# Patient Record
Sex: FEMALE | Race: WHITE | Employment: OTHER | URBAN - METROPOLITAN AREA
[De-identification: names, ages, dates, MRNs, and addresses within clinical notes are randomized per-mention and may not be internally consistent; named-entity substitution may affect disease eponyms.]

---

## 2017-11-14 DIAGNOSIS — R07.9 CHEST PAIN, UNSPECIFIED TYPE: ICD-10-CM

## 2017-11-14 RX ORDER — SIMVASTATIN 20 MG
20 TABLET ORAL AT BEDTIME
Qty: 30 TABLET | Refills: 0 | Status: SHIPPED | OUTPATIENT
Start: 2017-11-14 | End: 2020-01-23

## 2017-11-14 NOTE — TELEPHONE ENCOUNTER
Patient to f/u PRN. Given x1 month refill as pt is out of medication. Pharmacy notified to defer future refills to PMD per LOV 10/2016 w/Dr. Nicolas.

## 2018-02-15 ENCOUNTER — APPOINTMENT (OUTPATIENT)
Dept: GENERAL RADIOLOGY | Facility: CLINIC | Age: 78
End: 2018-02-15
Attending: EMERGENCY MEDICINE
Payer: COMMERCIAL

## 2018-02-15 ENCOUNTER — HOSPITAL ENCOUNTER (OUTPATIENT)
Facility: CLINIC | Age: 78
Setting detail: OBSERVATION
Discharge: HOME | End: 2018-02-16
Attending: EMERGENCY MEDICINE | Admitting: INTERNAL MEDICINE
Payer: COMMERCIAL

## 2018-02-15 DIAGNOSIS — J44.1 CHRONIC OBSTRUCTIVE PULMONARY DISEASE WITH ACUTE EXACERBATION (H): ICD-10-CM

## 2018-02-15 DIAGNOSIS — R06.02 SOB (SHORTNESS OF BREATH): ICD-10-CM

## 2018-02-15 DIAGNOSIS — R07.9 CHEST PAIN: ICD-10-CM

## 2018-02-15 LAB
ALBUMIN SERPL-MCNC: 3.4 G/DL (ref 3.4–5)
ALP SERPL-CCNC: 49 U/L (ref 40–150)
ALT SERPL W P-5'-P-CCNC: 21 U/L (ref 0–50)
ANION GAP SERPL CALCULATED.3IONS-SCNC: 4 MMOL/L (ref 3–14)
AST SERPL W P-5'-P-CCNC: 29 U/L (ref 0–45)
BASOPHILS # BLD AUTO: 0 10E9/L (ref 0–0.2)
BASOPHILS NFR BLD AUTO: 0.3 %
BILIRUB SERPL-MCNC: 0.3 MG/DL (ref 0.2–1.3)
BUN SERPL-MCNC: 15 MG/DL (ref 7–30)
CALCIUM SERPL-MCNC: 8.8 MG/DL (ref 8.5–10.1)
CHLORIDE SERPL-SCNC: 103 MMOL/L (ref 94–109)
CO2 SERPL-SCNC: 31 MMOL/L (ref 20–32)
CREAT SERPL-MCNC: 0.61 MG/DL (ref 0.52–1.04)
DIFFERENTIAL METHOD BLD: NORMAL
EOSINOPHIL # BLD AUTO: 0.2 10E9/L (ref 0–0.7)
EOSINOPHIL NFR BLD AUTO: 3.7 %
ERYTHROCYTE [DISTWIDTH] IN BLOOD BY AUTOMATED COUNT: 12.5 % (ref 10–15)
GFR SERPL CREATININE-BSD FRML MDRD: >90 ML/MIN/1.7M2
GLUCOSE SERPL-MCNC: 90 MG/DL (ref 70–99)
HCT VFR BLD AUTO: 37.2 % (ref 35–47)
HGB BLD-MCNC: 12.7 G/DL (ref 11.7–15.7)
IMM GRANULOCYTES # BLD: 0 10E9/L (ref 0–0.4)
IMM GRANULOCYTES NFR BLD: 0.2 %
LACTATE BLD-SCNC: 1.1 MMOL/L (ref 0.7–2)
LIPASE SERPL-CCNC: 296 U/L (ref 73–393)
LYMPHOCYTES # BLD AUTO: 3.4 10E9/L (ref 0.8–5.3)
LYMPHOCYTES NFR BLD AUTO: 54.3 %
MCH RBC QN AUTO: 31.8 PG (ref 26.5–33)
MCHC RBC AUTO-ENTMCNC: 34.1 G/DL (ref 31.5–36.5)
MCV RBC AUTO: 93 FL (ref 78–100)
MONOCYTES # BLD AUTO: 0.4 10E9/L (ref 0–1.3)
MONOCYTES NFR BLD AUTO: 6.8 %
NEUTROPHILS # BLD AUTO: 2.2 10E9/L (ref 1.6–8.3)
NEUTROPHILS NFR BLD AUTO: 34.7 %
NRBC # BLD AUTO: 0 10*3/UL
NRBC BLD AUTO-RTO: 0 /100
PLATELET # BLD AUTO: 201 10E9/L (ref 150–450)
POTASSIUM SERPL-SCNC: 3.5 MMOL/L (ref 3.4–5.3)
PROT SERPL-MCNC: 7.2 G/DL (ref 6.8–8.8)
RBC # BLD AUTO: 4 10E12/L (ref 3.8–5.2)
SODIUM SERPL-SCNC: 138 MMOL/L (ref 133–144)
TROPONIN I SERPL-MCNC: <0.015 UG/L (ref 0–0.04)
WBC # BLD AUTO: 6.2 10E9/L (ref 4–11)

## 2018-02-15 PROCEDURE — 80053 COMPREHEN METABOLIC PANEL: CPT | Performed by: EMERGENCY MEDICINE

## 2018-02-15 PROCEDURE — 93005 ELECTROCARDIOGRAM TRACING: CPT

## 2018-02-15 PROCEDURE — 99285 EMERGENCY DEPT VISIT HI MDM: CPT | Mod: 25

## 2018-02-15 PROCEDURE — 84484 ASSAY OF TROPONIN QUANT: CPT | Performed by: EMERGENCY MEDICINE

## 2018-02-15 PROCEDURE — 83690 ASSAY OF LIPASE: CPT | Performed by: EMERGENCY MEDICINE

## 2018-02-15 PROCEDURE — 71046 X-RAY EXAM CHEST 2 VIEWS: CPT

## 2018-02-15 PROCEDURE — 83605 ASSAY OF LACTIC ACID: CPT | Performed by: EMERGENCY MEDICINE

## 2018-02-15 PROCEDURE — 85025 COMPLETE CBC W/AUTO DIFF WBC: CPT | Performed by: EMERGENCY MEDICINE

## 2018-02-15 PROCEDURE — 94640 AIRWAY INHALATION TREATMENT: CPT

## 2018-02-15 RX ORDER — IPRATROPIUM BROMIDE AND ALBUTEROL SULFATE 2.5; .5 MG/3ML; MG/3ML
3 SOLUTION RESPIRATORY (INHALATION)
Status: DISCONTINUED | OUTPATIENT
Start: 2018-02-15 | End: 2018-02-16

## 2018-02-15 ASSESSMENT — ENCOUNTER SYMPTOMS
SHORTNESS OF BREATH: 1
DIARRHEA: 1
CHEST TIGHTNESS: 1
DIAPHORESIS: 0
VOMITING: 0
DIZZINESS: 0
NAUSEA: 0
ABDOMINAL PAIN: 1
LIGHT-HEADEDNESS: 0
COUGH: 1

## 2018-02-15 NOTE — IP AVS SNAPSHOT
MRN:[id number]                      After Visit Summary   2/15/2018    Mel Castellanos    MRN: [id number]           Thank you!     Thank you for choosing [facility] for your care. Our goal is always to provide you with excellent care. Hearing back from our patients is one way we can continue to improve our services. Please take a few minutes to complete the written survey that you may receive in the mail after you visit with us. Thank you!        Patient Information     Date Of Birth          1940        About your hospital stay     You were admitted on:  February 16, 2018 [facility]    You were discharged on:  February 16, 2018        Reason for your hospital stay       Chest pain, suspect MSK                  Who to Call     For medical emergencies, please call 911.  For non-urgent questions about your medical care, please call your primary care provider or clinic, [phone]          Attending Provider     Provider Specialty    Yvonne Reagan MD Emergency Medicine    [place], Satinder Dumont MD Internal Medicine       Primary Care Provider Office Phone # Fax #    [facility] [phone]       When to contact your care team       Call your primary doctor if you have any of the following:  increased shortness of breath or increased pain.                  After Care Instructions     Activity       Your activity upon discharge: activity as tolerated            Diet       Follow this diet upon discharge: Orders Placed This Encounter      Moderate Consistent CHO Diet                  Follow-up Appointments     Follow-up and recommended labs and tests        Follow up with primary care provider, [facility], within 7-10 days for hospital follow- up.  No follow up labs or test are needed.                  Pending Results     No orders found for last 3 day(s).            Statement of Approval      "Ordered          18 1253  I have reviewed and agree with all the recommendations and orders detailed in this document.  EFFECTIVE NOW     Approved and electronically signed by:  Oleg Peterson MD             Admission Information     Date & Time Provider Department Dept. Phone    2/15/2018 Satinder Balderas MD [facility] Observation Unit [phone]      Your Vitals Were     Blood Pressure Temperature Respirations Height Weight Pulse Oximetry    115/45 (BP Location: Right arm) 97.5  F (36.4  C) (Oral) 14 1.676 m (5' 6\") 67.9 kg (149 lb 11.2 oz) 95%    BMI (Body Mass Index)                   24.16 kg/m2           MyChart Information     One[employer]See lets you send messages to your doctor, view your test results, renew your prescriptions, schedule appointments and more. To sign up, go to [email or web address] . Click on \"Log in\" on the left side of the screen, which will take you to the Welcome page. Then click on \"Sign up Now\" on the right side of the page.     You will be asked to enter the access code listed below, as well as some personal information. Please follow the directions to create your username and password.     Your access code is: [email or web address]  Expires: 2018  1:39 PM     Your access code will  in 90 days. If you need help or a new code, please call your [place] clinic or [phone].        Care EveryWhere ID     This is your Care EveryWhere ID. This could be used by other organizations to access your [facility] medical records  [id number]        Equal Access to Services     [facility]: Hadii berlin marcelo Sohumberto, waaxda luqadaha, qaybta kaalmalashon cadet. So [facility] [phone].    ATENCIÓN: Si habla español, tiene a ware disposición servicios gratuitos de asistencia lingüística. Llame al [phone].    We comply with applicable federal civil rights laws and [place] laws. We do not discriminate on the basis of race, color, " national origin, age, disability, sex, sexual orientation, or gender identity.               Review of your medicines      CONTINUE these medicines which have NOT CHANGED        Dose / Directions    AMLODIPINE BESYLATE PO        Dose:  5 mg   Take 5 mg by mouth daily   Refills:  0       ASPIRIN EC PO        Dose:  81 mg   Take 81 mg by mouth 2 times daily   Refills:  0       CENTRUM SILVER per tablet        Dose:  1 tablet   Take 1 tablet by mouth daily   Refills:  0       IMDUR PO        Dose:  60 mg   Take 60 mg by mouth daily   Refills:  0       metFORMIN 500 MG 24 hr tablet   Commonly known as:  GLUCOPHAGE-XR        Dose:  1000 mg   Take 1,000 mg by mouth daily (with dinner)   Refills:  0       metoprolol succinate 50 MG 24 hr tablet   Commonly known as:  TOPROL-XL   Used for:  HTN (hypertension)        Dose:  50 mg   Take 1 tablet (50 mg) by mouth 2 times daily   Quantity:  180 tablet   Refills:  3       nitroGLYcerin 0.4 MG sublingual tablet   Commonly known as:  NITROSTAT   Used for:  Chest pain, unspecified type        Dose:  0.4 mg   Place 1 tablet (0.4 mg) under the tongue every 5 minutes as needed X 3 doses total if needed for chest pain.   Quantity:  25 tablet   Refills:  3       OMEPRAZOLE PO        Dose:  20 mg   Take 20 mg by mouth every morning   Refills:  0       simvastatin 20 MG tablet   Commonly known as:  ZOCOR   Used for:  Chest pain, unspecified type        Dose:  20 mg   Take 1 tablet (20 mg) by mouth At Bedtime   Quantity:  30 tablet   Refills:  0       tiotropium 18 MCG capsule   Commonly known as:  SPIRIVA        Dose:  18 mcg   Inhale 18 mcg into the lungs daily   Refills:  0         STOP taking     HYDROCHLOROTHIAZIDE PO                    Protect others around you: Learn how to safely use, store and throw away your medicines at www.disposemymeds.org.             Medication List: This is a list of all your medications and when to take them. Check marks below indicate your daily home  schedule. Keep this list as a reference.      Medications           Morning Afternoon Evening Bedtime As Needed    AMLODIPINE BESYLATE PO   Take 5 mg by mouth daily   Last time this was given:  5 mg on 2/16/2018  9:18 AM                                ASPIRIN EC PO   Take 81 mg by mouth 2 times daily   Last time this was given:  81 mg on 2/16/2018  9:18 AM                                CENTRUM SILVER per tablet   Take 1 tablet by mouth daily   Last time this was given:  1 tablet on 2/16/2018  9:18 AM                                IMDUR PO   Take 60 mg by mouth daily   Last time this was given:  30 mg on 2/16/2018  9:18 AM                                metFORMIN 500 MG 24 hr tablet   Commonly known as:  GLUCOPHAGE-XR   Take 1,000 mg by mouth daily (with dinner)                                metoprolol succinate 50 MG 24 hr tablet   Commonly known as:  TOPROL-XL   Take 1 tablet (50 mg) by mouth 2 times daily                                nitroGLYcerin 0.4 MG sublingual tablet   Commonly known as:  NITROSTAT   Place 1 tablet (0.4 mg) under the tongue every 5 minutes as needed X 3 doses total if needed for chest pain.   Last time this was given:  0.4 mg on 2/16/2018 12:21 AM                                OMEPRAZOLE PO   Take 20 mg by mouth every morning   Last time this was given:  20 mg on 2/16/2018  9:18 AM                                simvastatin 20 MG tablet   Commonly known as:  ZOCOR   Take 1 tablet (20 mg) by mouth At Bedtime                                tiotropium 18 MCG capsule   Commonly known as:  SPIRIVA   Inhale 18 mcg into the lungs daily

## 2018-02-15 NOTE — IP AVS SNAPSHOT
[facility] Observation [facility]    [address] [place] [id number]    Phone:  [phone]                                       After Visit Summary   2/15/2018    Mel Castellanos    MRN: [id number]           After Visit Summary Signature Page     I have received my discharge instructions, and my questions have been answered. I have discussed any challenges I see with this plan with the nurse or doctor.    ..........................................................................................................................................  Patient/Patient Representative Signature      ..........................................................................................................................................  Patient Representative Print Name and Relationship to Patient    ..................................................               ................................................  Date                                            Time    ..........................................................................................................................................  Reviewed by Signature/Title    ...................................................              ..............................................  Date                                                            Time

## 2018-02-16 VITALS
TEMPERATURE: 97.5 F | RESPIRATION RATE: 14 BRPM | OXYGEN SATURATION: 95 % | BODY MASS INDEX: 24.06 KG/M2 | DIASTOLIC BLOOD PRESSURE: 45 MMHG | SYSTOLIC BLOOD PRESSURE: 115 MMHG | HEIGHT: 66 IN | WEIGHT: 149.7 LBS

## 2018-02-16 LAB
ALBUMIN UR-MCNC: 10 MG/DL
APPEARANCE UR: CLEAR
BILIRUB UR QL STRIP: NEGATIVE
COLOR UR AUTO: YELLOW
GLUCOSE BLDC GLUCOMTR-MCNC: 106 MG/DL (ref 70–99)
GLUCOSE BLDC GLUCOMTR-MCNC: 114 MG/DL (ref 70–99)
GLUCOSE BLDC GLUCOMTR-MCNC: 134 MG/DL (ref 70–99)
GLUCOSE UR STRIP-MCNC: NEGATIVE MG/DL
HBA1C MFR BLD: 6.5 % (ref 4.3–6)
HGB UR QL STRIP: NEGATIVE
INTERPRETATION ECG - MUSE: NORMAL
KETONES UR STRIP-MCNC: NEGATIVE MG/DL
LEUKOCYTE ESTERASE UR QL STRIP: NEGATIVE
MUCOUS THREADS #/AREA URNS LPF: PRESENT /LPF
NITRATE UR QL: NEGATIVE
PH UR STRIP: 6 PH (ref 5–7)
RBC #/AREA URNS AUTO: 1 /HPF (ref 0–2)
SOURCE: ABNORMAL
SP GR UR STRIP: 1.02 (ref 1–1.03)
SQUAMOUS #/AREA URNS AUTO: <1 /HPF (ref 0–1)
TROPONIN I SERPL-MCNC: <0.015 UG/L (ref 0–0.04)
TROPONIN I SERPL-MCNC: <0.015 UG/L (ref 0–0.04)
UROBILINOGEN UR STRIP-MCNC: 2 MG/DL (ref 0–2)
WBC #/AREA URNS AUTO: 1 /HPF (ref 0–2)

## 2018-02-16 PROCEDURE — 25000132 ZZH RX MED GY IP 250 OP 250 PS 637: Performed by: INTERNAL MEDICINE

## 2018-02-16 PROCEDURE — 81001 URINALYSIS AUTO W/SCOPE: CPT | Performed by: INTERNAL MEDICINE

## 2018-02-16 PROCEDURE — 84484 ASSAY OF TROPONIN QUANT: CPT | Mod: 91 | Performed by: INTERNAL MEDICINE

## 2018-02-16 PROCEDURE — 25000125 ZZHC RX 250: Performed by: EMERGENCY MEDICINE

## 2018-02-16 PROCEDURE — 99207 ZZC APP CREDIT; MD BILLING SHARED VISIT: CPT | Performed by: HOSPITALIST

## 2018-02-16 PROCEDURE — 25000132 ZZH RX MED GY IP 250 OP 250 PS 637: Performed by: EMERGENCY MEDICINE

## 2018-02-16 PROCEDURE — 99236 HOSP IP/OBS SAME DATE HI 85: CPT | Performed by: INTERNAL MEDICINE

## 2018-02-16 PROCEDURE — 36415 COLL VENOUS BLD VENIPUNCTURE: CPT | Performed by: INTERNAL MEDICINE

## 2018-02-16 PROCEDURE — G0378 HOSPITAL OBSERVATION PER HR: HCPCS

## 2018-02-16 PROCEDURE — 83036 HEMOGLOBIN GLYCOSYLATED A1C: CPT | Performed by: INTERNAL MEDICINE

## 2018-02-16 PROCEDURE — 00000146 ZZHCL STATISTIC GLUCOSE BY METER IP

## 2018-02-16 PROCEDURE — 94640 AIRWAY INHALATION TREATMENT: CPT

## 2018-02-16 RX ORDER — LORATADINE 10 MG/1
10 TABLET ORAL DAILY
Status: DISCONTINUED | OUTPATIENT
Start: 2018-02-16 | End: 2018-02-16 | Stop reason: HOSPADM

## 2018-02-16 RX ORDER — ASPIRIN 81 MG/1
81 TABLET ORAL DAILY
Status: DISCONTINUED | OUTPATIENT
Start: 2018-02-17 | End: 2018-02-16

## 2018-02-16 RX ORDER — AMLODIPINE BESYLATE 5 MG/1
5 TABLET ORAL DAILY
Status: DISCONTINUED | OUTPATIENT
Start: 2018-02-16 | End: 2018-02-16 | Stop reason: HOSPADM

## 2018-02-16 RX ORDER — ISOSORBIDE MONONITRATE 30 MG/1
30 TABLET, EXTENDED RELEASE ORAL DAILY
Status: DISCONTINUED | OUTPATIENT
Start: 2018-02-16 | End: 2018-02-16 | Stop reason: HOSPADM

## 2018-02-16 RX ORDER — NALOXONE HYDROCHLORIDE 0.4 MG/ML
.1-.4 INJECTION, SOLUTION INTRAMUSCULAR; INTRAVENOUS; SUBCUTANEOUS
Status: DISCONTINUED | OUTPATIENT
Start: 2018-02-16 | End: 2018-02-16 | Stop reason: HOSPADM

## 2018-02-16 RX ORDER — MORPHINE SULFATE 2 MG/ML
2 INJECTION, SOLUTION INTRAMUSCULAR; INTRAVENOUS
Status: DISCONTINUED | OUTPATIENT
Start: 2018-02-16 | End: 2018-02-16 | Stop reason: HOSPADM

## 2018-02-16 RX ORDER — MULTIPLE VITAMINS W/ MINERALS TAB 9MG-400MCG
1 TAB ORAL DAILY
Status: DISCONTINUED | OUTPATIENT
Start: 2018-02-16 | End: 2018-02-16 | Stop reason: HOSPADM

## 2018-02-16 RX ORDER — METFORMIN HCL 500 MG
1000 TABLET, EXTENDED RELEASE 24 HR ORAL
COMMUNITY
End: 2020-01-23

## 2018-02-16 RX ORDER — NICOTINE POLACRILEX 4 MG
15-30 LOZENGE BUCCAL
Status: DISCONTINUED | OUTPATIENT
Start: 2018-02-16 | End: 2018-02-16 | Stop reason: HOSPADM

## 2018-02-16 RX ORDER — LIDOCAINE 40 MG/G
CREAM TOPICAL
Status: DISCONTINUED | OUTPATIENT
Start: 2018-02-16 | End: 2018-02-16 | Stop reason: HOSPADM

## 2018-02-16 RX ORDER — ROPINIROLE 1 MG/1
1 TABLET, FILM COATED ORAL AT BEDTIME
Status: DISCONTINUED | OUTPATIENT
Start: 2018-02-17 | End: 2018-02-16 | Stop reason: HOSPADM

## 2018-02-16 RX ORDER — ACETAMINOPHEN 325 MG/1
650 TABLET ORAL EVERY 4 HOURS PRN
Status: DISCONTINUED | OUTPATIENT
Start: 2018-02-16 | End: 2018-02-16 | Stop reason: HOSPADM

## 2018-02-16 RX ORDER — ASPIRIN 81 MG/1
162 TABLET, CHEWABLE ORAL ONCE
Status: DISCONTINUED | OUTPATIENT
Start: 2018-02-16 | End: 2018-02-16 | Stop reason: HOSPADM

## 2018-02-16 RX ORDER — ASPIRIN 81 MG/1
81 TABLET ORAL 2 TIMES DAILY
Status: DISCONTINUED | OUTPATIENT
Start: 2018-02-16 | End: 2018-02-16 | Stop reason: HOSPADM

## 2018-02-16 RX ORDER — SIMVASTATIN 10 MG
20 TABLET ORAL AT BEDTIME
Status: DISCONTINUED | OUTPATIENT
Start: 2018-02-17 | End: 2018-02-16 | Stop reason: HOSPADM

## 2018-02-16 RX ORDER — ALBUTEROL SULFATE 0.83 MG/ML
2.5 SOLUTION RESPIRATORY (INHALATION) EVERY 4 HOURS PRN
Status: DISCONTINUED | OUTPATIENT
Start: 2018-02-16 | End: 2018-02-16 | Stop reason: HOSPADM

## 2018-02-16 RX ORDER — ACETAMINOPHEN 650 MG/1
650 SUPPOSITORY RECTAL EVERY 4 HOURS PRN
Status: DISCONTINUED | OUTPATIENT
Start: 2018-02-16 | End: 2018-02-16 | Stop reason: HOSPADM

## 2018-02-16 RX ORDER — ALUMINA, MAGNESIA, AND SIMETHICONE 2400; 2400; 240 MG/30ML; MG/30ML; MG/30ML
30 SUSPENSION ORAL EVERY 4 HOURS PRN
Status: DISCONTINUED | OUTPATIENT
Start: 2018-02-16 | End: 2018-02-16 | Stop reason: HOSPADM

## 2018-02-16 RX ORDER — DEXTROSE MONOHYDRATE 25 G/50ML
25-50 INJECTION, SOLUTION INTRAVENOUS
Status: DISCONTINUED | OUTPATIENT
Start: 2018-02-16 | End: 2018-02-16 | Stop reason: HOSPADM

## 2018-02-16 RX ORDER — NITROGLYCERIN 0.4 MG/1
0.4 TABLET SUBLINGUAL EVERY 5 MIN PRN
Status: DISCONTINUED | OUTPATIENT
Start: 2018-02-16 | End: 2018-02-16

## 2018-02-16 RX ORDER — NITROGLYCERIN 0.4 MG/1
0.4 TABLET SUBLINGUAL EVERY 5 MIN PRN
Status: DISCONTINUED | OUTPATIENT
Start: 2018-02-16 | End: 2018-02-16 | Stop reason: HOSPADM

## 2018-02-16 RX ORDER — IPRATROPIUM BROMIDE AND ALBUTEROL SULFATE 2.5; .5 MG/3ML; MG/3ML
3 SOLUTION RESPIRATORY (INHALATION) EVERY 4 HOURS PRN
Status: DISCONTINUED | OUTPATIENT
Start: 2018-02-16 | End: 2018-02-16

## 2018-02-16 RX ADMIN — IPRATROPIUM BROMIDE AND ALBUTEROL SULFATE 3 ML: .5; 3 SOLUTION RESPIRATORY (INHALATION) at 00:36

## 2018-02-16 RX ADMIN — AMLODIPINE BESYLATE 5 MG: 5 TABLET ORAL at 09:18

## 2018-02-16 RX ADMIN — MULTIPLE VITAMINS W/ MINERALS TAB 1 TABLET: TAB at 09:18

## 2018-02-16 RX ADMIN — UMECLIDINIUM 1 PUFF: 62.5 AEROSOL, POWDER ORAL at 09:18

## 2018-02-16 RX ADMIN — CHOLECALCIFEROL CAP 125 MCG (5000 UNIT) 5000 UNITS: 125 CAP at 09:18

## 2018-02-16 RX ADMIN — LORATADINE 10 MG: 10 TABLET ORAL at 09:18

## 2018-02-16 RX ADMIN — ASPIRIN 81 MG: 81 TABLET, COATED ORAL at 09:18

## 2018-02-16 RX ADMIN — ISOSORBIDE MONONITRATE 30 MG: 30 TABLET, EXTENDED RELEASE ORAL at 09:18

## 2018-02-16 RX ADMIN — NITROGLYCERIN 0.4 MG: 0.4 TABLET SUBLINGUAL at 00:21

## 2018-02-16 RX ADMIN — OMEPRAZOLE 20 MG: 20 CAPSULE, DELAYED RELEASE ORAL at 09:18

## 2018-02-16 RX ADMIN — IPRATROPIUM BROMIDE AND ALBUTEROL SULFATE 3 ML: .5; 3 SOLUTION RESPIRATORY (INHALATION) at 00:06

## 2018-02-16 NOTE — PROGRESS NOTES
"RN spoke with pt and advised per MD recommendation that stress test be completed due to history of abnormal stress test in November 2017. Pt continues to refuse stress test. Pt states she feels better. \"I just don't want to go through that again.\" Pt remains pleasant but does not want to have any more testing done.  "

## 2018-02-16 NOTE — H&P
[facility]    History and Physical  Hospitalist       Date of Admission:  2/15/2018  Date of Service (when I saw the patient): 02/16/18    Assessment & Plan   Mel Castellanos is a 77 year old female who presents with chest pain    Chest pain  Angina, suspected  Ms. Castellanos is a pleasant 77-year-old female with medical history remarkable for coronary disease with PCI in 2011 angiogram 2014, diabetes mellitus, hypertension, hyperlipidemia, COPD, history of TIA and restless leg syndrome who comes in today after having an episode of chest pain.  It is similar to previous episodes of chest pain that she had when she had her heart attacks.  Her initial troponin is negative.  EKG does not show any ischemic changes.  -We will admit to observation to rule out MI  -Serial troponins  -Telemetry  -Oxygen to keep oxygen sats were 92%  -Plan on Lexiscan nuclear stress test in the morning  -She has received aspirin already and will continue  -If her troponins become positive or she develops recurrent and worsening chest pain may need to transfer her to inpatient status heparinize.  At this point I do not feel this is necessary.    Diabetes mellitus type 2  She takes metformin for this.  -Hold metformin  -Blood sugar checks  -Moderate CHO diet  -A1c    Hypertension  We will continue her outpatient regimen including amlodipine 5 mg daily and Imdur 30 mg daily.  -Hold metoprolol for now as Lexiscan is planned for today  -Blood pressures under excellent control    Hyperlipidemia  Continue her simvastatin at 20 mg at at bedtime    COPD  Lungs appear clear with good air movement no wheezing appreciated  -continue her prior to admission Spiriva  -As needed duo nebs every 4 hours  -Continue Claritin    Restless leg syndrome  Continue Requip    Question urinary tract infection  Patient reports having history of urinary tract infection.  She feels as if she may have a right now.  Will send UA and follow-up.    DVT  Prophylaxis: Ambulate every shift  Code Status: Full Code    Disposition: Expected discharge in 1-2 days pending the results of the above evaluation..    Satinder Balderas MD  [phone] (P)  Text Page     Primary Care Physician   [facility]    Chief Complaint   Chest pain    History is obtained from the patient and medical records    History of Present Illness   Mel Castellanos is a 77 year old female who presents with chest pain.  Patient was in her normal state of health when she is at the kitchen washing dishes today.  She had sudden onset of pain underneath her left breast.  She states she has short of breath with this although it is a little unclear whether this is new shortness of breath are related to severe COPD.  She also does not appear to have any other related symptoms including nausea/vomiting or diaphoresis.  She took 2 nitro with the second nitro helping her pain.  She called 911 was transported here.  The time of my visit her pain is largely resolved.  She denies any shortness of breath this time.  Otherwise her review of systems was negative/normal.    Past Medical History    I have reviewed this patient's medical history and updated it with pertinent information if needed.   Past Medical History:   Diagnosis Date     Arthritis      Emphysema of lung (H)      High cholesterol      HTN (hypertension)      Insomnia      MI (myocardial infarction)      Restless leg syndrome      TIA (transient ischaemic attack)      Type 2 diabetes mellitus without complications (H)      UTI (urinary tract infection)        Past Surgical History   I have reviewed this patient's surgical history and updated it with pertinent information if needed.  Past Surgical History:   Procedure Laterality Date     APPENDECTOMY       CHOLECYSTECTOMY         Prior to Admission Medications   Prior to Admission Medications   Prescriptions Last Dose Informant Patient Reported? Taking?   ASPIRIN EC PO  Daughter Yes  No   Sig: Take 81 mg by mouth 2 times daily   Cholecalciferol (VITAMIN D3 PO)   Yes No   Sig: Take 5,000 Units by mouth daily   METFORMIN HCL PO  Daughter Yes No   Sig: Take 1,000 mg by mouth 2 times daily (with meals)    Multiple Vitamins-Minerals (CENTRUM SILVER) per tablet  Daughter Yes No   Sig: Take 1 tablet by mouth daily   OMEPRAZOLE PO  Daughter Yes No   Sig: Take 20 mg by mouth every morning    ROPINIROLE HCL PO  Daughter Yes No   Sig: Take 1 mg by mouth At Bedtime Restless legs   amLODIPine (NORVASC) 2.5 MG tablet   No No   Sig: Take 2 tablets (5 mg) by mouth daily   ipratropium - albuterol 0.5 mg/2.5 mg/3 mL (DUONEB) 0.5-2.5 (3) MG/3ML nebulization  Daughter Yes No   Sig: Take 1 vial by nebulization 3 times daily as needed for shortness of breath / dyspnea or wheezing   isosorbide mononitrate (IMDUR) 30 MG 24 hr tablet   No No   Sig: Take 1 tablet (30 mg) by mouth daily   loratadine (CLARITIN) 10 MG tablet  Daughter Yes No   Sig: Take 10 mg by mouth daily   metoprolol (TOPROL-XL) 50 MG 24 hr tablet   No No   Sig: Take 1 tablet (50 mg) by mouth 2 times daily   nitroglycerin (NITROSTAT) 0.4 MG SL tablet   No No   Sig: Place 1 tablet (0.4 mg) under the tongue every 5 minutes as needed X 3 doses total if needed for chest pain.   simvastatin (ZOCOR) 20 MG tablet   No No   Sig: Take 1 tablet (20 mg) by mouth At Bedtime   tiotropium (SPIRIVA) 18 MCG inhalation capsule  Daughter Yes No   Sig: Inhale 18 mcg into the lungs daily      Facility-Administered Medications: None     Allergies   Allergies   Allergen Reactions     Sulfa Drugs Rash       Social History   I have reviewed this patient's social history and updated it with pertinent information if needed. Mel TAMIE Castellanos  reports that she has been smoking.  She has been smoking about 0.25 packs per day. She does not have any smokeless tobacco history on file. She reports that she does not drink alcohol or use illicit drugs.    Family History   I have reviewed  this patient's family history and updated it with pertinent information if needed.   Family History   Problem Relation Age of Onset     Coronary Artery Disease Father        Review of Systems   The 10 point Review of Systems is negative other than noted in the HPI or here.     Physical Exam   Temp: 98.1  F (36.7  C) Temp src: Oral BP: 110/57   Heart Rate: 68 Resp: 16 SpO2: 94 % O2 Device: None (Room air)    Vital Signs with Ranges  0 lbs 0 oz    Constitutional: alert, oriented and in no acute distress  Eyes: EOMI, PERRL  HEENT: OP clear  Respiratory: CTA B without wheezing appreciated.  Cardiovascular: RRR without m/r/g  GI: soft, nontender, nondistended, no HSM  Lymph/Hematologic: no cervical LAD  Genitourinary: deferred  Skin: no rashes or lesions grossly  Musculoskeletal: no deformities or arthritis  Neurologic: CN II-XII, HOOD, sensation grossly intact  Psychiatric: mood and affect wnl    Data   Data reviewed today:  I personally reviewed the EKG tracing showing No acute ischemic changes and the chest x-ray image(s) showing No acute infiltrates or changes..    Recent Labs  Lab 02/15/18  2325   WBC 6.2   HGB 12.7   MCV 93         POTASSIUM 3.5   CHLORIDE 103   CO2 31   BUN 15   CR 0.61   ANIONGAP 4   LAUREN 8.8   GLC 90   ALBUMIN 3.4   PROTTOTAL 7.2   BILITOTAL 0.3   ALKPHOS 49   ALT 21   AST 29   LIPASE 296   TROPI <0.015       Recent Results (from the past 24 hour(s))   XR Chest 2 Views    Narrative    CHEST 2 VIEWS  2/15/2018 11:46 PM     HISTORY: Chest pain.    COMPARISON: 8/4/2016.    FINDINGS: A few possible tiny calcified granulomas in the right lung  apex. The lungs are otherwise clear. Normal-sized cardiac silhouette.  Atherosclerotic calcification in the thoracic aorta.      Impression    IMPRESSION: No evidence of active cardiopulmonary disease.

## 2018-02-16 NOTE — PROGRESS NOTES
A&Ox4, forgetful at times. VSS on RA. Up independently. Denies pain. Discharge instructions reviewed with pt; understanding verbalized.

## 2018-02-16 NOTE — PHARMACY-ADMISSION MEDICATION HISTORY
Admission medication history interview status for the 2/15/2018  admission is complete. See EPIC admission navigator for prior to admission medications     Medication history source reliability:Good    Actions taken by pharmacist (provider contacted, etc):interviewed patient and called [facility] pharmacy for doses/meds as pt couldn't remember them all.     Additional medication history information not noted on PTA med list :None    Medication reconciliation/reorder completed by provider prior to medication history? No    Time spent in this activity: 25 minutes    Prior to Admission medications    Medication Sig Last Dose Taking? Auth Provider   AMLODIPINE BESYLATE PO Take 5 mg by mouth daily 2/15/2018 at Unknown time Yes Unknown, Entered By History   metFORMIN (GLUCOPHAGE-XR) 500 MG 24 hr tablet Take 1,000 mg by mouth daily (with dinner) 2/15/2018 at Unknown time Yes Unknown, Entered By History   HYDROCHLOROTHIAZIDE PO Take 25 mg by mouth daily 2/15/2018 at Unknown time Yes Unknown, Entered By History   Isosorbide Mononitrate (IMDUR PO) Take 60 mg by mouth daily 2/15/2018 at Unknown time Yes Unknown, Entered By History   simvastatin (ZOCOR) 20 MG tablet Take 1 tablet (20 mg) by mouth At Bedtime 2/15/2018 at Unknown time Yes Briseida Nicolas,    metoprolol (TOPROL-XL) 50 MG 24 hr tablet Take 1 tablet (50 mg) by mouth 2 times daily 2/15/2018 at Unknown time Yes Briseida Nicolas, DO   ASPIRIN EC PO Take 81 mg by mouth 2 times daily 2/15/2018 at Unknown time Yes Unknown, Entered By History   Multiple Vitamins-Minerals (CENTRUM SILVER) per tablet Take 1 tablet by mouth daily 2/15/2018 at Unknown time Yes Reported, Patient   tiotropium (SPIRIVA) 18 MCG inhalation capsule Inhale 18 mcg into the lungs daily 2/15/2018 at Unknown time Yes Reported, Patient   OMEPRAZOLE PO Take 20 mg by mouth every morning  2/15/2018 at Unknown time Yes Reported, Patient   nitroglycerin (NITROSTAT) 0.4 MG SL tablet Place 1  tablet (0.4 mg) under the tongue every 5 minutes as needed X 3 doses total if needed for chest pain.   Briseida Nicolas,           1

## 2018-02-16 NOTE — PLAN OF CARE
Problem: Patient Care Overview  Goal: Plan of Care/Patient Progress Review  Outcome: No Change  AVSS; pt denied pain during the night; on 1L/NC; UA sent; troponins negative so far; tele SR; plan for stress test today.

## 2018-02-16 NOTE — ED NOTES
Bed: ED21  Expected date: 2/15/18  Expected time: 11:00 PM  Means of arrival: Ambulance  Comments:  HEMS [phone] abd. pain

## 2018-02-16 NOTE — ED NOTES
[facility]  ED Nurse Handoff Report    ED Chief complaint: Chest Pain (c/o L lower CP with onset 40mins pta. EMS reports LLQ tenderness. +SOB with hx of COPD. Took 2 nitro at home. )      ED Diagnosis:   Final diagnoses:   Chest pain       Code Status: Full Code    Allergies:   Allergies   Allergen Reactions     Sulfa Drugs Rash       Activity level - Baseline/Home:  Independent    Activity Level - Current:   Stand with Assist     Needed?: No    Isolation: No  Infection: Not Applicable    Bariatric?: No    Vital Signs:   Vitals:    02/16/18 0002 02/16/18 0003 02/16/18 0004 02/16/18 0020   BP: 131/68   130/70   Resp:  25 12 9   Temp:       TempSrc:       SpO2: 94%  97% 97%       Cardiac Rhythm: ,        Pain level: 0-10 Pain Scale: 5    Is this patient confused?: No    Patient Report: Initial Complaint: started having chest pain 40min PTA,  Reports has hx of COPD and that she was feeling short of breath. Took 2 nitro at home with no relief, called EMS.  Pt c/o 4/10 here . Pt A/o.   Focused Assessment: chest pain  Tests Performed: labs, cxr  Abnormal Results: none  Treatments provided: nitro x1 pain 4/10 to 3/10.  duoneb given x2  Pt placed on O2 at 2liters per NC SAO2 90% room air    Family Comments: lives alone    OBS brochure/video discussed/provided to patient: Yes    ED Medications:   Medications   ipratropium - albuterol 0.5 mg/2.5 mg/3 mL (DUONEB) neb solution 3 mL (3 mLs Nebulization Given 2/16/18 0036)   nitroGLYcerin (NITROSTAT) sublingual tablet 0.4 mg (0.4 mg Sublingual Given 2/16/18 0021)       Drips infusing?:  No      ED NURSE PHONE NUMBER: [phone]

## 2018-02-16 NOTE — PROGRESS NOTES
RECEIVING UNIT ED HANDOFF REVIEW    ED Nurse Handoff Report was reviewed by: Judy Laurent on February 16, 2018 at 1:05 AM

## 2018-02-16 NOTE — ED PROVIDER NOTES
History     Chief Complaint:  Chest Pain    HPI   Mel Castellanos is a 77 year old female with a history of COPD not on baseline oxygen, MI, and TIA who presents with chest pain. The patient reports developing left lower chest pain 40 minutes prior to arrival tonight. She states that she was standing in the kitchen when she developed chest tightness with shortness of breath but no diaphoresis, nausea, or vomiting. She took 2 Nitroglycerin at home and called EMS for further transport; she presents to the ED and states that her discomfort has decreased to a 5/10 in severity following the nitro. She has a history of COPD and reports feeling chronic shortness of breath but not new or worsening now. She notes diarrhea without black or bloody stools. She denies lightheadedness or dizziness. EMS additionally reports her having left lower quadrant abdominal pain but the patient notes this to be chronic and diffuse across her abdomen. Of note, the patient additionally notes worsening cough with increased phlegm production recently.    Allergies:  Sulfa Drugs      Medications:    Zocor  Metoprolol  Nitroglycerin  Imdur  Norvasc  Claritin  Aspirin   Duoneb  Spiriva  Metformin  Omeprazole  Ropinirole      Past Medical History:    Arthritis  Emphysema of lung  High cholesterol   Hypertension   MI  TIA  Diabetes type II  UTI    Past Surgical History:    Appendectomy  Cholecystectomy    Family History:    CAD - father    Social History:  Smoking status: yes  Alcohol use: no   PCP: Monroe Cheng   Patient presents via EMS.   Marital Status:       Review of Systems   Constitutional: Negative for diaphoresis.   HENT: Positive for congestion.    Respiratory: Positive for cough, chest tightness and shortness of breath.    Cardiovascular: Positive for chest pain.   Gastrointestinal: Positive for abdominal pain and diarrhea. Negative for nausea and vomiting.   Neurological: Negative for dizziness and  light-headedness.   All other systems reviewed and are negative.    Physical Exam     Patient Vitals for the past 24 hrs:   BP Temp Temp src Heart Rate Resp SpO2   02/16/18 0043 - - - 64 19 98 %   02/16/18 0042 - - - 68 10 99 %   02/16/18 0041 - - - 64 (!) 7 -   02/16/18 0040 107/49 - - 68 9 -   02/16/18 0020 130/70 - - 66 9 97 %   02/16/18 0004 - - - 68 12 97 %   02/16/18 0003 - - - - 25 -   02/16/18 0002 131/68 - - - - 94 %   02/15/18 2309 139/71 98.1  F (36.7  C) Oral 65 18 95 %      Physical Exam  Physical Exam   General: Resting on the bed.  Ears, Nose, Throat:  External ears normal.  Nose normal.  .    Eyes:  Conjunctivae clear.  Pupils are equal, round, and reactive.   Neck: Normal range of motion.  Neck supple.   CV: Regular rate and rhythm.  No murmurs.      Respiratory: Effort normal and breath sounds diminished and scattered wheezing  Gastrointestinal: Soft.  No distension. There is diffuse tenderness.  There is no rigidity, no rebound and no guarding.   Neuro: Alert. Moving all extremities appropriately.  Normal speech.    Skin: Skin is warm and dry.  No rash noted.     Emergency Department Course   ECG (23:20):  Rate 63 bpm. PA interval 164. QT/QTc 418/427.   Normal sinus rhythm. Nonspecific ST abnormality. Abnormal ECG.  Interpreted at 2325 by Yvonne Reagan MD.      Imaging:  Radiographic findings were communicated with the patient who voiced understanding of the findings.    X-ray Chest, 2 views:  No evidence of active cardiopulmonary disease.   As read by Radiology.     Laboratory:  CBC: WNL (WBC 6.2, HGB 12.7, )   CMP: AWNL (Creatinine 0.61)  Lipase: 296  Troponin: <0.015  Lactic: 1.1    Interventions:  0021: Nitroglycerin 0.4 mg SL  0036: Duoneb 3 mL Nebulization    Emergency Department Course:  Past medical records, nursing notes, and vitals reviewed.  2309: I performed an exam of the patient and obtained history, as documented above. GCS 15.  IV inserted and blood drawn.  The  patient was taken for xray, see imaging results above.    I rechecked and updated the patient  Findings and plan explained to the Patient who consents to admission.     0030: Discussed the patient with Dr. Balderas, who will admit the patient to an observation bed for further monitoring, evaluation, and treatment.      Impression & Plan      Medical Decision Making:  Mel Castellanos is a 77 year old female with a history of CAD, prior MI, diabetes, COPD, presenting with chest discomfort. Vital signs unremarkable. Broad differential diagnosis includes but not limited to COPD exacerbation, CHF, ACS, PE, pneumonia, pneumothorax, dissection, etc. Overall, patient is well appearing and non toxic. She describes some vague abdominal pain, therefore, abdominal labs were added on as well. Lactate normal, not concerning for ischemic process. Lipase and LFT's unremarkable, not concerned for obstructive biliary etiology, pancreatitis, or hepatitis. BMP without any acute electrolyte, renal, or metabolic abnormality. CBC without leukocytosis or anemia. Chest xray shows no evidence of acute cardiopulmonary disease. Patient denies any tearing pain, back pain, and has symmetric peripheral pulses, not concerning for dissection. Considered PE but patient has no pleuritic chest pain, no significant shortness of breath, no hypoxia or tachycardia, not concerning for PE. I suspect there is some element of COPD as the patient is quite wheezy and sounds rattley on examination. She was given some nebulizers.  EKG obtained and reviewed showing sinus rhythm, no acute ST or T wave changes, overall, relatively unchanged from prior EKG. Troponin is negative. Patient's pain started only 40 minutes prior to presentation. This is concerning for possible ACS as she described it as similar chest pain to when she had a heart attack. She is also a heart score of 6 based on risk factors, age, etc. Therefore the decision was made to bring her into  observation for ACS rule out.  I discussed the patient with Dr. Balderas, who graciously accepted. Patient was admitted in stable condition.    Diagnosis:    ICD-10-CM    1. Chest pain R07.9 Hemoglobin A1c     UA with Microscopic reflex to Culture     Troponin I - Now then in 4 hours x 2      Troponin I - Now then in 4 hours x 2      Glucose by meter     Glucose by meter     CANCELED: UA reflex to Microscopic and Culture   2. SOB (shortness of breath) R06.02    3. Chronic obstructive pulmonary disease with acute exacerbation (H) J44.1        Disposition:  Admitted to observation    Rosmery Avalos  2/15/2018    EMERGENCY DEPARTMENT  I, Rosmery Avalos, am serving as a scribe at 11:09 PM on 2/15/2018 to document services personally performed by Yvonne Reagan MD based on my observations and the provider's statements to me.        Yvonne Reagan MD  02/16/18 0729

## 2018-02-16 NOTE — PROGRESS NOTES
Observation Goals:    Serial troponins and stress test complete. - not met; needs 0600 troponin check and stress test today  - Seen and cleared by consultant if applicable - not met  - Adequate pain control on oral analgesia - met  - Vital signs normal or at patient baseline - met  - Safe disposition plan has been identified - partially met

## 2018-02-16 NOTE — PROGRESS NOTES
Observation Goals:      Serial troponins and stress test complete. - not met; trops neg x3, pt refusing stress test  - Seen and cleared by consultant if applicable - n/a  - Adequate pain control on oral analgesia - denies pain  - Vital signs normal or at patient baseline - met  - Safe disposition plan has been identified - met, will discharge home

## 2018-02-16 NOTE — PROGRESS NOTES
Observation Goals:     Serial troponins and stress test complete. - not met; trops neg x3, pt refusing stress test  - Seen and cleared by consultant if applicable - not met  - Adequate pain control on oral analgesia - denies pain  - Vital signs normal or at patient baseline - met  - Safe disposition plan has been identified - partially met

## 2018-02-18 NOTE — DISCHARGE SUMMARY
"[facility]    Discharge Summary  Hospitalist    Date of Admission:  2/15/2018  Date of Discharge:  2/16/2018  1:56 PM  Discharging Provider: Oleg Peterson MD  Date of Service (when I saw the patient): 2/16/18    Discharge Diagnoses   Atypical chest pain, resolved  HTN, Essential  DM2  Hyperlipidemia  Restless leg syndrome  COPD      History of Present Illness   From H&P \"Mel Castellanos is a 77 year old female who presents with chest pain.  Patient was in her normal state of health when she is at the kitchen washing dishes today.  She had sudden onset of pain underneath her left breast.  She states she has short of breath with this although it is a little unclear whether this is new shortness of breath are related to severe COPD.  She also does not appear to have any other related symptoms including nausea/vomiting or diaphoresis.  She took 2 nitro with the second nitro helping her pain.  She called 911 was transported here.  The time of my visit her pain is largely resolved.  She denies any shortness of breath this time.  Otherwise her review of systems was negative/normal.\"    Hospital Course   Mel Castellanos was admitted on 2/15/2018.  The following problems were addressed during her hospitalization:     Chest pain  Angina, suspected  Ms. Castellanos is a pleasant 77-year-old female with medical history remarkable for coronary disease with PCI in 2011 angiogram 2014, diabetes mellitus, hypertension, hyperlipidemia, COPD, history of TIA and restless leg syndrome who came to ER after having an episode of chest pain. Patient reported on admission that her pain was similar to previous episodes of chest pain that she had when she had her heart attacks, though subsequently reported it started when she was cleaning the dog kennel, and some soiled linen. Her serial troponin remained negative.  EKG did not show any ischemic changes. She remained chest pain free during hospital stay. Did not have any " recurrence of pain, and reported that her pain was more during hand movement after cleaning the dog kennel. She had stress test 5 months ago, and had followed by cardiologist at outpatient. Patient did not want further testing, which I also felt was not needed given atypical pain. She was discharged     Question urinary tract infection  Patient reports having history of urinary tract infection.  She feels as if she may have a right now. UA was negative.    PTA medications were continued for her other stable medical conditions at discharge which included:   Diabetes mellitus type 2   Hypertension   Hyperlipidemia  COPD  Restless leg syndrome.    Oleg Peterson MD  Hospitalist    Significant Results and Procedures   Labs- reports attached.  CXR report attached    Pending Results   These results will be followed up by none    Unresulted Labs Ordered in the Past 30 Days of this Admission     No orders found from 12/17/2017 to 2/16/2018.          Code Status   Full Code       Primary Care Physician   [facility]    Constitutional: Alert, awake. Not in distress.  HEENT: PERRLA EOMI   Respiratory: Bilateral equal air entry, clear to auscultation. No respiratory distress.  Cardiovascular: Regular s1s2, no murmur, rub or gallop. No tachycardia.  GI: Soft, non distended, non tender, bowel tones active.  Skin/Integumen: No rash, no blister.    Discharge Disposition   Discharged to home  Condition at discharge: Stable    Consultations This Hospital Stay   None    Time Spent on this Encounter   I, Oleg Peterson, personally saw the patient today and spent less than or equal to 30 minutes discharging this patient.    Discharge Orders     Reason for your hospital stay   Chest pain, suspect MSK     Follow-up and recommended labs and tests    Follow up with primary care provider, [facility], within 7-10 days for hospital follow- up.  No follow up labs or test are needed.     Activity   Your  activity upon discharge: activity as tolerated     When to contact your care team   Call your primary doctor if you have any of the following:  increased shortness of breath or increased pain.     Full Code     Diet   Follow this diet upon discharge: Orders Placed This Encounter     Moderate Consistent CHO Diet       Discharge Medications   Discharge Medication List as of 2/16/2018  1:40 PM      CONTINUE these medications which have NOT CHANGED    Details   AMLODIPINE BESYLATE PO Take 5 mg by mouth daily, Historical      metFORMIN (GLUCOPHAGE-XR) 500 MG 24 hr tablet Take 1,000 mg by mouth daily (with dinner), Historical      Isosorbide Mononitrate (IMDUR PO) Take 60 mg by mouth daily, Historical      simvastatin (ZOCOR) 20 MG tablet Take 1 tablet (20 mg) by mouth At Bedtime, Disp-30 tablet, R-0, E-Prescribe      metoprolol (TOPROL-XL) 50 MG 24 hr tablet Take 1 tablet (50 mg) by mouth 2 times daily, Disp-180 tablet, R-3, E-Prescribe      ASPIRIN EC PO Take 81 mg by mouth 2 times daily, Historical      Multiple Vitamins-Minerals (CENTRUM SILVER) per tablet Take 1 tablet by mouth daily, Historical      tiotropium (SPIRIVA) 18 MCG inhalation capsule Inhale 18 mcg into the lungs daily, Historical      OMEPRAZOLE PO Take 20 mg by mouth every morning , Historical      nitroglycerin (NITROSTAT) 0.4 MG SL tablet Place 1 tablet (0.4 mg) under the tongue every 5 minutes as needed X 3 doses total if needed for chest pain., Disp-25 tablet, R-3, E-Prescribe         STOP taking these medications       HYDROCHLOROTHIAZIDE PO Comments:   Reason for Stopping:         loratadine (CLARITIN) 10 MG tablet Comments:   Reason for Stopping:         Cholecalciferol (VITAMIN D3 PO) Comments:   Reason for Stopping:         ROPINIROLE HCL PO Comments:   Reason for Stopping:             Allergies   Allergies   Allergen Reactions     Sulfa Drugs Rash     Data   Most Recent 3 CBC's:  Recent Labs   Lab Test  02/15/18   2325  08/04/16   1327   04/27/14   0536   WBC  6.2  6.2  7.0   HGB  12.7  12.2  12.8   MCV  93  91  91   PLT  201  233  202      Most Recent 3 BMP's:  Recent Labs   Lab Test  02/15/18   2325  09/19/16   1128  08/04/16   1327   NA  138  135*  135   POTASSIUM  3.5  4.0  3.3*   CHLORIDE  103  97*  96   CO2  31  31*  29   BUN  15  12  8   CR  0.61  0.68*  0.53   ANIONGAP  4  11  10   LAUREN  8.8  9.4  8.3*   GLC  90  106*  157*     Most Recent 2 LFT's:  Recent Labs   Lab Test  02/15/18   2325  04/24/14   1030   AST  29  26   ALT  21  23   ALKPHOS  49  43   BILITOTAL  0.3  0.5     Most Recent INR's and Anticoagulation Dosing History:  Anticoagulation Dose History     Recent Dosing and Labs Latest Ref Rng & Units 10/22/2009 10/23/2009 10/24/2009 10/25/2009 4/28/2011 9/13/2011 4/24/2014    INR 0.86 - 1.14 1.09 1.11 1.77(H) 1.92(H) 1.05 1.03 0.98        Most Recent 3 Troponin's:  Recent Labs   Lab Test  02/16/18   0635  02/16/18   0315  02/15/18   2325   TROPI  <0.015  <0.015  <0.015     Most Recent Cholesterol Panel:  Recent Labs   Lab Test  10/05/16   1036   CHOL  129   LDL  49   HDL  51   TRIG  144     Most Recent 6 Bacteria Isolates From Any Culture (See EPIC Reports for Culture Details):  Recent Labs   Lab Test  04/30/11 2010 04/27/11   1820   CULT  No growth after 6 days  No growth after 6 days  No growth     Most Recent TSH, T4 and A1c Labs:  Recent Labs   Lab Test  02/16/18   0635   04/27/11   1710   TSH   --    --   0.78   A1C  6.5*   < >   --     < > = values in this interval not displayed.     Results for orders placed or performed during the hospital encounter of 02/15/18   XR Chest 2 Views    Narrative    CHEST 2 VIEWS  2/15/2018 11:46 PM     HISTORY: Chest pain.    COMPARISON: 8/4/2016.    FINDINGS: A few possible tiny calcified granulomas in the right lung  apex. The lungs are otherwise clear. Normal-sized cardiac silhouette.  Atherosclerotic calcification in the thoracic aorta.      Impression    IMPRESSION: No evidence of active  cardiopulmonary disease.    MAGDALENA SIN MD

## 2018-03-29 ENCOUNTER — HOSPITAL ENCOUNTER (EMERGENCY)
Facility: CLINIC | Age: 78
Discharge: HOME | End: 2018-03-29
Attending: EMERGENCY MEDICINE | Admitting: EMERGENCY MEDICINE
Payer: COMMERCIAL

## 2018-03-29 ENCOUNTER — APPOINTMENT (OUTPATIENT)
Dept: GENERAL RADIOLOGY | Facility: CLINIC | Age: 78
End: 2018-03-29
Attending: EMERGENCY MEDICINE
Payer: COMMERCIAL

## 2018-03-29 VITALS
OXYGEN SATURATION: 96 % | HEART RATE: 72 BPM | SYSTOLIC BLOOD PRESSURE: 126 MMHG | RESPIRATION RATE: 20 BRPM | HEIGHT: 66 IN | TEMPERATURE: 98 F | WEIGHT: 141 LBS | BODY MASS INDEX: 22.66 KG/M2 | DIASTOLIC BLOOD PRESSURE: 54 MMHG

## 2018-03-29 DIAGNOSIS — Z86.79 HISTORY OF CORONARY ARTERY DISEASE: ICD-10-CM

## 2018-03-29 DIAGNOSIS — R07.9 ACUTE CHEST PAIN: ICD-10-CM

## 2018-03-29 LAB
ANION GAP SERPL CALCULATED.3IONS-SCNC: 8 MMOL/L (ref 3–14)
BASOPHILS # BLD AUTO: 0 10E9/L (ref 0–0.2)
BASOPHILS NFR BLD AUTO: 0.2 %
BUN SERPL-MCNC: 13 MG/DL (ref 7–30)
CALCIUM SERPL-MCNC: 9 MG/DL (ref 8.5–10.1)
CHLORIDE SERPL-SCNC: 105 MMOL/L (ref 94–109)
CO2 SERPL-SCNC: 26 MMOL/L (ref 20–32)
CREAT SERPL-MCNC: 0.63 MG/DL (ref 0.52–1.04)
D DIMER PPP FEU-MCNC: 0.6 UG/ML FEU (ref 0–0.5)
DIFFERENTIAL METHOD BLD: NORMAL
EOSINOPHIL # BLD AUTO: 0.1 10E9/L (ref 0–0.7)
EOSINOPHIL NFR BLD AUTO: 2.7 %
ERYTHROCYTE [DISTWIDTH] IN BLOOD BY AUTOMATED COUNT: 12.5 % (ref 10–15)
GFR SERPL CREATININE-BSD FRML MDRD: >90 ML/MIN/1.7M2
GLUCOSE SERPL-MCNC: 141 MG/DL (ref 70–99)
HCT VFR BLD AUTO: 36 % (ref 35–47)
HGB BLD-MCNC: 12 G/DL (ref 11.7–15.7)
IMM GRANULOCYTES # BLD: 0 10E9/L (ref 0–0.4)
IMM GRANULOCYTES NFR BLD: 0.2 %
INTERPRETATION ECG - MUSE: NORMAL
LIPASE SERPL-CCNC: 51 U/L (ref 73–393)
LYMPHOCYTES # BLD AUTO: 1.3 10E9/L (ref 0.8–5.3)
LYMPHOCYTES NFR BLD AUTO: 28.6 %
MCH RBC QN AUTO: 30.8 PG (ref 26.5–33)
MCHC RBC AUTO-ENTMCNC: 33.3 G/DL (ref 31.5–36.5)
MCV RBC AUTO: 92 FL (ref 78–100)
MONOCYTES # BLD AUTO: 0.3 10E9/L (ref 0–1.3)
MONOCYTES NFR BLD AUTO: 6.5 %
NEUTROPHILS # BLD AUTO: 2.8 10E9/L (ref 1.6–8.3)
NEUTROPHILS NFR BLD AUTO: 61.8 %
NRBC # BLD AUTO: 0 10*3/UL
NRBC BLD AUTO-RTO: 0 /100
PLATELET # BLD AUTO: 173 10E9/L (ref 150–450)
POTASSIUM SERPL-SCNC: 3.6 MMOL/L (ref 3.4–5.3)
RBC # BLD AUTO: 3.9 10E12/L (ref 3.8–5.2)
SODIUM SERPL-SCNC: 139 MMOL/L (ref 133–144)
TROPONIN I SERPL-MCNC: <0.015 UG/L (ref 0–0.04)
TROPONIN I SERPL-MCNC: <0.015 UG/L (ref 0–0.04)
WBC # BLD AUTO: 4.5 10E9/L (ref 4–11)

## 2018-03-29 PROCEDURE — 83690 ASSAY OF LIPASE: CPT | Performed by: EMERGENCY MEDICINE

## 2018-03-29 PROCEDURE — 85025 COMPLETE CBC W/AUTO DIFF WBC: CPT | Performed by: EMERGENCY MEDICINE

## 2018-03-29 PROCEDURE — 99285 EMERGENCY DEPT VISIT HI MDM: CPT | Mod: 25

## 2018-03-29 PROCEDURE — 71046 X-RAY EXAM CHEST 2 VIEWS: CPT

## 2018-03-29 PROCEDURE — 80048 BASIC METABOLIC PNL TOTAL CA: CPT | Performed by: EMERGENCY MEDICINE

## 2018-03-29 PROCEDURE — 93005 ELECTROCARDIOGRAM TRACING: CPT | Mod: 76

## 2018-03-29 PROCEDURE — 84484 ASSAY OF TROPONIN QUANT: CPT | Mod: 91 | Performed by: EMERGENCY MEDICINE

## 2018-03-29 PROCEDURE — 85379 FIBRIN DEGRADATION QUANT: CPT | Performed by: EMERGENCY MEDICINE

## 2018-03-29 PROCEDURE — 93005 ELECTROCARDIOGRAM TRACING: CPT

## 2018-03-29 ASSESSMENT — ENCOUNTER SYMPTOMS
SHORTNESS OF BREATH: 1
NAUSEA: 0
VOMITING: 0

## 2018-03-29 NOTE — ED AVS SNAPSHOT
Emergency Department    [address] [facility] [address]    Phone:  [phone]    Fax:  [phone]                                       Mel Castellanos   MRN: [id number]    Department:   Emergency Department   Date of Visit:  3/29/2018           Patient Information     Date Of Birth          1940        Your diagnoses for this visit were:     Acute chest pain     History of coronary artery disease        You were seen by Cristhian Washington MD.      Follow-up Information     Follow up with [facility]. Call today.    Contact information:    [address], [address]  [facility] [address] [phone]          Follow up with  Emergency Department.    Specialty:  EMERGENCY MEDICINE    Why:  As needed, If symptoms worsen    Contact information:    [address] [facility] [id number] [phone]        Discharge Instructions          *CHEST PAIN, UNCERTAIN CAUSE    Based on your exam today, the exact cause of your chest pain is not certain. Your condition does not seem serious at this time, and your pain does not appear to be coming from your heart. However, sometimes the signs of a serious problem take more time to appear. Therefore, watch for the warning signs listed below.  HOME CARE:  1. Rest today and avoid strenuous activity.  2. Take any prescribed medicine as directed.  FOLLOW UP with your doctor in 1-3 days.   GET PROMPT MEDICAL ATTENTION if any of the following occur:    A change in the type of pain: if it feels different, becomes more severe, lasts longer, or begins to spread into your shoulder, arm, neck, jaw or back    Shortness of breath or increased pain with breathing    Weakness, dizziness, or fainting    Cough with blood or dark colored sputum (phlegm)    Fever over 101  F (38.3  C)    Swelling, pain or redness in one leg    8867-0738 [employer]. [address] [place] [address], [place] [id number]. All rights  reserved. This information is not intended as a substitute for professional medical care. Always follow your healthcare professional's instructions.  This information has been modified by your health care provider with permission from the publisher.      24 Hour Appointment Hotline       To make an appointment at any [facility], call [phone] ([phone]). If you don't have a family doctor or clinic, we will help you find one. [facility] are conveniently located to serve the needs of you and your family.             Review of your medicines      Our records show that you are taking the medicines listed below. If these are incorrect, please call your family doctor or clinic.        Dose / Directions Last dose taken    AMLODIPINE BESYLATE PO   Dose:  5 mg        Take 5 mg by mouth daily   Refills:  0        ASPIRIN EC PO   Dose:  81 mg        Take 81 mg by mouth 2 times daily   Refills:  0        CENTRUM SILVER per tablet   Dose:  1 tablet        Take 1 tablet by mouth daily   Refills:  0        IMDUR PO   Dose:  60 mg        Take 60 mg by mouth daily   Refills:  0        metFORMIN 500 MG 24 hr tablet   Commonly known as:  GLUCOPHAGE-XR   Dose:  1000 mg        Take 1,000 mg by mouth daily (with dinner)   Refills:  0        metoprolol succinate 50 MG 24 hr tablet   Commonly known as:  TOPROL-XL   Dose:  50 mg   Quantity:  180 tablet        Take 1 tablet (50 mg) by mouth 2 times daily   Refills:  3        nitroGLYcerin 0.4 MG sublingual tablet   Commonly known as:  NITROSTAT   Dose:  0.4 mg   Quantity:  25 tablet        Place 1 tablet (0.4 mg) under the tongue every 5 minutes as needed X 3 doses total if needed for chest pain.   Refills:  3        OMEPRAZOLE PO   Dose:  20 mg        Take 20 mg by mouth every morning   Refills:  0        simvastatin 20 MG tablet   Commonly known as:  ZOCOR   Dose:  20 mg   Quantity:  30 tablet        Take 1 tablet (20 mg) by mouth At Bedtime   Refills:  0         tiotropium 18 MCG capsule   Commonly known as:  SPIRIVA   Dose:  18 mcg        Inhale 18 mcg into the lungs daily   Refills:  0                Procedures and tests performed during your visit     Procedure/Test Number of Times Performed    Basic metabolic panel 1    CBC with platelets differential 1    Cardiac Continuous Monitoring 2    D dimer quantitative 1    EKG 12-lead, tracing only 2    Lipase 1    Peripheral IV catheter 1    Pulse oximetry nursing 2    Troponin I 2    XR Chest 2 Views 1      Orders Needing Specimen Collection     None      Pending Results     Date and Time Order Name Status Description    3/29/2018 1027 EKG 12-lead, tracing only Preliminary             Pending Culture Results     No orders found from 3/27/2018 to 3/30/2018.            Pending Results Instructions     If you had any lab results that were not finalized at the time of your Discharge, you can call the ED Lab Result RN at [phone]. You will be contacted by this team for any positive Lab results or changes in treatment. The nurses are available 7 days a week from 10A to 6:30P.  You can leave a message 24 hours per day and they will return your call.        Test Results From Your Hospital Stay        3/29/2018 10:40 AM      Component Results     Component Value Ref Range & Units Status    WBC 4.5 4.0 - 11.0 10e9/L Final    RBC Count 3.90 3.8 - 5.2 10e12/L Final    Hemoglobin 12.0 11.7 - 15.7 g/dL Final    Hematocrit 36.0 35.0 - 47.0 % Final    MCV 92 78 - 100 fl Final    MCH 30.8 26.5 - 33.0 pg Final    MCHC 33.3 31.5 - 36.5 g/dL Final    RDW 12.5 10.0 - 15.0 % Final    Platelet Count 173 150 - 450 10e9/L Final    Diff Method Automated Method  Final    % Neutrophils 61.8 % Final    % Lymphocytes 28.6 % Final    % Monocytes 6.5 % Final    % Eosinophils 2.7 % Final    % Basophils 0.2 % Final    % Immature Granulocytes 0.2 % Final    Nucleated RBCs 0 0 /100 Final    Absolute Neutrophil 2.8 1.6 - 8.3 10e9/L Final    Absolute  Lymphocytes 1.3 0.8 - 5.3 10e9/L Final    Absolute Monocytes 0.3 0.0 - 1.3 10e9/L Final    Absolute Eosinophils 0.1 0.0 - 0.7 10e9/L Final    Absolute Basophils 0.0 0.0 - 0.2 10e9/L Final    Abs Immature Granulocytes 0.0 0 - 0.4 10e9/L Final    Absolute Nucleated RBC 0.0  Final         3/29/2018 11:05 AM      Component Results     Component Value Ref Range & Units Status    Sodium 139 133 - 144 mmol/L Final    Potassium 3.6 3.4 - 5.3 mmol/L Final    Chloride 105 94 - 109 mmol/L Final    Carbon Dioxide 26 20 - 32 mmol/L Final    Anion Gap 8 3 - 14 mmol/L Final    Glucose 141 (H) 70 - 99 mg/dL Final    Urea Nitrogen 13 7 - 30 mg/dL Final    Creatinine 0.63 0.52 - 1.04 mg/dL Final    GFR Estimate >90 >60 mL/min/1.7m2 Final    Non  GFR Calc    GFR Estimate If Black >90 >60 mL/min/1.7m2 Final    African American GFR Calc    Calcium 9.0 8.5 - 10.1 mg/dL Final         3/29/2018 11:05 AM      Component Results     Component Value Ref Range & Units Status    Troponin I ES <0.015 0.000 - 0.045 ug/L Final    The 99th percentile for upper reference range is 0.045 ug/L.  Troponin values   in the range of 0.045 - 0.120 ug/L may be associated with risks of adverse   clinical events.           3/29/2018 11:26 AM      Component Results     Component Value Ref Range & Units Status    D Dimer 0.6 (H) 0.0 - 0.50 ug/ml FEU Final    This D-dimer assay is intended for use in conjunction with a clinical pretest   probability assessment model to exclude pulmonary embolism (PE) and deep   venous thrombosis (DVT) in outpatients suspected of PE or DVT. The cut-off   value is 0.5 ug/mL FEU.           3/29/2018 11:11 AM      Narrative     XR CHEST 2 VW 3/29/2018 11:00 AM    HISTORY: Pain.    COMPARISON: February 15, 2018.        Impression     IMPRESSION: The lungs are clear. No focal pulmonary opacities. Heart  and mediastinum are unremarkable. No acute cardiopulmonary  abnormalities.    LOVE REESE MD         3/29/2018 11:05  AM      Component Results     Component Value Ref Range & Units Status    Lipase 51 (L) 73 - 393 U/L Final         3/29/2018  2:08 PM      Component Results     Component Value Ref Range & Units Status    Troponin I ES <0.015 0.000 - 0.045 ug/L Final    The 99th percentile for upper reference range is 0.045 ug/L.  Troponin values   in the range of 0.045 - 0.120 ug/L may be associated with risks of adverse   clinical events.                  Clinical Quality Measure: Blood Pressure Screening     Your blood pressure was checked while you were in the emergency department today. The last reading we obtained was  BP: 129/56 . Please read the guidelines below about what these numbers mean and what you should do about them.  If your systolic blood pressure (the top number) is less than 120 and your diastolic blood pressure (the bottom number) is less than 80, then your blood pressure is normal. There is nothing more that you need to do about it.  If your systolic blood pressure (the top number) is 120-139 or your diastolic blood pressure (the bottom number) is 80-89, your blood pressure may be higher than it should be. You should have your blood pressure rechecked within a year by a primary care provider.  If your systolic blood pressure (the top number) is 140 or greater or your diastolic blood pressure (the bottom number) is 90 or greater, you may have high blood pressure. High blood pressure is treatable, but if left untreated over time it can put you at risk for heart attack, stroke, or kidney failure. You should have your blood pressure rechecked by a primary care provider within the next 4 weeks.  If your provider in the emergency department today gave you specific instructions to follow-up with your doctor or provider even sooner than that, you should follow that instruction and not wait for up to 4 weeks for your follow-up visit.        Thank you for choosing [facility]       Thank you for choosing [facility] for your  "care. Our goal is always to provide you with excellent care. Hearing back from our patients is one way we can continue to improve our services. Please take a few minutes to complete the written survey that you may receive in the mail after you visit with us. Thank you!        [employer] Information     [employer] lets you send messages to your doctor, view your test results, renew your prescriptions, schedule appointments and more. To sign up, go to [email or web address] . Click on \"Log in\" on the left side of the screen, which will take you to the Welcome page. Then click on \"Sign up Now\" on the right side of the page.     You will be asked to enter the access code listed below, as well as some personal information. Please follow the directions to create your username and password.     Your access code is: [email or web address]  Expires: 2018  2:39 PM     Your access code will  in 90 days. If you need help or a new code, please call your [facility] clinic or [phone].        Care EveryWhere ID     This is your Care EveryWhere ID. This could be used by other organizations to access your [facility] medical records  [id number]        Equal Access to Services     MICHAEL LOOMIS AH: Pito Patel, mark cowan, sage benites, lashon willard. So [facility] [phone].    ATENCIÓN: Si habla español, tiene a ware disposición servicios gratuitos de asistencia lingüística. Dana al [phone].    We comply with applicable federal civil rights laws and [place] laws. We do not discriminate on the basis of race, color, national origin, age, disability, sex, sexual orientation, or gender identity.            After Visit Summary       This is your record. Keep this with you and show to your community pharmacist(s) and doctor(s) at your next visit.                  "

## 2018-03-29 NOTE — ED NOTES
Bed: ED19  Expected date:   Expected time:   Means of arrival:   Comments:  [facility] [place] - 445 - 77F chest pain eta 1005

## 2018-03-29 NOTE — ED AVS SNAPSHOT
Emergency Department    [address] [place] [address]    Phone:  [phone]    Fax:  [phone]                                       Mel Castellanos   MRN: [id number]    Department:   Emergency Department   Date of Visit:  3/29/2018           After Visit Summary Signature Page     I have received my discharge instructions, and my questions have been answered. I have discussed any challenges I see with this plan with the nurse or doctor.    ..........................................................................................................................................  Patient/Patient Representative Signature      ..........................................................................................................................................  Patient Representative Print Name and Relationship to Patient    ..................................................               ................................................  Date                                            Time    ..........................................................................................................................................  Reviewed by Signature/Title    ...................................................              ..............................................  Date                                                            Time

## 2018-03-29 NOTE — DISCHARGE INSTRUCTIONS
*CHEST PAIN, UNCERTAIN CAUSE    Based on your exam today, the exact cause of your chest pain is not certain. Your condition does not seem serious at this time, and your pain does not appear to be coming from your heart. However, sometimes the signs of a serious problem take more time to appear. Therefore, watch for the warning signs listed below.  HOME CARE:  1. Rest today and avoid strenuous activity.  2. Take any prescribed medicine as directed.  FOLLOW UP with your doctor in 1-3 days.   GET PROMPT MEDICAL ATTENTION if any of the following occur:    A change in the type of pain: if it feels different, becomes more severe, lasts longer, or begins to spread into your shoulder, arm, neck, jaw or back    Shortness of breath or increased pain with breathing    Weakness, dizziness, or fainting    Cough with blood or dark colored sputum (phlegm)    Fever over 101  F (38.3  C)    Swelling, pain or redness in one leg    2746-3198 [employer]. [address] [place] [address]. All rights reserved. This information is not intended as a substitute for professional medical care. Always follow your healthcare professional's instructions.  This information has been modified by your health care provider with permission from the publisher.

## 2018-03-29 NOTE — ED PROVIDER NOTES
History     Chief Complaint:  Chest Pain     HPI   HPI limited due to patient being poor historian. Supplemental HPI provided by nurse's report and information from EMS upon arrival.    Mel Castellanos is a 77 year old female, with a history of MI, TIA, hypertension, hyperlipidemia, type 2 diabetes, emphysema, daily smoker, and s/p coronary stent to RCA in 2011, who presents to the ED for evaluation of left upper chest pain. Per nurse s report, the patient lives at home with her daughters.  She took 2 Nitro at home which alleviated her pain. She rates the pain as a 5/10. She was provided 324mg Aspirin and 500mLs bolus by EMS. Upon evaluation, the patient reports she was outside this morning smoking for a few minutes then developed chest pain while returning inside the house. The patient notes her chest pain feels improved. She has associated shortness of breath though is unable to elaborate on the chronicity of this. The patient denies any nausea, vomiting, or leg swelling.      She was hospitalized here on February 15 for chest pain, thought to be atypical and she ruled out for an acute coronary syndrome.  At that time she did not wish to have advanced testing such as stress test or catheterization so she was referred back to her outpatient teams.    Allergies:  Sulfa drugs       Medications:    Amlodipine besylate  Metformin  Imdur  Simvastatin  Metoprolol  Nitrostat  Aspirin  Centrum silver  Spiriva  Omeprazole    Past Medical History:    Unstable angina  Arthritis  Emphysema   HLD  HTN  Insomnia  RLS  MI  TIA  Type 2 DM  UTI    Past Surgical History:    Appendectomy  Cholecystectomy  Coronary stent     Family History:    CAD    Social History:  Smoking status: Current every day smoker   Alcohol use: No  Presents to ED via EMS  Marital Status:   [5]     Review of Systems   Respiratory: Positive for shortness of breath.    Cardiovascular: Positive for chest pain. Negative for leg swelling.  "  Gastrointestinal: Negative for nausea and vomiting.   Limited due to patient being poor historian.     Physical Exam     Patient Vitals for the past 24 hrs:   BP Temp Temp src Pulse Heart Rate Resp SpO2 Height Weight   03/29/18 1430 126/54 - - - 60 20 - - -   03/29/18 1400 129/56 - - - 65 20 96 % - -   03/29/18 1330 134/59 - - - 67 20 96 % - -   03/29/18 1315 142/64 - - - 68 - - - -   03/29/18 1300 132/67 - - - 65 15 - - -   03/29/18 1245 (!) 124/108 - - - 68 - - - -   03/29/18 1230 132/63 - - - 65 - 96 % - -   03/29/18 1215 130/63 - - - - - 95 % - -   03/29/18 1200 132/63 - - - 58 12 96 % - -   03/29/18 1145 128/58 - - - 63 19 94 % - -   03/29/18 1143 - - - - 64 19 95 % - -   03/29/18 1131 - - - - 64 15 94 % - -   03/29/18 1130 133/62 - - - 66 14 - - -   03/29/18 1115 130/64 - - - 67 19 96 % - -   03/29/18 1114 - - - - 65 21 96 % - -   03/29/18 1106 - - - - 68 - 98 % - -   03/29/18 1049 121/61 - - - 63 15 97 % - -   03/29/18 1020 (!) 85/60 - - 72 - - 95 % - -   03/29/18 1018 (!) 74/64 98  F (36.7  C) Oral 70 - 18 97 % 1.676 m (5' 6\") 64 kg (141 lb)     Physical Exam  General: nontoxic appearing woman sitting upright in room 19  HENT: mucous membranes moist  CV: regular rate, regular rhythm, no lower extremity edema, no JVD, palpable symmetric radial pulses, no murmur audible  Resp: clear throughout, normal effort, no crackles or wheezing  GI: abdomen soft and nontender, no guarding, negative Lafleur's sign  MSK: no bony tenderness to chest, FROM bilateral shoulders  Skin: appropriately warm and dry, no erythema or vesicles to chest wall  Neuro: alert, clear speech, poor historian, moves all extremities with symmetric tone, no meningismus  Psych: calm, somewhat wandering topics of conversation but can be verbally redirected, suboptimal insight into her medical situation, wishes to discuss that taco salad is no longer offered in the cafeteria and is upset that her (clinic) doctor told her she can no longer " drive.    Emergency Department Course     ECG #1 (10:19:09):  Rate 62 bpm. NJ interval 172. QRS duration 76. QT/QTc 422/428. P-R-T axes 83 59 44. Normal sinus rhythm. Nonspecific ST and T wave abnormality. Abnormal ECG. Interpreted at 1023 by Cristhian Washington MD.    ECG #2 (13:48:56):  Rate 63 bpm. NJ interval 176. QRS duration 74. QT/QTc 434/444. P-R-T axes 78 54 45. Normal sinus rhythm. Nonspecific ST abnormality. Abnormal ECG. No symptoms. Interpreted at 1407 by Cristhian Washington MD.    Imaging:  Radiographic findings were communicated with the patient who voiced understanding of the findings.    XR Chest 2 Views  IMPRESSION: The lungs are clear. No focal pulmonary opacities. Heart  and mediastinum are unremarkable. No acute cardiopulmonary  abnormalities. As read by Radiology.     Laboratory:  Troponin I #1 (1105): <0.015  Troponin I #2 (1327): <0.015  Lipase: 51(L)  D dimer: 0.6(H)  CBC: o/w WNL (WBC 4.5, HGB 12.0, )  BMP: Glucose 141(H) o/w WNL (Creatinine 0.63)     Emergency Department Course:  Patient arrived by EMS.     Past medical records, nursing notes, and vitals reviewed.  1028: I performed an exam of the patient and obtained history, as documented above.    IV inserted and blood drawn.    The patient was sent for a chest x-ray while in the emergency department, findings above.    The patient was placed on continuous cardiac and pulse ox monitoring.    1123: I rechecked the patient. Explained findings to patient.    1210: I spoke to patient's daughter over the phone.  She prefers discharge home if tests in ED don't show anything major.    1225: I rechecked the patient.     1448: I rechecked the patient. Explained findings to patient.    Findings and plan explained to the Patient. Patient discharged home with instructions regarding supportive care, medications, and reasons to return. The importance of close follow-up was reviewed.     Impression & Plan      Medical Decision  Making:  She presents with chest pain of unconfirmed etiology despite extensive testing as above. She is a challenging historian, as has been documented previously, and I spent a significant amount of time during multiple visits to her room trying to elicit her symptoms. Acknowledging her history of known coronary artery disease, ACS was clearly considered; though now with serial troponins that are negative and non-specific EKG's, I do not think there is indication for immediate cath lab activation. She was admitted to this hospital for chest pain rule-out just over a month ago, and did not wish to pursue that again nor advanced testing such as stress testing or coronary angiogram. This remains the case today. I spoke with her daughter who defers to the patient's wishes, which are for consistently and unmistakably for discharge. D dimer is normal when adjusted for age, making PE unlikely as well. Chest x-ray shows no pneumothorax or pneumonia. I do not suspect aortic pathology. She has been asymptomatic throughout her time here. Interestingly, she does have lower blood pressures in her left arm compared to her right arm, though this has been present for many years per daughter.  She has normal symmetric palpable pulses in each radial artery, such long standing findings as this would not explain her acute and now resolved chest pain, so I do not think she needs emergent advanced imaging such as CT of the chest, though this could be reasonably reconsidered through clinic. She was discharged home in asymptomatic state.     Diagnosis:    ICD-10-CM   1. Acute chest pain R07.9   2. History of coronary artery disease Z86.79     Disposition:Patient discharged to home     Fay David  3/29/2018    EMERGENCY DEPARTMENT    I, Fay David, am serving as a scribe at 10:28 AM on 3/29/2018 to document services personally performed by Cristhian Washington MD based on my observations and the provider's statements to me.         Cristhian Washington MD  03/29/18 7442

## 2019-12-20 ENCOUNTER — HOSPITAL ENCOUNTER (EMERGENCY)
Facility: CLINIC | Age: 79
Discharge: HOME | End: 2019-12-20
Attending: EMERGENCY MEDICINE | Admitting: EMERGENCY MEDICINE
Payer: COMMERCIAL

## 2019-12-20 VITALS
OXYGEN SATURATION: 93 % | TEMPERATURE: 97.8 F | SYSTOLIC BLOOD PRESSURE: 116 MMHG | RESPIRATION RATE: 20 BRPM | DIASTOLIC BLOOD PRESSURE: 60 MMHG

## 2019-12-20 DIAGNOSIS — L81.9 DISCOLORATION OF SKIN OF LOWER LEG: ICD-10-CM

## 2019-12-20 DIAGNOSIS — R82.90 ABNORMAL URINALYSIS: ICD-10-CM

## 2019-12-20 LAB
ALBUMIN SERPL-MCNC: 3 G/DL (ref 3.4–5)
ALBUMIN SERPL-MCNC: NORMAL G/DL (ref 3.4–5)
ALBUMIN UR-MCNC: NEGATIVE MG/DL
ALP SERPL-CCNC: 76 U/L (ref 40–150)
ALP SERPL-CCNC: NORMAL U/L (ref 40–150)
ALT SERPL W P-5'-P-CCNC: 22 U/L (ref 0–50)
ALT SERPL W P-5'-P-CCNC: NORMAL U/L (ref 0–50)
ANION GAP SERPL CALCULATED.3IONS-SCNC: 3 MMOL/L (ref 3–14)
ANION GAP SERPL CALCULATED.3IONS-SCNC: NORMAL MMOL/L (ref 6–17)
APPEARANCE UR: CLEAR
AST SERPL W P-5'-P-CCNC: 39 U/L (ref 0–45)
AST SERPL W P-5'-P-CCNC: NORMAL U/L (ref 0–45)
BASOPHILS # BLD AUTO: 0 10E9/L (ref 0–0.2)
BASOPHILS NFR BLD AUTO: 0.2 %
BILIRUB SERPL-MCNC: 0.5 MG/DL (ref 0.2–1.3)
BILIRUB SERPL-MCNC: NORMAL MG/DL (ref 0.2–1.3)
BILIRUB UR QL STRIP: NEGATIVE
BUN SERPL-MCNC: 9 MG/DL (ref 7–30)
BUN SERPL-MCNC: NORMAL MG/DL (ref 7–30)
CALCIUM SERPL-MCNC: 9.4 MG/DL (ref 8.5–10.1)
CALCIUM SERPL-MCNC: NORMAL MG/DL (ref 8.5–10.1)
CHLORIDE SERPL-SCNC: 103 MMOL/L (ref 94–109)
CHLORIDE SERPL-SCNC: NORMAL MMOL/L (ref 94–109)
CO2 SERPL-SCNC: 31 MMOL/L (ref 20–32)
CO2 SERPL-SCNC: NORMAL MMOL/L (ref 20–32)
COLOR UR AUTO: ABNORMAL
CREAT SERPL-MCNC: 0.54 MG/DL (ref 0.52–1.04)
CREAT SERPL-MCNC: NORMAL MG/DL (ref 0.52–1.04)
DIFFERENTIAL METHOD BLD: NORMAL
EOSINOPHIL # BLD AUTO: 0.1 10E9/L (ref 0–0.7)
EOSINOPHIL NFR BLD AUTO: 2 %
ERYTHROCYTE [DISTWIDTH] IN BLOOD BY AUTOMATED COUNT: 12.6 % (ref 10–15)
GFR SERPL CREATININE-BSD FRML MDRD: 89 ML/MIN/{1.73_M2}
GFR SERPL CREATININE-BSD FRML MDRD: NORMAL ML/MIN/{1.73_M2}
GLUCOSE SERPL-MCNC: 109 MG/DL (ref 70–99)
GLUCOSE SERPL-MCNC: NORMAL MG/DL (ref 70–99)
GLUCOSE UR STRIP-MCNC: NEGATIVE MG/DL
HCT VFR BLD AUTO: 39 % (ref 35–47)
HGB BLD-MCNC: 13.1 G/DL (ref 11.7–15.7)
HGB UR QL STRIP: NEGATIVE
HYALINE CASTS #/AREA URNS LPF: 1 /LPF (ref 0–2)
IMM GRANULOCYTES # BLD: 0 10E9/L (ref 0–0.4)
IMM GRANULOCYTES NFR BLD: 0.3 %
KETONES UR STRIP-MCNC: NEGATIVE MG/DL
LEUKOCYTE ESTERASE UR QL STRIP: ABNORMAL
LYMPHOCYTES # BLD AUTO: 1.5 10E9/L (ref 0.8–5.3)
LYMPHOCYTES NFR BLD AUTO: 24.3 %
MCH RBC QN AUTO: 31.3 PG (ref 26.5–33)
MCHC RBC AUTO-ENTMCNC: 33.6 G/DL (ref 31.5–36.5)
MCV RBC AUTO: 93 FL (ref 78–100)
MONOCYTES # BLD AUTO: 0.3 10E9/L (ref 0–1.3)
MONOCYTES NFR BLD AUTO: 3.9 %
MUCOUS THREADS #/AREA URNS LPF: PRESENT /LPF
NEUTROPHILS # BLD AUTO: 4.4 10E9/L (ref 1.6–8.3)
NEUTROPHILS NFR BLD AUTO: 69.3 %
NITRATE UR QL: NEGATIVE
NRBC # BLD AUTO: 0 10*3/UL
NRBC BLD AUTO-RTO: 0 /100
PH UR STRIP: 8 PH (ref 5–7)
PLATELET # BLD AUTO: 227 10E9/L (ref 150–450)
POTASSIUM SERPL-SCNC: 3.7 MMOL/L (ref 3.4–5.3)
POTASSIUM SERPL-SCNC: NORMAL MMOL/L (ref 3.4–5.3)
PROT SERPL-MCNC: 6.5 G/DL (ref 6.8–8.8)
PROT SERPL-MCNC: NORMAL G/DL (ref 6.8–8.8)
RBC # BLD AUTO: 4.19 10E12/L (ref 3.8–5.2)
RBC #/AREA URNS AUTO: 0 /HPF (ref 0–2)
SODIUM SERPL-SCNC: 137 MMOL/L (ref 133–144)
SODIUM SERPL-SCNC: NORMAL MMOL/L (ref 133–144)
SOURCE: ABNORMAL
SP GR UR STRIP: 1.02 (ref 1–1.03)
SQUAMOUS #/AREA URNS AUTO: 1 /HPF (ref 0–1)
UROBILINOGEN UR STRIP-MCNC: NORMAL MG/DL (ref 0–2)
WBC # BLD AUTO: 6.4 10E9/L (ref 4–11)
WBC #/AREA URNS AUTO: 11 /HPF (ref 0–5)

## 2019-12-20 PROCEDURE — 87186 SC STD MICRODIL/AGAR DIL: CPT | Performed by: EMERGENCY MEDICINE

## 2019-12-20 PROCEDURE — 99283 EMERGENCY DEPT VISIT LOW MDM: CPT

## 2019-12-20 PROCEDURE — 80053 COMPREHEN METABOLIC PANEL: CPT | Performed by: EMERGENCY MEDICINE

## 2019-12-20 PROCEDURE — 87088 URINE BACTERIA CULTURE: CPT | Performed by: EMERGENCY MEDICINE

## 2019-12-20 PROCEDURE — 85025 COMPLETE CBC W/AUTO DIFF WBC: CPT | Performed by: EMERGENCY MEDICINE

## 2019-12-20 PROCEDURE — 87086 URINE CULTURE/COLONY COUNT: CPT | Performed by: EMERGENCY MEDICINE

## 2019-12-20 PROCEDURE — 81001 URINALYSIS AUTO W/SCOPE: CPT | Performed by: EMERGENCY MEDICINE

## 2019-12-20 RX ORDER — CEPHALEXIN 500 MG/1
500 CAPSULE ORAL 2 TIMES DAILY
Qty: 14 CAPSULE | Refills: 0 | Status: SHIPPED | OUTPATIENT
Start: 2019-12-20 | End: 2020-01-23

## 2019-12-20 NOTE — ED AVS SNAPSHOT
Emergency Department  [address] [place] [address]  Phone:  [phone]  Fax:  [phone]                                    Mel Castellanos   MRN: [id number]    Department:   Emergency Department   Date of Visit:  12/20/2019           After Visit Summary Signature Page    I have received my discharge instructions, and my questions have been answered. I have discussed any challenges I see with this plan with the nurse or doctor.    ..........................................................................................................................................  Patient/Patient Representative Signature      ..........................................................................................................................................  Patient Representative Print Name and Relationship to Patient    ..................................................               ................................................  Date                                   Time    ..........................................................................................................................................  Reviewed by Signature/Title    ...................................................              ..............................................  Date                                               Time          22EPIC Rev 08/18

## 2019-12-21 NOTE — ED TRIAGE NOTES
Pt states she has a bladder infection. She was seen at the Urgent care today who was worried about sepsis.

## 2019-12-21 NOTE — DISCHARGE INSTRUCTIONS
Your exam and test results are somewhat abnormal though not fully convincing for skin infection (cellulitis) or urinary infection.  However, I think it would be reasonable to start you on an antibiotic to treat for these possibilities.  Be sure to contact your primary care clinic for recheck early this coming week.

## 2019-12-21 NOTE — ED PROVIDER NOTES
"  History     Chief Complaint:  \"I have a UTI\"    HPI supplemented with electronic chart review.  History limited by: Sadia historian.      Mel Castellanos is a 79 year old female who presents with multiple concerns from Urgent Care via EMS. She reports that she presented to Urgent Care today with concerns about redness on her legs and a feeling like she \"has a bladder infection\". Per EMS, she was sent from Urgent Care with concerns for \"sepsis\", though EMS specifically states that they were not given any information to further support concern for sepsis or serious infection.  The patient herself states that she was sent here by EMS because urgent care was closing on this Friday evening.  Per her note at Urgent Care, she had an elevated respiratory rate of 22 breaths per minute but vitals are otherwise unremarkable. EMS states that she had a heart rate of 77 and blood pressures of 120/66 in transit. In the emergency department, the patient states that she thinks that she had a bladder infection because she is incontinent to urine when she stands up but states that this is an ongoing problem and has been present for years. She denies dysuria. She states that she has had a bladder infection \"every year since [she] was 34\". When asked if she is on blood thinners, she answered in the affirmative because she \"drinks Diet Coke\".  She states that the slight redness to her left foot is longstanding.    Further history later obtained from her daughter.  She states that she just recently refilled all of the patient's medications, which happened to include a standing prescription for nitrofurantoin, which the patient is started taking.  She has not had her urine tested lately.   has not seen a doctor    Allergies:  Sulfa drugs     Medications:    Zocor  Toprol-XL  Nitrostat  Aspirin 81 mg  Glucophage  Omeprazole  Spiriva    Past Medical History:    CAD  HLD  Type 2 DM  Subclavian artery stenosis/occlusion, left  Unstable " angina  Arthritis  Emphysema  HTN  Insomnia  MI  TIA  Recurrent UTI    Past Surgical History:    Appendectomy  Cholecystectomy    Family History:    CAD - father    Social History:  Smoking status: 0.25 packs per day  Alcohol use: No    Marital Status:       Review of Systems   Reason unable to perform ROS: Poor historian.     Physical Exam     Patient Vitals for the past 24 hrs:   BP Temp Temp src Heart Rate Resp SpO2   12/20/19 2035 116/60 97.8  F (36.6  C) Oral 70 20 93 %     Physical Exam  General: Nontoxic-appearing woman who is able to ambulate around the emergency department, bright-colored tennis shoes  HENT: mucous membranes moist cooperative, CV: regular rate, regular rhythm, no lower extremity edema, no JVD, palpable symmetric DP/PT pulses, compartments soft throughout all extremities, negative Homans sign bilaterally  Resp: clear throughout, normal effort, no crackles or wheezing  GI: abdomen soft and nontender, no guarding  MSK: no bony tenderness to lower extremities, no CVA tenderness   Skin: appropriately warm and dry, mild chronic appearing erythema and punctate spots to left foot, no open wounds seen, no ecchymosis  Neuro: alert, clear speech, oriented though very poor historian, ambulates independently,  equal, lifts both legs off bed  Psych: tangential and disorganized conversation though redirectable, good eye contact      Emergency Department Course     Laboratory:  CMP: Glucose 109 (H), albumin: 3.0 (L), protein total: 6.5 (L), o/w WNL (Creatinine: 0.54)    UA with Microscopic: pH: 8.0 (H), leukocyte esterase: small, WBC: 11 (H), mucous: present, o/w WNL    Urine culture: pending    CBC: WBC: 6.4, HGB: 13.1, PLT: 227    Emergency Department Course:  Nursing notes and vitals reviewed. (2020) I performed an exam of the patient as documented above.     IV inserted. Blood drawn. This was sent to the lab for further testing, results above.     2059 I rechecked the patient and discussed  the results of her workup thus far.     2142 I consulted with the patient's daughter regarding the patient's history and presentation here in the emergency department. She mentioned that the patient has some mild to moderate dementia.    Findings and plan explained to the Patient. Patient discharged home with instructions regarding supportive care, medications, and reasons to return. The importance of close follow-up was reviewed. The patient was prescribed Keflex.    I personally reviewed the laboratory results with the Patient and answered all related questions prior to discharge.     Impression & Plan    Medical Decision Making:  Her evaluation was made quite challenging due to the fact that she is a very poor historian, though this is a longstanding concern, present when I saw her many months ago as well.  No evidence of acute change in mental status.  Regarding her leg redness, this appears quite chronic.  She does not have fever, leukocytosis, or physical exam findings to raise high concern for cellulitis, though given  daughter at her daughter's concern for possible infection I felt it was reasonable to initiate Keflex.  Potential adverse effects reviewed.  This will also provide some coverage of her urinary tract, though in this regard as well I am not highly suspicious of an acute bacterial process.  Her mild pyuria is not associated with any new urinary symptoms to my knowledge.  Urine culture was sent.  All involved parties are comfortable with the plan for discharge home and close outpatient follow-up.  She is welcome back for acute troubles at any hour.    Diagnosis:    ICD-10-CM    1. Discoloration of skin of lower leg L81.9 Urine Culture Aerobic Bacterial     Comprehensive metabolic panel     Comprehensive metabolic panel   2. Abnormal urinalysis R82.90        Disposition:  discharged to home    Discharge Medications:  Discharge Medication List as of 12/20/2019 10:29 PM      START taking these  medications    Details   cephALEXin (KEFLEX) 500 MG capsule Take 1 capsule (500 mg) by mouth 2 times daily for 7 days, Disp-14 capsule, R-0, Local Print             Guicho Aldana  12/20/2019    EMERGENCY DEPARTMENT  Scribe Disclosure:  I, Guicho Aldana, am serving as a scribe on 12/20/2019 at 8:20 PM to personally document services performed by Cristhian Washington, * based on my observations and the provider's statements to me.     This record was created at least in part using electronic voice recognition software, so please excuse any typographical errors     Cristhian Washington MD  12/21/19 1871

## 2019-12-21 NOTE — ED NOTES
Bed: ED19  Expected date:   Expected time:   Means of arrival:   Comments:  [id number] Poss infection

## 2019-12-22 LAB
BACTERIA SPEC CULT: ABNORMAL
SPECIMEN SOURCE: ABNORMAL

## 2019-12-23 NOTE — RESULT ENCOUNTER NOTE
Final urine culture report is NEGATIVE per [facility] Result protocol.    If NEGATIVE result, no change in treatment, per [facility] Result protocol.

## 2020-01-23 ENCOUNTER — HOSPITAL ENCOUNTER (INPATIENT)
Facility: CLINIC | Age: 80
LOS: 9 days | Discharge: NURSING, REHAB OR LONG-TERM CARE | DRG: 037 | End: 2020-02-01
Attending: EMERGENCY MEDICINE | Admitting: HOSPITALIST
Payer: COMMERCIAL

## 2020-01-23 ENCOUNTER — APPOINTMENT (OUTPATIENT)
Dept: CT IMAGING | Facility: CLINIC | Age: 80
DRG: 037 | End: 2020-01-23
Attending: EMERGENCY MEDICINE
Payer: COMMERCIAL

## 2020-01-23 ENCOUNTER — APPOINTMENT (OUTPATIENT)
Dept: GENERAL RADIOLOGY | Facility: CLINIC | Age: 80
DRG: 037 | End: 2020-01-23
Attending: EMERGENCY MEDICINE
Payer: COMMERCIAL

## 2020-01-23 DIAGNOSIS — F51.01 PRIMARY INSOMNIA: ICD-10-CM

## 2020-01-23 DIAGNOSIS — A41.9 SEPSIS, DUE TO UNSPECIFIED ORGANISM, UNSPECIFIED WHETHER ACUTE ORGAN DYSFUNCTION PRESENT (H): ICD-10-CM

## 2020-01-23 DIAGNOSIS — I10 BENIGN ESSENTIAL HYPERTENSION: Primary | ICD-10-CM

## 2020-01-23 DIAGNOSIS — N39.0 URINARY TRACT INFECTION WITHOUT HEMATURIA, SITE UNSPECIFIED: ICD-10-CM

## 2020-01-23 PROBLEM — R41.82 ALTERED MENTAL STATUS: Status: ACTIVE | Noted: 2020-01-23

## 2020-01-23 LAB
ALBUMIN SERPL-MCNC: 3.4 G/DL (ref 3.4–5)
ALBUMIN UR-MCNC: 30 MG/DL
ALP SERPL-CCNC: 85 U/L (ref 40–150)
ALT SERPL W P-5'-P-CCNC: 37 U/L (ref 0–50)
ANION GAP SERPL CALCULATED.3IONS-SCNC: 3 MMOL/L (ref 3–14)
ANION GAP SERPL CALCULATED.3IONS-SCNC: 5 MMOL/L (ref 3–14)
APPEARANCE UR: CLEAR
APTT PPP: 34 SEC (ref 22–37)
AST SERPL W P-5'-P-CCNC: 43 U/L (ref 0–45)
BASE EXCESS BLDV CALC-SCNC: 4.7 MMOL/L
BASE EXCESS BLDV CALC-SCNC: 5.6 MMOL/L
BASOPHILS # BLD AUTO: 0 10E9/L (ref 0–0.2)
BASOPHILS NFR BLD AUTO: 0.1 %
BILIRUB SERPL-MCNC: 1.3 MG/DL (ref 0.2–1.3)
BILIRUB UR QL STRIP: NEGATIVE
BUN SERPL-MCNC: 17 MG/DL (ref 7–30)
BUN SERPL-MCNC: 20 MG/DL (ref 7–30)
CA-I BLD-MCNC: 4.8 MG/DL (ref 4.4–5.2)
CALCIUM SERPL-MCNC: 9.3 MG/DL (ref 8.5–10.1)
CALCIUM SERPL-MCNC: 9.7 MG/DL (ref 8.5–10.1)
CHLORIDE SERPL-SCNC: 102 MMOL/L (ref 94–109)
CHLORIDE SERPL-SCNC: 105 MMOL/L (ref 94–109)
CO2 SERPL-SCNC: 31 MMOL/L (ref 20–32)
CO2 SERPL-SCNC: 31 MMOL/L (ref 20–32)
COLOR UR AUTO: ABNORMAL
CREAT SERPL-MCNC: 0.54 MG/DL (ref 0.52–1.04)
CREAT SERPL-MCNC: 0.6 MG/DL (ref 0.52–1.04)
DIFFERENTIAL METHOD BLD: ABNORMAL
EOSINOPHIL # BLD AUTO: 0.1 10E9/L (ref 0–0.7)
EOSINOPHIL NFR BLD AUTO: 0.4 %
ERYTHROCYTE [DISTWIDTH] IN BLOOD BY AUTOMATED COUNT: 13.8 % (ref 10–15)
ERYTHROCYTE [DISTWIDTH] IN BLOOD BY AUTOMATED COUNT: 14.1 % (ref 10–15)
FLUAV+FLUBV AG SPEC QL: NEGATIVE
FLUAV+FLUBV AG SPEC QL: NEGATIVE
GFR SERPL CREATININE-BSD FRML MDRD: 86 ML/MIN/{1.73_M2}
GFR SERPL CREATININE-BSD FRML MDRD: 88 ML/MIN/{1.73_M2}
GLUCOSE BLDC GLUCOMTR-MCNC: 107 MG/DL (ref 70–99)
GLUCOSE BLDC GLUCOMTR-MCNC: 140 MG/DL (ref 70–99)
GLUCOSE BLDC GLUCOMTR-MCNC: 155 MG/DL (ref 70–99)
GLUCOSE SERPL-MCNC: 130 MG/DL (ref 70–99)
GLUCOSE SERPL-MCNC: 189 MG/DL (ref 70–99)
GLUCOSE UR STRIP-MCNC: NEGATIVE MG/DL
HBA1C MFR BLD: 6.8 % (ref 0–5.6)
HCO3 BLDV-SCNC: 30 MMOL/L (ref 21–28)
HCO3 BLDV-SCNC: 31 MMOL/L (ref 21–28)
HCT VFR BLD AUTO: 37.5 % (ref 35–47)
HCT VFR BLD AUTO: 37.8 % (ref 35–47)
HGB BLD-MCNC: 12.2 G/DL (ref 11.7–15.7)
HGB BLD-MCNC: 12.2 G/DL (ref 11.7–15.7)
HGB UR QL STRIP: NEGATIVE
IMM GRANULOCYTES # BLD: 0.1 10E9/L (ref 0–0.4)
IMM GRANULOCYTES NFR BLD: 0.8 %
INR PPP: 1.22 (ref 0.86–1.14)
INTERPRETATION ECG - MUSE: NORMAL
KETONES UR STRIP-MCNC: 5 MG/DL
LACTATE BLD-SCNC: 1.2 MMOL/L (ref 0.7–2)
LACTATE BLD-SCNC: 1.5 MMOL/L (ref 0.7–2)
LACTATE BLD-SCNC: 3.2 MMOL/L (ref 0.7–2)
LEUKOCYTE ESTERASE UR QL STRIP: ABNORMAL
LYMPHOCYTES # BLD AUTO: 0.7 10E9/L (ref 0.8–5.3)
LYMPHOCYTES NFR BLD AUTO: 4.4 %
MAGNESIUM SERPL-MCNC: 1.8 MG/DL (ref 1.6–2.3)
MCH RBC QN AUTO: 30.9 PG (ref 26.5–33)
MCH RBC QN AUTO: 31.1 PG (ref 26.5–33)
MCHC RBC AUTO-ENTMCNC: 32.3 G/DL (ref 31.5–36.5)
MCHC RBC AUTO-ENTMCNC: 32.5 G/DL (ref 31.5–36.5)
MCV RBC AUTO: 96 FL (ref 78–100)
MCV RBC AUTO: 96 FL (ref 78–100)
MONOCYTES # BLD AUTO: 0.5 10E9/L (ref 0–1.3)
MONOCYTES NFR BLD AUTO: 3 %
MUCOUS THREADS #/AREA URNS LPF: PRESENT /LPF
NEUTROPHILS # BLD AUTO: 15.5 10E9/L (ref 1.6–8.3)
NEUTROPHILS NFR BLD AUTO: 91.3 %
NITRATE UR QL: NEGATIVE
NRBC # BLD AUTO: 0 10*3/UL
NRBC BLD AUTO-RTO: 0 /100
O2/TOTAL GAS SETTING VFR VENT: 2 %
OXYHGB MFR BLDV: 63 %
PCO2 BLDV: 48 MM HG (ref 40–50)
PCO2 BLDV: 49 MM HG (ref 40–50)
PH BLDV: 7.41 PH (ref 7.32–7.43)
PH BLDV: 7.41 PH (ref 7.32–7.43)
PH UR STRIP: 6 PH (ref 5–7)
PLATELET # BLD AUTO: 178 10E9/L (ref 150–450)
PLATELET # BLD AUTO: 190 10E9/L (ref 150–450)
PO2 BLDV: 30 MM HG (ref 25–47)
PO2 BLDV: 34 MM HG (ref 25–47)
POTASSIUM SERPL-SCNC: 3.5 MMOL/L (ref 3.4–5.3)
POTASSIUM SERPL-SCNC: 3.6 MMOL/L (ref 3.4–5.3)
PROT SERPL-MCNC: 7.2 G/DL (ref 6.8–8.8)
RBC # BLD AUTO: 3.92 10E12/L (ref 3.8–5.2)
RBC # BLD AUTO: 3.95 10E12/L (ref 3.8–5.2)
RBC #/AREA URNS AUTO: 2 /HPF (ref 0–2)
SODIUM SERPL-SCNC: 138 MMOL/L (ref 133–144)
SODIUM SERPL-SCNC: 139 MMOL/L (ref 133–144)
SOURCE: ABNORMAL
SP GR UR STRIP: 1.02 (ref 1–1.03)
SPECIMEN SOURCE: NORMAL
SQUAMOUS #/AREA URNS AUTO: 1 /HPF (ref 0–1)
TROPONIN I SERPL-MCNC: <0.015 UG/L (ref 0–0.04)
UROBILINOGEN UR STRIP-MCNC: NORMAL MG/DL (ref 0–2)
WBC # BLD AUTO: 13.5 10E9/L (ref 4–11)
WBC # BLD AUTO: 17 10E9/L (ref 4–11)
WBC #/AREA URNS AUTO: 16 /HPF (ref 0–5)
WBC CLUMPS #/AREA URNS HPF: PRESENT /HPF

## 2020-01-23 PROCEDURE — 36415 COLL VENOUS BLD VENIPUNCTURE: CPT | Performed by: HOSPITALIST

## 2020-01-23 PROCEDURE — 99223 1ST HOSP IP/OBS HIGH 75: CPT | Mod: AI | Performed by: HOSPITALIST

## 2020-01-23 PROCEDURE — 93010 ELECTROCARDIOGRAM REPORT: CPT | Performed by: INTERNAL MEDICINE

## 2020-01-23 PROCEDURE — 93005 ELECTROCARDIOGRAM TRACING: CPT

## 2020-01-23 PROCEDURE — 84484 ASSAY OF TROPONIN QUANT: CPT | Performed by: NURSE PRACTITIONER

## 2020-01-23 PROCEDURE — 12000000 ZZH R&B MED SURG/OB

## 2020-01-23 PROCEDURE — 87040 BLOOD CULTURE FOR BACTERIA: CPT | Performed by: EMERGENCY MEDICINE

## 2020-01-23 PROCEDURE — 85730 THROMBOPLASTIN TIME PARTIAL: CPT | Performed by: EMERGENCY MEDICINE

## 2020-01-23 PROCEDURE — 81001 URINALYSIS AUTO W/SCOPE: CPT | Performed by: EMERGENCY MEDICINE

## 2020-01-23 PROCEDURE — 99291 CRITICAL CARE FIRST HOUR: CPT | Performed by: NURSE PRACTITIONER

## 2020-01-23 PROCEDURE — 71045 X-RAY EXAM CHEST 1 VIEW: CPT

## 2020-01-23 PROCEDURE — 25000132 ZZH RX MED GY IP 250 OP 250 PS 637: Performed by: NURSE PRACTITIONER

## 2020-01-23 PROCEDURE — 96365 THER/PROPH/DIAG IV INF INIT: CPT

## 2020-01-23 PROCEDURE — 83605 ASSAY OF LACTIC ACID: CPT | Performed by: NURSE PRACTITIONER

## 2020-01-23 PROCEDURE — 85025 COMPLETE CBC W/AUTO DIFF WBC: CPT | Performed by: EMERGENCY MEDICINE

## 2020-01-23 PROCEDURE — 25000128 H RX IP 250 OP 636: Performed by: NURSE PRACTITIONER

## 2020-01-23 PROCEDURE — 85027 COMPLETE CBC AUTOMATED: CPT | Performed by: NURSE PRACTITIONER

## 2020-01-23 PROCEDURE — 25000131 ZZH RX MED GY IP 250 OP 636 PS 637: Performed by: HOSPITALIST

## 2020-01-23 PROCEDURE — 83036 HEMOGLOBIN GLYCOSYLATED A1C: CPT | Performed by: EMERGENCY MEDICINE

## 2020-01-23 PROCEDURE — 25800030 ZZH RX IP 258 OP 636: Performed by: NURSE PRACTITIONER

## 2020-01-23 PROCEDURE — 83605 ASSAY OF LACTIC ACID: CPT | Performed by: EMERGENCY MEDICINE

## 2020-01-23 PROCEDURE — 82330 ASSAY OF CALCIUM: CPT | Performed by: NURSE PRACTITIONER

## 2020-01-23 PROCEDURE — 87086 URINE CULTURE/COLONY COUNT: CPT | Performed by: EMERGENCY MEDICINE

## 2020-01-23 PROCEDURE — 99291 CRITICAL CARE FIRST HOUR: CPT | Mod: 25

## 2020-01-23 PROCEDURE — 83735 ASSAY OF MAGNESIUM: CPT | Performed by: NURSE PRACTITIONER

## 2020-01-23 PROCEDURE — 80053 COMPREHEN METABOLIC PANEL: CPT | Performed by: EMERGENCY MEDICINE

## 2020-01-23 PROCEDURE — 80048 BASIC METABOLIC PNL TOTAL CA: CPT | Performed by: NURSE PRACTITIONER

## 2020-01-23 PROCEDURE — 96361 HYDRATE IV INFUSION ADD-ON: CPT

## 2020-01-23 PROCEDURE — 83605 ASSAY OF LACTIC ACID: CPT | Performed by: HOSPITALIST

## 2020-01-23 PROCEDURE — 25800030 ZZH RX IP 258 OP 636: Performed by: HOSPITALIST

## 2020-01-23 PROCEDURE — 70450 CT HEAD/BRAIN W/O DYE: CPT

## 2020-01-23 PROCEDURE — 00000146 ZZHCL STATISTIC GLUCOSE BY METER IP

## 2020-01-23 PROCEDURE — 82805 BLOOD GASES W/O2 SATURATION: CPT | Performed by: NURSE PRACTITIONER

## 2020-01-23 PROCEDURE — 85610 PROTHROMBIN TIME: CPT | Performed by: EMERGENCY MEDICINE

## 2020-01-23 PROCEDURE — 25800030 ZZH RX IP 258 OP 636: Performed by: EMERGENCY MEDICINE

## 2020-01-23 PROCEDURE — 25000128 H RX IP 250 OP 636: Performed by: EMERGENCY MEDICINE

## 2020-01-23 PROCEDURE — 87804 INFLUENZA ASSAY W/OPTIC: CPT | Performed by: EMERGENCY MEDICINE

## 2020-01-23 PROCEDURE — 82803 BLOOD GASES ANY COMBINATION: CPT | Performed by: EMERGENCY MEDICINE

## 2020-01-23 RX ORDER — MAGNESIUM SULFATE HEPTAHYDRATE 40 MG/ML
2 INJECTION, SOLUTION INTRAVENOUS ONCE
Status: COMPLETED | OUTPATIENT
Start: 2020-01-23 | End: 2020-01-24

## 2020-01-23 RX ORDER — PIPERACILLIN SODIUM, TAZOBACTAM SODIUM 4; .5 G/20ML; G/20ML
4.5 INJECTION, POWDER, LYOPHILIZED, FOR SOLUTION INTRAVENOUS EVERY 8 HOURS SCHEDULED
Status: DISCONTINUED | OUTPATIENT
Start: 2020-01-23 | End: 2020-01-24 | Stop reason: DRUGHIGH

## 2020-01-23 RX ORDER — AMOXICILLIN 250 MG
2 CAPSULE ORAL 2 TIMES DAILY
Status: DISCONTINUED | OUTPATIENT
Start: 2020-01-23 | End: 2020-02-01 | Stop reason: HOSPADM

## 2020-01-23 RX ORDER — MAGNESIUM SULFATE HEPTAHYDRATE 40 MG/ML
2 INJECTION, SOLUTION INTRAVENOUS DAILY PRN
Status: DISCONTINUED | OUTPATIENT
Start: 2020-01-23 | End: 2020-01-25

## 2020-01-23 RX ORDER — ACETAMINOPHEN 650 MG/1
650 SUPPOSITORY RECTAL EVERY 4 HOURS PRN
Status: DISCONTINUED | OUTPATIENT
Start: 2020-01-23 | End: 2020-02-01 | Stop reason: HOSPADM

## 2020-01-23 RX ORDER — ACETAMINOPHEN 325 MG/1
650 TABLET ORAL EVERY 4 HOURS PRN
Status: DISCONTINUED | OUTPATIENT
Start: 2020-01-23 | End: 2020-02-01 | Stop reason: HOSPADM

## 2020-01-23 RX ORDER — DEXTROSE MONOHYDRATE 25 G/50ML
25-50 INJECTION, SOLUTION INTRAVENOUS
Status: DISCONTINUED | OUTPATIENT
Start: 2020-01-23 | End: 2020-01-25

## 2020-01-23 RX ORDER — POTASSIUM CHLORIDE 1500 MG/1
20-40 TABLET, EXTENDED RELEASE ORAL
Status: DISCONTINUED | OUTPATIENT
Start: 2020-01-23 | End: 2020-01-25

## 2020-01-23 RX ORDER — MAGNESIUM OXIDE 400 MG/1
400 TABLET ORAL 2 TIMES DAILY
Status: ON HOLD | COMMUNITY
End: 2022-01-01

## 2020-01-23 RX ORDER — LIDOCAINE 40 MG/G
CREAM TOPICAL
Status: DISCONTINUED | OUTPATIENT
Start: 2020-01-23 | End: 2020-02-01 | Stop reason: HOSPADM

## 2020-01-23 RX ORDER — SODIUM CHLORIDE, SODIUM LACTATE, POTASSIUM CHLORIDE, CALCIUM CHLORIDE 600; 310; 30; 20 MG/100ML; MG/100ML; MG/100ML; MG/100ML
INJECTION, SOLUTION INTRAVENOUS CONTINUOUS
Status: DISCONTINUED | OUTPATIENT
Start: 2020-01-23 | End: 2020-01-24

## 2020-01-23 RX ORDER — AMOXICILLIN 250 MG
1 CAPSULE ORAL 2 TIMES DAILY
Status: DISCONTINUED | OUTPATIENT
Start: 2020-01-23 | End: 2020-02-01 | Stop reason: HOSPADM

## 2020-01-23 RX ORDER — ATORVASTATIN CALCIUM 40 MG/1
40 TABLET, FILM COATED ORAL DAILY
COMMUNITY
End: 2022-01-01

## 2020-01-23 RX ORDER — POTASSIUM CHLORIDE 29.8 MG/ML
20 INJECTION INTRAVENOUS
Status: DISCONTINUED | OUTPATIENT
Start: 2020-01-23 | End: 2020-01-25

## 2020-01-23 RX ORDER — ONDANSETRON 2 MG/ML
4 INJECTION INTRAMUSCULAR; INTRAVENOUS EVERY 6 HOURS PRN
Status: DISCONTINUED | OUTPATIENT
Start: 2020-01-23 | End: 2020-02-01 | Stop reason: HOSPADM

## 2020-01-23 RX ORDER — POTASSIUM CHLORIDE 1500 MG/1
40 TABLET, EXTENDED RELEASE ORAL ONCE
Status: COMPLETED | OUTPATIENT
Start: 2020-01-23 | End: 2020-01-23

## 2020-01-23 RX ORDER — CEFTRIAXONE 1 G/1
1 INJECTION, POWDER, FOR SOLUTION INTRAMUSCULAR; INTRAVENOUS EVERY 24 HOURS
Status: DISCONTINUED | OUTPATIENT
Start: 2020-01-24 | End: 2020-01-23

## 2020-01-23 RX ORDER — HYDRALAZINE HYDROCHLORIDE 20 MG/ML
10 INJECTION INTRAMUSCULAR; INTRAVENOUS EVERY 4 HOURS PRN
Status: DISCONTINUED | OUTPATIENT
Start: 2020-01-23 | End: 2020-02-01 | Stop reason: HOSPADM

## 2020-01-23 RX ORDER — POTASSIUM CHLORIDE 7.45 MG/ML
10 INJECTION INTRAVENOUS
Status: DISCONTINUED | OUTPATIENT
Start: 2020-01-23 | End: 2020-01-25

## 2020-01-23 RX ORDER — ROPINIROLE 1 MG/1
1 TABLET, FILM COATED ORAL AT BEDTIME
Status: ON HOLD | COMMUNITY
End: 2020-06-22

## 2020-01-23 RX ORDER — ONDANSETRON 4 MG/1
4 TABLET, ORALLY DISINTEGRATING ORAL EVERY 6 HOURS PRN
Status: DISCONTINUED | OUTPATIENT
Start: 2020-01-23 | End: 2020-02-01 | Stop reason: HOSPADM

## 2020-01-23 RX ORDER — PIOGLITAZONEHYDROCHLORIDE 15 MG/1
15 TABLET ORAL DAILY
COMMUNITY
End: 2022-01-01

## 2020-01-23 RX ORDER — CEFTRIAXONE 2 G/1
2 INJECTION, POWDER, FOR SOLUTION INTRAMUSCULAR; INTRAVENOUS ONCE
Status: COMPLETED | OUTPATIENT
Start: 2020-01-23 | End: 2020-01-23

## 2020-01-23 RX ORDER — HYDROCHLOROTHIAZIDE 25 MG/1
25 TABLET ORAL DAILY
Status: ON HOLD | COMMUNITY
End: 2020-02-01

## 2020-01-23 RX ORDER — POTASSIUM CL/LIDO/0.9 % NACL 10MEQ/0.1L
10 INTRAVENOUS SOLUTION, PIGGYBACK (ML) INTRAVENOUS
Status: DISCONTINUED | OUTPATIENT
Start: 2020-01-23 | End: 2020-01-25

## 2020-01-23 RX ORDER — SODIUM CHLORIDE 9 MG/ML
INJECTION, SOLUTION INTRAVENOUS CONTINUOUS
Status: DISCONTINUED | OUTPATIENT
Start: 2020-01-23 | End: 2020-01-23

## 2020-01-23 RX ORDER — NALOXONE HYDROCHLORIDE 0.4 MG/ML
.1-.4 INJECTION, SOLUTION INTRAMUSCULAR; INTRAVENOUS; SUBCUTANEOUS
Status: DISCONTINUED | OUTPATIENT
Start: 2020-01-23 | End: 2020-02-01 | Stop reason: HOSPADM

## 2020-01-23 RX ORDER — NICOTINE POLACRILEX 4 MG
15-30 LOZENGE BUCCAL
Status: DISCONTINUED | OUTPATIENT
Start: 2020-01-23 | End: 2020-01-25

## 2020-01-23 RX ORDER — MAGNESIUM SULFATE HEPTAHYDRATE 40 MG/ML
4 INJECTION, SOLUTION INTRAVENOUS EVERY 4 HOURS PRN
Status: DISCONTINUED | OUTPATIENT
Start: 2020-01-23 | End: 2020-01-25

## 2020-01-23 RX ORDER — POLYETHYLENE GLYCOL 3350 17 G/17G
17 POWDER, FOR SOLUTION ORAL DAILY PRN
Status: DISCONTINUED | OUTPATIENT
Start: 2020-01-23 | End: 2020-02-01 | Stop reason: HOSPADM

## 2020-01-23 RX ORDER — POTASSIUM CHLORIDE 1.5 G/1.58G
20-40 POWDER, FOR SOLUTION ORAL
Status: DISCONTINUED | OUTPATIENT
Start: 2020-01-23 | End: 2020-01-25

## 2020-01-23 RX ADMIN — PIPERACILLIN SODIUM AND TAZOBACTAM SODIUM 4.5 G: 4; .5 INJECTION, POWDER, LYOPHILIZED, FOR SOLUTION INTRAVENOUS at 22:17

## 2020-01-23 RX ADMIN — SODIUM CHLORIDE, POTASSIUM CHLORIDE, SODIUM LACTATE AND CALCIUM CHLORIDE: 600; 310; 30; 20 INJECTION, SOLUTION INTRAVENOUS at 20:45

## 2020-01-23 RX ADMIN — POTASSIUM CHLORIDE 40 MEQ: 1500 TABLET, EXTENDED RELEASE ORAL at 22:58

## 2020-01-23 RX ADMIN — CEFTRIAXONE SODIUM 2 G: 2 INJECTION, POWDER, FOR SOLUTION INTRAMUSCULAR; INTRAVENOUS at 11:55

## 2020-01-23 RX ADMIN — SODIUM CHLORIDE, POTASSIUM CHLORIDE, SODIUM LACTATE AND CALCIUM CHLORIDE 1000 ML: 600; 310; 30; 20 INJECTION, SOLUTION INTRAVENOUS at 19:10

## 2020-01-23 RX ADMIN — SODIUM CHLORIDE 1000 ML: 9 INJECTION, SOLUTION INTRAVENOUS at 11:48

## 2020-01-23 RX ADMIN — SODIUM CHLORIDE: 9 INJECTION, SOLUTION INTRAVENOUS at 15:05

## 2020-01-23 RX ADMIN — INSULIN ASPART 1 UNITS: 100 INJECTION, SOLUTION INTRAVENOUS; SUBCUTANEOUS at 16:35

## 2020-01-23 RX ADMIN — SODIUM CHLORIDE 1000 ML: 9 INJECTION, SOLUTION INTRAVENOUS at 16:39

## 2020-01-23 RX ADMIN — SODIUM CHLORIDE, POTASSIUM CHLORIDE, SODIUM LACTATE AND CALCIUM CHLORIDE 1000 ML: 600; 310; 30; 20 INJECTION, SOLUTION INTRAVENOUS at 20:01

## 2020-01-23 ASSESSMENT — ACTIVITIES OF DAILY LIVING (ADL)
ADLS_ACUITY_SCORE: 25
ADLS_ACUITY_SCORE: 23

## 2020-01-23 NOTE — PROGRESS NOTES
Update note    Paged regarding soft pressures. Slight improvement reported with alertness but still encephalopathic. Remains afebrile.  Lactic normalized.  Iv fluid bolus ongoing and confirmed with RN. Will continue to monitor closely, if BP doesn't improve keeping MAPs 60-65 or greater, RRT should be called and consideration of escalation for care and possible transfer and vasopressors. For now, continue on current unit with IVF.  Mohan Odonnell MD

## 2020-01-23 NOTE — ED NOTES
[facility]  ED Nurse Handoff Report    ED Chief complaint: Altered Mental Status (Altered LOC. Worse today. SBP 73mmHg initially.)      ED Diagnosis:   Final diagnoses:   Sepsis, due to unspecified organism, unspecified whether acute organ dysfunction present (H)   Urinary tract infection without hematuria, site unspecified       Code Status: Full Code    Allergies:   Allergies   Allergen Reactions     Sulfa Drugs Rash       Patient Story: Mel Castellanos is a 79 year old female who presents to the Emergency Department for an evaluation of altered mental status.  Focused Assessment:  Cardiac: WDL  Resp: WDL  Neuro A&O, confused to time, place, situation, pt is alert to self. Increased confusion per daughter.    Treatments and/or interventions provided: NS 1000ml bolus, rocephin 2g,   Patient's response to treatments and/or interventions: na    To be done/followed up on inpatient unit:  continue care    Does this patient have any cognitive concerns?: Forgetful, Disoriented to time, Disoriented to place and Disoriented to situation    Activity level - Baseline/Home:  Independent  Activity Level - Current:   Unknown, pt has not been out of bed.    Patient's Preferred language: English   Needed?: No    Isolation: None  Infection: Not Applicable  Bariatric?: No    Vital Signs:   Vitals:    01/23/20 1138 01/23/20 1145 01/23/20 1200 01/23/20 1206   BP: 106/53 114/53 121/60    Pulse: 72 73 72    Resp: 9 26 15    Temp:       TempSrc:       SpO2: 97% 95% 97%    Weight:    63.9 kg (140 lb 14 oz)       Cardiac Rhythm:     Was the PSS-3 completed:   No, pt was unable to complete due to AMS  What interventions are required if any?  na             Family Comments: na, daughter was intoxicated at scene per EMS. Daughters contact info is in demographics.  OBS brochure/video discussed/provided to patient/family: No, daughter not present but informed that pt will be admitted to the observation unit.                Name of person given brochure if not patient: CeAnne              Relationship to patient: Daughter     For the majority of the shift this patient's behavior was Green.   Behavioral interventions performed were na.    ED NURSE PHONE NUMBER: [phone]

## 2020-01-23 NOTE — H&P
[facility]    History and Physical - Hospitalist Service       Date of Admission:  1/23/2020    Assessment & Plan   Mel Castellanos is an 79 year old female with history of HTN, unstable angina, insomnia, ELS, TIA, T2DM, recurrent UTIs, and emphysema who presented to the ED via EMS due to increased altered mental status.    Altered mental status  Unclear etiology at admission, but she has history of similar presentation which were treated as UTI and symptoms improved. No other obvious etiology. Cultures pending.   - possible UTI - UA not classic, started rocephin in ED  - WBC 17k  - afebrile  - CT head pending   - will have bedside RN dysphagia screen, speech if needed    Recurrent UTI  - possible recurrence, treating as above    Deconditioned  Concern of safety at home  - Daughter possibly having issues at home.  - SW/CT consulted  - will request PT/OT when patient able to participate    Wounds  Skin excoriation, petechiae  - Both ankles tender, no overt cellulitic changes  - WOC RN consulted    HTN  Stable on admission.     T2DM  A1c 6.5% Feb 2018. PTA pioglitazone held.  - A1c ordered  - For now q4hr sugar check with sliding scale    Restless leg syndrome  PTA ropinirole can resume when more awake and safe to take PO      Diet: NPO for Medical/Clinical Reasons Except for: Meds, Ice Chips    DVT Prophylaxis: Pneumatic Compression Devices  Tavera Catheter: not present  Code Status: Full Code      Disposition Plan   Expected discharge: 2 - 3 days, anticipate TCU pending hospital course  Entered: Mohan Odonnell MD 01/23/2020, 12:48 PM     The patient's care was discussed with the Bedside Nurse, Patient and ED MD.    Moahn Odonnell MD  [facility]    ______________________________________________________________________    Chief Complaint   Altered mental status    History is obtained from ER staff, patient's daughter via report, and chart review    History of Present Illness  "  Mel Castellanos is an 79 year old female with history of HTN, unstable angina, insomnia, ELS, TIA, T2DM, recurrent UTIs, and emphysema who presented to the ED via EMS due to increased altered mental status.  She is unable to provide history at this time. But per chart review and discussion with ED staff, it appears the patient lives with her daughter.  ED RN spoke with the daughter over the phone who reported that she attempted suicide on Jan 1 and that her and her mom have been trying to look after each other. Per EMS, the daughter appeared intoxicated this morning and was directed not to drive. The daughter reported that the patient was \"more confused than usual\" and that's why she called EMS.     Patient aroused to loud voice and knows her name but is disoriented otherwise. She does follow simple commands. She appears unkempt with small lesions noted sporadically from head to toe really. In the ED, she was seen by Dr Avalos with whom I have discussed the case. She is afebrile, hemodynamically stable, and saturating 97% on 4 L nasal cannula.  She is still unable to provide history.  Initial work-up was fairly benign besides lactic acid of 3.2, WBC of 17, and UA concerning for possible infection-urine culture and blood culture pending.  Head CT is pending.  IV fluids and Rocephin have been started.  She will be admitted for further management.    Review of Systems    Unable due to patient condition.    Past Medical History    I have reviewed this patient's medical history and updated it with pertinent information if needed.   Past Medical History:   Diagnosis Date     Arthritis      Emphysema of lung (H)      High cholesterol      HTN (hypertension)      Insomnia      MI (myocardial infarction) (H)      Restless leg syndrome      TIA (transient ischaemic attack)      Type 2 diabetes mellitus without complications (H)      UTI (urinary tract infection)        Past Surgical History   I have reviewed this " patient's surgical history and updated it with pertinent information if needed.  Past Surgical History:   Procedure Laterality Date     APPENDECTOMY       CHOLECYSTECTOMY         Social History   I have reviewed this patient's social history and updated it with pertinent information if needed.  Social History     Tobacco Use     Smoking status: Current Every Day Smoker     Packs/day: 0.25     Smokeless tobacco: Never Used     Tobacco comment: 1/4-1/2 pack per day    Substance Use Topics     Alcohol use: No     Drug use: No       Family History   I have reviewed this patient's family history and updated it with pertinent information if needed.   Family History   Problem Relation Age of Onset     Coronary Artery Disease Father        Prior to Admission Medications   Prior to Admission Medications   Prescriptions Last Dose Informant Patient Reported? Taking?   AMLODIPINE BESYLATE PO 1/22/2020 at AM Daughter Yes Yes   Sig: Take 5 mg by mouth daily   ASPIRIN EC PO 1/22/2020 at AM Daughter Yes Yes   Sig: Take 81 mg by mouth daily    Isosorbide Mononitrate (IMDUR PO) 1/22/2020 at AM Daughter Yes Yes   Sig: Take 60 mg by mouth daily   Multiple Vitamins-Minerals (CENTRUM SILVER) per tablet 1/22/2020 at AM Daughter Yes Yes   Sig: Take 1 tablet by mouth daily   OMEPRAZOLE PO 1/22/2020 at AM Daughter Yes Yes   Sig: Take 20 mg by mouth every morning    atorvastatin (LIPITOR) 40 MG tablet 1/22/2020 at PM Daughter Yes Yes   Sig: Take 40 mg by mouth daily   hydrochlorothiazide (HYDRODIURIL) 25 MG tablet 1/22/2020 at AM Daughter Yes Yes   Sig: Take 25 mg by mouth daily   magnesium oxide (MAG-OX) 400 MG tablet 1/22/2020 at PM Daughter Yes Yes   Sig: Take 400 mg by mouth 2 times daily   metoprolol (TOPROL-XL) 50 MG 24 hr tablet 1/22/2020 at PM Daughter No Yes   Sig: Take 1 tablet (50 mg) by mouth 2 times daily   nitroglycerin (NITROSTAT) 0.4 MG SL tablet More than a month at Unknown time Daughter No No   Sig: Place 1 tablet (0.4 mg)  under the tongue every 5 minutes as needed X 3 doses total if needed for chest pain.   pioglitazone (ACTOS) 15 MG tablet 1/22/2020 at AM Daughter Yes Yes   Sig: Take 15 mg by mouth daily   rOPINIRole (REQUIP) 1 MG tablet 1/22/2020 at PM Daughter Yes Yes   Sig: Take 1 mg by mouth At Bedtime      Facility-Administered Medications: None     Allergies   Allergies   Allergen Reactions     Sulfa Drugs Rash       Physical Exam   Vital Signs: Temp: 98.8  F (37.1  C) Temp src: Oral BP: 121/60 Pulse: 72 Heart Rate: 70 Resp: 15 SpO2: 97 % O2 Device: Nasal cannula Oxygen Delivery: 4 LPM  Weight: 140 lbs 13.98 oz    Gen: NAD, resting but arouses to voice  HEENT: Normocephalic, EOMI, MMM  Resp: no crackles,  no wheezes, no increased work of resp  CV: S1S2 heard, reg rhythm, reg rate, no pedal edema  Abdo: soft, nontender, nondistended, bowel sounds present  Ext: calves nontender, well perfused  Neuro: sleeping but wakes to loud voice, oriented only to self, moves all ext on request, no asymmetry, CN grossly intact, no facial asymmetry, sensory grossly intact      Data   Data reviewed today: I reviewed all medications, new labs and imaging results over the last 24 hours. I personally reviewed no images or EKG's today.    Recent Labs   Lab 01/23/20  1138   WBC 17.0*   HGB 12.2   MCV 96      INR 1.22*      POTASSIUM 3.6   CHLORIDE 102   CO2 31   BUN 20   CR 0.60   ANIONGAP 5   LAUREN 9.7   *   ALBUMIN 3.4   PROTTOTAL 7.2   BILITOTAL 1.3   ALKPHOS 85   ALT 37   AST 43     Most Recent 3 CBC's:  Recent Labs   Lab Test 01/23/20  1138 12/20/19 2040 03/29/18  1025   WBC 17.0* 6.4 4.5   HGB 12.2 13.1 12.0   MCV 96 93 92    227 173     Most Recent 3 BMP's:  Recent Labs   Lab Test 01/23/20  1138 12/20/19 2124 12/20/19 2040    137 Canceled, Test credited, specimen discarded   POTASSIUM 3.6 3.7 Canceled, Test credited, specimen discarded   CHLORIDE 102 103 Canceled, Test credited, specimen discarded   CO2 54 08  Canceled, Test credited, specimen discarded   BUN 20 9 Canceled, Test credited, specimen discarded   CR 0.60 0.54 Canceled, Test credited, specimen discarded   ANIONGAP 5 3 Canceled, Test credited, specimen discarded   LAUREN 9.7 9.4 Canceled, Test credited, specimen discarded   * 109* Canceled, Test credited, specimen discarded     Most Recent 2 LFT's:  Recent Labs   Lab Test 01/23/20  1138 12/20/19 2124   AST 43 39   ALT 37 22   ALKPHOS 85 76   BILITOTAL 1.3 0.5     Most Recent 6 Bacteria Isolates From Any Culture (See EPIC Reports for Culture Details):  Recent Labs   Lab Test 12/20/19 2114   CULT <10,000 colonies/mL  Escherichia coli  *     Most Recent Urinalysis:  Recent Labs   Lab Test 01/23/20  1155   COLOR Dark Yellow   APPEARANCE Clear   URINEGLC Negative   URINEBILI Negative   URINEKETONE 5*   SG 1.025   UBLD Negative   URINEPH 6.0   PROTEIN 30*   NITRITE Negative   LEUKEST Large*   RBCU 2   WBCU 16*     Recent Results (from the past 24 hour(s))   XR Chest Port 1 View    Narrative    XR CHEST PORT 1 VW  1/23/2020 12:05 PM       INDICATION: Fever  COMPARISON: 3/29/2018       Impression    IMPRESSION: Negative portable chest. No change from previous.    GAVINO HARP MD

## 2020-01-23 NOTE — ED PROVIDER NOTES
History     Chief Complaint:  Altered mental status     The history is provided by the EMS personnel. The history is limited by the condition of the patient.      Mel Castellanos is an 79 year old female with a history of hypertension, MI, TIA, type 2 diabetes, and frequent UTIs, who presents via EMS for evaluation of altered mental status. Per EMS, the patient has been increasingly confused over the past few days, with increased confusion today. Patient's daughter was intoxicated on scene and unable to provide a clear history. On arrival, patient was incontinent and she was also noted to have chronic wounds on her sacrum and lower extremities.  She does have a history of frequent UTIs.  Initial blood pressure 73 systolic. En route, patient was given 200 mL fluids and blood pressure increased to 80/60. Blood glucose 186. Patient complains of pain but is unable to express where.    Allergies:  Sulfa     Medications:    Amlopidine   Aspirin   Imdur  Metformin  Toprol  Nitrostat  Omeprazole  Zocor   Spiriva    Past Medical History:    Unstable angina  Arthritis  Emphysema of lung  HTN  Insomnia  MI  RLS  TIA  DM 2  UTIs    Past Surgical History:    Appendectomy   Cholecystectomy     Family History:    CAD    Social History:  Smoking status: current everyday smoker   Alcohol use: no   Drug use: no   PCP: [facility] [place]  Marital Status:        Review of Systems   Unable to perform ROS: Mental status change       Physical Exam     Patient Vitals for the past 24 hrs:   BP Temp Temp src Pulse Heart Rate Resp SpO2 Weight   01/23/20 1352 127/63 -- -- 71 -- 20 97 % --   01/23/20 1300 117/54 -- -- 65 65 19 98 % --   01/23/20 1206 -- -- -- -- -- -- -- 63.9 kg (140 lb 14 oz)   01/23/20 1200 121/60 -- -- 72 70 15 97 % --   01/23/20 1145 114/53 -- -- 73 71 26 95 % --   01/23/20 1138 106/53 -- -- 72 70 9 97 % --   01/23/20 1132 126/59 98.8  F (37.1  C) Oral 76 76 26 95 % --        Physical Exam   General:  Patient is awake, alert but confused   Head: The scalp, face, and head appear normal  Eyes: The pupils are equal, round, and reactive to light. Conjunctivae and sclerae are normal  ENT: External acoustic canals are normal. The oropharynx is normal without erythema. Uvula is in the midline  Neck: Normal range of motion. No anterior cervical lymphadenopathy noted  CV: Regular rate. S1/S2. No murmurs.   Resp: Lungs are clear without wheezes or rales. No respiratory distress.   GI: Abdomen is soft, no rigidity, guarding, or rebound. No distension. No tenderness to palpation in any quadrant.     MS: Normal tone. Joints grossly normal without effusions. No asymmetric leg swelling, calf or thigh tenderness.    Skin: pressure ulcer on sacrum. Bilateral low extremity lesion consistent with bug bites.   Neuro: Speech is sparse. Face is symmetric. Moving all extremities.   Psych:  flat affect.  Appropriate interactions.    Emergency Department Course     ECG (11:37:48):  Rate 72 bpm. MI interval 166. QRS duration 78. QT/QTc 408/446. P-R-T axes 0 61 -9. Sinus rhythm with premature supraventricular complexes with occasional premature ventriclar complexes. Nonspecific ST and T wave abnormality. Abnormal ECG. Agree with computer interpretation. No significant change compared to EKG dated 3/29/18.   Interpreted at 1143 by Velasquez Avalos MD.     Imaging:  Radiographic findings were communicated with the patient who voiced understanding of the findings.    XR Chest Port 1 View  Negative portable chest. No change from previous.  As read by Radiology.    CT Head w/o Contrast  1. No acute pathology. No bleed, mass, or areas of acute infarction.  No significant change since 9/13/2011.     2. There is diffuse parenchymal volume loss.  White matter changes are  present in the cerebral hemispheres that are consistent with small  vessel ischemic disease in this age patient.  As read by Radiology.    Laboratory:  Lactic acid (1138):  3.2 (H)  Blood gas arterial: pH 7.41, PCO2 49, PO2 30, bicarbonate 31 (H), base excess 5.6, FIO2 2  PTT: 34  INR: 1.22 (H)  CBC: WBC 17.0 (H), HGB 12.2,    CMP: Glucose 189 (H), o/w WNL (Creatinine: 0.60)  Blood cultures x2: pending     UA: ketones 5, albumin 30, leukocyte esterase large, WBC 16 (H), WBC clumps present, mucous present, o/w negative     Influenza A/B antigen: Influenza A negative, Influenza B negative     Interventions:  1148: NS 1L IV Bolus   1155: Rocephin 2 g IV    Emergency Department Course:  1129: Nursing notes and vitals reviewed. I performed an exam of the patient as documented above.     IV inserted. Medicine administered as documented above. Blood drawn. This was sent to the lab for further testing, results above. The patient provided a urine sample here in the emergency department. This was sent for laboratory testing, findings above.      The patient had a portable chest xray and head CT while in the emergency department, findings above.     1200: I rechecked the patient and discussed the results of her workup thus far.     1220: I consulted with Dr. Odonnell of the hospitalist services. They are in agreement to accept the patient for admission.    Findings and plan explained to the Patient who consents to admission. Discussed the patient with Dr. Odonnell, who will admit the patient to a medical bed for further monitoring, evaluation, and treatment.    Impression & Plan      CMS Diagnoses: The Lactic acid level is elevated due to urinary tract infection, at this time there is no sign of severe sepsis or septic shock.    Medical Decision Making:  Mel Castellanos is a 79 year old female who presents for evaluation of AMS and concern for sepsis.  there was a low blood pressure in route. No evidence of hypotension or shock on evaluation here in the ED. Lactate as noted above. The etiology of the sepsis is most likely urosepsis. Of course a broad differential for the shock was considered  including infectious, cardiogenic, obstructive, hypovolemic.  A broad workup was done but I feel this is most likely infectious and treatment as noted above was directed towards infectious etiologies. Based on lactate and blood pressure measurements, I feel patient can be served best on a medicine bed.  Repeat blood pressures show stable BP.  Based on this I would not start levophed.  Total fluid input to this point is 1000 ml.       Diagnosis:    ICD-10-CM    1. Sepsis, due to unspecified organism, unspecified whether acute organ dysfunction present (H) A41.9 Blood culture     Blood culture   2. Urinary tract infection without hematuria, site unspecified N39.0        Disposition:  Admitted to Dr. Blas BAIG, Chela Oh, am serving as a scribe at 11:29 AM on 1/23/2020 to document services personally performed by Velasquez Avalos MD based on my observations and the provider's statements to me.       Chela Oh  1/23/2020    EMERGENCY DEPARTMENT       Velasquez Avalos MD  01/23/20 9627

## 2020-01-23 NOTE — PROVIDER NOTIFICATION
MD Notification    Notified Person: MD    Notified Person Name: Dr. Odonnell       Notification Date/Time: 1624 1/23/2020    Notification Interaction: paged provider    Purpose of Notification: BP 88/54; pt lethargic, unable to tell if pt symptomatic d/t confusion     Orders Received: 500 ml Bolus, run over 2 hrs.     Comments: will reassess BP after bolus   '

## 2020-01-23 NOTE — ED NOTES
Pt moved to ED Room #20. Report given to ED RN, Robe HATHAWAY Pending repeat lactic acid after NS bolus. Pt's daughter, Paula, called and stated that she is battling depression and tried to commit suicide on New Years Day. She is worried about her mother, but is currently not able to be here due to lack of sleep (and EMS thought she was intoxicated) so she was advised to rest. She states she has a therapy appointment tonight at 8pm and then will be able to visit her mom. Paula lives with the pt. Dr. Avalos updated.

## 2020-01-23 NOTE — PLAN OF CARE
RECEIVING UNIT ED HANDOFF REVIEW    ED Nurse Handoff Report was reviewed by: Zara Rider RN on January 23, 2020 at 1:08 PM

## 2020-01-23 NOTE — ED NOTES
Bed: ST02  Expected date:   Expected time:   Means of arrival:   Comments:  [facility] - 451 - 79 F altered mental status eta 1122

## 2020-01-23 NOTE — PHARMACY-ADMISSION MEDICATION HISTORY
Pharmacy Medication History  Admission medication history interview status for the 1/23/2020  admission is complete. See EPIC admission navigator for prior to admission medications     Medication history sources: Patient's family/friend (daughter) and Surescripts  Medication history source reliability: Good  Adherence assessment: Good    Significant changes made to the medication list:  None.    Additional medication history information:   none    Medication reconciliation completed by provider prior to medication history? No    Time spent in this activity: 20      Prior to Admission medications    Medication Sig Last Dose Taking? Auth Provider   AMLODIPINE BESYLATE PO Take 5 mg by mouth daily 1/22/2020 at AM Yes Unknown, Entered By History   ASPIRIN EC PO Take 81 mg by mouth daily  1/22/2020 at AM Yes Unknown, Entered By History   atorvastatin (LIPITOR) 40 MG tablet Take 40 mg by mouth daily 1/22/2020 at PM Yes Unknown, Entered By History   hydrochlorothiazide (HYDRODIURIL) 25 MG tablet Take 25 mg by mouth daily 1/22/2020 at AM Yes Unknown, Entered By History   Isosorbide Mononitrate (IMDUR PO) Take 60 mg by mouth daily 1/22/2020 at AM Yes Unknown, Entered By History   magnesium oxide (MAG-OX) 400 MG tablet Take 400 mg by mouth 2 times daily 1/22/2020 at PM Yes Unknown, Entered By History   metoprolol (TOPROL-XL) 50 MG 24 hr tablet Take 1 tablet (50 mg) by mouth 2 times daily 1/22/2020 at PM Yes Briseida Nicolas, DO   Multiple Vitamins-Minerals (CENTRUM SILVER) per tablet Take 1 tablet by mouth daily 1/22/2020 at AM Yes Reported, Patient   OMEPRAZOLE PO Take 20 mg by mouth every morning  1/22/2020 at AM Yes Reported, Patient   pioglitazone (ACTOS) 15 MG tablet Take 15 mg by mouth daily 1/22/2020 at AM Yes Unknown, Entered By History   rOPINIRole (REQUIP) 1 MG tablet Take 1 mg by mouth At Bedtime 1/22/2020 at PM Yes Unknown, Entered By History   nitroglycerin (NITROSTAT) 0.4 MG SL tablet Place 1 tablet  (0.4 mg) under the tongue every 5 minutes as needed X 3 doses total if needed for chest pain. More than a month at Unknown time  Briseida Nicolas DO

## 2020-01-24 LAB
ANION GAP SERPL CALCULATED.3IONS-SCNC: 6 MMOL/L (ref 3–14)
BACTERIA SPEC CULT: NO GROWTH
BUN SERPL-MCNC: 14 MG/DL (ref 7–30)
CALCIUM SERPL-MCNC: 9.3 MG/DL (ref 8.5–10.1)
CHLORIDE SERPL-SCNC: 105 MMOL/L (ref 94–109)
CO2 SERPL-SCNC: 24 MMOL/L (ref 20–32)
CREAT SERPL-MCNC: 0.54 MG/DL (ref 0.52–1.04)
ERYTHROCYTE [DISTWIDTH] IN BLOOD BY AUTOMATED COUNT: 13.9 % (ref 10–15)
GFR SERPL CREATININE-BSD FRML MDRD: 88 ML/MIN/{1.73_M2}
GLUCOSE BLDC GLUCOMTR-MCNC: 100 MG/DL (ref 70–99)
GLUCOSE BLDC GLUCOMTR-MCNC: 109 MG/DL (ref 70–99)
GLUCOSE BLDC GLUCOMTR-MCNC: 123 MG/DL (ref 70–99)
GLUCOSE BLDC GLUCOMTR-MCNC: 125 MG/DL (ref 70–99)
GLUCOSE BLDC GLUCOMTR-MCNC: 146 MG/DL (ref 70–99)
GLUCOSE BLDC GLUCOMTR-MCNC: 166 MG/DL (ref 70–99)
GLUCOSE SERPL-MCNC: 102 MG/DL (ref 70–99)
HCT VFR BLD AUTO: 38 % (ref 35–47)
HGB BLD-MCNC: 12.3 G/DL (ref 11.7–15.7)
Lab: NORMAL
MAGNESIUM SERPL-MCNC: 1.9 MG/DL (ref 1.6–2.3)
MCH RBC QN AUTO: 30.8 PG (ref 26.5–33)
MCHC RBC AUTO-ENTMCNC: 32.4 G/DL (ref 31.5–36.5)
MCV RBC AUTO: 95 FL (ref 78–100)
PLATELET # BLD AUTO: 164 10E9/L (ref 150–450)
POTASSIUM SERPL-SCNC: 3.5 MMOL/L (ref 3.4–5.3)
POTASSIUM SERPL-SCNC: 3.6 MMOL/L (ref 3.4–5.3)
RBC # BLD AUTO: 3.99 10E12/L (ref 3.8–5.2)
SODIUM SERPL-SCNC: 135 MMOL/L (ref 133–144)
SPECIMEN SOURCE: NORMAL
WBC # BLD AUTO: 10.8 10E9/L (ref 4–11)

## 2020-01-24 PROCEDURE — 84132 ASSAY OF SERUM POTASSIUM: CPT | Performed by: HOSPITALIST

## 2020-01-24 PROCEDURE — 36415 COLL VENOUS BLD VENIPUNCTURE: CPT | Performed by: HOSPITALIST

## 2020-01-24 PROCEDURE — 25000132 ZZH RX MED GY IP 250 OP 250 PS 637: Performed by: HOSPITALIST

## 2020-01-24 PROCEDURE — 99232 SBSQ HOSP IP/OBS MODERATE 35: CPT | Performed by: HOSPITALIST

## 2020-01-24 PROCEDURE — 85027 COMPLETE CBC AUTOMATED: CPT | Performed by: HOSPITALIST

## 2020-01-24 PROCEDURE — 12000000 ZZH R&B MED SURG/OB

## 2020-01-24 PROCEDURE — G0463 HOSPITAL OUTPT CLINIC VISIT: HCPCS

## 2020-01-24 PROCEDURE — 83735 ASSAY OF MAGNESIUM: CPT | Performed by: HOSPITALIST

## 2020-01-24 PROCEDURE — 80048 BASIC METABOLIC PNL TOTAL CA: CPT | Performed by: HOSPITALIST

## 2020-01-24 PROCEDURE — 25000128 H RX IP 250 OP 636: Performed by: NURSE PRACTITIONER

## 2020-01-24 PROCEDURE — 25000132 ZZH RX MED GY IP 250 OP 250 PS 637: Performed by: INTERNAL MEDICINE

## 2020-01-24 PROCEDURE — 25800030 ZZH RX IP 258 OP 636: Performed by: NURSE PRACTITIONER

## 2020-01-24 PROCEDURE — 25000132 ZZH RX MED GY IP 250 OP 250 PS 637: Performed by: NURSE PRACTITIONER

## 2020-01-24 PROCEDURE — 00000146 ZZHCL STATISTIC GLUCOSE BY METER IP

## 2020-01-24 PROCEDURE — 25000128 H RX IP 250 OP 636: Performed by: HOSPITALIST

## 2020-01-24 RX ORDER — NICOTINE POLACRILEX 4 MG
15-30 LOZENGE BUCCAL
Status: DISCONTINUED | OUTPATIENT
Start: 2020-01-24 | End: 2020-02-01 | Stop reason: HOSPADM

## 2020-01-24 RX ORDER — TRAZODONE HYDROCHLORIDE 50 MG/1
50 TABLET, FILM COATED ORAL AT BEDTIME
Status: DISCONTINUED | OUTPATIENT
Start: 2020-01-24 | End: 2020-02-01 | Stop reason: HOSPADM

## 2020-01-24 RX ORDER — PIPERACILLIN SODIUM, TAZOBACTAM SODIUM 4; .5 G/20ML; G/20ML
4.5 INJECTION, POWDER, LYOPHILIZED, FOR SOLUTION INTRAVENOUS EVERY 6 HOURS
Status: COMPLETED | OUTPATIENT
Start: 2020-01-24 | End: 2020-01-25

## 2020-01-24 RX ORDER — ROPINIROLE 1 MG/1
1 TABLET, FILM COATED ORAL AT BEDTIME
Status: DISCONTINUED | OUTPATIENT
Start: 2020-01-24 | End: 2020-02-01 | Stop reason: HOSPADM

## 2020-01-24 RX ORDER — MAGNESIUM OXIDE 400 MG/1
400 TABLET ORAL 2 TIMES DAILY
Status: DISCONTINUED | OUTPATIENT
Start: 2020-01-24 | End: 2020-02-01 | Stop reason: HOSPADM

## 2020-01-24 RX ORDER — ASPIRIN 81 MG/1
81 TABLET ORAL DAILY
Status: DISCONTINUED | OUTPATIENT
Start: 2020-01-25 | End: 2020-02-01 | Stop reason: HOSPADM

## 2020-01-24 RX ORDER — DEXTROSE MONOHYDRATE 25 G/50ML
25-50 INJECTION, SOLUTION INTRAVENOUS
Status: DISCONTINUED | OUTPATIENT
Start: 2020-01-24 | End: 2020-02-01 | Stop reason: HOSPADM

## 2020-01-24 RX ADMIN — PIPERACILLIN SODIUM AND TAZOBACTAM SODIUM 4.5 G: 4; .5 INJECTION, POWDER, LYOPHILIZED, FOR SOLUTION INTRAVENOUS at 06:14

## 2020-01-24 RX ADMIN — MAGNESIUM SULFATE HEPTAHYDRATE 2 G: 40 INJECTION, SOLUTION INTRAVENOUS at 00:22

## 2020-01-24 RX ADMIN — SENNOSIDES AND DOCUSATE SODIUM 1 TABLET: 8.6; 5 TABLET ORAL at 20:31

## 2020-01-24 RX ADMIN — PIPERACILLIN SODIUM AND TAZOBACTAM SODIUM 4.5 G: 4; .5 INJECTION, POWDER, LYOPHILIZED, FOR SOLUTION INTRAVENOUS at 18:40

## 2020-01-24 RX ADMIN — MAGNESIUM SULFATE HEPTAHYDRATE 2 G: 40 INJECTION, SOLUTION INTRAVENOUS at 10:38

## 2020-01-24 RX ADMIN — Medication 400 MG: at 20:31

## 2020-01-24 RX ADMIN — TRAZODONE HYDROCHLORIDE 50 MG: 50 TABLET ORAL at 01:56

## 2020-01-24 RX ADMIN — TRAZODONE HYDROCHLORIDE 50 MG: 50 TABLET ORAL at 20:31

## 2020-01-24 RX ADMIN — INSULIN ASPART 1 UNITS: 100 INJECTION, SOLUTION INTRAVENOUS; SUBCUTANEOUS at 00:29

## 2020-01-24 RX ADMIN — SODIUM CHLORIDE, POTASSIUM CHLORIDE, SODIUM LACTATE AND CALCIUM CHLORIDE: 600; 310; 30; 20 INJECTION, SOLUTION INTRAVENOUS at 04:45

## 2020-01-24 RX ADMIN — POTASSIUM CHLORIDE 20 MEQ: 1.5 POWDER, FOR SOLUTION ORAL at 10:38

## 2020-01-24 RX ADMIN — ROPINIROLE HYDROCHLORIDE 1 MG: 1 TABLET, FILM COATED ORAL at 22:27

## 2020-01-24 RX ADMIN — PIPERACILLIN SODIUM AND TAZOBACTAM SODIUM 4.5 G: 4; .5 INJECTION, POWDER, LYOPHILIZED, FOR SOLUTION INTRAVENOUS at 11:58

## 2020-01-24 ASSESSMENT — ACTIVITIES OF DAILY LIVING (ADL)
SWALLOWING: 0-->SWALLOWS FOODS/LIQUIDS WITHOUT DIFFICULTY
FALL_HISTORY_WITHIN_LAST_SIX_MONTHS: NO
COGNITION: 2 - DIFFICULTY WITH ORGANIZING THOUGHTS
DRESS: 0-->INDEPENDENT
ADLS_ACUITY_SCORE: 25
AMBULATION: 3-->ASSISTIVE EQUIPMENT AND PERSON
RETIRED_COMMUNICATION: 0-->UNDERSTANDS/COMMUNICATES WITHOUT DIFFICULTY
ADLS_ACUITY_SCORE: 19
TRANSFERRING: 1-->ASSISTIVE EQUIPMENT
BATHING: 1-->ASSISTIVE EQUIPMENT
ADLS_ACUITY_SCORE: 15.5
RETIRED_EATING: 0-->INDEPENDENT
TOILETING: 1-->ASSISTIVE EQUIPMENT
WHICH_OF_THE_ABOVE_FUNCTIONAL_RISKS_HAD_A_RECENT_ONSET_OR_CHANGE?: AMBULATION
ADLS_ACUITY_SCORE: 15.5

## 2020-01-24 ASSESSMENT — MIFFLIN-ST. JEOR: SCORE: 1075

## 2020-01-24 NOTE — PLAN OF CARE
Admission    Patient arrives to room 621-1 via cart from ED.  Care plan note:   DATE & TIME: 1/23/2020 admit-1930  Cognitive Concerns/ Orientation : Alert, oriented to self, confused, illogical speech.   BEHAVIOR & AGGRESSION TOOL COLOR: green   CIWA SCORE: n/a  ABNL VS/O2: Afebrile; Hypotensive, BP 88/54, MD notified, 1000 ml/2hr bolus ordered, pt pulled IV during bolus, BP 70s/40s, RRT called, see below; O2 stable 3L O2 NC.  MOBILITY: assist 2, turn/repo  PAIN MANAGMENT: denies pain   DIET: NPO for speech eval   BOWEL/BLADDER: Tavera present, patent.  ABNL LAB/BG: LA recheck 1.2; , 140  DRAIN/DEVICES: Tavera; PIV, IVF infusing   TELEMETRY RHYTHM: n/a  SKIN: blanchable redness to coccyx, mepilex applied. Small wounds/scabs/open areas to BLE. WOC consult pending.   TESTS/PROCEDURES: n/a  D/C DAY/GOALS/PLACE: pending  OTHER IMPORTANT INFO: Received call from [facility] Adult Protection, see note. SW consult in place.     RRT called at 1855 for persistent hypotension during IVF bolus and pt removed IV; RRT continued through end of shift, advised oncoming nurse.     Inpatient nursing criteria listed below were met:    PCD's Documented: Yes  Skin issues/needs documented :Yes  Isolation education started/completed NA  Patient allergies verified with patient: No - pt confused  Verified completion of [place] Risk Assessment Tool:  No - pt confused  Verified completion of Guardianship screening tool: No  Fall Prevention: Care plan updated, Education given and documented Yes  Care Plan initiated: Yes  Home medications documented in belongings flowsheet: NA  Patient belongings documented in belongings flowsheet: Yes  Reminder note (belongings/ medications) placed in discharge instructions:Yes  Admission profile/ required documentation complete: No  Bedside Report Letter given and explained to patient No

## 2020-01-24 NOTE — PLAN OF CARE
"DATE & TIME: 1/24/2020 [id number]    Cognitive Concerns/ Orientation : A&O self/place   BEHAVIOR & AGGRESSION TOOL COLOR: Green, mostly cooperative. This afternoon pulling at lines. Stating \"these need to come out by the time my daughter gets here because I'm leaving\".   CIWA SCORE: NA   ABNL VS/O2: VSS on 2 LPM NC- unable to wean sats mid 80's on RA  MOBILITY: Up w/1 GB/walker; up in chair today; PT/OT consult  PAIN MANAGMENT: Denies  DIET: Mod CHO, good appetite  BOWEL/BLADDER: Tavera with good UOP, now removed and due to void  ABNL LAB/BG: K 3.5, replaced and recheck tomorrow. Mg 1.9 replaced and recheck tomorrow. /123  DRAIN/DEVICES: PIV x2, now saline locked  TELEMETRY RHYTHM: NA  SKIN: coccyx red, blanchable, mepilex. BLE small scrapes/redness- WOC consult  TESTS/PROCEDURES: none  D/C DAY/GOALS/PLACE: pending off IV abx, SW following adult protection called yesterday. Daughter called and was updated with patients permission, daughter stated \"we sleep in same bed for her and my safety because well, I'm suicidal\".   OTHER IMPORTANT INFO: Neuro intact. Continue IV zosyn     "

## 2020-01-24 NOTE — PLAN OF CARE
DATE & TIME: 1-23 nights    Cognitive Concerns/ Orientation : A&Ox self and intermittently to place   BEHAVIOR & AGGRESSION TOOL COLOR: green  CIWA SCORE: NA   ABNL VS/O2: VSS on 2L NC  MOBILITY: A1, GB  PAIN MANAGMENT: denies  DIET: NPO ex meds, ice chips  BOWEL/BLADDER: anderson, continent bowel, up to bathroom  ABNL LAB/BG: NA  DRAIN/DEVICES: Anderson, L PIV inf LR, R PIV SL  TELEMETRY RHYTHM: NA  SKIN: bruising, multiple scabs, redness to coccyx covered w mepilex  TESTS/PROCEDURES: PT/OT, SW, WOC to see  D/C DAY/GOALS/PLACE: pending, to TCU  OTHER IMPORTANT INFO: sitter at bedside, pleasant

## 2020-01-24 NOTE — PLAN OF CARE
Received call from [facility] regarding pts daughter. Was advised daughter can visit, but should be supervised. Informed charge nurse.

## 2020-01-24 NOTE — CODE/RAPID RESPONSE
[facility]  House [facility] RRT Note  1/23/2020   Time Called: 1854  RRT called for: hypotension  Code Status: Full Code    Assessment & Plan   I was paged to the bedside to evaluate Ms. Mel Castellanos for an acute episode of hypotension refractory to previous volume resuscitation. Patient was alert and confused, complaining of back and chest pain, writhing in bed and appearing generally uncomfortable. 12 lead ECG was not indicative of any acute or overt ischemia. Labs were sent and non-invasive cardiac output numbers were obtained which demonstrated volume depletion and volume responsiveness. Patient was given multiple liters of LR along with continuous infusion.     Diagnosis:  -- hypovolemic shock, likely multifactorial (sepsis + dehydration)     Interventions ordered/provided:  -- 12 lead ECG  -- CBC, BMP, troponin, magnesium, calcium chloride, lactic acid  -- NICOM   CO/CI 7.2/4.3, SVR/SVRI 1445/2422, SVI change 13.7%   Given the adequate cardiac output and SVR this patient is volume depleted and it is appropriate to give additional fluid  -- 2 L LR bolus, followed with continuous infusion  -- broadened antibiotics to zosyn from ceftriaxone  -- 40 mEq Kdur and 2g IV mag sulfate, followed with electrolyte orderset    At the conclusion of this RRT patient was hemodynamically stable and will remain on station 66.    ADDENDUM 2200: Patient up in chair conversing with bedside attendant, BP adequate at 130's systolic, continue IVFs.     Interval History     Ms. Mel Castellanos is a 79 year old female who was admitted on 1/23/2020 for UTI.    Her history is significant for:  Past Medical History:   Diagnosis Date     Arthritis      Emphysema of lung (H)      High cholesterol      HTN (hypertension)      Insomnia      MI (myocardial infarction) (H)      Restless leg syndrome      TIA (transient ischaemic attack)      Type 2 diabetes mellitus without complications (H)      UTI (urinary tract infection)       Past Surgical History:   Procedure Laterality Date     APPENDECTOMY       CHOLECYSTECTOMY         Allergies   Allergies   Allergen Reactions     Sulfa Drugs Rash       Physical Exam   Physical Exam  Constitutional:       General: She is in acute distress.      Appearance: She is ill-appearing.   HENT:      Head: Normocephalic and atraumatic.      Nose: Nose normal.      Mouth/Throat:      Mouth: Mucous membranes are dry.   Eyes:      Pupils: Pupils are equal, round, and reactive to light.   Cardiovascular:      Rate and Rhythm: Normal rate and regular rhythm.   Pulmonary:      Effort: Respiratory distress present.   Abdominal:      General: Abdomen is flat. There is no distension.      Palpations: Abdomen is soft.   Musculoskeletal: Normal range of motion.   Skin:     General: Skin is warm and dry.      Capillary Refill: Capillary refill takes less than 2 seconds.   Neurological:      Mental Status: She is disoriented and confused.      GCS: GCS eye subscore is 4. GCS verbal subscore is 4. GCS motor subscore is 6.   Psychiatric:         Attention and Perception: She is inattentive.         Mood and Affect: Mood is anxious.         Behavior: Behavior is uncooperative.       Vital Signs with Ranges:  Temp:  [97.8  F (36.6  C)-98.8  F (37.1  C)] 97.8  F (36.6  C)  Pulse:  [65-76] 71  Heart Rate:  [40-77] 65  Resp:  [9-26] 18  BP: ()/(36-63) 80/61  SpO2:  [91 %-98 %] 95 %  No intake/output data recorded.    Data     EKG:  Interpreted by TAE Caro CNP  Time reviewed: 1915  Symptoms at time of EKG: chest pain, hypotension   Rhythm: normal sinus   Rate: Normal  Axis: Normal  Ectopy: none  Conduction: normal  ST Segments/ T Waves: ST Segment Depression Inferior  Q Waves: v1  Comparison to prior: ST depression in inferior leads more prominent  Clinical Impression: non-specific EKG    ABG:  -  Recent Labs   Lab 01/23/20  1155   O2PER 2       Troponin:    Recent Labs   Lab Test 03/29/18  1327   TROPI  <0.015       IMAGING: (X-ray/CT/MRI)   Recent Results (from the past 24 hour(s))   XR Chest Port 1 View    Narrative    XR CHEST PORT 1 VW  1/23/2020 12:05 PM       INDICATION: Fever  COMPARISON: 3/29/2018       Impression    IMPRESSION: Negative portable chest. No change from previous.    GAVINO HARP MD   CT Head w/o Contrast    Narrative    CT SCAN OF THE HEAD WITHOUT CONTRAST   1/23/2020 1:32 PM     HISTORY: Altered mental status (LOC), unexplained    TECHNIQUE:  Axial images of the head and coronal reformations without  IV contrast material. Radiation dose for this scan was reduced using  automated exposure control, adjustment of the mA and/or kV according  to patient size, or iterative reconstruction technique.    COMPARISON: None.    FINDINGS:     Intracranial contents: There is diffuse parenchymal volume loss.   White matter changes are present in the cerebral hemispheres that are  consistent with small vessel ischemic disease in this age patient.  There is no evidence of intracranial hemorrhage, mass, acute infarct  or anomaly.    Visualized orbits/sinuses/mastoids:  The visualized portions of the  sinuses and mastoids appear normal.    Osseous structures/soft tissues:  There is no evidence of trauma.      Impression    IMPRESSION:   1. No acute pathology. No bleed, mass, or areas of acute infarction.  No significant change since 9/13/2011.     2. There is diffuse parenchymal volume loss.  White matter changes are  present in the cerebral hemispheres that are consistent with small  vessel ischemic disease in this age patient.      VERA STOUT MD       CBC with Diff:  Recent Labs   Lab Test 01/23/20  1138   WBC 17.0*   HGB 12.2   MCV 96      INR 1.22*      No results found for: RETICABSCT  No results found for: RETP    Lactic Acid:    Lactic Acid   Date Value Ref Range Status   01/23/2020 1.2 0.7 - 2.0 mmol/L Final   01/23/2020 3.2 (H) 0.7 - 2.0 mmol/L Final   02/15/2018 1.1 0.7 - 2.0 mmol/L Final      No results found for: LACTS     Comprehensive Metabolic Panel:  Recent Labs   Lab 01/23/20  1138      POTASSIUM 3.6   CHLORIDE 102   CO2 31   ANIONGAP 5   *   BUN 20   CR 0.60   GFRESTIMATED 86   GFRESTBLACK >90   LAUREN 9.7   PROTTOTAL 7.2   ALBUMIN 3.4   BILITOTAL 1.3   ALKPHOS 85   AST 43   ALT 37       INR:    Recent Labs   Lab Test 01/23/20  1138   INR 1.22*       D-DIMER:  No components found for: DDIMER    BNP:  No results found for: BNP    UA:  Recent Labs   Lab 01/23/20  1155   COLOR Dark Yellow   APPEARANCE Clear   URINEGLC Negative   URINEBILI Negative   URINEKETONE 5*   SG 1.025   UBLD Negative   URINEPH 6.0   PROTEIN 30*   NITRITE Negative   LEUKEST Large*   RBCU 2   WBCU 16*       Time Spent on this Encounter   I spent 60 minutes of critical care time on the unit/floor managing the care of Mel Castellanos. Upon evaluation, this patient had a high probability of imminent or life-threatening deterioration due to shock, which required my direct attention, intervention, and personal management. 100% of my time was spent at the bedside counseling the patient and/or coordinating care regarding services listed in this note.    TAE Caro, CNP  Hospitalist - House [facility]  Text Page  ([phone])

## 2020-01-24 NOTE — PROVIDER NOTIFICATION
MD Notification    Notified Person: MD    Notified Person Name: sonya    Notification Date/Time: 1/24 0145    Notification Interaction: amcom page    Purpose of Notification: 621-2 S.B.    pt unable to sleep, can we get a sleep aid?    *[id number] CAMERON Chowdhury    Orders Received: PRN trazodone    Comments:

## 2020-01-24 NOTE — PROVIDER NOTIFICATION
MD Notification    Notified Person: MD    Notified Person Name: sonya    Notification Date/Time: 1/24 0145    Notification Interaction: amcom page    Purpose of Notification: 621-2 S.B.    pt unable to sleep, can we get a sleep aid?    *[id number] CAMERON Chowdhury    Orders Received:    Comments:

## 2020-01-24 NOTE — PROGRESS NOTES
"RRT team called off unit, resource RN (writer) stayed with patient. Started second 1 L bolus of LR. BP improved 120/71, 90/85 and 106/54. Patient very confused and often nonsensical statements. Stating everything hurts \"stomach, back, head, arm\", hot packs applied, relaxation promoted and repositioned. MD wanted to hold off on narcs due to confusion. Bedside RN Carolyne updated and she will continue to monitor.   "

## 2020-01-24 NOTE — PROGRESS NOTES
[facility]    Medicine Progress Note - Hospitalist Service       Date of Admission:  1/23/2020  Assessment & Plan   Mel Castellanos is an 79 year old female with history of HTN, unstable angina, insomnia, ELS, TIA, T2DM, recurrent UTIs, and emphysema who presented to the ED via EMS due to increased altered mental status.     Altered mental status - much improved 1/24  Sepsis resolving - possibly 2/2 UTI  Acute toxic-metabolic encephalopathy - improving  Unclear etiology at admission, but she has history of similar presentation which were treated as UTI and symptoms improved. No other obvious etiology. Cultures NGTD.   Flu negative.   - possible UTI - leukocytosis + UA not classic, started rocephin in ED  - WBC 17k --> 13k --> 10k today  - afebrile  - CT head no acute pathology  - RRT last night for hypotension/sepsis, given additional IVF boluses and abx switched to zosyn --> improved  - much improved 1/24 but pt still confused about situation     Recurrent UTI  - possible recurrence, treating as above     Deconditioned  Concern of safety at home  - Daughter possibly having issues at home.  - SW/CT consulted  - will request PT/OT when patient able to participate     Wounds  Skin excoriation, petechiae  - Both ankles tender, no overt cellulitic changes  - WOC RN consulted, appreciated     HTN  Stable on admission.   - as above, soft BP last night requiring multiple IVF boluses, better today - continue holding PTA meds     History of CAD/MI  PTA ASA resumed, PTA BB, imdur, statin held for now given soft BP and confusion    T2DM  A1c 6.5% Feb 2018. PTA pioglitazone held.  - A1c now 6.8%  - POC QID CMLHS and sliding scale     Restless leg syndrome  PTA ropinirole can resume when more awake and safe to take PO    Diet: Moderate Consistent CHO Diet    DVT Prophylaxis: Pneumatic Compression Devices  Tavera Catheter: not present  Code Status: Full Code      Disposition Plan   Expected discharge: pending  continued improvement, therapy recs, SW consult - appears to be less than ideal/safe situation at home  Entered: Mohan Odonnell MD 01/24/2020, 5:46 PM       The patient's care was discussed with the Bedside Nurse, Care Coordinator/ and Patient.    Mohan Odonnell MD  Hospitalist Service  [facility]    ______________________________________________________________________    Interval History   Seen and examined. Improved. Awake and talking appropriately but confused on the situation and thinks she is able to safely be at home.     Data reviewed today: I reviewed all medications, new labs and imaging results over the last 24 hours. I personally reviewed no images or EKG's today.    Physical Exam   Vital Signs: Temp: 98.1  F (36.7  C) Temp src: Oral BP: 111/53   Heart Rate: 77 Resp: 16 SpO2: 94 % O2 Device: Nasal cannula Oxygen Delivery: 2 LPM  Weight: 140 lbs 3.4 oz    Gen: NAD, elderly  HEENT: Normocephalic, EOMI, MMM  Resp: no crackles,  no wheezes, no increased work of resp  CV: S1S2 heard, reg rhythm, reg rate, no pedal edema  Abdo: soft, nontender, nondistended, bowel sounds present  Ext: calves nontender, well perfused  Neuro: AAOx3, CN grossly intact, no facial asymmetry      Data   Recent Labs   Lab 01/24/20  1149 01/24/20  0859 01/23/20  1930 01/23/20  1138   WBC  --  10.8 13.5* 17.0*   HGB  --  12.3 12.2 12.2   MCV  --  95 96 96   PLT  --  164 178 190   INR  --   --   --  1.22*   NA  --  135 139 138   POTASSIUM 3.6 3.5 3.5 3.6   CHLORIDE  --  105 105 102   CO2  --  24 31 31   BUN  --  14 17 20   CR  --  0.54 0.54 0.60   ANIONGAP  --  6 3 5   LAUREN  --  9.3 9.3 9.7   GLC  --  102* 130* 189*   ALBUMIN  --   --   --  3.4   PROTTOTAL  --   --   --  7.2   BILITOTAL  --   --   --  1.3   ALKPHOS  --   --   --  85   ALT  --   --   --  37   AST  --   --   --  43   TROPI  --   --  <0.015  --      No results found for this or any previous visit (from the past 24 hour(s)).

## 2020-01-24 NOTE — PROGRESS NOTES
[facility] Nurse Inpatient Wound Assessment   Reason for consultation: Evaluate and treat BLE      Assessment  Bilateral lower legs covered in small scattered scabs of unclear etiology.  Pt poor historian.  Possibly from scratching at legs.  She also has a few scabs across her nose and face.  Feet are a bit purplish with slow capillary refill.  Scabs and scarring also to dorsal feet and left great toe, which is very tender.  No acute s/s infection noted.     Coccyx pink with thin dry scuffed tissue, very bony protrusion, remains intact and blanchable but at risk for breakdown.     Treatment Plan  BLE: Daily and prn:  1.  Cleanse with mild skin cleanser.  2.  Apply Sween 24 cream.   3.  Leave open to air.  Elevate legs on pillows, floating heels at all times.      Pressure Injury Prevention (PIP) Plan:  If patient is declining pressure injury prevention interventions: Explore reason why and address patient's concerns and Educate on pressure injury risk and prevention intervention(s)  Mattress: Follow bed algorithm, reassess daily and order specialty mattress, if indicated.  HOB: Maintain at or below 30 degrees, unless contraindicated  Repositioning in bed: Every 1-2 hours , Left/right positioning; avoid supine and Raise foot of bed prior to raising head of bed, to reduce patient sliding down (shear)  Heels: Keep elevated off mattress and Pillows under calves  Protective Dressing: Sacral Mepilex for prevention (#[id number]),  especially for the agitated patient   Positioning Equipment: None  Chair positioning: Chair cushion (#[id number]) , Repositions self: patient to shift weight every 15 minutes and Assist patient to reposition hourly   If patient has a buttock pressure injury, or high risk for PI use chair cushion or SPS.  Moisture Management: Perineal cleansing /protection: Follow Incontinence Protocol, Clean and dry skin folds with bathing  and Moisturize dry skin  Under Devices: Inspect  skin under all medical devices during skin inspection , Ensure tubes are stabilized without tension and Ensure patient is not lying on medical devices or equipment when repositioned  Ask provider to discontinue device when no longer needed.    Orders Written  WOC Nurse follow-up plan: weekly  Nursing to notify the Provider(s) and re-consult the WOC Nurse if wound(s) deteriorates or new skin concern.    Patient History  According to provider note(s):  Mel Castellanos is an 79 year old female with history of HTN, unstable angina, insomnia, ELS, TIA, T2DM, recurrent UTIs, and emphysema who presented to the ED via EMS due to increased altered mental status.    Objective Data  Containment of urine/stool: Brief    Active Diet Order:  Orders Placed This Encounter      Moderate Consistent CHO Diet    Output:   I/O last 3 completed shifts:  In: 2597 [P.O.:240; I.V.:357; IV Piggyback:2000]  Out: 1850 [Urine:1850]    Risk Assessment:   Sensory Perception: 3-->slightly limited  Moisture: 3-->occasionally moist  Activity: 3-->walks occasionally  Mobility: 3-->slightly limited  Nutrition: 3-->adequate  Friction and Shear: 2-->potential problem  Bruno Score: 17                          Labs:   Recent Labs   Lab 01/24/20  0859  01/23/20  1138   ALBUMIN  --   --  3.4   HGB 12.3   < > 12.2   INR  --   --  1.22*   WBC 10.8   < > 17.0*   A1C  --   --  6.8*    < > = values in this interval not displayed.       Physical Exam  Skin inspection: focused BLE, coccyx    Wound Location:  BLE  Date of last photo:  1-24-20      Wound History: unclear  Measurements (length x width x depth, in cm):  Numerous scattered small dry scabs and excoriations, about 0.5cm diameter average  Wound Base: dry thin red scabby tissue  Palpation of the wound bed: normal   Periwound skin: mild erythema  Temperature: normal   Drainage: none  Odor: none  Pain: minimal, except tenderness to left great toe    Coccyx:  Pink and thin fragile tissue, but intact and  blanchable.  Pt rolls to side with 1-2 assist.   1-24-20        Interventions  Current support surface: Standard  Atmos Air mattress  Current off-loading measures: Pillows  Visual inspection of wound(s) completed  Wound Care: done per plan of care  Supplies: reviewed  Education provided: importance of repositioning, plan of care, wound progress and Off-loading pressure  Discussed plan of care with Patient and Nurse    Sapna Watt RN

## 2020-01-24 NOTE — PLAN OF CARE
Pt confused, VSS stable after RRT was done. Blood pressure came up to 120/71 after fluid boluses. Hot packs given for pain in her back. Up in chair with assist x1. Tavera in place.

## 2020-01-25 ENCOUNTER — APPOINTMENT (OUTPATIENT)
Dept: PHYSICAL THERAPY | Facility: CLINIC | Age: 80
DRG: 037 | End: 2020-01-25
Attending: HOSPITALIST
Payer: COMMERCIAL

## 2020-01-25 ENCOUNTER — APPOINTMENT (OUTPATIENT)
Dept: OCCUPATIONAL THERAPY | Facility: CLINIC | Age: 80
DRG: 037 | End: 2020-01-25
Attending: HOSPITALIST
Payer: COMMERCIAL

## 2020-01-25 LAB
GLUCOSE BLDC GLUCOMTR-MCNC: 111 MG/DL (ref 70–99)
GLUCOSE BLDC GLUCOMTR-MCNC: 140 MG/DL (ref 70–99)
GLUCOSE BLDC GLUCOMTR-MCNC: 161 MG/DL (ref 70–99)
GLUCOSE BLDC GLUCOMTR-MCNC: 203 MG/DL (ref 70–99)
GLUCOSE BLDC GLUCOMTR-MCNC: 93 MG/DL (ref 70–99)
INTERPRETATION ECG - MUSE: NORMAL
MAGNESIUM SERPL-MCNC: 1.7 MG/DL (ref 1.6–2.3)
POTASSIUM SERPL-SCNC: 3.4 MMOL/L (ref 3.4–5.3)

## 2020-01-25 PROCEDURE — 97530 THERAPEUTIC ACTIVITIES: CPT | Mod: GP | Performed by: PHYSICAL THERAPIST

## 2020-01-25 PROCEDURE — 97165 OT EVAL LOW COMPLEX 30 MIN: CPT | Mod: GO

## 2020-01-25 PROCEDURE — 97116 GAIT TRAINING THERAPY: CPT | Mod: GP | Performed by: PHYSICAL THERAPIST

## 2020-01-25 PROCEDURE — 25000132 ZZH RX MED GY IP 250 OP 250 PS 637: Performed by: INTERNAL MEDICINE

## 2020-01-25 PROCEDURE — 36415 COLL VENOUS BLD VENIPUNCTURE: CPT | Performed by: HOSPITALIST

## 2020-01-25 PROCEDURE — 84132 ASSAY OF SERUM POTASSIUM: CPT | Performed by: HOSPITALIST

## 2020-01-25 PROCEDURE — 25000132 ZZH RX MED GY IP 250 OP 250 PS 637: Performed by: HOSPITALIST

## 2020-01-25 PROCEDURE — 00000146 ZZHCL STATISTIC GLUCOSE BY METER IP

## 2020-01-25 PROCEDURE — 83735 ASSAY OF MAGNESIUM: CPT | Performed by: HOSPITALIST

## 2020-01-25 PROCEDURE — 97535 SELF CARE MNGMENT TRAINING: CPT | Mod: GO

## 2020-01-25 PROCEDURE — 25000128 H RX IP 250 OP 636: Performed by: HOSPITALIST

## 2020-01-25 PROCEDURE — 97161 PT EVAL LOW COMPLEX 20 MIN: CPT | Mod: GP | Performed by: PHYSICAL THERAPIST

## 2020-01-25 PROCEDURE — 25000128 H RX IP 250 OP 636: Performed by: INTERNAL MEDICINE

## 2020-01-25 PROCEDURE — 12000000 ZZH R&B MED SURG/OB

## 2020-01-25 PROCEDURE — 99232 SBSQ HOSP IP/OBS MODERATE 35: CPT | Performed by: HOSPITALIST

## 2020-01-25 PROCEDURE — 99221 1ST HOSP IP/OBS SF/LOW 40: CPT | Performed by: PSYCHIATRY & NEUROLOGY

## 2020-01-25 RX ORDER — MAGNESIUM SULFATE HEPTAHYDRATE 40 MG/ML
4 INJECTION, SOLUTION INTRAVENOUS EVERY 4 HOURS PRN
Status: DISCONTINUED | OUTPATIENT
Start: 2020-01-25 | End: 2020-02-01 | Stop reason: HOSPADM

## 2020-01-25 RX ORDER — POTASSIUM CHLORIDE 1.5 G/1.58G
20-40 POWDER, FOR SOLUTION ORAL
Status: DISCONTINUED | OUTPATIENT
Start: 2020-01-25 | End: 2020-02-01 | Stop reason: HOSPADM

## 2020-01-25 RX ORDER — POTASSIUM CHLORIDE 1500 MG/1
20-40 TABLET, EXTENDED RELEASE ORAL
Status: DISCONTINUED | OUTPATIENT
Start: 2020-01-25 | End: 2020-02-01 | Stop reason: HOSPADM

## 2020-01-25 RX ORDER — HALOPERIDOL 5 MG/ML
2 INJECTION INTRAMUSCULAR EVERY 6 HOURS PRN
Status: DISCONTINUED | OUTPATIENT
Start: 2020-01-25 | End: 2020-02-01 | Stop reason: HOSPADM

## 2020-01-25 RX ORDER — OLANZAPINE 5 MG/1
5 TABLET, ORALLY DISINTEGRATING ORAL 2 TIMES DAILY PRN
Status: DISCONTINUED | OUTPATIENT
Start: 2020-01-25 | End: 2020-02-01 | Stop reason: HOSPADM

## 2020-01-25 RX ORDER — METOPROLOL SUCCINATE 25 MG/1
25 TABLET, EXTENDED RELEASE ORAL 2 TIMES DAILY
Status: DISCONTINUED | OUTPATIENT
Start: 2020-01-25 | End: 2020-01-28

## 2020-01-25 RX ORDER — POTASSIUM CL/LIDO/0.9 % NACL 10MEQ/0.1L
10 INTRAVENOUS SOLUTION, PIGGYBACK (ML) INTRAVENOUS
Status: DISCONTINUED | OUTPATIENT
Start: 2020-01-25 | End: 2020-02-01 | Stop reason: HOSPADM

## 2020-01-25 RX ORDER — POTASSIUM CHLORIDE 29.8 MG/ML
20 INJECTION INTRAVENOUS
Status: DISCONTINUED | OUTPATIENT
Start: 2020-01-25 | End: 2020-01-25

## 2020-01-25 RX ORDER — POTASSIUM CHLORIDE 7.45 MG/ML
10 INJECTION INTRAVENOUS
Status: DISCONTINUED | OUTPATIENT
Start: 2020-01-25 | End: 2020-02-01 | Stop reason: HOSPADM

## 2020-01-25 RX ADMIN — METOPROLOL SUCCINATE 25 MG: 25 TABLET, EXTENDED RELEASE ORAL at 20:36

## 2020-01-25 RX ADMIN — SENNOSIDES AND DOCUSATE SODIUM 1 TABLET: 8.6; 5 TABLET ORAL at 09:27

## 2020-01-25 RX ADMIN — Medication 400 MG: at 20:37

## 2020-01-25 RX ADMIN — PIPERACILLIN SODIUM AND TAZOBACTAM SODIUM 4.5 G: 4; .5 INJECTION, POWDER, LYOPHILIZED, FOR SOLUTION INTRAVENOUS at 12:15

## 2020-01-25 RX ADMIN — PIPERACILLIN SODIUM AND TAZOBACTAM SODIUM 4.5 G: 4; .5 INJECTION, POWDER, LYOPHILIZED, FOR SOLUTION INTRAVENOUS at 05:56

## 2020-01-25 RX ADMIN — PIPERACILLIN SODIUM AND TAZOBACTAM SODIUM 4.5 G: 4; .5 INJECTION, POWDER, LYOPHILIZED, FOR SOLUTION INTRAVENOUS at 00:43

## 2020-01-25 RX ADMIN — HALOPERIDOL LACTATE 2 MG: 5 INJECTION, SOLUTION INTRAMUSCULAR at 02:55

## 2020-01-25 RX ADMIN — ASPIRIN 81 MG: 81 TABLET, DELAYED RELEASE ORAL at 09:27

## 2020-01-25 RX ADMIN — METOPROLOL SUCCINATE 25 MG: 25 TABLET, EXTENDED RELEASE ORAL at 12:15

## 2020-01-25 RX ADMIN — TRAZODONE HYDROCHLORIDE 50 MG: 50 TABLET ORAL at 20:37

## 2020-01-25 RX ADMIN — OMEPRAZOLE 20 MG: 20 CAPSULE, DELAYED RELEASE ORAL at 09:27

## 2020-01-25 RX ADMIN — ROPINIROLE HYDROCHLORIDE 1 MG: 1 TABLET, FILM COATED ORAL at 20:37

## 2020-01-25 RX ADMIN — SENNOSIDES AND DOCUSATE SODIUM 1 TABLET: 8.6; 5 TABLET ORAL at 20:37

## 2020-01-25 RX ADMIN — Medication 400 MG: at 09:26

## 2020-01-25 ASSESSMENT — ACTIVITIES OF DAILY LIVING (ADL)
ADLS_ACUITY_SCORE: 21
ADLS_ACUITY_SCORE: 15.5
ADLS_ACUITY_SCORE: 21
ADLS_ACUITY_SCORE: 15.5
PREVIOUS_RESPONSIBILITIES: HOUSEKEEPING;MEAL PREP

## 2020-01-25 ASSESSMENT — MIFFLIN-ST. JEOR: SCORE: 1084

## 2020-01-25 NOTE — PLAN OF CARE
OT eval received and reviewed. Eval completed; treatment initiated. Pt 80 y/o female admitted post altered mental status. Per chart review, pt lives with daughter who attempted suicide on New Years Day. Noted daughter was intoxicated upon arrival of hospital. Per daughter report, pt demo'd increased confusion. Pt reported living with daughter in condo with no stairs. Pt reported daughter assists with I/ADL's as needed, with being ind with ADL's at baseline. Pt accuracy of home environment history unknown secondary to pt decreased cognition. Pt seated in chair upon OT arrival; RN present. Pt agreeable to therapy.     Discharge Planner OT   Patient plan for discharge: Return home  Current status: Pt demo'd ability to don/doff socks with SBA. MMT WFL. ROM WFL. With verbal cues from , pt completed sit <> stand with FWW and SBA. Pt ambulated from chair to bathroom with CGA and FWW; noted unsteady gait. Pt left FWW at bathroom door and completed toilet transfer with SBA and grab bars. Pt required verbal cues throughout session for safety awareness. Pt required min A for toileting tasks. Pt required min A for sit <> stand during toilet transfer. Pt returned to chair with all needs in reach; chair alarm on. SLUMS Examination completed; pt scored 6/30 with increased difficulty with attention, problem-solving, STM, and decision-making.  Barriers to return to prior living situation: Unknown safety status of living environment; cognition; decreased activity tolerance  Recommendations for discharge: TCU  Rationale for recommendations: Pt performing below baseline. Pt will benefit from continued skilled OT services for increased balance, strength, and ind with ADL's prior to returning home. Per current cognitive status, pt is unsafe to live alone or without 24-hour supervision. Unknown safety of current living environment with daughter. Will continue to adjust discharge recommendations as needed.        Entered by: Susan  Deloris 01/25/2020 12:44 PM

## 2020-01-25 NOTE — PLAN OF CARE
DATE & TIME: 1/25/20 9090-1404   Cognitive Concerns/ Orientation : A&O to self only  BEHAVIOR & AGGRESSION TOOL COLOR: Green  CIWA SCORE: NA   ABNL VS/O2: VSS except desats to 87% on room air; oxygen sats 92% on 2 L oxygen via nasal cannula  MOBILITY: Assist of 1-2 +gb/walker; ambulating to bathroom  PAIN MANAGMENT: Denies  DIET: Mod CHO  BOWEL/BLADDER: Incontinent at times  ABNL LAB/BG:   DRAIN/DEVICES: PIV x2 saline locked  TELEMETRY RHYTHM: NA  SKIN: coccyx red, blanchable, mepilex. BLE small scrapes/redness  TESTS/PROCEDURES: None  D/C DAY/GOALS/PLACE: Discharge pending  OTHER IMPORTANT INFO: Anxious and agitated at times and pulling at lines; mits attempted but increased agitation; PRN Haldol given x1 with good results

## 2020-01-25 NOTE — PROVIDER NOTIFICATION
MD Notification    Notified Person: MD    Notified Person Name: Dr. Balderas    Notification Date/Time: 1/25/20 7907    Notification Interaction: Paged    Purpose of Notification:  Requesting PRN IV medication for anxiety/agitation    Orders Received: PRN Haldol IV 2 mg Q 6 hrs    Comments:

## 2020-01-25 NOTE — CONSULTS
"BRIEF NUTRITION ASSESSMENT      REASON FOR ASSESSMENT:  Mel Castellanos is a 79 year old female seen by Registered Dietitian for Admission Nutrition Risk Screen for reduced oral intake over the last month    NUTRITION HISTORY:  Deferred as patient confused and agitated --> not able to provide    CURRENT DIET AND INTAKE:  Diet:  Moderate CHO               Patient consuming 100% of meals per flowsheets, appetite good per RN documentation.  Noted she had toast this morning and a sandwich last evening.  Suspecting oral intake directly correlated with mental status.     ANTHROPOMETRICS:  Height: 5'3\"  Weight:  64.5 kg (142#)(1/25)  Body mass index is 25.2 kg/m    Weight Status: Overweight BMI 25-29.9  IBW:  52.3 kg   %IBW: 123%  Weight History:   Wt Readings from Last 10 Encounters:   01/25/20 64.5 kg (142 lb 3.2 oz)   03/29/18 64 kg (141 lb)   02/16/18 67.9 kg (149 lb 11.2 oz)   10/05/16 67.6 kg (149 lb)   08/19/16 66.7 kg (147 lb 1.6 oz)   08/04/16 65.4 kg (144 lb 3.2 oz)   04/27/14 81.6 kg (179 lb 14.3 oz)   Appears that weight has been stable over the last few years       LABS:  Labs noted    MALNUTRITION:  Visual Nutrition Focused Physical Assessment (NFPA) completed. Patient does not meet two of the following criteria necessary for diagnosing malnutrition: significant weight loss, reduced intake, subcutaneous fat loss, muscle loss or fluid retention.     NUTRITION INTERVENTION:  Nutrition Diagnosis:  No nutrition diagnosis at this time.    Implementation:  Nutrition Education:  Not appropriate at this time due to patient condition.    FOLLOW UP/MONITORING:   Will re-evaluate in 7 - 10 days, or sooner, if re-consulted.    Jayla Valenzuela, RD, LD, CNSC   Clinical Dietitian - [facility]             "

## 2020-01-25 NOTE — PROGRESS NOTES
01/25/20 1418   Quick Adds   Type of Visit Initial PT Evaluation   Living Environment   Lives With child(mariam), adult   Living Arrangements condominium   Home Accessibility no concerns   Transportation Anticipated family or friend will provide   Living Environment Comment No steps.  Patient reporting different information about how far she walks in [place] from elevator to apartment.   Self-Care   Usual Activity Tolerance moderate   Current Activity Tolerance fair   Equipment Currently Used at Home none  (has 4WW per patient)   Activity/Exercise/Self-Care Comment Reports she ambulates I[place]y.  Both her and daughter cook.   Functional Level Prior   Ambulation 0-->independent   Transferring 0-->independent   Toileting 0-->independent   Bathing 0-->independent   Communication 2-->difficulty understanding (not related to language barrier)   Cognition 2 - difficulty with organizing thoughts   Fall history within last six months no  (per patient)   Which of the above functional risks had a recent onset or change? ambulation   Prior Functional Level Comment Ambulates independently per patient.  Rests when she needs to.   General Information   Onset of Illness/Injury or Date of Surgery - Date 01/23/20   Referring Physician Mohan Odonnell MD   Patient/Family Goals Statement Patient reports she wants to return home.    Pertinent History of Current Problem (include personal factors and/or comorbidities that impact the POC) Admitted with altered mental status.  Per chart review, pt lives with daughter who attempted suicide on New Years Day. Noted daughter was intoxicated upon arrival of hospital. Per daughter report, pt demo'd increased confusion. Pt reported living with daughter in condo with no stairs.   Precautions/Limitations fall precautions   General Observations Sitting up in chair; sitter in room.  Agreeable to participate.   Cognitive Status Examination   Orientation person;place   Level of Consciousness alert  "  Follows Commands and Answers Questions 75% of the time   Personal Safety and Judgment impulsive   Pain Assessment   Patient Currently in Pain No   Integumentary/Edema   Integumentary/Edema no deficits were identifed   Posture    Posture Forward head position;Kyphosis   Range of Motion (ROM)   ROM Comment Generally WFL's   Strength   Strength Comments antigravity movement noted throughout extremeties   Bed Mobility   Bed Mobility Comments Not assessed   Transfer Skills   Transfer Comments Sit to stand with increased effort and Min A x 1, otherwise CGA .    Gait   Gait Comments Ambulated 15' for eval with WW and CGA plus cues to stay inside walker   Balance   Balance Comments Mild balance deficits noted with no gross LOB.  Sway in standing.     Coordination   Coordination no deficits were identified   Muscle Tone   Muscle Tone no deficits were identified   General Therapy Interventions   Planned Therapy Interventions balance training;gait training;strengthening;transfer training;progressive activity/exercise   Clinical Impression   Criteria for Skilled Therapeutic Intervention yes, treatment indicated   PT Diagnosis impaired functional mobility   Influenced by the following impairments mild balance deficits, generalized weakness, fall risk, memory deficits   Functional limitations due to impairments decreased independence infunctional mobility   Clinical Presentation Stable/Uncomplicated   Clinical Presentation Rationale per clinical judgment   Clinical Decision Making (Complexity) Low complexity   Therapy Frequency Daily   Predicted Duration of Therapy Intervention (days/wks) 4 days   Anticipated Discharge Disposition Transitional Care Facility   Risk & Benefits of therapy have been explained Yes   Patient, Family & other staff in agreement with plan of care Yes   [facility] AM-PAC  \"6 Clicks\" V.2 Basic Mobility Inpatient Short Form   1. Turning from your back to your side while in a flat bed without using " bedrails? 4 - None   2. Moving from lying on your back to sitting on the side of a flat bed without using bedrails? 4 - None   3. Moving to and from a bed to a chair (including a wheelchair)? 3 - A Little   4. Standing up from a chair using your arms (e.g., wheelchair, or bedside chair)? 3 - A Little   5. To walk in hospital room? 3 - A Little   6. Climbing 3-5 steps with a railing? 3 - A Little   Basic Mobility Raw Score (Score out of 24.Lower scores equate to lower levels of function) 20   Total Evaluation Time   Total Evaluation Time (Minutes) 15

## 2020-01-25 NOTE — PLAN OF CARE
DATE & TIME: 1/24/2020 1432-3829    Cognitive Concerns/ Orientation : A+Ox2   BEHAVIOR & AGGRESSION TOOL COLOR: Yellow; anxious, cooperative  CIWA SCORE: NA   ABNL VS/O2: RA   MOBILITY: Assist x1  PAIN MANAGMENT: Denies  DIET: Mod Carb  BOWEL/BLADDER: Incontinent, walks to bathroom  ABNL LAB/BG: NA  DRAIN/DEVICES: None  TELEMETRY RHYTHM: NA  SKIN: Rash and redness on legs, red coccyx  TESTS/PROCEDURES: TBD  D/C DAY/GOALS/PLACE: TBD  OTHER IMPORTANT INFO: Possible vulnerable adult

## 2020-01-25 NOTE — PLAN OF CARE
PT:  Attempted to see during scheduled PT time.  Nursing requesting PT return in PM secondary to patient having a sleepless night and now sleeping soundly.

## 2020-01-25 NOTE — PROGRESS NOTES
ILIA    I) Aware of the social work consult ordered for discharge planning. PT is recommending a TCU stay and there is concern for her safety at home.     Will not initiate discharge planning today as patient received IV Haldol last evening for agitation and she currently has a sitter.    P) Care Transitions will follow.

## 2020-01-25 NOTE — PROGRESS NOTES
[facility]    Medicine Progress Note - Hospitalist Service       Date of Admission:  1/23/2020  Assessment & Plan   Mel Castellanos is an 79 year old female with history of HTN, unstable angina, insomnia, ELS, TIA, T2DM, recurrent UTIs, and emphysema who presented to the ED via EMS due to increased altered mental status.     Altered mental status - much improved since 1/24  Sepsis resolving - possibly 2/2 UTI  Acute toxic-metabolic encephalopathy - improving  Likely baseline cognitive impairment  Unclear etiology at admission, but she has history of similar presentation which were treated as UTI and symptoms improved. No other obvious etiology. Cultures NGTD.   Flu negative.   - possible UTI - leukocytosis + UA not classic, started rocephin in ED  - WBC 17k --> 13k --> 10k today  - afebrile  - CT head no acute pathology  - RRT last night for hypotension/sepsis, given additional IVF boluses and abx switched to zosyn --> improved  - much improved 1/24 - 25 but pt still confused about situation - ? Baseline cognitive impairment  - zosyn stopped 1/25 - Ucx negative;   - Psychiatry consult appreciated - pt has made strange mention of suicidality - unclear if in reference to her daughter or herself - appreciated recommendations in relation to her overall mental state also     Recurrent UTI  - possible recurrence, treating as above  - Urine culture negative - abx stopped     Deconditioned  Concern of safety at home - unclear baseline mentation   - Daughter possibly having issues at home.  - SW/CT consulted  - will request PT/OT when patient able to participate  - appears that she is likely cognitively impaired at baseline, to what degree remains the question. Attempted to talk to daughter she lives with today, no answer so far.     Wounds  Skin excoriation, petechiae  - Both ankles tender, no overt cellulitic changes  - WOC RN consulted, appreciated     HTN  Stable on admission then soft, now normal  1/25  - previously soft and needing multiple boluses  - still hold PTA imdur, amlodipine, hydrochlorothiazide  - PTA metop resumed 1/25     History of CAD/MI  PTA ASA resumed, PTA imdur, statin held for now given soft BP and confusion  - pta metoprolol resumed with holding parameters    T2DM  A1c 6.5% Feb 2018. PTA pioglitazone held.  - A1c now 6.8%  - POC QID CMLHS and sliding scale     Restless leg syndrome  PTA ropinirole resumed      Diet: Moderate Consistent CHO Diet    DVT Prophylaxis: Pneumatic Compression Devices and Ambulate every shift  Tavera Catheter: not present  Code Status: Full Code      Disposition Plan   Expected discharge: unclear - pending therapy recs, improvement?, likely not appropriate for home given safety concern  Entered: Mohan Odonnell MD 01/25/2020, 3:01 PM       The patient's care was discussed with the Bedside Nurse, Care Coordinator/ and Patient.  Attempted to talk to daughter - no answer, will try again.    Mohan Odonnell MD  Hospitalist Service  [facility]    ______________________________________________________________________    Interval History   Seen and examined. Continues to be awake and alert but quite confused in general about situation. Seems likely she has baseline cog dysfunction. PT/OT evaluating, will try to speak with daughter although she does not seem to be most reliable source given pre-admission story.    Data reviewed today: I reviewed all medications, new labs and imaging results over the last 24 hours. I personally reviewed no images or EKG's today.    Physical Exam   Vital Signs: Temp: 97.8  F (36.6  C) Temp src: Oral BP: 119/51 Pulse: 79 Heart Rate: 71 Resp: 16 SpO2: 94 % O2 Device: None (Room air) Oxygen Delivery: 2 LPM  Weight: 142 lbs 3.15 oz    Gen: NAD, pleasantly confused  HEENT: Normocephalic, EOMI, MMM  Resp: no crackles,  no wheezes, no increased work of resp  CV: S1S2 heard,  Reg rhythm, reg rate, no pedal  edema  Abdo: soft, nontender, nondistended, bowel sounds present  Ext: calves nontender, well perfused  Neuro: AAOx self, CN grossly intact, no facial asymmetry      Data   Recent Labs   Lab 01/25/20  1015 01/24/20  1149 01/24/20  0859 01/23/20  1930 01/23/20  1138   WBC  --   --  10.8 13.5* 17.0*   HGB  --   --  12.3 12.2 12.2   MCV  --   --  95 96 96   PLT  --   --  164 178 190   INR  --   --   --   --  1.22*   NA  --   --  135 139 138   POTASSIUM 3.4 3.6 3.5 3.5 3.6   CHLORIDE  --   --  105 105 102   CO2  --   --  24 31 31   BUN  --   --  14 17 20   CR  --   --  0.54 0.54 0.60   ANIONGAP  --   --  6 3 5   LAUREN  --   --  9.3 9.3 9.7   GLC  --   --  102* 130* 189*   ALBUMIN  --   --   --   --  3.4   PROTTOTAL  --   --   --   --  7.2   BILITOTAL  --   --   --   --  1.3   ALKPHOS  --   --   --   --  85   ALT  --   --   --   --  37   AST  --   --   --   --  43   TROPI  --   --   --  <0.015  --      No results found for this or any previous visit (from the past 24 hour(s)).

## 2020-01-25 NOTE — PROGRESS NOTES
01/25/20 1100   Quick Adds   Type of Visit Initial Occupational Therapy Evaluation   Living Environment   Lives With child(mariam), adult   Living Arrangements condominium   Home Accessibility wheelchair accessible   Transportation Anticipated family or friend will provide   Living Environment Comment Pt lives with daughter. Reported daughter attempted suicide New Years Day. Pt is poor historian.    Self-Care   Usual Activity Tolerance good   Current Activity Tolerance good   Regular Exercise No   Equipment Currently Used at Home none   Functional Level   Ambulation 0-->independent   Transferring 0-->independent   Toileting 0-->independent   Bathing 0-->independent   Dressing 0-->independent   Eating 0-->independent   Communication 2-->difficulty understanding (not related to language barrier)   Cognition 2 - difficulty with organizing thoughts   Fall history within last six months no  (Per pt report)   Which of the above functional risks had a recent onset or change? ambulation   General Information   Onset of Illness/Injury or Date of Surgery - Date 01/23/20   Referring Physician Mohan Odonnell MD   Patient/Family Goals Statement Return home   Cognitive Status Examination   Orientation person   Level of Consciousness confused   Follows Commands (Cognition) follows one step commands;follows two step commands   Memory impaired   Attention Reports problems attending;Distractible during evaluation   Organization/Problem Solving Reports problems with organization;Reports problems with problem solving during evaluation   Cognitive Comment Pt scored 6/30 on SLUMS Examination.    Sensory Examination   Sensory Quick Adds Hot/Cold   Pain Assessment   Patient Currently in Pain No   Posture   Posture not impaired   Range of Motion (ROM)   ROM Quick Adds No deficits were identified   Strength   Manual Muscle Testing Quick Adds No deficits were identified   Hand Strength   Hand Strength Comments WFL for ADL's   Muscle  Tone Assessment   Muscle Tone Quick Adds No deficits were identified   Coordination   Upper Extremity Coordination No deficits were identified   Transfer Skills   Transfer Transfer Skill: Stand to Sit   Transfer Skill: Sit to Stand   Level of [place]: Sit/Stand stand-by assist   Assistive Device for Transfer: Sit/Stand rolling walker   Toilet Transfer   Toilet Transfer Toilet Transfer Skill   Transfer Skill: Toilet Transfer   Level of [place]: Toilet minimum assist (75% patients effort)   Physical Assist/Nonphysical Assist: Toilet verbal cues   Assistive Device rolling walker;grab bars   Upper Body Dressing   Level of [place]: Dress Upper Body independent   Lower Body Dressing   Level of [place]: Dress Lower Body stand-by assist   Toileting   Level of [place]: Toilet minimum assist (75% patients effort)   Grooming   Level of [place]: Grooming stand-by assist   Eating/Self Feeding   Level of [place]: Eating independent   Instrumental Activities of Daily Living (IADL)   Previous Responsibilities housekeeping;meal prep  (Pt reported daughter assists with I/ADL's)   Activities of Daily Living Analysis   Impairments Contributing to Impaired Activities of Daily Living cognition impaired;strength decreased   General Therapy Interventions   Planned Therapy Interventions ADL retraining;cognition;strengthening   Clinical Impression   Criteria for Skilled Therapeutic Interventions Met yes, treatment indicated   OT Diagnosis Decreased ind with I/ADL's   Influenced by the following impairments Decreased cognition; decreased strength; decreased activity tolerance   Assessment of Occupational Performance 1-3 Performance Deficits   Identified Performance Deficits Toileting, bathing, functional transfers   Clinical Decision Making (Complexity) Low complexity   Therapy Frequency Daily   Predicted Duration of Therapy Intervention (days/wks) 2 days   Anticipated Discharge Disposition TCU  (Pending  "assessment of safe living environment)   Risks and Benefits of Treatment have been explained. Yes   Patient, Family & other staff in agreement with plan of care Yes   [facility] TM \"6 Clicks\"   2016, Trustees of [facility], under license to [employer].  All rights reserved.   6 Clicks Short Forms Daily Activity Inpatient Short Form   [facility] AM-PAC  \"6 Clicks\" Daily Activity Inpatient Short Form   1. Putting on and taking off regular lower body clothing? 3 - A Little   2. Bathing (including washing, rinsing, drying)? 3 - A Little   3. Toileting, which includes using toilet, bedpan or urinal? 3 - A Little   4. Putting on and taking off regular upper body clothing? 4 - None   5. Taking care of personal grooming such as brushing teeth? 4 - None   6. Eating meals? 4 - None   Daily Activity Raw Score (Score out of 24.Lower scores equate to lower levels of function) 21   Total Evaluation Time   Total Evaluation Time (Minutes) 8     "

## 2020-01-25 NOTE — CONSULTS
Consult Date:  01/25/2020      PSYCHIATRY CONSULTATION      REASON FOR REFERRAL:  Question, depression, patient mentions being suicidal at times.  Question, admitted with EMS, possibly secondary to urinary tract infection.      REQUESTING PHYSICIAN:  Mohan Odonnell MD      HISTORY OF PRESENT ILLNESS:  Ms. Mel Castellanos is a very pleasant 79-year-old woman with history of hypertension, unstable angina, insomnia, RLS, TIA, type 2 diabetes mellitus, recurrent UTIs and emphysema, who presented to the Emergency Department with increased altered mental status.  There was apparently some report of some possible SI admission.  Psychiatry was asked to consult in that setting; however, overnight she had substantial agitation, required IV Haldol 2 mg.  Nursing reports that there was a robust positive response to the intervention.  She was able to sleep soundly into the morning.        On my interview this afternoon, the patient is markedly pleasant.  She is found eating lunch.  Reports she is feeling good.  Reports her mood is positive.  Denies any depression, denies anxiety.  Reports good appetite.  She claims that she slept well last night and has no recollection of the agitation that occurred.  She is markedly confused, not able to describe why she is here or where she is.  Confused about the date, although is able state it is January.  She states it is 1921.  She is able to describe the current president, but unable to describe any recent presidents.  She denies any suicidal ideation or thoughts of harming others.  Denies any hallucinatory experience subjectively or any paranoia.      PAST MEDICAL HISTORY:  Arthritis, emphysema, high cholesterol, hypertension, insomnia, myocardial infarction, restless leg syndrome, TIA, type 2 diabetes mellitus, UTI recurrent.      SURGICAL HISTORY:  Appendectomy, cholecystectomy.      SOCIAL HISTORY:  Daily smoker, 1/4 pack per day.  No alcohol or drug use.  She has been retired.  I  believe she lives with her daughter.  She states she used to work different jobs including at a [employer].      FAMILY HISTORY:  Coronary artery disease in father.      PRIOR TO ADMISSION MEDICATIONS:   1.  Amlodipine.   2.  Aspirin.   3.  Isosorbide mononitrate.   4.  Multivitamin.   5.  Omeprazole.   6.  Atorvastatin.   7.  Hydrochlorothiazide.   8.  Magnesium oxide   9.  Metoprolol.   10.  Nitroglycerin.   11.  Pioglitazone.   12.  Ropinirole.      ALLERGIES:  SULFA DRUGS.      PHYSICAL EXAMINATION:   VITAL SIGNS:  Temperature 97.8, blood pressure 119/51, heart rate 71, respiratory rate 16, SpO2 of 94% on room air.      MENTAL STATUS EXAMINATION:  She is dressed in a hospital gown, sitting upright in a chair eating her lunch.  She is maintaining good eye contact.  She is polite and cooperative.  No involuntary movements.  Speech is nonpressured, normal volume, tone, prosody.  Use of language is reasonably articulate.  Mood is described as good.  Affect is mood congruent, very pleasant and euthymic.  Thought form linear, although quite brief due to cognitive impairment.  She denied hallucinosis or paranoid ideation.  She is disoriented to the year, although she is able to state the correct month.  She is able to recite days of the week in reverse.  She is able to state the current president, but cannot name any recent presidents.  Insight poor.  She does not recognize why she is here.  Judgment recently poor in the setting of agitation and required pharmacologic intervention overnight, although it has improved here today as she is currently very pleasant and cooperative.      IMPRESSION, REPORT, PLAN:   1.  Delirium, hyperkinetic.   2.  Unspecified neurocognitive disorder.   3.  Recent sepsis, resolving, possibly secondary to urinary tract infection.      FORMULATION:  This is an 79-year-old woman with history of hypertension, unstable angina, insomnia, RLS, TIA, type 2 diabetes, recurrent UTIs and emphysema, who  presented to the Emergency Department with increased altered mental status.  She has been medically stabilized and possible sepsis that is improved.  WBCs are improved from 17,000 to 10,000.  She was treated with Rocephin in the emergency room.  CT of the brain did not show any acute pathology.  She had an an RRT overnight with hypotension and sepsis, given IV fluids.  She required some Haldol IV overnight as well for a behavioral agitation; however, at this time, she is markedly improved with behaviors, no longer agitated.  She is very pleasant and cooperative.  She is no longer having any suicidal ideation.  Reports that she has a very good mood.  Denies any anxiety.      PLAN:  Continue medical stabilization.  Recent cognitive impairment likely contributed to the reports of depression and suicidal ideation.  Agitation is much improved today.  We will add olanzapine Zydis 5 mg b.i.d. p.r.n. for agitation if she is willing to take oral.  Otherwise, continue Haldol IV if oral not possible, although with cardiac history would be ideal to utilize telemetry or at least frequent EKGs if she continues to need further doses of IV Haldol.  IM olanzapine could also be considered at 5 mg b.i.d. p.r.n. for agitation.      Please reconsult Psychiatry as needed.         JENNIFER TINOCO MD             D: 2020   T: 2020   MT: KARL      Name:     REYMUNDO LUCERO   MRN:      [id number]        Account:       [id number]   :      1940           Consult Date:  2020      Document: [id number]

## 2020-01-25 NOTE — PLAN OF CARE
PT:  Patient seen for initial evaluation and treatment.  Patient admitted with altered mental status.  Per chart review, pt lives with daughter who attempted suicide on New Years Day. Noted daughter was intoxicated upon arrival of hospital. Per daughter report, pt demo'd increased confusion. Pt reported living with daughter in condo with no stairs. At baseline, patient notes she ambulates alone without an AD.    Discharge Planner PT   Patient plan for discharge: Wants to return home.  Current status: Up in chair with sitter present upon PT arriving. Sit to stand with increased effort and initially Min A; progressing to CGA. Patient ambulated 45' with WW and CGA plus cueing to stay inside walker.  Patient had been stating that she doesn't normally use a WW so at that point therapist asked that patient try without but she declined stating she is too tired.  Continued ambulating 125' with WW and CGA.  Once back in room patient let go of walker to walk into bathroom.  Insisted on not using.  Performed with close CGA to mild Min A and increased forward flexion.  Later, patient attempting to pull brief down while facing toilet.  Instructed to turn around which she did with slight Min A.  Able to pull brief down with CGA.  Sit to and from stand on low toilet with CGA and increased effort.  In stance with wide LUL able to pull up brief with CGA.  When standing at sink therapist provided occasional Min A secondary to noting patient rocking back on heels despite forward lean on the sink; appeared at risk for falling.  Returned to chair without use of WW with CGA. Up in chair with sitter present at end of session.   Barriers to return to prior living situation:  Unknown safety status of living environment; cognition; decreased activity tolerance  Recommendations for discharge: TCU  Rationale for recommendations: Pt performing below baseline. Pt will benefit from continued skilled PT services for increased balance, strength,  and ind with functional mobility prior to returning home. Per current cognitive and fall risk status, pt is unsafe to live alone or without 24-hour supervision. Unknown safety of current living environment with daughter. Will continue to adjust discharge recommendations as needed.        Entered by: Mansi Solares 01/25/2020 2:36 PM

## 2020-01-26 ENCOUNTER — APPOINTMENT (OUTPATIENT)
Dept: OCCUPATIONAL THERAPY | Facility: CLINIC | Age: 80
DRG: 037 | End: 2020-01-26
Payer: COMMERCIAL

## 2020-01-26 ENCOUNTER — APPOINTMENT (OUTPATIENT)
Dept: PHYSICAL THERAPY | Facility: CLINIC | Age: 80
DRG: 037 | End: 2020-01-26
Payer: COMMERCIAL

## 2020-01-26 LAB
GLUCOSE BLDC GLUCOMTR-MCNC: 114 MG/DL (ref 70–99)
GLUCOSE BLDC GLUCOMTR-MCNC: 119 MG/DL (ref 70–99)
GLUCOSE BLDC GLUCOMTR-MCNC: 132 MG/DL (ref 70–99)
GLUCOSE BLDC GLUCOMTR-MCNC: 148 MG/DL (ref 70–99)
GLUCOSE BLDC GLUCOMTR-MCNC: 172 MG/DL (ref 70–99)

## 2020-01-26 PROCEDURE — 97535 SELF CARE MNGMENT TRAINING: CPT | Mod: GO

## 2020-01-26 PROCEDURE — 25000132 ZZH RX MED GY IP 250 OP 250 PS 637: Performed by: INTERNAL MEDICINE

## 2020-01-26 PROCEDURE — 25000132 ZZH RX MED GY IP 250 OP 250 PS 637: Performed by: HOSPITALIST

## 2020-01-26 PROCEDURE — 00000146 ZZHCL STATISTIC GLUCOSE BY METER IP

## 2020-01-26 PROCEDURE — 97750 PHYSICAL PERFORMANCE TEST: CPT | Mod: GP

## 2020-01-26 PROCEDURE — 99232 SBSQ HOSP IP/OBS MODERATE 35: CPT | Performed by: HOSPITALIST

## 2020-01-26 PROCEDURE — 12000000 ZZH R&B MED SURG/OB

## 2020-01-26 PROCEDURE — 97116 GAIT TRAINING THERAPY: CPT | Mod: GP

## 2020-01-26 RX ADMIN — SENNOSIDES AND DOCUSATE SODIUM 2 TABLET: 8.6; 5 TABLET ORAL at 21:31

## 2020-01-26 RX ADMIN — ASPIRIN 81 MG: 81 TABLET, DELAYED RELEASE ORAL at 08:10

## 2020-01-26 RX ADMIN — TRAZODONE HYDROCHLORIDE 50 MG: 50 TABLET ORAL at 21:33

## 2020-01-26 RX ADMIN — METOPROLOL SUCCINATE 25 MG: 25 TABLET, EXTENDED RELEASE ORAL at 21:31

## 2020-01-26 RX ADMIN — Medication 400 MG: at 21:31

## 2020-01-26 RX ADMIN — Medication 1 MG: at 00:14

## 2020-01-26 RX ADMIN — OMEPRAZOLE 20 MG: 20 CAPSULE, DELAYED RELEASE ORAL at 08:13

## 2020-01-26 RX ADMIN — Medication 400 MG: at 08:11

## 2020-01-26 RX ADMIN — SENNOSIDES AND DOCUSATE SODIUM 2 TABLET: 8.6; 5 TABLET ORAL at 08:14

## 2020-01-26 RX ADMIN — ROPINIROLE HYDROCHLORIDE 1 MG: 1 TABLET, FILM COATED ORAL at 21:30

## 2020-01-26 ASSESSMENT — ACTIVITIES OF DAILY LIVING (ADL)
ADLS_ACUITY_SCORE: 22
ADLS_ACUITY_SCORE: 21

## 2020-01-26 ASSESSMENT — MIFFLIN-ST. JEOR: SCORE: 1070.41

## 2020-01-26 NOTE — PLAN OF CARE
"DATE & TIME: 1/25/20 [id number]  Cognitive Concerns/ Orientation : A&O to self, and to place at times. Confused  BEHAVIOR & AGGRESSION TOOL COLOR: Green. Calm and cooperative this shift  CIWA SCORE: NA    ABNL VS/O2: Weaned off to RA. Sating 93-95% on RA.   MOBILITY: Assist x1 with GB/walker. Up in chair for meals. Ambulated to B/R.  PAIN MANAGMENT: Denies  DIET: Mod CHO  BOWEL/BLADDER: Incontinent at times.  ABNL LAB/BG: BG 93, 161, 140. On insulin per sliding scale. Mag 1.7 and K 3.4. Replacement protocol changed to \"standard\" by MD.   DRAIN/DEVICES: PIV x2 saline locked.  TELEMETRY RHYTHM: NA  SKIN: Blanchable redness to coccyx, Mepilex changed. Multiple small scabs to BLE, using Sween 24 cream per Woc RN orders, JADEN  TESTS/PROCEDURES: None  D/C DAY/GOALS/PLACE: Discharge pending  OTHER IMPORTANT INFO: Pt slept well till 0910, calm and cooperative this shift. Seen by Psych. PT and OT eval done, recommends TCU at discharge. SW/CC following. Sitter at bedside for safety. Continue to monitor.   "

## 2020-01-26 NOTE — PROGRESS NOTES
01/26/20 1012   Signing Clinician's Name / Credentials   Signing clinician's name / credentials Edna Lewis PT   Patel Balance Scale (PATEL LINH, ANDREA S, ARRON KAPLAN, IRVING B: MEASURING BALANCE IN THE ELDERLY: VALIDATION OF AN INSTRUMENT. CAN. J. PUB. HEALTH, JULY/AUGUST SUPPLEMENT 2:S7-11, 1992.)   Sit To Stand 3   Standing Unsupported 4   Sitting Unsupported 4   Stand to Sit 4   Transfers 4   Standing with Eyes Closed 3   Standing Unsupported, Feet Together 3   Reach Forward With Outstretched Arm 2   Retrieve Object From Floor 1   Turning to Look Behind 2   Turn 360 Degrees 1   Placing Alternate Foot on Stool (4-6 inches) 1   Unsupported Tandem Stand (Demonstrate to Subject) 2   One Leg Stand 1   Total Score (A score of 45 or less has been correlated with an increased risk of falls)   Total Score (out of 56) 35     Patel Balance Scale (BBS) Cutoff Scores: A score of < 45/56 indicates an increased risk for falls.   Patient Score: 35/56    The BBS is a measure of static and dynamic standing balance that has been validated in community dwelling elderly individuals and individuals who have Parkinson's Disease, MS, and those who are s/p CVA and TBI. The test is administered without an assistive device. Scores from the Patel are used to determine the probability of falling based on the patient's previous history of falls and their test performance.     Minimal Detectable Change = 6.5   & Minimal Detectable Change (Parkinson's Disease) = 5 according to Lorenzo & Adrianey 2008    Assessment (rationale for performing, application to patient s function & care plan): falls risk  Minutes billed as physical performance test: 15

## 2020-01-26 NOTE — PLAN OF CARE
"Cognitive Concerns/ Orientation : A&O to self only. Confused  BEHAVIOR & AGGRESSION TOOL COLOR: Green. Calm and cooperative this shift  CIWA SCORE: NA    ABNL VS/O2: Weaned off to RA. Sating 93-95% on RA.   MOBILITY: Assist x1 with GB/walker. Up in chair for meals.  PAIN MANAGMENT: Denies  DIET: Mod CHO  BOWEL/BLADDER: Incontinent at times.  ABNL LAB/BG:   . On insulin per sliding scale. Mag 1.7 and K 3.4. Replacement protocol changed to \"standard\" by MD.   DRAIN/DEVICES: PIV x2 saline locked.  TELEMETRY RHYTHM: NA  SKIN: Blanchable redness to coccyx, Mepilex changed. Multiple small scabs to BLE, using Sween 24 cream per Woc RN orders, JADEN  TESTS/PROCEDURES: None  D/C DAY/GOALS/PLACE: Discharge pending  OTHER IMPORTANT INFO: Pt slept on and off, calm and cooperative this shift. Seen by Psych. PT and OT eval done, recommends TCU at discharge. SW/CC following. Sitter at bedside for safety. Continue to monitor. Continue to monitor  "

## 2020-01-26 NOTE — PLAN OF CARE
Discharge Planner OT   Patient plan for discharge: Return home  Current status: Pt seated on toilet with sitter upon OT arrival; pt agreeable to therapy. With verbal cues and SBA, pt competed LB dressing seated on toilet. Pt demo'd ability to complete toileting tasks with mod I. Pt completed toilet transfer with mod I and grab bars. Pt completed standing grooming/hygiene tasks with mod I. Pt ambulated from bathroom with SBA and no AE. Pt returned to seated position. Chair alarm on; all needs in reach with sitter present.   Barriers to return to prior living situation: Cognition; decreased safety awareness; decreased ind with ADL's  Recommendations for discharge: TCU  Rationale for recommendations: Pt performing below baseline and unsafe to return home secondary to decreased safety awareness and cognition. Pt will continue to benefit from skilled OT services for increased activity tolerance and ind with ADL's prior to returning home.       Entered by: Susan Puentes 01/26/2020 2:11 PM

## 2020-01-26 NOTE — PLAN OF CARE
DATE & TIME: 1/26/20 AM shift  Cognitive Concerns/ Orientation : A&O to self and to place. Confused, disoriented to situation and time.   BEHAVIOR & AGGRESSION TOOL COLOR: Green. Calm and cooperative this shift  CIWA SCORE: NA    ABNL VS/O2: VSS on RA except hypotensive this morning, Metoprolol was held for that reason.   MOBILITY: SBA with GB/walker. Up in chair for meals. Ambulated to B/R. Ambulated in the [place] with therapy.  PAIN MANAGMENT: Denies  DIET: Mod CHO  BOWEL/BLADDER: Incontinent at times. No BM this shift  ABNL LAB/BG: /119.   DRAIN/DEVICES: PIV x2 saline locked.  TELEMETRY RHYTHM: NA  SKIN: Blanchable redness to coccyx, Mepilex changed. Multiple small scabs to BLE, using Sween 24 cream.   TESTS/PROCEDURES: None  D/C DAY/GOALS/PLACE: Discharge pending safe disposition. TCU recommended.   OTHER IMPORTANT INFO: PT and OT are following. SW/CC are following. Sitter at bedside for safety.

## 2020-01-26 NOTE — PLAN OF CARE
"Discharge Planner PT   Patient plan for discharge: wants to go home today  Current status: Patient sitting in chair upon arrival, confused and sitter present. Patient perseverating during session on \"The doctor told me a few days ago that I could go home today.\" Patient completes sit <> stand transfers with use of hands and modified independence. Patient ambulated 160' with gait belt donned, using FWW for initial 10' but carrying the assistive device so use was discontinued. Patient ambulated remainder of gait training with no device and min assist due to multiple imbalances. Patient scored 35/56 on the [place] Balance Scale indicating she is a increased risk for falls. Patient left in chair with alarm activated and sitter present.   Barriers to return to prior living situation: assist level with mobility, falls risk, decreased safety awareness  Recommendations for discharge: TCU  Rationale for recommendations: Patient presenting with functional mobility below baseline level of independence. She is high risk for falls per formal balance testing with poor compliance with use of assistive device. She will benefit from skilled PT intervention at TCU to maximize safety and independence with mobility prior to return home.        Entered by: Edna Lewis 01/26/2020 11:29 AM       "

## 2020-01-26 NOTE — PROGRESS NOTES
"[facility]    Medicine Progress Note - Hospitalist Service       Date of Admission:  1/23/2020  Assessment & Plan   Mel Castellanos is an 79 year old female with history of HTN, unstable angina, insomnia, ELS, TIA, T2DM, recurrent UTIs, and emphysema who presented to the ED via EMS due to increased altered mental status.     Altered mental status - much improved since 1/24  Sepsis resolving - UTI ruled out  Acute toxic-metabolic encephalopathy - improving  Likely baseline cognitive impairment  Unclear etiology at admission, but she has history of similar presentation which were treated as UTI and symptoms improved. No other obvious etiology. Cultures NGTD.   Flu negative.   - possible UTI - leukocytosis + UA not classic, started rocephin in ED  - WBC 17k --> 13k --> 10k , remains afebrile and nontoxic  - CT head no acute pathology  - RRT last night for hypotension/sepsis, given additional IVF boluses and abx switched to zosyn --> improved and stopped 1/25 - afebrile, and UCx negative  - since 1/24 more awake and alert, but very confused, ?baseline cognition  - Psychiatry consulted - appreciated  - PRN olanzapine and haldol if necessary     History of recurrent UTI  - possible recurrence, treating as above  - Urine culture negative - abx stopped     Deconditioned  Concern of safety at home - unclear baseline mentation   - Daughter possibly having issues at home.  - SW/CT consulted  - will request PT/OT when patient able to participate  - appears that she is likely cognitively impaired at baseline, to what degree remains the question.  Have attempted to talk to daughter CeAnn home she lives with, but patient states that she sleeps daytimes as she cannot at night. She was reportedly drunk when EMS picked up patient and reportedly she has depression and attempted suicide Steven 1. The patient and this daughter have been living together to \"take care\" of each other.     Skin excoriation, petechiae, small " scabs  - Both ankles tender, no overt cellulitic changes  - WOC RN consulted, appreciate recs     HTN  Stable on admission then soft, now normal 1/25  - previously soft and needing multiple boluses  - still hold PTA imdur, amlodipine, hydrochlorothiazide  - PTA metop resumed 1/25  - BP low briefly this AM 89/54 subsequently acceptable 140s/70s - still holding as above     History of CAD/MI  PTA ASA resumed, PTA imdur, statin held for now given soft BP and confusion  - pta metoprolol resumed with holding parameters    T2DM  A1c 6.5% Feb 2018. PTA pioglitazone held.  - A1c now 6.8%  - POC QID CMLHS and sliding scale     Restless leg syndrome  PTA ropinirole resumed    Diet: Moderate Consistent CHO Diet    DVT Prophylaxis: Pneumatic Compression Devices  Tavera Catheter: not present  Code Status: Full Code      Disposition Plan   Expected discharge: Pending SW consult, psychiatry recs - therapies rec TCU, also concerns of safety at home  Entered: Mohan Odonnell MD 01/26/2020, 3:42 PM       The patient's care was discussed with the Bedside Nurse, Care Coordinator/ and Patient.    Mohan Odonnell MD  Hospitalist Service  [facility]    ______________________________________________________________________    Interval History   Seen and examined. No new changes. Pleasantly confused. Sitter at bedside.    Data reviewed today: I reviewed all medications, new labs and imaging results over the last 24 hours. I personally reviewed no images or EKG's today.    Physical Exam   Vital Signs: Temp: 97.8  F (36.6  C) Temp src: Oral BP: (!) 145/71 Pulse: 72 Heart Rate: 65 Resp: 18 SpO2: 91 % O2 Device: None (Room air)    Weight: 139 lbs 3.2 oz    Gen: NAD, pleasantly confused, elderly  HEENT: Normocephalic, EOMI, MMM  Resp: no crackles,  no wheezes, no increased work of resp  CV: S1S2 heard, reg rhythm, reg rate, no pedal edema  Abdo: soft, nontender, nondistended, bowel sounds present  Ext: calves  nontender, well perfused  Neuro: AAOx self, CN grossly intact, no facial asymmetry      Data   Recent Labs   Lab 01/25/20  1015 01/24/20  1149 01/24/20  0859 01/23/20  1930 01/23/20  1138   WBC  --   --  10.8 13.5* 17.0*   HGB  --   --  12.3 12.2 12.2   MCV  --   --  95 96 96   PLT  --   --  164 178 190   INR  --   --   --   --  1.22*   NA  --   --  135 139 138   POTASSIUM 3.4 3.6 3.5 3.5 3.6   CHLORIDE  --   --  105 105 102   CO2  --   --  24 31 31   BUN  --   --  14 17 20   CR  --   --  0.54 0.54 0.60   ANIONGAP  --   --  6 3 5   LAUREN  --   --  9.3 9.3 9.7   GLC  --   --  102* 130* 189*   ALBUMIN  --   --   --   --  3.4   PROTTOTAL  --   --   --   --  7.2   BILITOTAL  --   --   --   --  1.3   ALKPHOS  --   --   --   --  85   ALT  --   --   --   --  37   AST  --   --   --   --  43   TROPI  --   --   --  <0.015  --      No results found for this or any previous visit (from the past 24 hour(s)).

## 2020-01-27 ENCOUNTER — APPOINTMENT (OUTPATIENT)
Dept: OCCUPATIONAL THERAPY | Facility: CLINIC | Age: 80
DRG: 037 | End: 2020-01-27
Payer: COMMERCIAL

## 2020-01-27 LAB
GLUCOSE BLDC GLUCOMTR-MCNC: 102 MG/DL (ref 70–99)
GLUCOSE BLDC GLUCOMTR-MCNC: 106 MG/DL (ref 70–99)
GLUCOSE BLDC GLUCOMTR-MCNC: 112 MG/DL (ref 70–99)
GLUCOSE BLDC GLUCOMTR-MCNC: 115 MG/DL (ref 70–99)
GLUCOSE BLDC GLUCOMTR-MCNC: 124 MG/DL (ref 70–99)

## 2020-01-27 PROCEDURE — 97530 THERAPEUTIC ACTIVITIES: CPT | Mod: GO

## 2020-01-27 PROCEDURE — 99233 SBSQ HOSP IP/OBS HIGH 50: CPT | Performed by: PSYCHIATRY & NEUROLOGY

## 2020-01-27 PROCEDURE — 97535 SELF CARE MNGMENT TRAINING: CPT | Mod: GO

## 2020-01-27 PROCEDURE — 12000000 ZZH R&B MED SURG/OB

## 2020-01-27 PROCEDURE — 25000132 ZZH RX MED GY IP 250 OP 250 PS 637: Performed by: INTERNAL MEDICINE

## 2020-01-27 PROCEDURE — 00000146 ZZHCL STATISTIC GLUCOSE BY METER IP

## 2020-01-27 PROCEDURE — 25000132 ZZH RX MED GY IP 250 OP 250 PS 637: Performed by: HOSPITALIST

## 2020-01-27 PROCEDURE — 99232 SBSQ HOSP IP/OBS MODERATE 35: CPT | Performed by: HOSPITALIST

## 2020-01-27 RX ADMIN — Medication 400 MG: at 22:01

## 2020-01-27 RX ADMIN — OMEPRAZOLE 20 MG: 20 CAPSULE, DELAYED RELEASE ORAL at 09:23

## 2020-01-27 RX ADMIN — TRAZODONE HYDROCHLORIDE 50 MG: 50 TABLET ORAL at 22:01

## 2020-01-27 RX ADMIN — METOPROLOL SUCCINATE 25 MG: 25 TABLET, EXTENDED RELEASE ORAL at 09:23

## 2020-01-27 RX ADMIN — ASPIRIN 81 MG: 81 TABLET, DELAYED RELEASE ORAL at 09:23

## 2020-01-27 RX ADMIN — Medication 400 MG: at 09:23

## 2020-01-27 RX ADMIN — ROPINIROLE HYDROCHLORIDE 1 MG: 1 TABLET, FILM COATED ORAL at 22:01

## 2020-01-27 ASSESSMENT — ACTIVITIES OF DAILY LIVING (ADL)
ADLS_ACUITY_SCORE: 22
ADLS_ACUITY_SCORE: 14
ADLS_ACUITY_SCORE: 14
ADLS_ACUITY_SCORE: 21

## 2020-01-27 NOTE — CONSULTS
[facility] Psychiatric Consult Progress Note    Interval History:   Pt seen, chart reviewed, case discussed with nursing staff and treating clinicians.  I met with the patient on station 66.  This morning she is a little bit groggy, disoriented.  She does not know where she is, does not know what day it is.  Clearly based on chart review she is not decisional, lives with her daughter. I understand there may be concerns because her daughter could have a drinking problem.  The recommendation at this point is for a transitional care unit.  That seems like a reasonable disposition, but if the daughter wants to take her home I would explain to the daughter that her discharge would be AGAINST MEDICAL ADVICE and we would likely initiate a referral to adult protection.  It may also be helpful to enlist input from other family members in this circumstance to appraise them of the situation and get their input since the patient really cannot make her own decisions at this point and family member should be acting as a substitute decision-maker.     Review of systems:   10 point Review of Systems completed by Dr. Brooks, and is  is negative other than noted in the HPI     Medications:       aspirin  81 mg Oral Daily     insulin aspart  1-3 Units Subcutaneous TID AC     insulin aspart  1-3 Units Subcutaneous At Bedtime     magnesium oxide  400 mg Oral BID     metoprolol succinate ER  25 mg Oral BID     omeprazole  20 mg Oral QAM     rOPINIRole  1 mg Oral At Bedtime     senna-docusate  1 tablet Oral BID    Or     senna-docusate  2 tablet Oral BID     sodium chloride (PF)  3 mL Intracatheter Q8H     traZODone  25 mg Oral At Bedtime    Or     traZODone  50 mg Oral At Bedtime     acetaminophen, acetaminophen, glucose **OR** dextrose **OR** glucagon, haloperidol lactate, hydrALAZINE, lidocaine 4%, lidocaine (buffered or not buffered), magnesium sulfate, melatonin, naloxone, OLANZapine zydis, ondansetron **OR**  ondansetron, polyethylene glycol, potassium chloride, potassium chloride with lidocaine, potassium chloride, potassium chloride, sodium chloride (PF)    Mental Status Examination:     Appearance:  adequately groomed, dressed in hospital scrubs, appeared as age stated and somnolent  Eye Contact:  poor   Speech:  decreased prosody and soft  Language:Normal  Psychomotor Behavior:  no evidence of tardive dyskinesia, dystonia, or tics  Mood:  NA  Affect:  intensity is flat  Thought Process:  illogical no loose associations  Thought Content:  no evidence of suicidal ideation or homicidal ideation and no evidence of psychotic thought  Oriented to:  disoriented  Attention Span and Concentration:  poor  Recent and Remote Memory:  poor  Fund of Knowledge: poor  Muscle Strength and Tone: normal  Gait and Station: Normal  Insight:  limited  Judgment:  poor        Labs/Vitals:     Recent Results (from the past 24 hour(s))   Glucose by meter    Collection Time: 01/26/20 12:22 PM   Result Value Ref Range    Glucose 119 (H) 70 - 99 mg/dL   Glucose by meter    Collection Time: 01/26/20  6:17 PM   Result Value Ref Range    Glucose 148 (H) 70 - 99 mg/dL   Glucose by meter    Collection Time: 01/26/20  9:34 PM   Result Value Ref Range    Glucose 172 (H) 70 - 99 mg/dL   Glucose by meter    Collection Time: 01/27/20  2:15 AM   Result Value Ref Range    Glucose 115 (H) 70 - 99 mg/dL     B/P: 110/71, T: 98, P: 71, R: 16    Impression:   Mel presents with what appears to be a delirium superimposed on a major neurocognitive disorder.  She is not decisional.  We should enlist family members for medical decisions, if the daughter is demanding to take her home would consider discharge AMA with an adult protection referral.  The current recommendation is for a TCU and family members should be made aware of this recommendation so they can participate in the decision.      DIagnoses:   1.  Delirium secondary to UTI  2   UTI  3.  Probable major  neurocognitive disorder, unspecified         Plan:   1.  Medication management and disposition per medical team   2.  Continue current psychotropic medications  3.  Enlist family members for decisions regarding care, consider AMA discharge if daughter uncooperative with TCU recommendation along with adult protection referral        Attestation:  Patient has been seen and evaluated by me,  Gary Brooks MD

## 2020-01-27 NOTE — PLAN OF CARE
DATE & TIME: 1/27/20 5713-8266   Cognitive Concerns/ Orientation : A&O x2, disoriented to time and situation   BEHAVIOR & AGGRESSION TOOL COLOR: Green  CIWA SCORE: NA   ABNL VS/O2: VSS on room air   MOBILITY: SBA +gb/walker  PAIN MANAGMENT: Denies   DIET: Mod CHO  BOWEL/BLADDER: Incontinent of bladder at times; no bm this shift   ABNL LAB/BG:   DRAIN/DEVICES: PIV L arm saline locked  TELEMETRY RHYTHM: NA  SKIN: Blanchable redness to coccyx, Mepilex CDI. Multiple small scabs to BLE, using Sween 24 cream.   TESTS/PROCEDURES: None  D/C DAY/GOALS/PLACE: Discharge pending safe disposition.  TCU recommended at discharge.  OTHER IMPORTANT INFO: PT/OT/SW/CC following. Bedside attendant for safety.

## 2020-01-27 NOTE — PROGRESS NOTES
[facility]    Medicine Progress Note - Hospitalist Service       Date of Admission:  1/23/2020  Assessment & Plan   Mel Castellanos is an 79 year old female with history of HTN, unstable angina, insomnia, ELS, TIA, T2DM, recurrent UTIs, and emphysema who presented to the ED via EMS due to increased altered mental status.     Altered mental status - much improved since 1/24  Sepsis resolving - UTI ruled out  Acute toxic-metabolic encephalopathy - improving  Likely baseline cognitive impairment  Unclear etiology at admission, but she has history of similar presentation which were treated as UTI and symptoms improved. No other obvious etiology. Cultures NGTD.   Flu negative.   - possible UTI - leukocytosis + UA not classic, started rocephin in ED  - WBC 17k --> 13k --> 10k , remains afebrile and nontoxic  - CT head no acute pathology  - RRT last night for hypotension/sepsis, given additional IVF boluses and abx switched to zosyn --> improved and stopped on 1/25 - afebrile, and UCx negative  - since 1/24 more awake and alert, but confused, ?baseline cognition  - Psychiatry consulted - appreciated. Note reviewed.  - PRN olanzapine and haldol if necessary.  - PT/OT recommending TCU. Patient and daughter seem to not be in favor of this. Social work/care coordinator looking into TCU options, if patient and daughter refuse, would likely discharge home against medical advice, see psychiatry note from 1/27/20.     History of recurrent UTI  - Possible recurrence, treated as above.  - Urine culture negative - abx stopped.     Deconditioned  Concern of safety at home - unclear baseline mentation   - Daughter possibly having issues at home.  - SW/CT consulted.  - PT/OT consulted.  - appears that she is likely cognitively impaired at baseline, to what degree remains the question.  Have attempted to talk to daughter CeAnn home she lives with, but patient states that she sleeps daytimes as she cannot at night. She  "was reportedly drunk when EMS picked up patient and reportedly she has depression and attempted suicide Jan 1. The patient and this daughter have been living together to \"take care\" of each other.     Skin excoriation, petechiae, small scabs  - Both ankles tender, no overt cellulitic changes.  - WOC RN consulted, appreciate recs.     Hypertension  Stable on admission then soft.  - previously soft and needing multiple boluses.  - Continue to hold PTA imdur, amlodipine, hydrochlorothiazide.  - PTA metop resumed 1/25.  - Blood pressure somewhat labile, continue to monitor and adjust anti-hypertensives as indicated.     History of CAD/MI  PTA ASA resumed, PTA imdur, statin held for now given soft BP and confusion.  - Continue PTA metoprolol with holding parameters.    T2DM  A1c 6.5% Feb 2018. PTA pioglitazone held.  - A1c now 6.8%.  - Continue accuchecks and sliding scale NovoLog.     Restless leg syndrome  - Continue PTA ropinirole.       Diet: Moderate Consistent CHO Diet    DVT Prophylaxis: Pneumatic Compression Devices  Tavera Catheter: not present  Code Status: Full Code      Disposition Plan   Expected discharge: recommend TCU, discussed with care coordinator/social work today.  Entered: Behzad Frank MD 01/27/2020, 2:12 PM       The patient's care was discussed with the Care Coordinator/ and Patient.    Behzad Frank MD  Hospitalist Service  [facility]    ______________________________________________________________________    Interval History   Mel Castellanos feels OK today. Wants to go home. No other complaints/concerns for me. Denies fevers, chest pain, shortness of breath, nausea, abdominal pain, diarrhea, urinary symptoms.    Data reviewed today: I reviewed all medications, new labs and imaging results over the last 24 hours. I personally reviewed no images or EKG's today.    Physical Exam   Vital Signs: Temp: 97.6  F (36.4  C) Temp src: Oral BP: 130/58 Pulse: 71 " Heart Rate: 67 Resp: 16 SpO2: 93 % O2 Device: None (Room air)    Weight: 139 lbs 3.2 oz  Constitutional: awake, alert, cooperative, no apparent distress  Respiratory: clear to auscultation bilaterally, no crackles or wheezing  Cardiovascular: regular rate and rhythm, normal S1 and S2, no murmur noted, no edema  GI: normal bowel sounds, soft, non-distended, non-tender  Skin: warm, dry  Neurologic: awake, alert, not oriented    Data   Recent Labs   Lab 01/25/20  1015 01/24/20  1149 01/24/20  0859 01/23/20  1930 01/23/20  1138   WBC  --   --  10.8 13.5* 17.0*   HGB  --   --  12.3 12.2 12.2   MCV  --   --  95 96 96   PLT  --   --  164 178 190   INR  --   --   --   --  1.22*   NA  --   --  135 139 138   POTASSIUM 3.4 3.6 3.5 3.5 3.6   CHLORIDE  --   --  105 105 102   CO2  --   --  24 31 31   BUN  --   --  14 17 20   CR  --   --  0.54 0.54 0.60   ANIONGAP  --   --  6 3 5   LAUREN  --   --  9.3 9.3 9.7   GLC  --   --  102* 130* 189*   ALBUMIN  --   --   --   --  3.4   PROTTOTAL  --   --   --   --  7.2   BILITOTAL  --   --   --   --  1.3   ALKPHOS  --   --   --   --  85   ALT  --   --   --   --  37   AST  --   --   --   --  43   TROPI  --   --   --  <0.015  --      Medications       aspirin  81 mg Oral Daily     insulin aspart  1-3 Units Subcutaneous TID AC     insulin aspart  1-3 Units Subcutaneous At Bedtime     magnesium oxide  400 mg Oral BID     metoprolol succinate ER  25 mg Oral BID     omeprazole  20 mg Oral QAM     rOPINIRole  1 mg Oral At Bedtime     senna-docusate  1 tablet Oral BID    Or     senna-docusate  2 tablet Oral BID     sodium chloride (PF)  3 mL Intracatheter Q8H     traZODone  25 mg Oral At Bedtime    Or     traZODone  50 mg Oral At Bedtime

## 2020-01-27 NOTE — PLAN OF CARE
Discharge Planner OT   Patient plan for discharge: did not state  Current status: pt seen in AM. Completed standing oral facial hygiene tasks with set-up, toileting task including change of brief with very minimal assist, SBA with functional transfers. However, patient continues to push walker aside and/or use unsafely requiring verbal and physical cues during all ambulatory tasks and when manipulating in small bathroom space or around bed. Trialled without walker however she is unsteady ot times especially when retrieving items outside LUL or from high/low surfaces. Pt lacks safety awareness and dismisses concern for falls.   Barriers to return to prior living situation: current level of A, fall risk  Recommendations for discharge: TCU  Rationale for recommendations:   Pt performing below baseline and unsafe to return home secondary to decreased safety awareness and cognition. Pt will continue to benefit from skilled OT services for increased activity tolerance and ind with ADL's prior to returning home.       Entered by: Kevin Peacock 01/27/2020 12:01 PM

## 2020-01-27 NOTE — PLAN OF CARE
PT: Attempted to see patient for PT session but pt very emotional and declines working with PT right now.

## 2020-01-27 NOTE — PLAN OF CARE
DATE & TIME: 1/27/20 0595-1672   Cognitive Concerns/ Orientation : A&O X1 self, disoriented to time and situation and place  BEHAVIOR & AGGRESSION TOOL COLOR: Yellow for confusion, emotional at times about wanting to go home  CIWA SCORE: NA       ABNL VS/O2: VSS on room air   MOBILITY: SBA +gb/walker at times, up in chair for breakfast and dinner  PAIN MANAGMENT: Denies   DIET: Tolerating Mod CHO and liquids, did not have lunch this afternoon  BOWEL/BLADDER: Incontinent of bladder at times; BM this morning  DRAIN/DEVICES: PIV L arm saline locked  TELEMETRY RHYTHM: NA  SKIN: Blanchable redness to coccyx, Mepilex CDI. Multiple small scabs to BLE, using Sween 24 cream.   TESTS/PROCEDURES: None  D/C DAY/GOALS/PLACE: Discharge pending safe disposition. TCU recommended at discharge. Social work to follow up with daughter.  OTHER IMPORTANT INFO: PT/OT/SW/CC following. Bedside attendant for confusion and agitation. Speech rambles with conversations.  and 112 this shift,  for dinner, no insulin coverage.

## 2020-01-27 NOTE — PLAN OF CARE
DATE & TIME: 1/26/20 4576-2328  Cognitive Concerns/ Orientation : A&O to self and to place. Confused, disoriented to situation and time.   BEHAVIOR & AGGRESSION TOOL COLOR: Green. Calm and cooperative this shift  CIWA SCORE: NA    ABNL VS/O2: VSS on RA   MOBILITY: SBA with GB/walker. Up in chair most of shift. Ambulated to B/R and in room frequently.  PAIN MANAGMENT: Denies  DIET: Mod CHO- ate late lunch and refused dinner  BOWEL/BLADDER: Incontinent at times. No BM this shift- given 2 senna  ABNL LAB/BG: , 172.   DRAIN/DEVICES: PIV saline locked.  TELEMETRY RHYTHM: NA  SKIN: Blanchable redness to coccyx, Mepilex changed. Multiple small scabs to BLE, using Sween 24 cream.   TESTS/PROCEDURES: None  D/C DAY/GOALS/PLACE: Discharge pending safe disposition. TCU recommended.   OTHER IMPORTANT INFO: PT and OT are following. SW/CC are following. Sitter at bedside for safety. Daughter called and nurse updated about PT's recommendation for TCU- daughter became upset and expressed how she wanted her mother home if at all possible. She would like SW to call her tomorrow to discuss other options such as home PT- phone number listed under family contacts.

## 2020-01-28 ENCOUNTER — APPOINTMENT (OUTPATIENT)
Dept: PHYSICAL THERAPY | Facility: CLINIC | Age: 80
DRG: 037 | End: 2020-01-28
Payer: COMMERCIAL

## 2020-01-28 ENCOUNTER — APPOINTMENT (OUTPATIENT)
Dept: OCCUPATIONAL THERAPY | Facility: CLINIC | Age: 80
DRG: 037 | End: 2020-01-28
Payer: COMMERCIAL

## 2020-01-28 LAB
GLUCOSE BLDC GLUCOMTR-MCNC: 113 MG/DL (ref 70–99)
GLUCOSE BLDC GLUCOMTR-MCNC: 129 MG/DL (ref 70–99)
GLUCOSE BLDC GLUCOMTR-MCNC: 142 MG/DL (ref 70–99)
GLUCOSE BLDC GLUCOMTR-MCNC: 157 MG/DL (ref 70–99)
GLUCOSE BLDC GLUCOMTR-MCNC: 180 MG/DL (ref 70–99)
TSH SERPL DL<=0.005 MIU/L-ACNC: 1.76 MU/L (ref 0.4–4)
VIT B12 SERPL-MCNC: 569 PG/ML (ref 193–986)

## 2020-01-28 PROCEDURE — 12000000 ZZH R&B MED SURG/OB

## 2020-01-28 PROCEDURE — 36415 COLL VENOUS BLD VENIPUNCTURE: CPT | Performed by: INTERNAL MEDICINE

## 2020-01-28 PROCEDURE — 00000146 ZZHCL STATISTIC GLUCOSE BY METER IP

## 2020-01-28 PROCEDURE — 97535 SELF CARE MNGMENT TRAINING: CPT | Mod: GO | Performed by: OCCUPATIONAL THERAPIST

## 2020-01-28 PROCEDURE — 25000132 ZZH RX MED GY IP 250 OP 250 PS 637: Performed by: INTERNAL MEDICINE

## 2020-01-28 PROCEDURE — 25000132 ZZH RX MED GY IP 250 OP 250 PS 637: Performed by: HOSPITALIST

## 2020-01-28 PROCEDURE — 82607 VITAMIN B-12: CPT | Performed by: INTERNAL MEDICINE

## 2020-01-28 PROCEDURE — 99232 SBSQ HOSP IP/OBS MODERATE 35: CPT | Performed by: INTERNAL MEDICINE

## 2020-01-28 PROCEDURE — 82746 ASSAY OF FOLIC ACID SERUM: CPT | Performed by: INTERNAL MEDICINE

## 2020-01-28 PROCEDURE — 97116 GAIT TRAINING THERAPY: CPT | Mod: GP

## 2020-01-28 PROCEDURE — 84443 ASSAY THYROID STIM HORMONE: CPT | Performed by: INTERNAL MEDICINE

## 2020-01-28 PROCEDURE — 97530 THERAPEUTIC ACTIVITIES: CPT | Mod: GO | Performed by: OCCUPATIONAL THERAPIST

## 2020-01-28 RX ORDER — ATORVASTATIN CALCIUM 40 MG/1
40 TABLET, FILM COATED ORAL EVERY EVENING
Status: DISCONTINUED | OUTPATIENT
Start: 2020-01-28 | End: 2020-02-01 | Stop reason: HOSPADM

## 2020-01-28 RX ADMIN — Medication 400 MG: at 09:25

## 2020-01-28 RX ADMIN — TRAZODONE HYDROCHLORIDE 50 MG: 50 TABLET ORAL at 20:30

## 2020-01-28 RX ADMIN — SENNOSIDES AND DOCUSATE SODIUM 1 TABLET: 8.6; 5 TABLET ORAL at 09:25

## 2020-01-28 RX ADMIN — METOPROLOL SUCCINATE 12.5 MG: 25 TABLET, EXTENDED RELEASE ORAL at 20:26

## 2020-01-28 RX ADMIN — ATORVASTATIN CALCIUM 40 MG: 40 TABLET, FILM COATED ORAL at 20:26

## 2020-01-28 RX ADMIN — METOPROLOL SUCCINATE 25 MG: 25 TABLET, EXTENDED RELEASE ORAL at 09:25

## 2020-01-28 RX ADMIN — OMEPRAZOLE 20 MG: 20 CAPSULE, DELAYED RELEASE ORAL at 09:25

## 2020-01-28 RX ADMIN — ROPINIROLE HYDROCHLORIDE 1 MG: 1 TABLET, FILM COATED ORAL at 20:26

## 2020-01-28 RX ADMIN — ASPIRIN 81 MG: 81 TABLET, DELAYED RELEASE ORAL at 09:25

## 2020-01-28 RX ADMIN — Medication 400 MG: at 20:26

## 2020-01-28 ASSESSMENT — ACTIVITIES OF DAILY LIVING (ADL)
ADLS_ACUITY_SCORE: 21

## 2020-01-28 ASSESSMENT — MIFFLIN-ST. JEOR: SCORE: 1031

## 2020-01-28 NOTE — PLAN OF CARE
DATE & TIME: 1/28/2020 0700- 1930              Cognitive Concerns/ Orientation : Pt A/Ox2, disoriented to time and situation   BEHAVIOR & AGGRESSION TOOL COLOR: Green            ABNL VS/O2: VSS on RA  MOBILITY: SBA with walker and gait belt, fall risk  PAIN MANAGMENT: Denies  DIET: Mod CHO  BOWEL/BLADDER: Incontinent at times  ABNL LAB/BG: ,180,129  DRAIN/DEVICES: IV SL  SKIN: Blanchable redness to coccyx, Mepilex on coccyx. Scabs on bilateral lower extremities  D/C DAY/GOALS/PLACE: Discharge pending placement,. PT recommending TCU.   OHER IMPORTANT INFO: does't call for help., sets alarm off, Psych, SW  Following..

## 2020-01-28 NOTE — PROGRESS NOTES
"[facility]  Hospitalist Progress Note  Name: Mel Castellanos    MRN: [id number]  Physician:  Matt Edmonds DO, FHM (Text Page)    Summary of Stay:  Mel Castellanos is an 79 year old female with history of HTN, unstable angina, insomnia, ELS, TIA, T2DM, recurrent UTIs, and emphysema who presented to the ED via EMS due to increased altered mental status.  Felt possibly infectious earlier in stay but now felt unlikely a signficant infection responsible.  Psychiatry feels she has a significant neurocognitive disorder and did not feel she is same for discharge home.  Therapy and psychiatry has recommended TCU/rehab.      Assessment & Plan    Altered mental status (metabolic encepholopathy superimposed on cognitive disorder)  - Confusion improved since 1/24 though still significant.  Current level of confusion felt potentially related to significant underlying neurocognitive disorder:  Unclear etiology at admission, but she has history of similar presentation which were treated as UTI and symptoms improved. No other obvious etiology. Cultures NGTD.   Flu negative.  At this point off antibiotics as no definitive bacterial infection found.   Possibly had a recent viral infection that is now clearing.  Given trend would recommend TCU/Rehab with further monitoring.    - Psychiatry consulted - appreciated. Note reviewed.  \"Mel presents with what appears to be a delirium superimposed on a major neurocognitive disorder.  She is not decisional.\"    \"If the daughter is demanding to take her home would consider discharge AMA with an adult protection referral.\"  -  Zyprexa/Haldol PRN.  Less agitated over course of stay.  -  Check TSH, B12/Folate.  -  Will ask neurology to also see her for any further recommendations given patient/daughter concerns.     Concern of initial sepsis though now not felt to likely have had a significant bacterial infection:  - possible UTI - leukocytosis + UA not classic, started " "rocephin in ED  - WBC 17k --> 13k --> 10k , remains afebrile and nontoxic  - CT head no acute pathology  - RRT earlier in stay for hypotension/sepsis, given additional IVF boluses and abx switched to zosyn --> improved and abx later stopped 1/25.  Remains afebrile, and cultures negative.  - since 1/24 more awake and alert, but confused, ?baseline cognition     History of recurrent UTI  - Urine culture negative - abx stopped.     Deconditioned  Concern of safety at home - unclear baseline mentation   - Daughter possibly having issues at home.  - SW/CT consulted.  PT/OT following.  - appears that she is likely cognitively impaired at baseline, to what degree remains the question. Patients daughter was reportedly intoxicated when EMS picked up patient and reportedly she has depression and attempted suicide recently. The patient and this daughter have been living together to \"take care\" of each other.  Significant home safety concerns.     Skin excoriation, petechiae, small scabs:  - Both ankles tender, no overt cellulitic changes.  - WOC RN consulted, appreciate recs.     Hypertension:  Stable on admission then soft.  - previously soft and needing multiple boluses.  - Continue to hold PTA imdur, amlodipine, hydrochlorothiazide.  - PTA metop resumed 1/25.  - Blood pressure somewhat labile, continue to monitor and adjust anti-hypertensives as indicated.     History of CAD/MI:  -  PTA ASA resume.    -  Imdur resumed low dose.  -  Statin resumed  -  Continue metoprolol at reduced dose from PTA with lower BP's earlier in stay.     T2DM  A1c 6.5% Feb 2018. PTA pioglitazone held.  - A1c now 6.8%.  - Continue accuchecks and sliding scale NovoLog.     Restless leg syndrome  - Continue PTA ropinirole.        Diet: Moderate Consistent CHO Diet    DVT Prophylaxis: Pneumatic Compression Devices  Tavera Catheter: not present  Code Status: Full Code      Disposition Plan   Expected discharge in 1-2 days to rehab/TCU.  See above " concerning issues if patient/daughter want to leave AMA.     Entered: Matt Edmonds 01/28/2020, 5:35 PM       Interval History   Assumed care, history reviewed.  Ms. Castellanos denied pain, sob, nausea.  She reports feeling well and at first wanted to discharge home.   Then she discussed things disappearing in her room and made unusual tangential comments.    -Data reviewed today: I reviewed all new labs and imaging reports over the last 24 hours. I personally reviewed no images or EKG's today.    Physical Exam   Temp: 97.8  F (36.6  C) Temp src: Oral BP: 116/51 Pulse: 81 Heart Rate: 73 Resp: 18 SpO2: 94 % O2 Device: None (Room air)    Vitals:    01/25/20 0630 01/26/20 0500 01/28/20 0624   Weight: 64.5 kg (142 lb 3.2 oz) 63.1 kg (139 lb 3.2 oz) 59.2 kg (130 lb 8.2 oz)     Vital Signs with Ranges  Temp:  [97.3  F (36.3  C)-98  F (36.7  C)] 97.8  F (36.6  C)  Pulse:  [67-85] 81  Heart Rate:  [73-85] 73  Resp:  [16-18] 18  BP: ()/(49-66) 116/51  SpO2:  [93 %-96 %] 94 %  I/O last 3 completed shifts:  In: 720 [P.O.:720]  Out: -     GEN:  Alert, oriented x self and place but not time well.  Doesn't answer my questions always and is tangential at times.  Appears comfortable, no overt distress.  HEENT:  Normocephalic/atraumatic, no scleral icterus, no nasal discharge, mouth moist.  CV:  Regular rate and rhythm, distant.  No loud murmur/rub.  LUNGS:  Clear to auscultation bilaterally without rales/rhonchi/wheezing/retractions.  Symmetric chest rise on inhalation noted.  ABD:  Active bowel sounds, soft, non-tender/non-distended.  No rebound/guarding/rigidity.  EXT:  No edema.  No cyanosis.  No acute joint synovitis noted.  SKIN:  Dry to touch, no exanthems noted in the visualized areas.  NEURO:  Moving extremities well, sensation to touch grossly intact.  No new focal deficits.    Medications       aspirin  81 mg Oral Daily     atorvastatin  40 mg Oral QPM     insulin aspart  1-3 Units Subcutaneous TID AC     insulin  aspart  1-3 Units Subcutaneous At Bedtime     [START ON 1/29/2020] isosorbide mononitrate  15 mg Oral Daily     magnesium oxide  400 mg Oral BID     metoprolol succinate ER  12.5 mg Oral BID     omeprazole  20 mg Oral QAM     rOPINIRole  1 mg Oral At Bedtime     senna-docusate  1 tablet Oral BID    Or     senna-docusate  2 tablet Oral BID     sodium chloride (PF)  3 mL Intracatheter Q8H     traZODone  25 mg Oral At Bedtime    Or     traZODone  50 mg Oral At Bedtime     Data     Recent Labs   Lab 01/24/20  0859 01/23/20 1930 01/23/20  1138   WBC 10.8 13.5* 17.0*   HGB 12.3 12.2 12.2   HCT 38.0 37.5 37.8   MCV 95 96 96    178 190     Recent Labs   Lab 01/23/20  1155 01/23/20  1138   CULT No growth No growth after 5 days  No growth after 5 days     Recent Labs   Lab 01/25/20  1015 01/24/20  1149 01/24/20 0859 01/23/20 1930 01/23/20  1138   NA  --   --  135 139 138   POTASSIUM 3.4 3.6 3.5 3.5 3.6   CHLORIDE  --   --  105 105 102   CO2  --   --  24 31 31   ANIONGAP  --   --  6 3 5   GLC  --   --  102* 130* 189*   BUN  --   --  14 17 20   CR  --   --  0.54 0.54 0.60   GFRESTIMATED  --   --  88 88 86   GFRESTBLACK  --   --  >90 >90 >90   LAUREN  --   --  9.3 9.3 9.7   MAG 1.7  --  1.9 1.8  --    PROTTOTAL  --   --   --   --  7.2   ALBUMIN  --   --   --   --  3.4   BILITOTAL  --   --   --   --  1.3   ALKPHOS  --   --   --   --  85   AST  --   --   --   --  43   ALT  --   --   --   --  37       No results found for this or any previous visit (from the past 24 hour(s)).

## 2020-01-28 NOTE — PLAN OF CARE
Discharge Planner OT   Patient plan for discharge: home  Current status: pt very resistant to safety cues, she likes to do things her own way.  Sit <> stand SBA with VC for hand placement, positioning of body at chair.  SBA ambulation in room, with pt furniture walking, mildly unsteady, no LOB.  Pt very concerned that someone would come into the room and take her clothes.  Kristin LB dressing. SBA hygienes at sink. Pt left with sitter present.  Barriers to return to prior living situation: current level of assist, falls risk, decreased balance, impaired cognition, poor safety awareness  Recommendations for discharge: TCU  Rationale for recommendations: Pt performing below baseline and unsafe to return home secondary to decreased safety awareness and cognition. Pt will continue to benefit from skilled OT services for increased activity tolerance and ind with ADL's prior to returning home.        Entered by: ROMERO DAVIDSON 01/28/2020 2:48 PM

## 2020-01-28 NOTE — CONSULTS
Care Transition Initial Assessment - RN  Met with: Patient and Family.  DATA   Active Problems:    Altered mental status     Cognitive Status: disoriented.  Contact information and PCP information verified: Yes  Lives With: child(mariam), adult   Living Arrangements: condominium   Insurance concerns: No Insurance issues identified  ASSESSMENT  Patient currently receives the following services:   None      Identified issues/concerns regarding health management: Discussed with patients daughter regarding discharge plans. Daughter stating she would like her mother to return home. Discussed that therapies recommend tCU and presently it will be against medical advice if patient returns home. Daughter in agreement for patient to go to a TCU temporarily.  Daughter would like TCU in the [facility] and would like to provide transportation Information sent via [employer]  PLAN  Financial costs for the patient include none .  Patient given options and choices for discharge yes .  Patient/family is agreeable to the plan?  Yes:   Patient anticipates discharging to home .   Patient anticipates needs for home equipment: No  Transportation/person available to transport on day of discharge  is daughter and have they been notified.Plan/Disposition: TCU   Care  (CTS) will continue to follow as needed.

## 2020-01-28 NOTE — PLAN OF CARE
DATE & TIME: 1/24/2020 2743-1743   Cognitive Concerns/ Orientation : Pt A/Ox2, disoriented to time and situation   BEHAVIOR & AGGRESSION TOOL COLOR: Green   ABNL VS/O2: VSS on RA  MOBILITY: SBA with walker and gait belt, fall risk  PAIN MANAGMENT: Denies  DIET: Mod CHO  BOWEL/BLADDER: Incontinent at times  ABNL LAB/BG: /142  DRAIN/DEVICES: IV SL  SKIN: Blanchable redness to coccyx, Mepilex on coccyx. Scabs on bilateral lower extremities  D/C DAY/GOALS/PLACE: Discharge pending safe disposition. PT recommending TCU. Daughter thinks pt can go home but if she needs to go to a TCU would like to go to [place]  OTHER IMPORTANT INFO: Daughter came to visit pt this evening. Telling pt that she will pick her up tomorrow to leave. However, pt has no placement today to be leaving yet.

## 2020-01-28 NOTE — PLAN OF CARE
"Discharge Planner PT   Patient plan for discharge: states home today with daughter and going to the bank and [employer]   Current status: Patient sitting in chair upon arrival, confused and sitter present. Agreeable to therapy. BP assessed 112/49. Patient perseverating during session on the need to \"get my jeans on.\" Performed STS transfer x 2 with Min A and cues. Ambulated 100 ft x 1 with no assistive device and Min A for balance. Intermittently reaching out for environmental objects for support. C/o of fatigue and low back pain and requested to sit in hallway chair. Seated rest break x 5 minutes. Declined further gait in hallway; ambulated 15 ft to bedside chair.   Barriers to return to prior living situation: assist level with mobility, falls risk, decreased safety awareness, cognition  Recommendations for discharge: TCU per plan established by the PT.  Rationale for recommendations: Patient presenting with functional mobility below baseline level of independence. She is high risk for falls per formal balance testing with poor compliance with use of assistive device. She will benefit from skilled PT intervention at TCU to maximize safety and independence with mobility prior to return home.        Entered by: Jalya Le 01/28/2020 11:15 AM     "

## 2020-01-29 ENCOUNTER — APPOINTMENT (OUTPATIENT)
Dept: PHYSICAL THERAPY | Facility: CLINIC | Age: 80
DRG: 037 | End: 2020-01-29
Payer: COMMERCIAL

## 2020-01-29 ENCOUNTER — APPOINTMENT (OUTPATIENT)
Dept: ULTRASOUND IMAGING | Facility: CLINIC | Age: 80
DRG: 037 | End: 2020-01-29
Payer: COMMERCIAL

## 2020-01-29 ENCOUNTER — APPOINTMENT (OUTPATIENT)
Dept: ULTRASOUND IMAGING | Facility: CLINIC | Age: 80
DRG: 037 | End: 2020-01-29
Attending: INTERNAL MEDICINE
Payer: COMMERCIAL

## 2020-01-29 LAB
BACTERIA SPEC CULT: NO GROWTH
BACTERIA SPEC CULT: NO GROWTH
CRP SERPL-MCNC: 3.4 MG/L (ref 0–8)
FOLATE SERPL-MCNC: 26.3 NG/ML
GLUCOSE BLDC GLUCOMTR-MCNC: 126 MG/DL (ref 70–99)
GLUCOSE BLDC GLUCOMTR-MCNC: 138 MG/DL (ref 70–99)
GLUCOSE BLDC GLUCOMTR-MCNC: 148 MG/DL (ref 70–99)
GLUCOSE BLDC GLUCOMTR-MCNC: 159 MG/DL (ref 70–99)
GLUCOSE BLDC GLUCOMTR-MCNC: 97 MG/DL (ref 70–99)
Lab: NORMAL
Lab: NORMAL
SPECIMEN SOURCE: NORMAL
SPECIMEN SOURCE: NORMAL

## 2020-01-29 PROCEDURE — 25000132 ZZH RX MED GY IP 250 OP 250 PS 637: Performed by: HOSPITALIST

## 2020-01-29 PROCEDURE — 12000000 ZZH R&B MED SURG/OB

## 2020-01-29 PROCEDURE — 93922 UPR/L XTREMITY ART 2 LEVELS: CPT

## 2020-01-29 PROCEDURE — 25000132 ZZH RX MED GY IP 250 OP 250 PS 637: Performed by: INTERNAL MEDICINE

## 2020-01-29 PROCEDURE — 36415 COLL VENOUS BLD VENIPUNCTURE: CPT | Performed by: INTERNAL MEDICINE

## 2020-01-29 PROCEDURE — 97116 GAIT TRAINING THERAPY: CPT | Mod: GP

## 2020-01-29 PROCEDURE — 93925 LOWER EXTREMITY STUDY: CPT

## 2020-01-29 PROCEDURE — 86140 C-REACTIVE PROTEIN: CPT | Performed by: INTERNAL MEDICINE

## 2020-01-29 PROCEDURE — 00000146 ZZHCL STATISTIC GLUCOSE BY METER IP

## 2020-01-29 PROCEDURE — 97110 THERAPEUTIC EXERCISES: CPT | Mod: GP

## 2020-01-29 PROCEDURE — 99232 SBSQ HOSP IP/OBS MODERATE 35: CPT | Performed by: INTERNAL MEDICINE

## 2020-01-29 RX ADMIN — ATORVASTATIN CALCIUM 40 MG: 40 TABLET, FILM COATED ORAL at 20:22

## 2020-01-29 RX ADMIN — OMEPRAZOLE 20 MG: 20 CAPSULE, DELAYED RELEASE ORAL at 09:40

## 2020-01-29 RX ADMIN — ASPIRIN 81 MG: 81 TABLET, DELAYED RELEASE ORAL at 09:40

## 2020-01-29 RX ADMIN — ROPINIROLE HYDROCHLORIDE 1 MG: 1 TABLET, FILM COATED ORAL at 20:22

## 2020-01-29 RX ADMIN — Medication 400 MG: at 09:40

## 2020-01-29 RX ADMIN — TRAZODONE HYDROCHLORIDE 50 MG: 50 TABLET ORAL at 20:22

## 2020-01-29 RX ADMIN — Medication 400 MG: at 20:22

## 2020-01-29 RX ADMIN — METOPROLOL SUCCINATE 12.5 MG: 25 TABLET, EXTENDED RELEASE ORAL at 20:22

## 2020-01-29 ASSESSMENT — ACTIVITIES OF DAILY LIVING (ADL)
ADLS_ACUITY_SCORE: 21

## 2020-01-29 NOTE — PLAN OF CARE
Discharge Planner PT   Patient plan for discharge: TCU  Current status: Greeted patient sitting up in chair and agreeable to therapy with encouragement. Assisted patient with ordering dinner. Engaged patient in 2 reps of sit <> stand with no assistive device at CGA. Engaged patient in ambulation with no assistive device at CGA for a bout of 50ft+70ft with patient requiring extended rest break due to left leg fatigue/pain - No LOB noted with either bout of ambulation. Engaged patient in seated leg exercises. Throughout session, patient requires significant encouragement for activity and education on the benefits of exercise for improving LE strength. Concluded session with patient sitting up in chair, alarm on, all needs in reach with dinner just arriving - nursing assistance at bedside.   Barriers to return to prior living situation: decreased activity tolerance, impaired balance per PATEL, high fall risk, decreased safety awareness  Recommendations for discharge: TCU  Rationale for recommendations: Patient would benefit from continued physical therapy in the setting of a TCU for  improving functional mobility, LE strength and tolerance for functional activities in order to return to baseline of functioning.          Entered by: Ayla Villatoro 01/29/2020 5:22 PM

## 2020-01-29 NOTE — PROGRESS NOTES
SW:  D:  Received call from Mireya at [facility] and she states she will look at referral and call back to confirm bed for patient.     P:  Will continue to follow.        ILIA Eastman Student  [phone]  [facility]

## 2020-01-29 NOTE — PROGRESS NOTES
SW:  D:  Fatimah began assessing patient for admission today however prior to completing their assessments the available vacancies were assigned to other patients.  Fatimah requested clarification to when the bed side attendant was discontinued.  Several days of RN progress notes faxed to Mireya.  Based on RN notes, it appears the last shift to utilize a bed side attendant was 1/27 evening.  Mireya will complete her assessment in the morning and let writer know if she has a vacancy for Thursday.  PLAN:  Will call vladislav Winter to update her.

## 2020-01-29 NOTE — PROGRESS NOTES
Vascular Surgery Update:    Routine consult received around 5pm today.  For Abnormal ALISON.  Per chart review patient was admitted for altered mental status and though this was initially thought to be due to an infectious process, it is now deemed likely due to a neurocognitive disorder potentially.  Not admitted for lower extremity issues.   Per the documentation the patient intermittently has faint bluish discoloration to the toes.  The notes say the foot is not overtly cyanotic and that there is no acute limb change.  This prompted ABIs to be performed.  The right ALISON is over 0.9.  The left is around 0.39.  Distal waveforms are monophasic on both sides.  The patient needs an arterial duplex of the bilateral LE.  This is ordered now.  Vascular surgery will see the patient in the AM tomorrow for this routine consult.  Again primary team note says no acute limb change today and PT notes says patient has pain with ambulation but is able to ambulate so motor function is not compromised and the limb is not described as acutely threatened.  Of note she does have diabetes as a comorbidity.  Arterial duplex of lower extremity ordered, bilaterally.  This will give more information of if there is arterial stenosis that may require possible intervention.  Vascular surgery will see the patient tomorrow AM and follow up on the arterial duplex.  Cr is 0.54.  We would ask that the patient be NPO at midnight just in case an angiogram is indicated based on arterial duplex.  However, at this time no angiogram is scheduled.    Nicolás Rios  Vascular Fellow

## 2020-01-29 NOTE — PLAN OF CARE
DATE & TIME: 1/25/2020 [id number]   Cognitive Concerns/ Orientation : Pt A/Ox2, disoriented to time and situation   BEHAVIOR & AGGRESSION TOOL COLOR: Green   ABNL VS/O2: VSS on RA  MOBILITY: SBA with walker and gait belt, fall risk  PAIN MANAGMENT: Denies  DIET: Mod CHO  BOWEL/BLADDER: Incontinent at times  ABNL LAB/BG:   DRAIN/DEVICES: IV SL  SKIN: Blanchable redness to coccyx, Mepilex in place  D/C DAY/GOALS/PLACE: Discharge to TCU pending placement, hopefully 1-2 days  OTHER IMPORTANT INFO: Psych and SW following. Impulsive when getting up forgets to call for help.

## 2020-01-29 NOTE — PLAN OF CARE
Cognitive Concerns/ Orientation : Pt A/Ox2, disoriented to time and situation   BEHAVIOR & AGGRESSION TOOL COLOR: Green   ABNL VS/O2: VSS on RA  MOBILITY: SBA with walker and gait belt, fall risk  PAIN MANAGMENT: Denies  DIET: Mod CHO  BOWEL/BLADDER: Incontinent at times  ABNL LAB/BG:   DRAIN/DEVICES: IV SL  SKIN: Blanchable redness to coccyx, Mepilex in place  D/C DAY/GOALS/PLACE: Discharge to TCU pending placement, hopefully 1-2 days  OTHER IMPORTANT INFO: Psych and SW following. Impulsive when getting up forgets to call for help.

## 2020-01-29 NOTE — PLAN OF CARE
DATE & TIME: 1/29/2020  0700- 1930  Cognitive Concerns/ Orientation : Pt A/Ox2, disoriented to time and situation   BEHAVIOR & AGGRESSION TOOL COLOR: Green            ABNL VS/O2: VSS on RA  MOBILITY: SBA with walker and gait belt, fall risk  PAIN MANAGMENT: Denies  DIET: Mod CHO  BOWEL/BLADDER:continent  ABNL LAB/BG: , 159,97  TESTS&pROCE: L .foot doppler US .  DRAIN/DEVICES: IV SL  SKIN: Blanchable redness to coccyx, feet red and swollen, scabs on the legs and feet,Mepilex in place for coccyx  D/C DAY/GOALS/PLACE: Discharge to TCU, pending placement, hopefully 1-2 days  OTHER IMPORTANT INFO: Psych and SW following. Impulsive when getting up forgets to call for help LE wound care done per plan of care, CMS intact,doppler pulses positive, Seen by neurology , vascular surgery consult pending..

## 2020-01-29 NOTE — CONSULTS
[facility] [place]  [facility]    Neurology Consultation    Mel Castellanos MRN# [id number]   YOB: 1940 Age: 79 year old    Code Status:Full Code   Date of Admission: 1/23/2020  Date of Consult:01/29/2020                                                                                       Assessment and Plan:                                         #.  Improving encephalopathy- presentation on admission would be consistent with toxic metabolic encephalopathy related to urinary tract infection.  Although she is improved, she is still impaired with regard to memory and some communication difficulty.  This is suggestive of underlying dementia.  Unfortunately historian is not available to confirm chronicity.  --Agree with plan per psychiatry and hospitalist service that patient will need to be in a supervised living situation with reliable supervision.  Clinical follow-up to confirm previous history and temporal course of symptoms would be necessary to establish a clear diagnosis of dementia  Otherwise I would consider her work-up complete.  Please call if further neurologic follow-up as needed during this hospitalization      ----------------------------------------------------------------------------------  ----------------------------------------------------------------------------------  Reason for consult: I was asked by Dr. Edmonds to evaluate this patient for confusion.       Chief Complaint:   Patient unreliable historian, unaware of her confusion  History is obtained from the patient / chart       History of Present Illness:   This patient is a 79 year old female who presents with problems with confusion reported several days prior to her admission.  During her admission she has been found to have a urinary tract infection.  Some of her confusion has improved, however she has significant impairments limiting decision-making, judgment.  As there are  concerns about the daughter she lives with- inebriation was noted by EMS staff at the time of their evaluation.  Note concerns of psychiatrist.  There is limitations of ability to have collaborating history regarding any history of previous cognitive impairments.  Chart records available are limited.  The patient reports that she has not driven in a couple of years as her car has not been working.  She reports that she shares household responsibilities and finances with her daughter.  There is apparently another daughter who lives up near [place] who is otherwise going through a stressful time due to an illness in her son-in-law.           Past Medical History:     Past Medical History:   Diagnosis Date     Arthritis      Emphysema of lung (H)      High cholesterol      HTN (hypertension)      Insomnia      MI (myocardial infarction) (H)      Restless leg syndrome      TIA (transient ischaemic attack)      Type 2 diabetes mellitus without complications (H)      UTI (urinary tract infection)          Past Surgical History:     Past Surgical History:   Procedure Laterality Date     APPENDECTOMY       CHOLECYSTECTOMY            Social History:     Social History     Socioeconomic History     Marital status:      Spouse name: None     Number of children: None     Years of education: None     Highest education level: None   Occupational History     None   Social Needs     Financial resource strain: None     Food insecurity:     Worry: None     Inability: None     Transportation needs:     Medical: None     Non-medical: None   Tobacco Use     Smoking status: Current Every Day Smoker     Packs/day: 0.25     Smokeless tobacco: Never Used     Tobacco comment: 1/4-1/2 pack per day    Substance and Sexual Activity     Alcohol use: No     Drug use: No     Sexual activity: None   Lifestyle     Physical activity:     Days per week: None     Minutes per session: None     Stress: None   Relationships     Social  connections:     Talks on phone: None     Gets together: None     Attends [employer] service: None     Active member of club or organization: None     Attends meetings of clubs or organizations: None     Relationship status: None     Intimate partner violence:     Fear of current or ex partner: None     Emotionally abused: None     Physically abused: None     Forced sexual activity: None   Other Topics Concern     Parent/sibling w/ CABG, MI or angioplasty before 65F 55M? No   Social History Narrative     None     Patient denies smoking, no significant alcohol intake, denies illicit drugs use       Family History:     Family History   Problem Relation Age of Onset     Coronary Artery Disease Father      Reviewed and not felt to be contributory.        Home Medications:     Prior to Admission Medications   Prescriptions Last Dose Informant Patient Reported? Taking?   AMLODIPINE BESYLATE PO 1/22/2020 at AM Daughter Yes Yes   Sig: Take 5 mg by mouth daily   ASPIRIN EC PO 1/22/2020 at AM Daughter Yes Yes   Sig: Take 81 mg by mouth daily    Isosorbide Mononitrate (IMDUR PO) 1/22/2020 at AM Daughter Yes Yes   Sig: Take 60 mg by mouth daily   Multiple Vitamins-Minerals (CENTRUM SILVER) per tablet 1/22/2020 at AM Daughter Yes Yes   Sig: Take 1 tablet by mouth daily   OMEPRAZOLE PO 1/22/2020 at AM Daughter Yes Yes   Sig: Take 20 mg by mouth every morning    atorvastatin (LIPITOR) 40 MG tablet 1/22/2020 at PM Daughter Yes Yes   Sig: Take 40 mg by mouth daily   hydrochlorothiazide (HYDRODIURIL) 25 MG tablet 1/22/2020 at AM Daughter Yes Yes   Sig: Take 25 mg by mouth daily   magnesium oxide (MAG-OX) 400 MG tablet 1/22/2020 at PM Daughter Yes Yes   Sig: Take 400 mg by mouth 2 times daily   metoprolol (TOPROL-XL) 50 MG 24 hr tablet 1/22/2020 at PM Daughter No Yes   Sig: Take 1 tablet (50 mg) by mouth 2 times daily   nitroglycerin (NITROSTAT) 0.4 MG SL tablet More than a month at Unknown time Daughter No No   Sig: Place 1 tablet  (0.4 mg) under the tongue every 5 minutes as needed X 3 doses total if needed for chest pain.   pioglitazone (ACTOS) 15 MG tablet 1/22/2020 at AM Daughter Yes Yes   Sig: Take 15 mg by mouth daily   rOPINIRole (REQUIP) 1 MG tablet 1/22/2020 at PM Daughter Yes Yes   Sig: Take 1 mg by mouth At Bedtime      Facility-Administered Medications: None          Allergy:     Allergies   Allergen Reactions     Sulfa Drugs Rash          Inpatient Medications:   Scheduled Meds:    aspirin  81 mg Oral Daily     atorvastatin  40 mg Oral QPM     insulin aspart  1-3 Units Subcutaneous TID AC     insulin aspart  1-3 Units Subcutaneous At Bedtime     isosorbide mononitrate  15 mg Oral Daily     magnesium oxide  400 mg Oral BID     metoprolol succinate ER  12.5 mg Oral BID     omeprazole  20 mg Oral QAM     rOPINIRole  1 mg Oral At Bedtime     senna-docusate  1 tablet Oral BID    Or     senna-docusate  2 tablet Oral BID     sodium chloride (PF)  3 mL Intracatheter Q8H     traZODone  25 mg Oral At Bedtime    Or     traZODone  50 mg Oral At Bedtime     PRN Meds: acetaminophen, acetaminophen, glucose **OR** dextrose **OR** glucagon, haloperidol lactate, hydrALAZINE, lidocaine 4%, lidocaine (buffered or not buffered), magnesium sulfate, melatonin, naloxone, OLANZapine zydis, ondansetron **OR** ondansetron, polyethylene glycol, potassium chloride, potassium chloride with lidocaine, potassium chloride, potassium chloride, sodium chloride (PF)        Review of Systems    Not fully reliable because of confusion  CONSTITUTIONAL: negative for fever, chills, change in weight  INTEGUMENTARY/SKIN: no rash or obvious new lesions  ENT/MOUTH: no sore throat, new sinus pain or nasal drainage, no neck mass noted  RESP: No pleuretic pain, No cough, no hemoptysis, No SOB   CV: negative for chest pain, palpitations or peripheral edema  GI: no nausea, vomiting, change in stools  : no dysuria or hematuria  MUSCULOSKELETAL: no myalgias, arthralgias or join  "efffusion  ENDOCRINE: no history of polyuria, polydyspsia or symptoms of thyroid dysfunction  PSYCHIATRIC: no change in mood stable  LYMPHATIC: no new lymphadenopathy  HEME: no bleeding or easy bruisability  NEURO: see HPI       Physical Exam:   Physical Exam   Vitals:  Height:5' 2.992\"[historical[  Weight:130 lbs 8.2 oz   Temp: 98.1  F (36.7  C) Temp src: Oral BP: 126/63 Pulse: 69 Heart Rate: 69 Resp: 17 SpO2: 95 % O2 Device: None (Room air)    General Appearance:  No acute distress, very pleasant  Neuro:       Mental Status Exam: Alert.  Oriented to self.  Oriented to hospital but not name or room number or floor.  She is 1 month off with the time, difficulty with date.  She does not know current events       Cranial Nerves:   2 through 12 intact.  Fundus technically difficult           Motor:   Tone bulk and strength intact x4           Reflexes: Difficult to elicit in the legs, symmetrically in the upper extremity.  Plantar signs normal.  No clonus       Sensory: Vibration and cold sense intact x4                   Coordination:   Intact x4       Gait: Wide-based, unsteady gait.  Unable to reliably test Romberg  Neck: no nuchal rigidity, normal thyroid. No carotid bruits.    Cardiovascular: Regular rate and rhythm, no m/r/g    Extremities: No clubbing, no cyanosis, no edema       Data:   ROUTINE IP LABS   CBC RESULTS:     Recent Labs   Lab 01/24/20  0859 01/23/20 1930 01/23/20  1138   WBC 10.8 13.5* 17.0*   RBC 3.99 3.92 3.95   HGB 12.3 12.2 12.2   HCT 38.0 37.5 37.8    178 190     Basic Metabolic Panel:   Recent Labs   Lab Test 01/25/20  1015 01/24/20  1149 01/24/20  0859 01/23/20 1930 01/23/20  1138   NA  --   --  135 139 138   POTASSIUM 3.4 3.6 3.5 3.5 3.6   CHLORIDE  --   --  105 105 102   CO2  --   --  24 31 31   BUN  --   --  14 17 20   CR  --   --  0.54 0.54 0.60   GLC  --   --  102* 130* 189*   LAUREN  --   --  9.3 9.3 9.7     Liver panel:  Recent Labs   Lab Test 01/23/20  1138 12/20/19  2124 " 12/20/19  2040 02/15/18  2325 04/24/14  1030 11/20/13  0915   PROTTOTAL 7.2 6.5* Canceled, Test credited, specimen discarded 7.2 7.1 7.5   ALBUMIN 3.4 3.0* Canceled, Test credited, specimen discarded 3.4 3.9 4.5   BILITOTAL 1.3 0.5 Canceled, Test credited, specimen discarded 0.3 0.5 0.7   ALKPHOS 85 76 Canceled, Test credited, specimen discarded 49 43 47   AST 43 39 Canceled, Test credited, specimen discarded 29 26 24   ALT 37 22 Canceled, Test credited, specimen discarded 21 23 19   BILIDIRECT  --   --   --   --   --  0.1     INR:  Recent Labs   Lab Test 01/23/20  1138 04/24/14  1030   INR 1.22* 0.98      Lipid Profile:  Recent Labs   Lab Test 10/05/16  1036 11/20/13  0000   CHOL 129  --    HDL 51 42*   LDL 49 52*   TRIG 144 287*   CHOLHDLRATIO  --  3.6     Thyroid Panel:  Recent Labs   Lab Test 01/28/20  1915   TSH 1.76      Vitamin B12:   Recent Labs   Lab Test 01/28/20  1915   B12 569      A1C:   Recent Labs   Lab Test 01/23/20  1138 02/16/18  0635 08/05/16  0625 04/25/14  0650   A1C 6.8* 6.5* 6.4* 7.2*     Troponin I:   Recent Labs   Lab Test 01/23/20  1930 03/29/18  1327 03/29/18  1025 02/16/18  0635 02/16/18  0315 02/15/18  2325 08/04/16  2350 08/04/16  1804   TROPI <0.015 <0.015 <0.015 <0.015 <0.015 <0.015 0.023 0.025   Results for NIC REYMUNDO L (MRN [id number]) as of 1/29/2020 14:13   Ref. Range 1/28/2020 19:15   Folate Latest Ref Range: >5.4 ng/mL 26.3   TSH Latest Ref Range: 0.40 - 4.00 mU/L 1.76   Vitamin B12 Latest Ref Range: 193 - 986 pg/mL 569         IMAGING:   All imaging studies were reviewed personally  CT head: Significant atrophy is noted to have progressed from the previous study in 2011  CT SCAN OF THE HEAD WITHOUT CONTRAST   1/23/2020 1:32 PM      HISTORY: Altered mental status (LOC), unexplained     TECHNIQUE:  Axial images of the head and coronal reformations without  IV contrast material. Radiation dose for this scan was reduced using  automated exposure control, adjustment of the mA  and/or kV according  to patient size, or iterative reconstruction technique.     COMPARISON: None.     FINDINGS:      Intracranial contents: There is diffuse parenchymal volume loss.   White matter changes are present in the cerebral hemispheres that are  consistent with small vessel ischemic disease in this age patient.  There is no evidence of intracranial hemorrhage, mass, acute infarct  or anomaly.     Visualized orbits/sinuses/mastoids:  The visualized portions of the  sinuses and mastoids appear normal.     Osseous structures/soft tissues:  There is no evidence of trauma.                                                                      IMPRESSION:   1. No acute pathology. No bleed, mass, or areas of acute infarction.  No significant change since 9/13/2011.     2. There is diffuse parenchymal volume loss.  White matter changes are  present in the cerebral hemispheres that are consistent with small  vessel ischemic disease in this age patient

## 2020-01-29 NOTE — PROGRESS NOTES
"[facility]  Hospitalist Progress Note  Name: Mel Castellanos    MRN: [id number]  Physician:  Matt Edmonds DO, FHM (Text Page)    Summary of Stay:  Mel Castellanos is an 79 year old female with history of HTN, unstable angina, insomnia, ELS, TIA, T2DM, recurrent UTIs, and emphysema who presented to the ED via EMS due to increased altered mental status.  Felt possibly infectious earlier in stay but now felt unlikely a signficant infection responsible.  Psychiatry feels she has a significant neurocognitive disorder and did not feel she is same for discharge home.  Therapy and psychiatry has recommended TCU/rehab.      Assessment & Plan    Altered mental status (metabolic encepholopathy superimposed on cognitive disorder)  - Confusion improved since 1/24 though still significant.  Current level of confusion felt potentially related to significant underlying neurocognitive disorder:  Unclear etiology at admission, but she has history of similar presentation which were treated as UTI and symptoms improved. No other obvious etiology. Cultures NGTD.   Flu negative.  At this point off antibiotics as no definitive bacterial infection found.   Possibly had a recent viral infection that is now clearing.  Given trend would recommend TCU/Rehab with further monitoring.    - Psychiatry consulted - appreciated. Note reviewed.  \"Mel presents with what appears to be a delirium superimposed on a major neurocognitive disorder.  She is not decisional.\"    \"If the daughter is demanding to take her home would consider discharge AMA with an adult protection referral.\"  -  Zyprexa/Haldol PRN.  Less agitated over course of stay.  -  TSH, B12/Folate normal range  -  Appreciate neurology consult.  Neurologist also felt presentation combination of toxic/metabolic encephalopathy with probable underlying dementia.    Concern of initial sepsis though now not felt to likely have had a significant bacterial infection:  - possible " "UTI - leukocytosis + UA not classic, started rocephin in ED.  Off abx now.  - WBC 17k --> 13k --> 10k , remains afebrile and nontoxic  - CT head no acute pathology  - RRT earlier in stay for hypotension/sepsis, given additional IVF boluses and abx switched to zosyn --> improved and abx later stopped 1/25.  Remains afebrile, and cultures negative.  - since 1/24 more awake and alert, but confused, ?baseline cognition     History of recurrent UTI  - Urine culture negative - abx stopped.      Deconditioned  Concern of safety at home - unclear baseline mentation   - Daughter possibly having issues at home.  - SW/CT consulted.  PT/OT following.  - appears that she is likely cognitively impaired at baseline, to what degree remains the question. Patients daughter was reportedly intoxicated when EMS picked up patient and reportedly she has depression and attempted suicide recently. The patient and this daughter have been living together to \"take care\" of each other.  Significant home safety concerns.     Skin excoriation, petechiae, small scabs:  - Both ankles tender, no overt cellulitic changes.  - WOC RN consulted, appreciate recs.     Hypertension:  Stable on admission then soft.  - previously soft and needing multiple boluses.  - Continue to hold PTA imdur, amlodipine, hydrochlorothiazide.  - PTA metop resumed 1/25.  - Blood pressure somewhat labile, continue to monitor and adjust anti-hypertensives as indicated.     History of CAD/MI:  -  PTA ASA resume.    -  Imdur resumed low dose.  -  Statin resumed  -  Continue metoprolol at reduced dose from PTA with lower BP's earlier in stay.     T2DM  A1c 6.5% Feb 2018. PTA pioglitazone held.  - A1c now 6.8%.  - Continue accuchecks and sliding scale NovoLog.     Restless leg syndrome  - Continue PTA ropinirole.    Discoloration of toes at times by RN report:  -  Not overtly cyanotic.  Does not appear infectious/cellulitis.  CRP low at 3.4.  US ALISON Pending.    ADDENDUM:  ALISON " quite abnormal on left.  I will ask vascular surgery to review.  No acute limb change this afternoon.  Appears to have been a gradually worse issue for quite some time.          Diet: Moderate Consistent CHO Diet    DVT Prophylaxis: Pneumatic Compression Devices  Tavera Catheter: not present  Code Status: Full Code      Disposition Plan   Discharge to rehab/TCU possibly tomorrow once accepting facility found.  Medically appears ready for discharge to facility that can meet her needs.  See above concerning issues if patient/daughter want to leave AMA.      Entered: Matt Edmonds 01/29/2020, 3:36 PM       Interval History   Ms. Castellanos denied pain, sob, nausea.  No new physical complaints.  Doesn't remember much of our discussion from yesterday.    -Data reviewed today: I reviewed all new labs and imaging reports over the last 24 hours. I personally reviewed no images or EKG's today.    Physical Exam   Temp: 98.1  F (36.7  C) Temp src: Oral BP: 126/63 Pulse: 69 Heart Rate: 69 Resp: 17 SpO2: 95 % O2 Device: None (Room air)    Vitals:    01/25/20 0630 01/26/20 0500 01/28/20 0624   Weight: 64.5 kg (142 lb 3.2 oz) 63.1 kg (139 lb 3.2 oz) 59.2 kg (130 lb 8.2 oz)     Vital Signs with Ranges  Temp:  [97.8  F (36.6  C)-98.1  F (36.7  C)] 98.1  F (36.7  C)  Pulse:  [62-78] 69  Heart Rate:  [62-82] 69  Resp:  [17-18] 17  BP: ()/(51-65) 126/63  SpO2:  [91 %-95 %] 95 %  I/O last 3 completed shifts:  In: 320 [P.O.:320]  Out: -     GEN:  Alert, oriented x self and place but not time well.  Doesn't answer my questions always and is tangential like prior visit.  Also confused with discussion of what we talked about yesterday.  Appears comfortable, no overt distress.  HEENT:  Normocephalic/atraumatic, no scleral icterus, no nasal discharge, mouth moist.  CV:  Regular rate and rhythm, distant.  No loud murmur/rub.  LUNGS:  Clear to auscultation bilaterally without rales/rhonchi/wheezing/retractions.  Symmetric chest rise on  inhalation noted.  ABD:  Active bowel sounds, soft, non-tender/non-distended.  No rebound/guarding/rigidity.  EXT:  Trace foot edema.  Left toes a little jaleel appearing today.  Feet warm to touch.  No acute joint synovitis noted.  SKIN:  Dry to touch, no exanthems noted in the visualized areas.  NEURO:  Moving extremities well, sensation to touch grossly intact.  No new focal deficits.    Medications       aspirin  81 mg Oral Daily     atorvastatin  40 mg Oral QPM     insulin aspart  1-3 Units Subcutaneous TID AC     insulin aspart  1-3 Units Subcutaneous At Bedtime     isosorbide mononitrate  15 mg Oral Daily     magnesium oxide  400 mg Oral BID     metoprolol succinate ER  12.5 mg Oral BID     omeprazole  20 mg Oral QAM     rOPINIRole  1 mg Oral At Bedtime     senna-docusate  1 tablet Oral BID    Or     senna-docusate  2 tablet Oral BID     sodium chloride (PF)  3 mL Intracatheter Q8H     traZODone  25 mg Oral At Bedtime    Or     traZODone  50 mg Oral At Bedtime     Data     Recent Labs   Lab 01/24/20  0859 01/23/20  1930 01/23/20  1138   WBC 10.8 13.5* 17.0*   HGB 12.3 12.2 12.2   HCT 38.0 37.5 37.8   MCV 95 96 96    178 190     Recent Labs   Lab 01/23/20  1155 01/23/20  1138   CULT No growth No growth  No growth     Recent Labs   Lab 01/25/20  1015 01/24/20  1149 01/24/20  0859 01/23/20  1930 01/23/20  1138   NA  --   --  135 139 138   POTASSIUM 3.4 3.6 3.5 3.5 3.6   CHLORIDE  --   --  105 105 102   CO2  --   --  24 31 31   ANIONGAP  --   --  6 3 5   GLC  --   --  102* 130* 189*   BUN  --   --  14 17 20   CR  --   --  0.54 0.54 0.60   GFRESTIMATED  --   --  88 88 86   GFRESTBLACK  --   --  >90 >90 >90   LAUREN  --   --  9.3 9.3 9.7   MAG 1.7  --  1.9 1.8  --    PROTTOTAL  --   --   --   --  7.2   ALBUMIN  --   --   --   --  3.4   BILITOTAL  --   --   --   --  1.3   ALKPHOS  --   --   --   --  85   AST  --   --   --   --  43   ALT  --   --   --   --  37       No results found for this or any previous visit  (from the past 24 hour(s)).

## 2020-01-29 NOTE — PROGRESS NOTES
SW:  D:  Social work following for discharge planning.  Referrals sent to [facility], [facility] and [facility] to confirm bed for patient.     P:  Will continue to follow.      ILIA Eastman Student  [phone]  [facility]

## 2020-01-30 ENCOUNTER — APPOINTMENT (OUTPATIENT)
Dept: ULTRASOUND IMAGING | Facility: CLINIC | Age: 80
DRG: 037 | End: 2020-01-30
Attending: NURSE PRACTITIONER
Payer: COMMERCIAL

## 2020-01-30 ENCOUNTER — APPOINTMENT (OUTPATIENT)
Dept: INTERVENTIONAL RADIOLOGY/VASCULAR | Facility: CLINIC | Age: 80
DRG: 037 | End: 2020-01-30
Attending: NURSE PRACTITIONER
Payer: COMMERCIAL

## 2020-01-30 LAB
CHOLEST SERPL-MCNC: 135 MG/DL
ERYTHROCYTE [DISTWIDTH] IN BLOOD BY AUTOMATED COUNT: 13.2 % (ref 10–15)
ERYTHROCYTE [DISTWIDTH] IN BLOOD BY AUTOMATED COUNT: 13.3 % (ref 10–15)
GLUCOSE BLDC GLUCOMTR-MCNC: 103 MG/DL (ref 70–99)
GLUCOSE BLDC GLUCOMTR-MCNC: 104 MG/DL (ref 70–99)
GLUCOSE BLDC GLUCOMTR-MCNC: 106 MG/DL (ref 70–99)
GLUCOSE BLDC GLUCOMTR-MCNC: 116 MG/DL (ref 70–99)
GLUCOSE BLDC GLUCOMTR-MCNC: 120 MG/DL (ref 70–99)
GLUCOSE BLDC GLUCOMTR-MCNC: 120 MG/DL (ref 70–99)
HCT VFR BLD AUTO: 35.1 % (ref 35–47)
HCT VFR BLD AUTO: 38.4 % (ref 35–47)
HDLC SERPL-MCNC: 48 MG/DL
HGB BLD-MCNC: 11.6 G/DL (ref 11.7–15.7)
HGB BLD-MCNC: 12.7 G/DL (ref 11.7–15.7)
INR PPP: 1.11 (ref 0.86–1.14)
LDLC SERPL CALC-MCNC: 62 MG/DL
MCH RBC QN AUTO: 31.2 PG (ref 26.5–33)
MCH RBC QN AUTO: 31.2 PG (ref 26.5–33)
MCHC RBC AUTO-ENTMCNC: 33 G/DL (ref 31.5–36.5)
MCHC RBC AUTO-ENTMCNC: 33.1 G/DL (ref 31.5–36.5)
MCV RBC AUTO: 94 FL (ref 78–100)
MCV RBC AUTO: 94 FL (ref 78–100)
NONHDLC SERPL-MCNC: 87 MG/DL
PLATELET # BLD AUTO: 213 10E9/L (ref 150–450)
PLATELET # BLD AUTO: 254 10E9/L (ref 150–450)
RBC # BLD AUTO: 3.72 10E12/L (ref 3.8–5.2)
RBC # BLD AUTO: 4.07 10E12/L (ref 3.8–5.2)
TRIGL SERPL-MCNC: 126 MG/DL
WBC # BLD AUTO: 6 10E9/L (ref 4–11)
WBC # BLD AUTO: 6.9 10E9/L (ref 4–11)

## 2020-01-30 PROCEDURE — 25000132 ZZH RX MED GY IP 250 OP 250 PS 637: Performed by: HOSPITALIST

## 2020-01-30 PROCEDURE — 99232 SBSQ HOSP IP/OBS MODERATE 35: CPT | Performed by: INTERNAL MEDICINE

## 2020-01-30 PROCEDURE — C1769 GUIDE WIRE: HCPCS

## 2020-01-30 PROCEDURE — 27210740 ZZH ACCESSORY CR3

## 2020-01-30 PROCEDURE — 27210886 ZZH ACCESSORY CR5

## 2020-01-30 PROCEDURE — 36415 COLL VENOUS BLD VENIPUNCTURE: CPT | Performed by: RADIOLOGY

## 2020-01-30 PROCEDURE — 25000125 ZZHC RX 250: Performed by: PHYSICIAN ASSISTANT

## 2020-01-30 PROCEDURE — 85347 COAGULATION TIME ACTIVATED: CPT

## 2020-01-30 PROCEDURE — 25500064 ZZH RX 255 OP 636: Performed by: RADIOLOGY

## 2020-01-30 PROCEDURE — 25000128 H RX IP 250 OP 636: Performed by: HOSPITALIST

## 2020-01-30 PROCEDURE — 93971 EXTREMITY STUDY: CPT | Mod: RT

## 2020-01-30 PROCEDURE — 25000128 H RX IP 250 OP 636: Performed by: PHYSICIAN ASSISTANT

## 2020-01-30 PROCEDURE — C1773 RET DEV, INSERTABLE: HCPCS

## 2020-01-30 PROCEDURE — 25000128 H RX IP 250 OP 636: Performed by: NURSE PRACTITIONER

## 2020-01-30 PROCEDURE — 27210892 ZZH CATH CR4

## 2020-01-30 PROCEDURE — 36415 COLL VENOUS BLD VENIPUNCTURE: CPT | Performed by: STUDENT IN AN ORGANIZED HEALTH CARE EDUCATION/TRAINING PROGRAM

## 2020-01-30 PROCEDURE — 85610 PROTHROMBIN TIME: CPT | Performed by: STUDENT IN AN ORGANIZED HEALTH CARE EDUCATION/TRAINING PROGRAM

## 2020-01-30 PROCEDURE — 25000128 H RX IP 250 OP 636

## 2020-01-30 PROCEDURE — 25000128 H RX IP 250 OP 636: Performed by: RADIOLOGY

## 2020-01-30 PROCEDURE — 37221: CPT | Mod: 50

## 2020-01-30 PROCEDURE — 27210738 ZZH ACCESSORY CR2

## 2020-01-30 PROCEDURE — 12000000 ZZH R&B MED SURG/OB

## 2020-01-30 PROCEDURE — 25000132 ZZH RX MED GY IP 250 OP 250 PS 637: Performed by: INTERNAL MEDICINE

## 2020-01-30 PROCEDURE — 85027 COMPLETE CBC AUTOMATED: CPT | Performed by: STUDENT IN AN ORGANIZED HEALTH CARE EDUCATION/TRAINING PROGRAM

## 2020-01-30 PROCEDURE — 27210742 ZZH CATH CR1

## 2020-01-30 PROCEDURE — 25800030 ZZH RX IP 258 OP 636: Performed by: INTERNAL MEDICINE

## 2020-01-30 PROCEDURE — 37224 ZZHC REVASCULARIZE FEM/POP ARTERY, ANGIOPLASTY: CPT

## 2020-01-30 PROCEDURE — B41G1ZZ FLUOROSCOPY OF LEFT LOWER EXTREMITY ARTERIES USING LOW OSMOLAR CONTRAST: ICD-10-PCS | Performed by: RADIOLOGY

## 2020-01-30 PROCEDURE — 047E3EZ DILATION OF RIGHT INTERNAL ILIAC ARTERY WITH TWO INTRALUMINAL DEVICES, PERCUTANEOUS APPROACH: ICD-10-PCS | Performed by: RADIOLOGY

## 2020-01-30 PROCEDURE — 00000146 ZZHCL STATISTIC GLUCOSE BY METER IP

## 2020-01-30 PROCEDURE — C1725 CATH, TRANSLUMIN NON-LASER: HCPCS

## 2020-01-30 PROCEDURE — B4101ZZ FLUOROSCOPY OF ABDOMINAL AORTA USING LOW OSMOLAR CONTRAST: ICD-10-PCS | Performed by: RADIOLOGY

## 2020-01-30 PROCEDURE — C1760 CLOSURE DEV, VASC: HCPCS

## 2020-01-30 PROCEDURE — 27210804 ZZH SHEATH CR3

## 2020-01-30 PROCEDURE — 047F3EZ DILATION OF LEFT INTERNAL ILIAC ARTERY WITH TWO INTRALUMINAL DEVICES, PERCUTANEOUS APPROACH: ICD-10-PCS | Performed by: RADIOLOGY

## 2020-01-30 PROCEDURE — 80061 LIPID PANEL: CPT | Performed by: PHYSICIAN ASSISTANT

## 2020-01-30 PROCEDURE — 27210845 ZZH DEVICE INFLATION CR5

## 2020-01-30 PROCEDURE — 27210896 ZZH CATH CR9

## 2020-01-30 PROCEDURE — 27210808 ZZH SHEATH CR7

## 2020-01-30 PROCEDURE — 99152 MOD SED SAME PHYS/QHP 5/>YRS: CPT

## 2020-01-30 PROCEDURE — 85027 COMPLETE CBC AUTOMATED: CPT | Performed by: RADIOLOGY

## 2020-01-30 PROCEDURE — 99222 1ST HOSP IP/OBS MODERATE 55: CPT | Performed by: SURGERY

## 2020-01-30 PROCEDURE — 25800030 ZZH RX IP 258 OP 636: Performed by: RADIOLOGY

## 2020-01-30 PROCEDURE — 36415 COLL VENOUS BLD VENIPUNCTURE: CPT | Performed by: PHYSICIAN ASSISTANT

## 2020-01-30 PROCEDURE — 99207 ZZC APP CREDIT; MD BILLING SHARED VISIT: CPT | Performed by: NURSE PRACTITIONER

## 2020-01-30 RX ORDER — ACETAMINOPHEN 500 MG
500 TABLET ORAL EVERY 6 HOURS PRN
Status: DISCONTINUED | OUTPATIENT
Start: 2020-01-30 | End: 2020-02-01 | Stop reason: HOSPADM

## 2020-01-30 RX ORDER — FENTANYL CITRATE 50 UG/ML
25-50 INJECTION, SOLUTION INTRAMUSCULAR; INTRAVENOUS EVERY 5 MIN PRN
Status: DISCONTINUED | OUTPATIENT
Start: 2020-01-30 | End: 2020-01-30

## 2020-01-30 RX ORDER — SODIUM CHLORIDE 9 MG/ML
INJECTION, SOLUTION INTRAVENOUS CONTINUOUS
Status: DISCONTINUED | OUTPATIENT
Start: 2020-01-30 | End: 2020-02-01 | Stop reason: HOSPADM

## 2020-01-30 RX ORDER — LIDOCAINE HYDROCHLORIDE 10 MG/ML
INJECTION, SOLUTION INFILTRATION; PERINEURAL
Status: DISCONTINUED
Start: 2020-01-30 | End: 2020-01-30 | Stop reason: HOSPADM

## 2020-01-30 RX ORDER — FLUMAZENIL 0.1 MG/ML
0.2 INJECTION, SOLUTION INTRAVENOUS
Status: DISCONTINUED | OUTPATIENT
Start: 2020-01-30 | End: 2020-01-30

## 2020-01-30 RX ORDER — IODIXANOL 320 MG/ML
150 INJECTION, SOLUTION INTRAVASCULAR ONCE
Status: COMPLETED | OUTPATIENT
Start: 2020-01-30 | End: 2020-01-30

## 2020-01-30 RX ORDER — DEXTROSE MONOHYDRATE 25 G/50ML
25-50 INJECTION, SOLUTION INTRAVENOUS
Status: DISCONTINUED | OUTPATIENT
Start: 2020-01-30 | End: 2020-01-30

## 2020-01-30 RX ORDER — HEPARIN SODIUM 1000 [USP'U]/ML
INJECTION, SOLUTION INTRAVENOUS; SUBCUTANEOUS
Status: COMPLETED
Start: 2020-01-30 | End: 2020-01-30

## 2020-01-30 RX ORDER — FENTANYL CITRATE 50 UG/ML
INJECTION, SOLUTION INTRAMUSCULAR; INTRAVENOUS
Status: COMPLETED
Start: 2020-01-30 | End: 2020-01-30

## 2020-01-30 RX ORDER — DEXTROSE MONOHYDRATE 25 G/50ML
25-50 INJECTION, SOLUTION INTRAVENOUS
Status: DISCONTINUED | OUTPATIENT
Start: 2020-01-30 | End: 2020-02-01 | Stop reason: HOSPADM

## 2020-01-30 RX ORDER — HEPARIN SODIUM 10000 [USP'U]/100ML
650 INJECTION, SOLUTION INTRAVENOUS CONTINUOUS
Status: DISCONTINUED | OUTPATIENT
Start: 2020-01-30 | End: 2020-01-30

## 2020-01-30 RX ORDER — NICOTINE POLACRILEX 4 MG
15-30 LOZENGE BUCCAL
Status: DISCONTINUED | OUTPATIENT
Start: 2020-01-30 | End: 2020-02-01 | Stop reason: HOSPADM

## 2020-01-30 RX ORDER — HEPARIN SODIUM 10000 [USP'U]/100ML
500 INJECTION, SOLUTION INTRAVENOUS CONTINUOUS
Status: DISCONTINUED | OUTPATIENT
Start: 2020-01-30 | End: 2020-01-31

## 2020-01-30 RX ORDER — HEPARIN SODIUM 1000 [USP'U]/ML
2500 INJECTION, SOLUTION INTRAVENOUS; SUBCUTANEOUS ONCE
Status: COMPLETED | OUTPATIENT
Start: 2020-01-30 | End: 2020-01-30

## 2020-01-30 RX ORDER — NALOXONE HYDROCHLORIDE 0.4 MG/ML
.1-.4 INJECTION, SOLUTION INTRAMUSCULAR; INTRAVENOUS; SUBCUTANEOUS
Status: DISCONTINUED | OUTPATIENT
Start: 2020-01-30 | End: 2020-01-30

## 2020-01-30 RX ORDER — HEPARIN SODIUM 1000 [USP'U]/ML
500-6000 INJECTION, SOLUTION INTRAVENOUS; SUBCUTANEOUS
Status: COMPLETED | OUTPATIENT
Start: 2020-01-30 | End: 2020-01-30

## 2020-01-30 RX ORDER — SODIUM CHLORIDE 9 MG/ML
INJECTION, SOLUTION INTRAVENOUS CONTINUOUS
Status: DISCONTINUED | OUTPATIENT
Start: 2020-01-30 | End: 2020-01-30

## 2020-01-30 RX ORDER — ACETAMINOPHEN 500 MG
500 TABLET ORAL EVERY 6 HOURS PRN
Status: DISCONTINUED | OUTPATIENT
Start: 2020-01-30 | End: 2020-01-30

## 2020-01-30 RX ORDER — NICOTINE POLACRILEX 4 MG
15-30 LOZENGE BUCCAL
Status: DISCONTINUED | OUTPATIENT
Start: 2020-01-30 | End: 2020-01-30

## 2020-01-30 RX ADMIN — ACETAMINOPHEN 650 MG: 325 TABLET, FILM COATED ORAL at 20:40

## 2020-01-30 RX ADMIN — HEPARIN SODIUM 500 UNITS/HR: 10000 INJECTION, SOLUTION INTRAVENOUS at 22:41

## 2020-01-30 RX ADMIN — FENTANYL CITRATE 25 MCG: 50 INJECTION, SOLUTION INTRAMUSCULAR; INTRAVENOUS at 16:03

## 2020-01-30 RX ADMIN — ISOSORBIDE MONONITRATE 15 MG: 30 TABLET, EXTENDED RELEASE ORAL at 08:41

## 2020-01-30 RX ADMIN — SODIUM CHLORIDE: 9 INJECTION, SOLUTION INTRAVENOUS at 21:33

## 2020-01-30 RX ADMIN — MIDAZOLAM HYDROCHLORIDE 0.5 MG: 1 INJECTION, SOLUTION INTRAMUSCULAR; INTRAVENOUS at 16:02

## 2020-01-30 RX ADMIN — METOPROLOL SUCCINATE 12.5 MG: 25 TABLET, EXTENDED RELEASE ORAL at 21:25

## 2020-01-30 RX ADMIN — FENTANYL CITRATE 25 MCG: 50 INJECTION, SOLUTION INTRAMUSCULAR; INTRAVENOUS at 16:57

## 2020-01-30 RX ADMIN — MIDAZOLAM HYDROCHLORIDE 0.5 MG: 1 INJECTION, SOLUTION INTRAMUSCULAR; INTRAVENOUS at 14:10

## 2020-01-30 RX ADMIN — FENTANYL CITRATE 25 MCG: 50 INJECTION, SOLUTION INTRAMUSCULAR; INTRAVENOUS at 14:15

## 2020-01-30 RX ADMIN — LIDOCAINE HYDROCHLORIDE 19 ML: 10 INJECTION, SOLUTION INFILTRATION; PERINEURAL at 15:35

## 2020-01-30 RX ADMIN — MIDAZOLAM HYDROCHLORIDE 0.5 MG: 1 INJECTION, SOLUTION INTRAMUSCULAR; INTRAVENOUS at 16:57

## 2020-01-30 RX ADMIN — ROPINIROLE HYDROCHLORIDE 1 MG: 1 TABLET, FILM COATED ORAL at 21:25

## 2020-01-30 RX ADMIN — ASPIRIN 81 MG: 81 TABLET, DELAYED RELEASE ORAL at 08:41

## 2020-01-30 RX ADMIN — MIDAZOLAM HYDROCHLORIDE 0.5 MG: 1 INJECTION, SOLUTION INTRAMUSCULAR; INTRAVENOUS at 16:46

## 2020-01-30 RX ADMIN — HEPARIN SODIUM 650 UNITS/HR: 10000 INJECTION, SOLUTION INTRAVENOUS at 19:58

## 2020-01-30 RX ADMIN — ATORVASTATIN CALCIUM 40 MG: 40 TABLET, FILM COATED ORAL at 21:25

## 2020-01-30 RX ADMIN — SODIUM CHLORIDE: 9 INJECTION, SOLUTION INTRAVENOUS at 11:41

## 2020-01-30 RX ADMIN — Medication 400 MG: at 21:26

## 2020-01-30 RX ADMIN — MIDAZOLAM HYDROCHLORIDE 0.5 MG: 1 INJECTION, SOLUTION INTRAMUSCULAR; INTRAVENOUS at 14:29

## 2020-01-30 RX ADMIN — OMEPRAZOLE 20 MG: 20 CAPSULE, DELAYED RELEASE ORAL at 08:30

## 2020-01-30 RX ADMIN — HEPARIN SODIUM 10000 UNITS: 10000 INJECTION INTRAVENOUS; SUBCUTANEOUS at 14:23

## 2020-01-30 RX ADMIN — HEPARIN SODIUM 10000 UNITS: 10000 INJECTION INTRAVENOUS; SUBCUTANEOUS at 16:32

## 2020-01-30 RX ADMIN — HEPARIN SODIUM 4000 UNITS: 1000 INJECTION, SOLUTION INTRAVENOUS; SUBCUTANEOUS at 15:26

## 2020-01-30 RX ADMIN — MIDAZOLAM HYDROCHLORIDE 0.5 MG: 1 INJECTION, SOLUTION INTRAMUSCULAR; INTRAVENOUS at 14:48

## 2020-01-30 RX ADMIN — FENTANYL CITRATE 25 MCG: 50 INJECTION, SOLUTION INTRAMUSCULAR; INTRAVENOUS at 15:21

## 2020-01-30 RX ADMIN — HEPARIN SODIUM 2500 UNITS: 1000 INJECTION, SOLUTION INTRAVENOUS; SUBCUTANEOUS at 16:13

## 2020-01-30 RX ADMIN — TRAZODONE HYDROCHLORIDE 50 MG: 50 TABLET ORAL at 21:26

## 2020-01-30 RX ADMIN — Medication 400 MG: at 08:33

## 2020-01-30 RX ADMIN — FENTANYL CITRATE 25 MCG: 50 INJECTION, SOLUTION INTRAMUSCULAR; INTRAVENOUS at 15:27

## 2020-01-30 RX ADMIN — FENTANYL CITRATE 25 MCG: 50 INJECTION, SOLUTION INTRAMUSCULAR; INTRAVENOUS at 14:48

## 2020-01-30 RX ADMIN — FENTANYL CITRATE 25 MCG: 50 INJECTION, SOLUTION INTRAMUSCULAR; INTRAVENOUS at 16:46

## 2020-01-30 RX ADMIN — FENTANYL CITRATE 25 MCG: 50 INJECTION, SOLUTION INTRAMUSCULAR; INTRAVENOUS at 14:25

## 2020-01-30 RX ADMIN — IODIXANOL 131 ML: 320 INJECTION, SOLUTION INTRAVASCULAR at 17:14

## 2020-01-30 ASSESSMENT — ACTIVITIES OF DAILY LIVING (ADL)
ADLS_ACUITY_SCORE: 21

## 2020-01-30 ASSESSMENT — MIFFLIN-ST. JEOR: SCORE: 1039

## 2020-01-30 NOTE — PROGRESS NOTES
SW:  D:  Updated daughter, Henrique, regarding pending TCU referrals to [facility], [facility] and [facility].    Tomorrow will review Medicare web site reports for the facility which can accept patient.    She stated she would like to transport patient to the TCU.

## 2020-01-30 NOTE — CONSULTS
"[facility] PATIENT CONSULTATION NOTE  Consulted by:Matt Edmonds DO  Reason for consultation: Abnormal ALISON    HPI:  Mel Castellanos is a 79 year old year old female who has a PMH significant for CAD, HTN, DM2, RLS, tobacco abuse who was admitted for altered mental status.  Patient continues to be confused during the examination and it is difficult to get a detailed history.  Patient was admitted to the hospital yesterday and there was concern about dusky toes on the left foot.  An ALISON was obtained and revealing an ALISON of 0.39 on the left.  Patient endorses some pain and weakness in her LLE with ambulation, however, she is unable to elaborate on the specifics.  She has had a hip replacement in the left and feels the weakness is related to that, though she states she sometimes gets pain as well.  She denies pain at rest.  She reports that at one time they discussed \"cutting off her leg\" at her [place] clinic and she was sent to [place] to heal wounds on her legs.  I was unable to find any documentation in care anywhere to support this.  She is a current smoker, she is on lipitor and ASA 81mg.    Review Of Systems:   General: Denies F/C  Respiratory: Denies SOB  Cardio: Denies CP  Gastrointestinal: Denies N/V  Genitourinary: Denies recent change in urination  Musculoskeletal: See HPI  Neurologic: Denies HA  Psychiatric: Denies confusion  Hematology/immunology: no unexpected bruising    All other systems negative    PAST MEDICAL HISTORY:  Past Medical History:   Diagnosis Date     Arthritis      Emphysema of lung (H)      High cholesterol      HTN (hypertension)      Insomnia      MI (myocardial infarction) (H)      Restless leg syndrome      TIA (transient ischaemic attack)      Type 2 diabetes mellitus without complications (H)      UTI (urinary tract infection)        PAST SURGICAL HISTORY:  Past Surgical History:   Procedure Laterality Date     APPENDECTOMY       CHOLECYSTECTOMY         FAMILY " "HISTORY:  Family History   Problem Relation Age of Onset     Coronary Artery Disease Father        SOCIAL HISTORY:   Social History     Tobacco Use     Smoking status: Current Every Day Smoker     Packs/day: 0.25     Smokeless tobacco: Never Used     Tobacco comment: 1/4-1/2 pack per day    Substance Use Topics     Alcohol use: No       TOBACCO USE:  Current smoker    FUNCTIONAL CAPACITY:      HOME MEDICATIONS:  Prior to Admission medications    Medication Sig Start Date End Date Taking? Authorizing Provider   AMLODIPINE BESYLATE PO Take 5 mg by mouth daily   Yes Unknown, Entered By History   ASPIRIN EC PO Take 81 mg by mouth daily    Yes Unknown, Entered By History   atorvastatin (LIPITOR) 40 MG tablet Take 40 mg by mouth daily   Yes Unknown, Entered By History   hydrochlorothiazide (HYDRODIURIL) 25 MG tablet Take 25 mg by mouth daily   Yes Unknown, Entered By History   Isosorbide Mononitrate (IMDUR PO) Take 60 mg by mouth daily   Yes Unknown, Entered By History   magnesium oxide (MAG-OX) 400 MG tablet Take 400 mg by mouth 2 times daily   Yes Unknown, Entered By History   metoprolol (TOPROL-XL) 50 MG 24 hr tablet Take 1 tablet (50 mg) by mouth 2 times daily 10/12/16  Yes Briseida Nicolas, DO   Multiple Vitamins-Minerals (CENTRUM SILVER) per tablet Take 1 tablet by mouth daily   Yes Reported, Patient   OMEPRAZOLE PO Take 20 mg by mouth every morning    Yes Reported, Patient   pioglitazone (ACTOS) 15 MG tablet Take 15 mg by mouth daily   Yes Unknown, Entered By History   rOPINIRole (REQUIP) 1 MG tablet Take 1 mg by mouth At Bedtime   Yes Unknown, Entered By History   nitroglycerin (NITROSTAT) 0.4 MG SL tablet Place 1 tablet (0.4 mg) under the tongue every 5 minutes as needed X 3 doses total if needed for chest pain. 10/5/16   Briseida Nicolas, DO       VITAL SIGNS:  /56 (BP Location: Right arm)   Pulse 63   Temp 97.7  F (36.5  C) (Oral)   Resp 17   Ht 1.6 m (5' 2.99\")   Wt 59.2 kg (130 " lb 8.2 oz)   SpO2 93%   Breastfeeding No   BMI 23.13 kg/m      Intake/Output Summary (Last 24 hours) at 1/30/2020 1058  Last data filed at 1/29/2020 1800  Gross per 24 hour   Intake 480 ml   Output --   Net 480 ml       Labs:  ROUTINE IP LABS (Last four results)  BMP  Recent Labs   Lab 01/25/20  1015 01/24/20  1149 01/24/20  0859 01/23/20  1930 01/23/20  1138   NA  --   --  135 139 138   POTASSIUM 3.4 3.6 3.5 3.5 3.6   CHLORIDE  --   --  105 105 102   LAUREN  --   --  9.3 9.3 9.7   CO2  --   --  24 31 31   BUN  --   --  14 17 20   CR  --   --  0.54 0.54 0.60   GLC  --   --  102* 130* 189*     CBC  Recent Labs   Lab 01/24/20  0859 01/23/20 1930 01/23/20  1138   WBC 10.8 13.5* 17.0*   RBC 3.99 3.92 3.95   HGB 12.3 12.2 12.2   HCT 38.0 37.5 37.8   MCV 95 96 96   MCH 30.8 31.1 30.9   MCHC 32.4 32.5 32.3   RDW 13.9 14.1 13.8    178 190     INR  Recent Labs   Lab 01/23/20  1138   INR 1.22*       PHYSICAL EXAM:  Constitutional: healthy, alert, no acute distress and cooperative   Cardiovascular: RRR  Respiratory: CTAB anteriorly, breathing unlabored without secondary muscle use  Psychiatric: mentation appears normal and affect normal/bright  Neck: no asymmetry  GI/Abdomen:  No masses, no CVAT  MSK: able to move all extremities without weakness or ataxia  Extremities: Bilateral legs with generalized small scabs/excoriation.  Left DP/PT monophasic signals on doppler.  RLE biphasic DP/PT.  Hematology: no bruising on visible skin    IMAGING:  US ALISON DOPPLER NO EXERCISE, 1-2 LEVELS, BILATERAL   1/29/2020 3:45 PM      HISTORY: Left foot wound.     COMPARISON: None.     FINDINGS:  Right ALISON:   DP: 0.78  PT: 0.98.     Left ALISON:   DP: 0.39   PT: 0.39.     Right Digital Brachial Index: 0.48.  Left Digital Brachial index: 0.16     Waveforms: Monophasic bilaterally. Normal right first digital  waveforms. Severely dampened left first digital waveform.                                                                       IMPRESSION:   1. Right ALISON is normal without evidence of arterial insufficiency,  however waveforms are monophasic suggesting ALISON might not be accurate.  2. Left ALISON shows severe arterial insufficiency.  3. Abnormal digital brachial indices bilaterally left worse than  right.     ALISON CRITERIA:  >0.95 Normal  0.90 - 0.94 Mild  0.5 - 0.89 Moderate  0.2 - 0.49 Severe  <0.2 Critical    Arterial duplex suggests mid Left SFA occlusion, official read still pending.    Patient Active Problem List   Diagnosis     Unstable angina (H)     Altered mental status       ASSESSMENT:  79 year old female with abnormal ALISON and ultrasound suggestive of left SFA occlusion.      PLAN:  -Keep NPO, diagnostic angiogram with possible intervention today.  -will obtain vein mapping for possible bypass if IR intervention unsuccessful.  -Discussed with daughter who is in agreement with angiogram  -Continue overall cares per primary team.  -Vascular will continue to follow.      Blanca Lyn, CELESTE, APRN, AGNP-C  Division of Vascular Surgery   [facility]   Pager: [phone]

## 2020-01-30 NOTE — PROGRESS NOTES
"[facility]  Hospitalist Progress Note  Name: Mel Castellanos    MRN: [id number]  Physician:  Matt Edmonds DO, FHM (Text Page)    Summary of Stay:  Mel Castellanos is an 79 year old female with history of HTN, unstable angina, insomnia, ELS, TIA, T2DM, recurrent UTIs, and emphysema who presented to the ED via EMS due to increased altered mental status.  Felt possibly infectious earlier in stay but now felt unlikely a signficant infection responsible.  Psychiatry feels she has a significant neurocognitive disorder and did not feel she is safe for discharge home.  Therapy and psychiatry has recommended TCU/rehab.      Assessment & Plan    Altered mental status (metabolic encepholopathy superimposed on cognitive disorder)  - Confusion improved since 1/24 though still significant.  Current level of confusion felt potentially related to significant underlying neurocognitive disorder:  Unclear etiology at admission, but she has history of similar presentation which were treated as UTI and symptoms improved. No other obvious etiology. Cultures NGTD.   Flu negative.  At this point off antibiotics as no definitive bacterial infection found.   Possibly had a recent viral infection that is now clearing.  Given trend would recommend TCU/Rehab with further monitoring.    - Psychiatry consulted - appreciated. Note reviewed.  \"Mel presents with what appears to be a delirium superimposed on a major neurocognitive disorder.  She is not decisional.\"    \"If the daughter is demanding to take her home would consider discharge AMA with an adult protection referral.\"  -  Zyprexa/Haldol PRN.  Less agitated over course of stay.  -  TSH, B12/Folate normal range  -  Appreciate neurology consult.  Neurologist also felt presentation combination of toxic/metabolic encephalopathy with probable underlying dementia.    Discoloration of toes at times by RN report:  -  Not overtly cyanotic.  Does not appear infectious/cellulitis.  " "CRP low at 3.4.  US ALISON quite abnormal on left.  I asked vascular surgery to review.  I appreciate their assistance.  Angiogram scheduled this afternoon.    History of recurrent UTI:  - Urine culture negative - abx stopped.      Deconditioned  Concern of safety at home - unclear baseline mentation   - Daughter possibly having issues at home.  - SW/CT consulted.  PT/OT following.  - appears that she is likely cognitively impaired at baseline, to what degree remains the question. Patients daughter was reportedly intoxicated when EMS picked up patient and reportedly she has depression and attempted suicide recently. The patient and this daughter have been living together to \"take care\" of each other.  Significant home safety concerns.     Skin excoriation, petechiae, small scabs:  - Both ankles tender, no overt cellulitic changes.  - WOC RN consulted, appreciate recs.     Hypertension:  Stable on admission then soft.  - previously soft and needing multiple boluses.  - Continue to hold PTA imdur, amlodipine, hydrochlorothiazide.  - PTA metop resumed 1/25.  - Blood pressure somewhat labile, continue to monitor and adjust anti-hypertensives as indicated.     History of CAD/MI:  -  PTA ASA resume.    -  Imdur resumed low dose.  -  Statin resumed  -  Continue metoprolol at reduced dose from PTA with lower BP's earlier in stay.     T2DM  A1c 6.5% Feb 2018. PTA pioglitazone held.  - A1c now 6.8%.  - Continue accuchecks and sliding scale NovoLog.     Restless leg syndrome  - Continue PTA ropinirole.    Concern of initial sepsis though now not felt to likely have had a significant bacterial infection:  - possible UTI - leukocytosis + UA not classic, started rocephin in ED.  Off abx now.  - WBC 17k --> 13k --> 10k , remains afebrile and nontoxic  - CT head no acute pathology  - RRT earlier in stay for hypotension/sepsis, given additional IVF boluses and abx switched to zosyn --> improved and abx later stopped 1/25 as ultimately " wasn't felt an infection.  Remains afebrile, and cultures negative.             Diet: NPO per Anesthesia Guidelines for Procedure/Surgery Except for: Meds    DVT Prophylaxis: Pneumatic Compression Devices  Tavera Catheter: not present  Code Status: Full Code      Disposition Plan   Discharge to rehab/TCU possibly tomorrow pending outcome of angiogram. See above concerning issues if patient/daughter want to leave AMA.      Entered: Matt Edmonds 01/30/2020, 3:05 PM       Interval History   Ms. Castellanos denied pain, sob, nausea.  No new physical complaints.  Mainly frustrated she was NPO when I saw her earlier today.    -Data reviewed today: I reviewed all new labs and imaging reports over the last 24 hours. I personally reviewed no images or EKG's today.    Physical Exam   Temp: 97.7  F (36.5  C) Temp src: Oral BP: (!) 159/69 Pulse: 66 Heart Rate: 74 Resp: 13 SpO2: 97 % O2 Device: Nasal cannula Oxygen Delivery: 2 LPM  Vitals:    01/25/20 0630 01/26/20 0500 01/28/20 0624   Weight: 64.5 kg (142 lb 3.2 oz) 63.1 kg (139 lb 3.2 oz) 59.2 kg (130 lb 8.2 oz)     Vital Signs with Ranges  Temp:  [97.3  F (36.3  C)-98.1  F (36.7  C)] 97.7  F (36.5  C)  Pulse:  [63-78] 66  Heart Rate:  [60-81] 74  Resp:  [10-26] 13  BP: (116-163)/(56-89) 159/69  SpO2:  [91 %-98 %] 97 %  I/O last 3 completed shifts:  In: 240 [P.O.:240]  Out: -     GEN:  Alert, oriented x self and place but not time well.  Doesn't answer my questions always and is tangential like prior visit.  Also confused with discussion of what we talked about yesterday.  Appears comfortable, no overt distress.  HEENT:  Normocephalic/atraumatic, no scleral icterus, no nasal discharge, mouth moist.  CV:  Regular rate and rhythm, distant.  No loud murmur/rub.  LUNGS:  Clear to auscultation bilaterally without rales/rhonchi/wheezing/retractions.  Symmetric chest rise on inhalation noted.  ABD:  Active bowel sounds, soft, non-tender/non-distended.  No  rebound/guarding/rigidity.  EXT:  Trace foot edema.  Left toes a little cyanotic again today.  Feet warm to touch.  No acute joint synovitis noted.  SKIN:  Dry to touch, no exanthems noted in the visualized areas.  NEURO:  Moving extremities well, sensation to touch grossly intact.  No new focal deficits.    Medications     heparin 10,000 units in 1000 mL NS       sodium chloride 100 mL/hr at 01/30/20 1141       aspirin  81 mg Oral Daily     atorvastatin  40 mg Oral QPM     insulin aspart  1-3 Units Subcutaneous TID AC     insulin aspart  1-3 Units Subcutaneous At Bedtime     iodixanol  150 mL Arterial Once     isosorbide mononitrate  15 mg Oral Daily     lidocaine         magnesium oxide  400 mg Oral BID     metoprolol succinate ER  12.5 mg Oral BID     omeprazole  20 mg Oral QAM     rOPINIRole  1 mg Oral At Bedtime     senna-docusate  1 tablet Oral BID    Or     senna-docusate  2 tablet Oral BID     sodium chloride (PF)  3 mL Intracatheter Q8H     traZODone  25 mg Oral At Bedtime    Or     traZODone  50 mg Oral At Bedtime     Data     Recent Labs   Lab 01/30/20  1140 01/24/20  0859 01/23/20  1930   WBC 6.0 10.8 13.5*   HGB 12.7 12.3 12.2   HCT 38.4 38.0 37.5   MCV 94 95 96    164 178     Recent Labs   Lab 01/25/20  1015 01/24/20  1149 01/24/20  0859 01/23/20  1930   NA  --   --  135 139   POTASSIUM 3.4 3.6 3.5 3.5   CHLORIDE  --   --  105 105   CO2  --   --  24 31   ANIONGAP  --   --  6 3   GLC  --   --  102* 130*   BUN  --   --  14 17   CR  --   --  0.54 0.54   GFRESTIMATED  --   --  88 88   GFRESTBLACK  --   --  >90 >90   LAUREN  --   --  9.3 9.3   MAG 1.7  --  1.9 1.8       Recent Results (from the past 24 hour(s))   US ALISON Doppler No Exercise    Narrative    US ALISON DOPPLER NO EXERCISE, 1-2 LEVELS, BILATERAL   1/29/2020 3:45 PM     HISTORY: Left foot wound.    COMPARISON: None.    FINDINGS:  Right ALISON:   DP: 0.78  PT: 0.98.    Left ALISON:   DP: 0.39   PT: 0.39.    Right Digital Brachial Index: 0.48.  Left  Digital Brachial index: 0.16    Waveforms: Monophasic bilaterally. Normal right first digital  waveforms. Severely dampened left first digital waveform.      Impression    IMPRESSION:   1. Right ALISON is normal without evidence of arterial insufficiency,  however waveforms are monophasic suggesting ALISON might not be accurate.  2. Left ALISON shows severe arterial insufficiency.  3. Abnormal digital brachial indices bilaterally left worse than  right.    ALISON CRITERIA:  >0.95 Normal  0.90 - 0.94 Mild  0.5 - 0.89 Moderate  0.2 - 0.49 Severe  <0.2 Critical   US Lower Extremity Arterial Duplex Bilateral    Narrative    ULTRASOUND LOWER EXTREMITY ARTERIAL DUPLEX BILATERAL  1/29/2020 6:54  PM     HISTORY:  Abnormal ankle-brachial index. Patient has a history of  peripheral arterial disease. The right and left ankle-brachial indices  are 0.98 and 0.39.    COMPARISON: Ankle-brachial indices dated 1/29/2020.    FINDINGS: Color Doppler and spectral waveform analysis performed.    Right lower extremity: There is extensive scattered calcified plaque  in the sampled infrainguinal arteries of the right lower extremity.  There is an elevation in peak systolic velocity in the mid right  superficial femoral artery equaling 286 cm/s. Velocities proximal and  distal to this level are 109 and 88 cm/s.    There is an elevation in peak systolic velocity in the right common  femoral artery equaling 205 cm/s. Velocities distal to this are 109  cm/s.    Right popliteal artery and tibioperoneal trunk are patent. The right  proximal and distal posterior tibial artery, proximal anterior tibial  artery, and proximal peroneal arteries are patent. The distal right  peroneal artery is not visualized. Severely dampened waveforms are  identified in the distal right anterior tibial artery.    Overall, there is a progressive dampening of waveforms from the  proximal to distal sampled infrainguinal arteries of the right lower  extremity.    Left lower  extremity: There is extensive scattered calcified plaque in  the sampled infrainguinal arteries of the left lower extremity.  Monophasic waveforms are noted throughout the left lower extremity.  These progressively become more dampened from proximal to distal. No  focal elevations in peak systolic velocity are identified. The distal  peroneal artery and anterior tibial artery are not well seen.      Impression    IMPRESSION:  1. Extensive calcified plaque in the sampled infrainguinal arteries of  the lower extremities. Elevations in peak systolic velocity in the  right common femoral artery and mid superficial femoral artery would  indicate focal stenoses.  2. Probable distal peroneal artery occlusions. Probable significant  steno-occlusive disease involving the anterior tibial arteries  bilaterally with severely dampened waveforms in the distal anterior  tibial arteries bilaterally.  3. Probable inflow iliac steno-occlusive disease involving the left  side contributing to diffusely monophasic waveforms throughout the  left lower extremity.   US Lower Extremity Venous Mapping Right    Narrative    ULTRASOUND LOWER EXTREMITY VENOUS MAPPING RIGHT  1/30/2020 12:18 PM     HISTORY: Preoperative planning for lower extremity bypass graft  surgery.    COMPARISON: None.    FINDINGS:   Right lower extremity: The right greater saphenous vein is patent. Its  diameters from the saphenofemoral junction to the knee range between  5.4 to 3.0 mm. Its diameters from below the knee to the ankle range  between 3.2 to 2.1 mm.      Impression    IMPRESSION: Right greater saphenous vein mapping performed as above.

## 2020-01-30 NOTE — PROGRESS NOTES
Vascular Brief Update:    Case discussed with the patient, the IR team and the patient's daughter, Josette.  The daughter can be reached at [phone].  The patient has a diminished ALISON of less than 0.5 on the left side and monophasic signals from the knee distally.  The arterial duplex demonstrates a likely stenosis in the mid SFA on the left side with monophasic distal to this.  She has a palpable right femoral pulse.  The left femoral pulse is only faintly palpable.  The patient is not able to relay a specific history.  It is unclear of the cause but she came in with AMS.  Her daughter thinks she has calf pain on the left side and that this is worse with walking and that it impairs her quality of life.  This would qualify as life style limiting claudication.  She also has hip pain, which our procedure would not treat.  We are planning a left sided diagnostic angiogram with possible therapeutic intervention with IR.  The patient is NPO.  We are getting a STAT INR and CBC as well.  This is discussed with the nursing team as well.  Plan for IR intervention later today.  Consent will need to be obtained from the daughter Josette at the above number but at this time around 11:15am today the daughter says she would like to go ahead with the procedure.    Nicolás Rios  Vascular Fellow

## 2020-01-30 NOTE — IR NOTE
Interventional Radiology Intra-procedural Nursing Note    Patient Name: Mel Castellanos  Medical Record Number: [id number]  Today's Date: January 30, 2020    Start Time: 1358  End of procedure time: n/a  Procedure: left lower extremity angiogram,  Report given to: CAMERON Mae, IR  Time pt departs:  n/a  : n/a    Other Notes: patient tolerating procedure well. Bilateral 6f femoral arterial sheaths in place. Angiogram with intervention in progress. Vss. Sr on monitor.     Fentanyl 2mcg IV given  Versed 150mg IV given  Heparin 4000 units IV given    Syeda Mann RN on 1/30/2020 at 4:28 PM

## 2020-01-30 NOTE — PROCEDURES
[facility]    Procedure: Abdominal aortogram and LLE angiogram  Date/Time: 1/30/2020 5:26 PM  Performed by: Vikas Jefferson MD  Authorized by: Vikas Jefferson MD     UNIVERSAL PROTOCOL   Site Marked: NA  Prior Images Obtained and Reviewed:  Yes  Required items: Required blood products, implants, devices and special equipment available    Patient identity confirmed:  Verbally with patient, arm band, provided demographic data and hospital-assigned identification number  Patient was reevaluated immediately before administering moderate or deep sedation or anesthesia  Confirmation Checklist:  Patient's identity using two indicators, relevant allergies, procedure was appropriate and matched the consent or emergent situation and correct equipment/implants were available  Time out: Immediately prior to the procedure a time out was called    Universal Protocol: the Joint Commission Universal Protocol was followed    Preparation: Patient was prepped and draped in usual sterile fashion           ANESTHESIA    Anesthesia: Local infiltration  Local Anesthetic:  Lidocaine 1% without epinephrine      SEDATION    Patient Sedated: Yes    Sedation Type:  Moderate (conscious) sedation  Sedation:  Fentanyl and midazolam  Vital signs: Vital signs monitored during sedation    See dictated procedure note for full details.  Findings: Bilateral 6 Fr arterial sheath access (angioseals applied bilaterally at completion as ACT was 199.    Severe plaque at aortoiliac bifurcation, requiring bilateral kissing stents.  Prior to stent deployment, angiogram of LLE performed with severe diffuse calcific plaque and focal stenosis/occlusion of the distal SFA/popliteal arteries.  Attempts made to cross stenosis, though repeatedly entered dissection plan.  Unable to regain access to true lumen distally.  Eventually, further attempts aborted as patient has a BKPA surgical bypass target.    Bilateral 8 mm X 57 mm kissing  "stents deployed at the iliac bifurcation and \"built up\" into distal abdominal aorta.  Good result at completion.    Will begin heparin 2 hours after procedure.    Specimens: none    Complications: None    Condition: Stable    PROCEDURE   Patient Tolerance:  Patient tolerated the procedure well with no immediate complications    Length of time physician/provider present for 1:1 monitoring during sedation: 195      "

## 2020-01-30 NOTE — PLAN OF CARE
PT: pt off unit for LLE angiogram at time of scheduled appt    OT: pt w/ period of bedrest after angio; will r/s for tomorrow

## 2020-01-30 NOTE — PRE-PROCEDURE
GENERAL PRE-PROCEDURE:   Procedure:  Left lower extremity angiogram  Date/Time:  1/30/2020 11:54 AM    Written consent obtained?: Yes    Risks and benefits: Risks, benefits and alternatives were discussed    Consent given by:  Patient  Patient states understanding of procedure being performed: Yes    Patient's understanding of procedure matches consent: Yes    Procedure consent matches procedure scheduled: Yes    Expected level of sedation:  Moderate  Appropriately NPO:  Yes  ASA Class:  Class 2- mild systemic disease, no acute problems, no functional limitations  Mallampati  :  Grade 1- soft palate, uvula, tonsillar pillars, and posterior pharyngeal wall visible  Lungs:  Lungs clear with good breath sounds bilaterally  Heart:  Normal heart sounds and rate  History & Physical reviewed:  History and physical reviewed and no updates needed  Statement of review:  I have reviewed the lab findings, diagnostic data, medications, and the plan for sedation

## 2020-01-30 NOTE — IR NOTE
IR NOTE:    Report received from Syeda BARNES. Patient received a total of 2 mg versed and 150 mcg fentanyl. I gave another 1 mg versed and 50 mcg fentanyl (TOTAL: 3 mg versed 200 mcg fentanyl). Kissing stents placed (stent to right iliac artery and left iliac artery). Patient tolerated procedure well. ACT: 199. Right sheath removed at 1708 and angioseal placed. Left sheath removed at 1710 and angioseal placed. Report called to station 66. Patient escorted back to station 66 by myself.    Carmelita Koch RN

## 2020-01-30 NOTE — PROGRESS NOTES
SW:  D:  Yesterday evening, writer spoke with daughter via phone and updated her regarding referrals to [facility].  She was in agreement with either facility however has not viewed or received Medicare rating information.    Today both [facility] and [facility] has been updated that patient is not ready for discharge today and to please keep her on their list for possible TCU placement. Neither has completed their assessment.

## 2020-01-30 NOTE — PLAN OF CARE
DATE & TIME: 1/29-1/30/2020, [id number]                       Cognitive Concerns/ Orientation : A&Ox3, disoriented to situation, confused at times, forgetful   BEHAVIOR & AGGRESSION TOOL COLOR: Green  CIWA SCORE: na  ABNL VS/O2: VSS on RA  MOBILITY: Up with SBA with walker/GB, fall risk  PAIN MANAGMENT: Denied  DIET: Mod Carb, NPO at midnight  BOWEL/BLADDER: incontinent at times, up to BA  ABNL LAB/BG: ,116  DRAIN/DEVICES: PIV painful, removed, no access at this time  TELEMETRY RHYTHM: na  SKIN: Blanchable redness to coccyx, feet red/swollen, scabs on legs/feet  TESTS/PROCEDURES: US LE arterial duplex today  D/C DAY/GOALS/PLACE: Vascular surgery consult today. NPO since midnight in case of vascular procedure.  OTHER IMPORTANT INFO:  Pulses present in LE, legs joni/scabs.

## 2020-01-30 NOTE — PLAN OF CARE
DATE & TIME: 1/30/2020, 2000- 1530         Cognitive Concerns/ Orientation : A&Ox2,disoriented to situation,  pleasantly confused, forgetful   BEHAVIOR & AGGRESSION TOOL COLOR: Green  CIWA SCORE: na  ABNL VS/O2: VSS on RA  MOBILITY: Up with SBA with walker/GB, fall risk  PAIN MANAGMENT: Denied  DIET: NPO  BOWEL/BLADDER:continent  ABNL LAB/BG: ,12,106  DRAIN/DEVICES: PIV-IVF infusing  TELEMETRY RHYTHM: NA  SKIN: Blanchable redness to coccyx, feet red/swollen, scabs on legs/feet  TESTS/PROCEDURES: LLE Angiogram  with possible interventions, vein mapping for possible bypass if IR interventions unsuccessful.   D/C DAY/GOALS/PLACE: Vascular surgery following. .  OTHER IMPORTANT INFO:   Pedal Pulses week, legs joni/scabs.

## 2020-01-31 ENCOUNTER — APPOINTMENT (OUTPATIENT)
Dept: PHYSICAL THERAPY | Facility: CLINIC | Age: 80
DRG: 037 | End: 2020-01-31
Payer: COMMERCIAL

## 2020-01-31 LAB
ANION GAP SERPL CALCULATED.3IONS-SCNC: 4 MMOL/L (ref 3–14)
BUN SERPL-MCNC: 10 MG/DL (ref 7–30)
CALCIUM SERPL-MCNC: 9 MG/DL (ref 8.5–10.1)
CHLORIDE SERPL-SCNC: 106 MMOL/L (ref 94–109)
CO2 SERPL-SCNC: 28 MMOL/L (ref 20–32)
CREAT SERPL-MCNC: 0.61 MG/DL (ref 0.52–1.04)
GFR SERPL CREATININE-BSD FRML MDRD: 85 ML/MIN/{1.73_M2}
GLUCOSE BLDC GLUCOMTR-MCNC: 117 MG/DL (ref 70–99)
GLUCOSE BLDC GLUCOMTR-MCNC: 120 MG/DL (ref 70–99)
GLUCOSE BLDC GLUCOMTR-MCNC: 146 MG/DL (ref 70–99)
GLUCOSE BLDC GLUCOMTR-MCNC: 161 MG/DL (ref 70–99)
GLUCOSE SERPL-MCNC: 121 MG/DL (ref 70–99)
HGB BLD-MCNC: 10.9 G/DL (ref 11.7–15.7)
HGB BLD-MCNC: 11.2 G/DL (ref 11.7–15.7)
KCT BLD-ACNC: 199 SEC (ref 75–150)
LMWH PPP CHRO-ACNC: 0.11 IU/ML
POTASSIUM SERPL-SCNC: 3.7 MMOL/L (ref 3.4–5.3)
SODIUM SERPL-SCNC: 138 MMOL/L (ref 133–144)

## 2020-01-31 PROCEDURE — 85018 HEMOGLOBIN: CPT | Performed by: INTERNAL MEDICINE

## 2020-01-31 PROCEDURE — 97530 THERAPEUTIC ACTIVITIES: CPT | Mod: GP

## 2020-01-31 PROCEDURE — 36415 COLL VENOUS BLD VENIPUNCTURE: CPT | Performed by: INTERNAL MEDICINE

## 2020-01-31 PROCEDURE — 85018 HEMOGLOBIN: CPT | Performed by: NURSE PRACTITIONER

## 2020-01-31 PROCEDURE — 36415 COLL VENOUS BLD VENIPUNCTURE: CPT | Performed by: NURSE PRACTITIONER

## 2020-01-31 PROCEDURE — 97116 GAIT TRAINING THERAPY: CPT | Mod: GP

## 2020-01-31 PROCEDURE — 25000132 ZZH RX MED GY IP 250 OP 250 PS 637: Performed by: INTERNAL MEDICINE

## 2020-01-31 PROCEDURE — 12000000 ZZH R&B MED SURG/OB

## 2020-01-31 PROCEDURE — 99232 SBSQ HOSP IP/OBS MODERATE 35: CPT | Performed by: INTERNAL MEDICINE

## 2020-01-31 PROCEDURE — 00000146 ZZHCL STATISTIC GLUCOSE BY METER IP

## 2020-01-31 PROCEDURE — 85520 HEPARIN ASSAY: CPT | Performed by: INTERNAL MEDICINE

## 2020-01-31 PROCEDURE — 99231 SBSQ HOSP IP/OBS SF/LOW 25: CPT | Performed by: SURGERY

## 2020-01-31 PROCEDURE — G0463 HOSPITAL OUTPT CLINIC VISIT: HCPCS

## 2020-01-31 PROCEDURE — 80048 BASIC METABOLIC PNL TOTAL CA: CPT | Performed by: INTERNAL MEDICINE

## 2020-01-31 PROCEDURE — 25000132 ZZH RX MED GY IP 250 OP 250 PS 637: Performed by: HOSPITALIST

## 2020-01-31 RX ADMIN — TRAZODONE HYDROCHLORIDE 50 MG: 50 TABLET ORAL at 21:10

## 2020-01-31 RX ADMIN — Medication 400 MG: at 13:51

## 2020-01-31 RX ADMIN — ROPINIROLE HYDROCHLORIDE 1 MG: 1 TABLET, FILM COATED ORAL at 21:09

## 2020-01-31 RX ADMIN — OMEPRAZOLE 20 MG: 20 CAPSULE, DELAYED RELEASE ORAL at 13:46

## 2020-01-31 RX ADMIN — ISOSORBIDE MONONITRATE 15 MG: 30 TABLET, EXTENDED RELEASE ORAL at 13:46

## 2020-01-31 RX ADMIN — ASPIRIN 81 MG: 81 TABLET, DELAYED RELEASE ORAL at 13:46

## 2020-01-31 RX ADMIN — ATORVASTATIN CALCIUM 40 MG: 40 TABLET, FILM COATED ORAL at 21:09

## 2020-01-31 RX ADMIN — METOPROLOL SUCCINATE 12.5 MG: 25 TABLET, EXTENDED RELEASE ORAL at 13:51

## 2020-01-31 RX ADMIN — Medication 400 MG: at 21:09

## 2020-01-31 RX ADMIN — METOPROLOL SUCCINATE 12.5 MG: 25 TABLET, EXTENDED RELEASE ORAL at 21:12

## 2020-01-31 ASSESSMENT — ACTIVITIES OF DAILY LIVING (ADL)
ADLS_ACUITY_SCORE: 21

## 2020-01-31 ASSESSMENT — MIFFLIN-ST. JEOR: SCORE: 1097.17

## 2020-01-31 NOTE — CODE/RAPID RESPONSE
[facility]    House FREYA RRT Note  1/30/2020   Time Called: 2007    RRT called for: Bleeding from angiosite    Assessment & Plan     Bleeding from R groin angio site.  - Upon arrival, pt lying in bed, awake, alert, in no apparent distress with flying squad placing pressure on R groin.  Per nursing, previous groin check had been ~ 45 min prior to RRT.  At time of RRT, pt's R groin angio site dressing was saturated with additional blood noted on chux beneath pt.  Pt's VSS, including HR 75, SBP 140s-150s.  Pt awake, alert, telling childhood stories, appears to be mentating at baseline and voiding without difficulty.  Nursing recently dopplered pt's pulses in RLE, remain intact.  No obvious hematoma or ecchymosis noted around R angio site.  Very faint bruit noted.      INTERVENTIONS:  - After discussion with Dr. Jefferson, greatly appreciate prompt returned call and recommendations, change R groin angio site dressing now, hold pressure for 10 min to obtain hemostasis, will hold on starting heparin now at this time, but will start heparin in 2 hours after hemostasis, at 2230, at a fixed rate of 500 U/hr.     - IR will need to adjust rate tomorrow; may need to be contacted in AM   - After discussion with pharmacy, will obtain heparin 10a in AM; will discontinue pharmacy consult for heparin management as pt now on fixed rate.    - Will transfer pt to station 33 as station 33 more familiar with post-angio pts  - Will repeat hgb at 0000 and in AM to ensure stability   - Updated pt's daughter, Henrique, regarding pt's evening and need for transfer to station 33.  Questions answered and concerned addressed.      At the end of the RRT pt resting in bed, hemostasis achieved after 10 min of manual pressure, remains hemodynamically stable.      Discussed with and defer further cares to nursing and Dr. Jefferson, IR physician.      Interval History     Mel Castellanos is a 79 year old female who was admitted on 1/23/2020  for AMS.    Medical history significant for: HTN, unstable angina, insomnia, ELS, TIA, DMII, recurrent UTIs, emphysema    Code Status: Full Code    Allergies   Allergies   Allergen Reactions     Sulfa Drugs Rash     Physical Exam   Vital Signs with Ranges:  Temp:  [97.1  F (36.2  C)-97.7  F (36.5  C)] 97.1  F (36.2  C)  Pulse:  [63-90] 69  Heart Rate:  [60-90] 90  Resp:  [8-26] 16  BP: (116-175)/() 119/58  SpO2:  [91 %-99 %] 92 %  I/O last 3 completed shifts:  In: 240 [P.O.:240]  Out: -     Constitutional: Pt lying in bed, awake, alert, in no apparent distress  Pulmonary: In no apparent respiratory distress  Cardiovascular: Appears well perfused  GI: Soft  Skin/Integumen: L groin angio site CDI, R groin angio site saturated, no hematoma palpated or ecchymosis noted, faint bruit auscultated  Neuro: Awake, alert, clear speech, confused speech regarding situation, conversing about childhood memories  Psych:  Calm  Extremities: Moves all extremities, RLE warm, dopplered pulses by nursing staff    Data     CBC with Diff:  Recent Labs   Lab Test 01/30/20  1936 01/30/20  1140   WBC 6.9 6.0   HGB 11.6* 12.7   MCV 94 94    254   INR  --  1.11      Time Spent on this Encounter   I spent 20 minutes on the unit/floor managing the care of Mel Castellanos. Over 50% of my time was spent counseling the patient and/or coordinating care regarding services listed in this note.    TAE Call [facility] FREYA

## 2020-01-31 NOTE — PROGRESS NOTES
"[facility]  Hospitalist Progress Note  Name: Mel Castellanos    MRN: [id number]  Physician:  Matt Edmonds DO, FHM (Text Page)    Summary of Stay:  Mel Castellanos is an 79 year old female with history of HTN, unstable angina, insomnia, ELS, TIA, T2DM, recurrent UTIs, and emphysema who presented to the ED via EMS due to increased altered mental status.  Felt possibly infectious earlier in stay but now felt unlikely a signficant infection responsible.  Psychiatry feels she has a significant neurocognitive disorder and did not feel she is safe for discharge home.  Therapy and psychiatry has recommended TCU/rehab.  During stay noted cyanotic left toes and some pain with activity.  This led to further eval and ultimately angiogram.    Assessment & Plan    Altered mental status (metabolic encepholopathy superimposed on cognitive disorder)  - Confusion improved since 1/24 though still significant.  Current level of confusion felt potentially related to significant underlying neurocognitive disorder:  Unclear etiology at admission, but she has history of similar presentation which were treated as UTI and symptoms improved. No other obvious etiology. Cultures NGTD.   Flu negative.  At this point off antibiotics as no definitive bacterial infection found.   Possibly had a recent viral infection that is now clearing.  Given trend would recommend TCU/Rehab with further monitoring.    - Psychiatry consulted - appreciated. Note reviewed.  \"Mel presents with what appears to be a delirium superimposed on a major neurocognitive disorder.  She is not decisional.\"    \"If the daughter is demanding to take her home would consider discharge AMA with an adult protection referral.\"  -  Zyprexa/Haldol PRN.  Less agitated over course of stay.  -  TSH, B12/Folate normal range  -  Appreciate neurology consult.  Neurologist also felt presentation combination of toxic/metabolic encephalopathy with probable underlying " "dementia.      LLE claudication s/p angiogram/stent 1/30/20  Post procedure site bleeding episode with RRT:  -  Doing better today.  Heparin drip stopped by IR during day.  Site appeared stable midday.  Vascular felt continuing on just ASA was reasonable at this time.  Will monitor patient though today given episode overnight.  If doing well will d/c to TCU tomorrow AM.      History of recurrent UTI:  - Urine culture negative - abx stopped.      Deconditioned  Concern of safety at home - unclear baseline mentation   - Daughter possibly having issues at home.  - SW/CT consulted.  PT/OT following.  - appears that she is likely cognitively impaired at baseline, to what degree remains the question. Patients daughter was reportedly intoxicated when EMS picked up patient and reportedly she has depression and attempted suicide recently. The patient and this daughter have been living together to \"take care\" of each other.  Significant home safety concerns.     Skin excoriation, petechiae, small scabs:  - Both ankles tender, no overt cellulitic changes.  - WOC RN consulted, appreciate recs.     Hypertension:  Stable on admission then soft with IVF boluses.  - Continue to hold PTA amlodipine, hydrochlorothiazide.  Back on partial dose imdur/metoprolol.  Likely will d/c with amlodipine and hydrochlorothiazide discontinued given BP trends.  - PTA metop resumed 1/25.  - Blood pressure somewhat labile, continue to monitor and adjust anti-hypertensives as indicated.     History of CAD/MI:  -  PTA ASA resume.    -  Imdur low dose.  -  Statin resumed  -  Continue metoprolol at reduced dose from PTA with lower BP's earlier in stay.     T2DM  A1c 6.5% Feb 2018. PTA pioglitazone held.  - A1c now 6.8%.  - Continue accuchecks and sliding scale NovoLog.     Restless leg syndrome  - Continue PTA ropinirole.    Concern of initial sepsis though now not felt to likely have had a significant bacterial infection:  - possible UTI - leukocytosis " + UA not classic, started rocephin in ED.  Off abx now.  - WBC 17k --> 13k --> 10k , remains afebrile and nontoxic  - CT head no acute pathology  - RRT earlier in stay for hypotension/sepsis, given additional IVF boluses and abx switched to zosyn --> improved and abx later stopped 1/25 as ultimately wasn't felt an infection.  Remains afebrile, and cultures negative.             Diet: Advance Diet as Tolerated: Regular Diet Adult    DVT Prophylaxis: Pneumatic Compression Devices  Tavera Catheter: not present  Code Status: Full Code      Disposition Plan   Discharge to rehab/TCU tomorrow.  Prior discharge was delayed/cancelled due to vascular issues that required angiogram.     Entered: Matt Edmonds 01/31/2020, 4:09 PM       Interval History   Ms. Castellanos denied pain except mildly at procedure groin site.  No chest pain, sob, nausea.  No other new physical complaints.    -Data reviewed today: I reviewed all new labs and imaging reports over the last 24 hours. I personally reviewed no images or EKG's today.    Physical Exam   Temp: 98.3  F (36.8  C) Temp src: Oral BP: 98/49 Pulse: 84 Heart Rate: 82 Resp: 16 SpO2: 96 % O2 Device: None (Room air) Oxygen Delivery: 2 LPM  Vitals:    01/28/20 0624 01/30/20 1920 01/31/20 0701   Weight: 59.2 kg (130 lb 8.2 oz) 60 kg (132 lb 4.4 oz) 65.8 kg (145 lb 1.6 oz)     Vital Signs with Ranges  Temp:  [97.1  F (36.2  C)-98.6  F (37  C)] 98.3  F (36.8  C)  Pulse:  [69-90] 84  Heart Rate:  [72-90] 82  Resp:  [8-18] 16  BP: ()/(49-89) 98/49  SpO2:  [91 %-99 %] 96 %  I/O last 3 completed shifts:  In: 2028.33 [P.O.:200; I.V.:1828.33]  Out: 200 [Urine:200] (complete out not captured)    GEN:  Alert, oriented x self and place but not time well.  Doesn't answer my questions always and is tangential like prior visit.  Also confused with discussion of what we talked about yesterday.  Appears comfortable, no overt distress.  HEENT:  Normocephalic/atraumatic, no scleral icterus, no nasal  discharge, mouth moist.  CV:  Regular rate and rhythm, distant.  No loud murmur/rub.  LUNGS:  Clear to auscultation bilaterally without rales/rhonchi/wheezing/retractions.  Symmetric chest rise on inhalation noted.  ABD:  Active bowel sounds, soft, non-tender/non-distended.  No rebound/guarding/rigidity.  Groin procedure site with dry dressing.  EXT:  Trace foot edema. Feet warm to touch.  No acute joint synovitis noted.  SKIN:  Dry to touch, no exanthems noted in the visualized areas.  NEURO:  Moving extremities well, sensation to touch grossly intact.  No new focal deficits.    Medications     sodium chloride Stopped (01/31/20 0955)     sodium chloride Stopped (01/30/20 1804)       aspirin  81 mg Oral Daily     atorvastatin  40 mg Oral QPM     insulin aspart  1-3 Units Subcutaneous TID AC     insulin aspart  1-3 Units Subcutaneous At Bedtime     isosorbide mononitrate  15 mg Oral Daily     magnesium oxide  400 mg Oral BID     metoprolol succinate ER  12.5 mg Oral BID     omeprazole  20 mg Oral QAM     rOPINIRole  1 mg Oral At Bedtime     senna-docusate  1 tablet Oral BID    Or     senna-docusate  2 tablet Oral BID     sodium chloride (PF)  3 mL Intracatheter Q8H     traZODone  25 mg Oral At Bedtime    Or     traZODone  50 mg Oral At Bedtime     Data     Recent Labs   Lab 01/31/20  0831 01/31/20  0020 01/30/20  1936 01/30/20  1140   WBC  --   --  6.9 6.0   HGB 11.2* 10.9* 11.6* 12.7   HCT  --   --  35.1 38.4   MCV  --   --  94 94   PLT  --   --  213 254     Recent Labs   Lab 01/31/20  0831 01/25/20  1015     --    POTASSIUM 3.7 3.4   CHLORIDE 106  --    CO2 28  --    ANIONGAP 4  --    *  --    BUN 10  --    CR 0.61  --    GFRESTIMATED 85  --    GFRESTBLACK >90  --    LAUREN 9.0  --    MAG  --  1.7       Recent Results (from the past 24 hour(s))   IR Lower Extremity Angiogram Left    Narrative    IR LOWER EXTREMITY ANGIOGRAM LEFT 1/30/2020 5:12 PM    HISTORY: 79-year-old woman with history of significant  peripheral  arterial disease as demonstrated clinically and via ultrasound imaging  performed the same day. Patient has considerably diminished ALISON values  in the left lower extremity with diffuse monophasic waveforms. Patient  has bilateral arterial insufficiency, though left more so than right.  Focus will be made on the left lower extremity.    TECHNIQUE: Patient was brought to the Interventional Radiology  Department after informed consent obtained with patient's daughter.  Patient was placed in a supine position. Skin overlying the right  groin was prepped and draped in standard sterile fashion as well as  the left groin. Ultrasound was used to visualize the common femoral  arteries given diminished palpable pulse on the right and no clear  palpable pulse on the left. Scattered calcifications noted in both  common femoral arteries, though attempts are made to access common  femoral arteries in location devoid of calcification. Ultrasound  confirmed patency of both common femoral arteries and images stored  for documentation. 1% lidocaine was used for local anesthesia.  Micropuncture kit was used to access the right common femoral artery  initially and subsequently, the left common femoral artery. Over a  series of maneuvers, 6 [place] sheathes were replaced retrograde  bilaterally. Over a guidewire, a pigtail catheter was advanced into  the abdominal aorta where angiogram performed. Diffuse atherosclerotic  plaque noted throughout the abdominal aorta, especially infrarenal  segment as well as the aortoiliac bifurcation. Attempts were made to  cross the aortic bifurcation from right to left, though given severity  of plaque, this was unable to be performed. Pigtail catheter then  pulled back to the distal abdominal aorta where pelvic angiograms  performed. Retrograde access was obtained in the left common femoral  artery. Bilateral 6 [place] Sharan sheath were placed in both groins.  Angiogram was performed  throughout the left lower extremity  demonstrating severe plaque and irregularity in the distal left  superficial femoral and above-knee popliteal arteries. Given plan for  bilateral kissing common iliac arterial stents, idea was to initially  treat the left lower extremity prior to stent placement as up and over  access would no longer be available. Given inability to cross the  aortic bifurcation, 4 mm balloon angioplasty was used to dilate the  left common iliac artery in an effort to form a tract for placement of  the sheath. A gooseneck snare was placed from the left groin access  and placed in the distal abdominal aorta. A stiff angled Glidewire was  advanced from the right groin. The Glidewire was snared from left  groin approach and pulled through the left sheath for through and  through access. The right groin sheath was then advanced to the left  external iliac artery. A Berenstein catheter was placed in the distal  left superficial femoral artery where angiogram performed.  Considerable atherosclerotic plaque was noted throughout this arterial  segment. Attempts were made to advance a Glidewire through this  location, though given severity of plaque, the wire entered a  dissection flap. Considerable and numerous attempts were made to gain  reentry into the true lumen. A Young Harris microcatheter and 0.014 wires  were also used in an attempt to regain entry into the true lumen,  though ultimately were unsuccessful. Dissection was noted to the level  of the above-knee popliteal artery. At this point, it was decided to  abort further attempts to gain access through this arterial system  given concern for extending dissection to the below-knee popliteal  artery which appears to be a suitable location for landing zone of a  bypass graft. Prior to intervention, 4000 units of heparin was  administered. During intervention, an additional 2500 units of heparin  was administered. Ultimately, areas of considerable  "plaque and  stenosis noted in the distal SFA and above-knee popliteal arteries,  though the below knee popliteal artery and runoff arteries remain  intact via collaterals at completion. Attention was then paid to the  distal abdominal aorta and common iliac arteries where both sheathes  were advanced into the distal abdominal aorta and angiograms  performed. Bilateral kissing common iliac arterial stents were then  deployed from the common iliac arteries, \"built up\" into the distal  abdominal aorta. The stents were 8 mm x 57 mm balloon-expandable  Visi-Pro stents. Completion angiogram demonstrates good result of  stent placement with improved blood flow through the distal abdominal  aorta and common iliac arteries. Left groin sheath was then pulled  back to the external iliac artery where angiogram performed throughout  the left lower extremity to confirm patency of the runoff arteries  including the below knee popliteal and posterior tibial artery.  Angio-Seal closure devices were then deployed at access site. Patient  is not the best candidate for Angio-Seal closure device given degree  of calcification and nearby arterial branches, though given patient's  underlying dementia and inability to hold still throughout the exam,  they were deployed nonetheless. Plan will be for heparin treatment to  begin two hours after completion of this exam.    Sedation: Moderate level of sedation achieved with 3 mg IV Versed, 200  mcg IV fentanyl.  Sedation time: 194 minutes.  Please note the above medications were administered by the  Interventional Radiology Staff under my direct supervision. The  patient's vital signs were monitored and remained stable throughout  the procedure.    A total of 6500 units of heparin administered during the exam.  Fluoroscopic time: 50.9 minutes.  Air (Kerma): 340 mGy.  Contrast: 131 of Visipaque administered intra-arterially without  complication.    FINDINGS: 40 spot fluoroscopic images and " angiogram sequences obtained  throughout the procedure. Please see description above.      Impression    IMPRESSION:  1. Deployment of bilateral common iliac kissing stents extending into  the distal abdominal aorta, 8 mm x 57 mm. Patient had considerable  plaque and stenosis at the aortobiiliac junction, left more so than  right. Patient now has palpable bilateral common femoral pulses by  completion.  2. Severe atherosclerotic plaque and irregularity throughout the  distal SFA and above-knee popliteal artery. Attempts were made to  treat this with balloon angioplasty, though unable to maintain access  to the true lumen given friability of arteries in extent of plaque. By  completion, runoff remains unchanged with intact below-knee popliteal  and runoff via the posterior tibial artery. Peroneal artery is  partially patent, though small in size. 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    CALI OVALLE MD

## 2020-01-31 NOTE — PLAN OF CARE
Please see other note for patient condition changes on the 3-11pm shift. LS are clear on RA, BS active, up SBA normally  when allowed to be out of bed. Ate dinner prior to transfer to . Insulin pen sent with patient with belongings- tubed . Last BG check was 103. Patient is disoriented to situation and time and occasionally place, still forgetting to keep legs straight at times.

## 2020-01-31 NOTE — PLAN OF CARE
Discharge Planner PT   Patient plan for discharge: TCU  Current status: Pt sitting up in chair upon arrival, attempting to order lunch. Assisted pt with task. Pt is pleasantly confused, perseverating on getting bag/personal belongings out of closet. Performed STS transfer x 2 with SBA. Ambulated 150 ft x 1 with FWW and SBA and an additional 30 ft with no assistive device and CGA. SpO2 on RA during activity 95-97%. HR 88. Returned to chair at end of session. Declined seated exercises due to fatigue.  Barriers to return to prior living situation: decreased activity tolerance, impaired balance per PATEL, high fall risk, decreased safety awareness  Recommendations for discharge: TCU per plan established by the PT.  Rationale for recommendations: Patient would benefit from continued physical therapy in the setting of a TCU for improving functional mobility, LE strength and tolerance for functional activities in order to return to baseline of functioning.        Entered by: Jayla Le 01/31/2020 12:13 PM

## 2020-01-31 NOTE — PROGRESS NOTES
RRt called for bleeding leaking around right angio incision cite, checked 45mins prior cite had a quarter size of blood on dressing and when going to change patient from incontinents and sit up to eat found blood leaking. BP stable if a bit elevated 140's-150's/70's and tachy. NP to call ceAnn with update and patient to transfer to station 33.    Called report to station 33 patient going to room 331 when clean    Patient transferred, requested 33 RN to update CeAnn when patient is settled onto the floor. (see sticky note or face sheet or patient cell phone for number)

## 2020-01-31 NOTE — PLAN OF CARE
Patient is alert and confused at times, up with assistance of one with gait belt and walker. Vital signs stable, denies pain. Right groin incision CDI, no hematoma. Tele:

## 2020-01-31 NOTE — PROGRESS NOTES
[facility] Nurse Inpatient Wound Assessment   Reason for consultation: Evaluate and treat BLE      Assessment  Bilateral lower legs covered in small scattered scabs of unclear etiology.  Possibly from scratching at legs.  Appear slowly resolving.    Left toes and plantar foot remain a little purplish, great toe is slow to patrice and is tender, but skin remains intact.  No acute s/s infection noted.  Vascular Surgery has been following and no further interventions indicated at this time.     Coccyx pink with thin dry scuffed tissue, very bony protrusion, but remains intact and blanchable.  Pt able to stand and ambulate slowly with SBA.     Treatment Plan  No changes to POC this week.    BLE: Daily and prn:  1.  Cleanse with mild skin cleanser.  2.  Apply Sween 24 cream.   3.  Leave open to air.  Elevate legs on pillows, floating heels at all times.      Pressure Injury Prevention (PIP) Plan:  If patient is declining pressure injury prevention interventions: Explore reason why and address patient's concerns and Educate on pressure injury risk and prevention intervention(s)  Mattress: Follow bed algorithm, reassess daily and order specialty mattress, if indicated.  HOB: Maintain at or below 30 degrees, unless contraindicated  Repositioning in bed: Every 1-2 hours , Left/right positioning; avoid supine and Raise foot of bed prior to raising head of bed, to reduce patient sliding down (shear)  Heels: Keep elevated off mattress and Pillows under calves  Protective Dressing: Sacral Mepilex for prevention (#[id number]),  especially for the agitated patient   Positioning Equipment: None  Chair positioning: Chair cushion (#[id number]) , Repositions self: patient to shift weight every 15 minutes and Assist patient to reposition hourly   If patient has a buttock pressure injury, or high risk for PI use chair cushion or SPS.  Moisture Management: Perineal cleansing /protection: Follow Incontinence  Protocol, Clean and dry skin folds with bathing  and Moisturize dry skin  Under Devices: Inspect skin under all medical devices during skin inspection , Ensure tubes are stabilized without tension and Ensure patient is not lying on medical devices or equipment when repositioned  Ask provider to discontinue device when no longer needed.    Orders reviewed  WOC Nurse follow-up plan: weekly  Nursing to notify the Provider(s) and re-consult the WOC Nurse if wound(s) deteriorates or new skin concern.    Patient History  According to provider note(s):  Mel Castellanos is an 79 year old female with history of HTN, unstable angina, insomnia, ELS, TIA, T2DM, recurrent UTIs, and emphysema who presented to the ED via EMS due to increased altered mental status.    Objective Data  Containment of urine/stool: Brief    Active Diet Order:  Orders Placed This Encounter      Advance Diet as Tolerated: Regular Diet Adult    Output:   I/O last 3 completed shifts:  In: 2028.33 [P.O.:200; I.V.:1828.33]  Out: -     Risk Assessment:   Sensory Perception: 3-->slightly limited  Moisture: 3-->occasionally moist  Activity: 4-->walks frequently  Mobility: 3-->slightly limited  Nutrition: 3-->adequate  Friction and Shear: 3-->no apparent problem  Bruno Score: 19                          Labs:   Recent Labs   Lab 01/31/20  0831  01/30/20  1936 01/30/20  1140  01/29/20  1410   HGB 11.2*   < > 11.6* 12.7   < >  --    INR  --   --   --  1.11  --   --    WBC  --   --  6.9 6.0   < >  --    CRP  --   --   --   --   --  3.4    < > = values in this interval not displayed.       Physical Exam  Skin inspection: focused BLE, coccyx    Wound Location:  BLE  Date of last photo:    1-31-20 left foot        1-24-20      Wound History: unclear  Measurements (length x width x depth, in cm):  Numerous scattered small dry scabs and excoriations, about 0.5cm diameter average  Wound Base: dry thin red scabby tissue  Palpation of the wound bed: normal   Periwound  skin: mild erythema  Temperature: normal   Drainage: none  Odor: none  Pain: minimal, except tenderness to left great toe    Coccyx:  Pink and thin fragile tissue, but intact and blanchable.  Pt more mobile this week.   1-24-20        Interventions  Current support surface: Standard  Atmos Air mattress  Current off-loading measures: Pillows  Visual inspection of wound(s) completed  Wound Care: done per plan of care  Supplies: reviewed  Education provided: importance of repositioning, plan of care, wound progress and Off-loading pressure  Discussed plan of care with Patient and Nurse    Sapna Watt RN

## 2020-01-31 NOTE — PROGRESS NOTES
BRIEF NUTRITION REASSESSMENT      REASON FOR REASSESSMENT:  Follow-up    CURRENT DIET AND INTAKE:  Diet:  Regular              Chart reviewed  1/30: Kissing stents placed (stent to right iliac artery and left iliac artery).   1/30: RRT called for: Bleeding from angiosite.  Tx st66 ---> st33.    Pt tolerating po  Ordering 3 meals per day  Flowsheets reflect intake of 100% meals ordered  Pt typically doesn't order breakfast until ~9:30am    Note plans for TCU at d/c      ANTHROPOMETRICS:  Vitals:    01/23/20 1206 01/23/20 1544 01/24/20 0900 01/25/20 0630   Weight: 63.9 kg (140 lb 14 oz) 63.2 kg (139 lb 5.3 oz) 63.6 kg (140 lb 3.4 oz) 64.5 kg (142 lb 3.2 oz)    01/26/20 0500 01/28/20 0624 01/30/20 1920 01/31/20 0701   Weight: 63.1 kg (139 lb 3.2 oz) 59.2 kg (130 lb 8.2 oz) 60 kg (132 lb 4.4 oz) 65.8 kg (145 lb 1.6 oz)         LABS:  Labs noted    MALNUTRITION:  (1/25) Patient does not meet two of the following criteria necessary for diagnosing malnutrition: significant weight loss, reduced intake, subcutaneous fat loss, muscle loss or fluid retention.     NUTRITION INTERVENTION:  Nutrition Diagnosis:  No nutrition diagnosis at this time.    Implementation:  No intervention at this time    FOLLOW UP/MONITORING:   Will re-evaluate in 7 - 10 days, or sooner, if re-consulted.

## 2020-01-31 NOTE — PROGRESS NOTES
SW:    I: ILIA following for discharge planning. Pt transferred to station 33 last evening however now ready for discharge today. ILIA reviewed previous  documentation and referrals have been made to TCU. ILIA contacted [place] and [facility] to determine bed availalability. [facility] does not have bed availability and needed to leave message with [facility]. Noted that referral also sent to [facility] and [facility], will contact them as well. [facility] [place] [facility]    [place] [employer] Medicare and therefore will need prior auth.     P: SW to follow. ILIA will update pt daughter as able re: placement, will await return call from [facility].    1300: [facility] can clinically accept, need prior auth from [facility] which facility is pursuing.    1515: [facility] has received auth from [facility] and can admit today. ILIA updated pt daughter who is agreeable with placement at this location.     ILIA contacted hospitalist and pt will be discharge tomorrow, 2/1/20. Pt daughter will provide transport at 1100. ILIA updated [facility] TCU admissions.    Orders to be faxed to [phone].      KELLY Keen, [facility] Medical CenterSW  [facility]  Daytime (8:00am-4:30pm): [phone]  After-Hours SW Pager (4:30pm-11:30pm): [phone]

## 2020-01-31 NOTE — PROGRESS NOTES
"VASCULAR SURGERY PROGRESS NOTE    Subjective:  Patient found sleeping in bed, denies leg pain.  Had an RRT called last night for oozing from groin puncture site.    Objective:    Intake/Output Summary (Last 24 hours) at 1/31/2020 0755  Last data filed at 1/31/2020 0558  Gross per 24 hour   Intake 2028.33 ml   Output --   Net 2028.33 ml     Labs:  ROUTINE IP LABS (Last four results)  BMP  Recent Labs   Lab 01/25/20  1015 01/24/20  1149 01/24/20  0859   NA  --   --  135   POTASSIUM 3.4 3.6 3.5   CHLORIDE  --   --  105   LAUREN  --   --  9.3   CO2  --   --  24   BUN  --   --  14   CR  --   --  0.54   GLC  --   --  102*     CBC  Recent Labs   Lab 01/31/20  0020 01/30/20  1936 01/30/20  1140 01/24/20  0859   WBC  --  6.9 6.0 10.8   RBC  --  3.72* 4.07 3.99   HGB 10.9* 11.6* 12.7 12.3   HCT  --  35.1 38.4 38.0   MCV  --  94 94 95   MCH  --  31.2 31.2 30.8   MCHC  --  33.0 33.1 32.4   RDW  --  13.2 13.3 13.9   PLT  --  213 254 164     INR  Recent Labs   Lab 01/30/20  1140   INR 1.11     PHYSICAL EXAM:  /60 (BP Location: Right arm)   Pulse 74   Temp 98.6  F (37  C) (Oral)   Resp 16   Ht 1.6 m (5' 2.99\")   Wt 65.8 kg (145 lb 1.6 oz)   SpO2 97%   Breastfeeding No   BMI 25.71 kg/m    General: The patient is alert and oriented. Appropriate. No acute distress  Psych: pleasant affect, answers questions appropriately  Skin: Color appropriate for race, warm, dry.  Respiratory: The patient does not require supplemental oxygen. Breathing unlabored  GI:  Abdomen soft, nontender to light palpation.  Extremities: groin puncture site with clean dressing in place, soft with no sign of hematoma.  Left leg warm with monophasic DP/PT signals, RLE with Biphasic signals          ASSESSMENT:  79 year old female POD 1 for bilateral iliac kissing stents placed by IR and found to have focal stenosis/occlusion of the left distal SFA/popliteal arteries.      PLAN:  -Appreciate IR intervention.  -Continue heparin per IR " recommendations  -No surgical intervention planned at this time, patient has no specific complaints in terms of her legs and her PAD was an incidental finding on this hospitalization.  -will arrange outpatient follow-up for the patient.  -Vascular surgery will sign off, please contact us with questions or changes in clinical course.    Blanca Lyn, CELESTE, APRN, AGNP-C  Division of Vascular Surgery   [facility]  Pager: [phone]    As noted above.  I reviewed yesterday afternoon's angiogram with the interventional radiologist, Dr. Calvin.  He was unable to cross a diseased segment of the distal left superficial femoral artery.  Bilateral kissing iliac stents were placed for a severe left iliac stenosis.  This should significantly improve her inflow.    She has a significant neurocognitive disorder with multiple other medical issues.  She would be a high risk for left leg bypass.  She has no clear-cut signs or symptoms of left leg critical limb ischemia.  Blood flow to her left foot should be improved from her prehospital baseline.  I would be happy to see her in my vascular surgical office for outpatient follow-up if further issues become apparent.    Cl Padilla MD

## 2020-01-31 NOTE — PROGRESS NOTES
SPIRITUAL HEALTH SERVICES Progress Note  [facility] [phone]    Visited pt per LOS. Pt uses vikas name Stephania.    Pt described that she doesn't know why she is in hospital but pt's daughter was here yesterday. 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 Pt also shared that she wanted to become a police but because of her big brother passed away from cancer, she didn't make it. Pt used to be a  and she shared some stories from that job. Pt mentioned that she is always the one who helps and protects others. Pt see herself as a fighter.  asked if she will keep fighting with the physical issue, pt said yes. Pt shared many stories about her families and friends, nurse came in later for skin test. [facility] will remain available for any future needs.        Kennedy Balderrama  Chaplain Resident

## 2020-02-01 VITALS
HEIGHT: 63 IN | RESPIRATION RATE: 18 BRPM | HEART RATE: 80 BPM | BODY MASS INDEX: 25.47 KG/M2 | SYSTOLIC BLOOD PRESSURE: 116 MMHG | OXYGEN SATURATION: 95 % | WEIGHT: 143.74 LBS | DIASTOLIC BLOOD PRESSURE: 56 MMHG | TEMPERATURE: 97.9 F

## 2020-02-01 PROBLEM — I10 BENIGN ESSENTIAL HYPERTENSION: Status: ACTIVE | Noted: 2020-02-01

## 2020-02-01 PROBLEM — G47.00 INSOMNIA: Status: ACTIVE | Noted: 2020-02-01

## 2020-02-01 LAB
GLUCOSE BLDC GLUCOMTR-MCNC: 101 MG/DL (ref 70–99)
GLUCOSE BLDC GLUCOMTR-MCNC: 138 MG/DL (ref 70–99)

## 2020-02-01 PROCEDURE — 25000132 ZZH RX MED GY IP 250 OP 250 PS 637: Performed by: HOSPITALIST

## 2020-02-01 PROCEDURE — 99239 HOSP IP/OBS DSCHRG MGMT >30: CPT | Performed by: HOSPITALIST

## 2020-02-01 PROCEDURE — 00000146 ZZHCL STATISTIC GLUCOSE BY METER IP

## 2020-02-01 PROCEDURE — 25000132 ZZH RX MED GY IP 250 OP 250 PS 637: Performed by: INTERNAL MEDICINE

## 2020-02-01 RX ORDER — METOPROLOL SUCCINATE 25 MG/1
12.5 TABLET, EXTENDED RELEASE ORAL 2 TIMES DAILY
Qty: 60 TABLET | Refills: 0 | Status: ON HOLD | DISCHARGE
Start: 2020-02-01 | End: 2022-01-01

## 2020-02-01 RX ORDER — TRAZODONE HYDROCHLORIDE 50 MG/1
50 TABLET, FILM COATED ORAL AT BEDTIME
Qty: 30 TABLET | Refills: 0 | DISCHARGE
Start: 2020-02-01 | End: 2020-03-17

## 2020-02-01 RX ORDER — ISOSORBIDE MONONITRATE 30 MG/1
15 TABLET, EXTENDED RELEASE ORAL DAILY
Qty: 30 TABLET | Refills: 0 | Status: ON HOLD | DISCHARGE
Start: 2020-02-01 | End: 2022-01-01

## 2020-02-01 RX ADMIN — ASPIRIN 81 MG: 81 TABLET, DELAYED RELEASE ORAL at 09:42

## 2020-02-01 RX ADMIN — ISOSORBIDE MONONITRATE 15 MG: 30 TABLET, EXTENDED RELEASE ORAL at 09:42

## 2020-02-01 RX ADMIN — METOPROLOL SUCCINATE 12.5 MG: 25 TABLET, EXTENDED RELEASE ORAL at 09:42

## 2020-02-01 RX ADMIN — Medication 400 MG: at 09:42

## 2020-02-01 RX ADMIN — SENNOSIDES AND DOCUSATE SODIUM 1 TABLET: 8.6; 5 TABLET ORAL at 09:43

## 2020-02-01 RX ADMIN — OMEPRAZOLE 20 MG: 20 CAPSULE, DELAYED RELEASE ORAL at 09:42

## 2020-02-01 ASSESSMENT — MIFFLIN-ST. JEOR: SCORE: 1091

## 2020-02-01 ASSESSMENT — ACTIVITIES OF DAILY LIVING (ADL)
ADLS_ACUITY_SCORE: 21

## 2020-02-01 NOTE — PROGRESS NOTES
"VASCULAR SURGERY PROGRESS NOTE    Subjective:  No acute events overnight. No complaints this AM. Pleasantly confused. Ambulated with assist yesterday. Denies leg or foot pain.    Objective:    Intake/Output Summary (Last 24 hours) at 2/1/2020 0746  Last data filed at 1/31/2020 1800  Gross per 24 hour   Intake 2798.33 ml   Output 600 ml   Net 2198.33 ml     Labs:  ROUTINE IP LABS (Last four results)  BMP  Recent Labs   Lab 01/31/20  0831 01/25/20  1015     --    POTASSIUM 3.7 3.4   CHLORIDE 106  --    LAUREN 9.0  --    CO2 28  --    BUN 10  --    CR 0.61  --    *  --      CBC  Recent Labs   Lab 01/31/20  0831 01/31/20  0020 01/30/20  1936 01/30/20  1140   WBC  --   --  6.9 6.0   RBC  --   --  3.72* 4.07   HGB 11.2* 10.9* 11.6* 12.7   HCT  --   --  35.1 38.4   MCV  --   --  94 94   MCH  --   --  31.2 31.2   MCHC  --   --  33.0 33.1   RDW  --   --  13.2 13.3   PLT  --   --  213 254     INR  Recent Labs   Lab 01/30/20  1140   INR 1.11     PHYSICAL EXAM:  /61 (BP Location: Right arm)   Pulse 80   Temp 98.8  F (37.1  C) (Oral)   Resp 18   Ht 1.6 m (5' 2.99\")   Wt 65.8 kg (145 lb 1.6 oz)   SpO2 93%   Breastfeeding No   BMI 25.71 kg/m    General: The patient is alert. Appropriate. No acute distress  Psych: pleasant affect, answers questions appropriately  Skin: Color appropriate for race, warm, dry.  Respiratory: The patient does not require supplemental oxygen. Breathing unlabored  Extremities: B/L feet warm, R DP monophasic, R PT triphasic, L DP/PT monophasic, groins C/D/I      ASSESSMENT:   79F s/p LLE angiogram & B/L iliac stents      PLAN:  Continue ASA  Routine neurovascular checks  Ambulate as tolerated  No plans for intervention  Okay to follow up outpatient if she becomes symptomatic    Oxana Stevens MD  Vascular Surgery Fellow  Pgr [phone]    STAFF:  As above.  Doing well.                Soham Toledo MD                     "

## 2020-02-01 NOTE — PROGRESS NOTES
ILIA  D/I: Faxed discharge orders via DOD to facility. Spoke to Tiarra, supervisor at facility to notify social work once patient has arranged.  Social work met with patient and patient's friend to notify them that pt needs to go directly from hospital to facility.  P: Will continue to monitor and follow.    D/I: PAS completed. Two copies placed in pt's packet.    PAS-RR    D: Per DHS regulation, SW completed and submitted PAS-RR to [place] [employer] Direct Connect via the Senior [employer] Line.  PAS-RR confirmation # is : [id number]    I: SW spoke with pt/pt family are aware a PAS-RR has been submitted.  SW reviewed with pt/pt family they may be contacted for a follow up appointment within 10 days of hospital discharge if their SNF stay is < 30 days.  Contact information for [employer] was also provided.    A: Pt/pt family verbalized understanding.    P: Further questions may be directed to [employer] Line at #[phone], option #4 for PAS-RR staff.      Mikayla Hagen MA, MSW, PHILIPPE  [facility]  [phone]

## 2020-02-01 NOTE — DISCHARGE SUMMARY
[facility]  Hospitalist Discharge Summary       Date of Admission:  1/23/2020  Date of Discharge:  2/1/2020 11:51 AM  Discharging Provider: Mohan Odonnell MD      Discharge Diagnoses   1. Acute metabolic encephalopathy superimposed on baseline cognitive disorder - stabilized  2. LLE claudication s/p angiogram and bilateral iliac stenting   3. Deconditioned  4. History of recurrent UTI  5. Hypertension  6. CAD history  7. RLS  8. Initial concern of sepsis ruled out as below    Follow-ups Needed After Discharge   Follow-up Appointments     Follow Up and recommended labs and tests      - TCU MD this week for hospital follow up - ongoing blood pressure   management  - Vascular surgery at their discretion  - Neurology 3-4 weeks for neurocognitive impairment           Unresulted Labs Ordered in the Past 30 Days of this Admission     No orders found from 12/24/2019 to 1/24/2020.      These results will be followed up by NA    Discharge Disposition   Discharged to TCU  Condition at discharge: Stable    Hospital Course   Summary of Stay:  Mel Castellanos is an 79 year old female with history of HTN, unstable angina, insomnia, ELS, TIA, T2DM, recurrent UTIs, and emphysema who presented to the ED via EMS due to increased altered mental status.  Felt possibly infectious earlier in stay but now felt unlikely a signficant infection responsible.  Psychiatry feels she has a significant neurocognitive disorder and did not feel she is safe for discharge home.  Therapy and psychiatry has recommended TCU/rehab.  During stay noted cyanotic left toes and some pain with activity.  This led to further eval and ultimately angiogram with stenting completed. She is appropriate for discharge on 2/1 and has TCU bed available.         Altered mental status (metabolic encepholopathy superimposed on cognitive disorder)  - Confusion improved since 1/24 though still significant.  Current level of confusion felt potentially  "related to significant underlying neurocognitive disorder:  Unclear etiology at admission, but she has history of similar presentation which were treated as UTI and symptoms improved. No other obvious etiology. Cultures NGTD.   Flu negative.  At this point off antibiotics as no definitive bacterial infection found.   Possibly had a recent viral infection that is now clearing.  Given trend would recommend TCU/Rehab with further monitoring.     - Psychiatry consulted - appreciated. Note reviewed.  \"Mel presents with what appears to be a delirium superimposed on a major neurocognitive disorder.  She is not decisional.\"    \"If the daughter is demanding to take her home would consider discharge AMA with an adult protection referral.\"  -  Zyprexa/Haldol PRN.  Less agitated over course of stay.  -  TSH, B12/Folate normal range  -  Appreciate neurology consult.  Neurologist also felt presentation combination of toxic/metabolic encephalopathy with probable underlying dementia.     LLE claudication s/p angiogram/stent 1/30/20  Post procedure site bleeding episode with RRT:  -  Doing better today.  Heparin drip stopped by IR during day.  Site appeared stable midday.  Vascular felt continuing on just ASA was reasonable at this time.  Will monitor patient though today given episode overnight.   - F/U with vascular surgery at their discretion     History of recurrent UTI:  - Urine culture negative - abx stopped.      Deconditioned  Concern of safety at home - unclear baseline mentation   - Daughter possibly having issues at home.  - SW/CT consulted.  PT/OT following.  - appears that she is likely cognitively impaired at baseline, to what degree remains the question. Patients daughter was reportedly intoxicated when EMS picked up patient and reportedly she has depression and attempted suicide recently. The patient and this daughter have been living together to \"take care\" of each other.  Significant home safety " concerns.     Skin excoriation, petechiae, small scabs:  - Both ankles tender, no overt cellulitic changes.  - WOC RN consulted, appreciate recs.     Hypertension:  Stable on admission then soft with IVF boluses.  - Continue to hold PTA amlodipine, hydrochlorothiazide.  Back on partial dose imdur/metoprolol.  Likely will d/c with amlodipine and hydrochlorothiazide discontinued given BP trends.  - PTA metop resumed 1/25.  - Blood pressure somewhat labile  - Discharge to TCU with amlodipine and hydrochlorothiazide held as BP mostly on low/normal range     History of CAD/MI:  -  PTA ASA resume.    -  Imdur low dose.  -  Statin resumed  -  Continue metoprolol at reduced dose from PTA with lower BP's earlier in stay.     T2DM  A1c 6.5% Feb 2018. PTA pioglitazone held.  - A1c now 6.8%.  - Continue accuchecks and sliding scale NovoLog.  - Resume PTA regimen at discharge     Restless leg syndrome  - Continue PTA ropinirole.     Concern of initial sepsis though now not felt to likely have had a significant bacterial infection:  - possible UTI - leukocytosis + UA not classic, started rocephin in ED.  Off abx now.  - WBC 17k --> 13k --> 10k , remains afebrile and nontoxic for multiple days prior to discharge  - CT head no acute pathology  - RRT earlier in stay for hypotension/sepsis, given additional IVF boluses and abx switched to zosyn --> improved and abx later stopped 1/25 as ultimately wasn't felt an infection.  Remains afebrile, and cultures negative.      Consultations This Hospital Stay   CARE TRANSITION RN/SW IP CONSULT  WOUND OSTOMY CONTINENCE NURSE  IP CONSULT  PHYSICAL THERAPY ADULT IP CONSULT  OCCUPATIONAL THERAPY ADULT IP CONSULT  PSYCHIATRY IP CONSULT  PSYCHIATRY IP CONSULT  NEUROLOGY IP CONSULT  VASCULAR SURGERY IP CONSULT  PHARMACY TO DOSE HEPARIN  PHYSICAL THERAPY ADULT IP CONSULT  OCCUPATIONAL THERAPY ADULT IP CONSULT    Code Status   Prior    Time Spent on this Encounter   I, Mohan Odonnell MD,  personally saw the patient today and spent greater than 30 minutes discharging this patient.       Mohan Odonnell MD  [facility]  ______________________________________________________________________    Physical Exam   Vital Signs: Temp: 97.9  F (36.6  C) Temp src: Oral BP: 116/56 Pulse: 80 Heart Rate: 71 Resp: 18 SpO2: 95 % O2 Device: None (Room air)    Weight: 143 lbs 11.84 oz    Gen: NAD, pleasantly confused  HEENT: Normocephalic, EOMI, MMM  Resp: no crackles,  no wheezes, no increased work of resp  CV: S1S2 heard, reg rhythm, reg rate, no pedal edema  Abdo: soft, nontender, nondistended, bowel sounds present  Ext: calves nontender, well perfused  Neuro: AAOx self, CN grossly intact, no facial asymmetry         Primary Care Physician   [facility]    Discharge Orders      General info for SNF    Length of Stay Estimate: Short Term Care: Estimated # of Days <30  Condition at Discharge: Stable  Level of care:skilled   Rehabilitation Potential: Fair  Admission H&P remains valid and up-to-date: Yes  Recent Chemotherapy: N/A  Use Nursing Home Standing Orders: Yes     Mantoux instructions    Give two-step Mantoux (PPD) Per Facility Policy Yes     Reason for your hospital stay    Altered mental status     Glucose monitor nursing POCT    Before meals and at bedtime     Daily weights    Call Provider for weight gain of more than 2 pounds per day or 5 pounds per week.     Follow Up and recommended labs and tests    - TCU MD this week for hospital follow up - ongoing blood pressure management  - Vascular surgery at their discretion  - Neurology 3-4 weeks for neurocognitive impairment     Activity - Up with assistive device     Physical Therapy Adult Consult    Evaluate and treat as clinically indicated.    Reason:  Deconditioned, cognitive impairment     Occupational Therapy Adult Consult    Evaluate and treat as clinically indicated.    Reason:  Deconditioned, cognitive impairment      Fall precautions     Advance Diet as Tolerated    Follow this diet upon discharge: Orders Placed This Encounter      Advance Diet as Tolerated: Regular Diet Adult       Significant Results and Procedures   Most Recent 3 CBC's:  Recent Labs   Lab Test 01/31/20  0831 01/31/20  0020 01/30/20  1936 01/30/20  1140 01/24/20  0859   WBC  --   --  6.9 6.0 10.8   HGB 11.2* 10.9* 11.6* 12.7 12.3   MCV  --   --  94 94 95   PLT  --   --  213 254 164     Most Recent 3 BMP's:  Recent Labs   Lab Test 01/31/20  0831 01/25/20  1015 01/24/20  1149 01/24/20  0859 01/23/20 1930     --   --  135 139   POTASSIUM 3.7 3.4 3.6 3.5 3.5   CHLORIDE 106  --   --  105 105   CO2 28  --   --  24 31   BUN 10  --   --  14 17   CR 0.61  --   --  0.54 0.54   ANIONGAP 4  --   --  6 3   LAUREN 9.0  --   --  9.3 9.3   *  --   --  102* 130*     Most Recent 2 LFT's:  Recent Labs   Lab Test 01/23/20  1138 12/20/19 2124   AST 43 39   ALT 37 22   ALKPHOS 85 76   BILITOTAL 1.3 0.5     Most Recent 6 Bacteria Isolates From Any Culture (See EPIC Reports for Culture Details):  Recent Labs   Lab Test 01/23/20  1155 01/23/20  1138 12/20/19 2114   CULT No growth No growth  No growth <10,000 colonies/mL  Escherichia coli  *     Most Recent TSH and T4:  Recent Labs   Lab Test 01/28/20 1915   TSH 1.76     Most Recent Urinalysis:  Recent Labs   Lab Test 01/23/20  1155   COLOR Dark Yellow   APPEARANCE Clear   URINEGLC Negative   URINEBILI Negative   URINEKETONE 5*   SG 1.025   UBLD Negative   URINEPH 6.0   PROTEIN 30*   NITRITE Negative   LEUKEST Large*   RBCU 2   WBCU 16*     Most Recent Anemia Panel:  Recent Labs   Lab Test 01/31/20  0831  01/30/20  1936 01/28/20 1915   WBC  --   --  6.9   < >  --    HGB 11.2*   < > 11.6*   < >  --    HCT  --   --  35.1   < >  --    MCV  --   --  94   < >  --    PLT  --   --  213   < >  --    B12  --   --   --   --  569   FOLIC  --   --   --   --  26.3    < > = values in this interval not displayed.   ,   Results  for orders placed or performed during the hospital encounter of 01/23/20   XR Chest Port 1 View    Narrative    XR CHEST PORT 1 VW  1/23/2020 12:05 PM       INDICATION: Fever  COMPARISON: 3/29/2018       Impression    IMPRESSION: Negative portable chest. No change from previous.    GAVINO HARP MD   CT Head w/o Contrast    Narrative    CT SCAN OF THE HEAD WITHOUT CONTRAST   1/23/2020 1:32 PM     HISTORY: Altered mental status (LOC), unexplained    TECHNIQUE:  Axial images of the head and coronal reformations without  IV contrast material. Radiation dose for this scan was reduced using  automated exposure control, adjustment of the mA and/or kV according  to patient size, or iterative reconstruction technique.    COMPARISON: None.    FINDINGS:     Intracranial contents: There is diffuse parenchymal volume loss.   White matter changes are present in the cerebral hemispheres that are  consistent with small vessel ischemic disease in this age patient.  There is no evidence of intracranial hemorrhage, mass, acute infarct  or anomaly.    Visualized orbits/sinuses/mastoids:  The visualized portions of the  sinuses and mastoids appear normal.    Osseous structures/soft tissues:  There is no evidence of trauma.      Impression    IMPRESSION:   1. No acute pathology. No bleed, mass, or areas of acute infarction.  No significant change since 9/13/2011.     2. There is diffuse parenchymal volume loss.  White matter changes are  present in the cerebral hemispheres that are consistent with small  vessel ischemic disease in this age patient.      VERA STOUT MD   US ALISON Doppler No Exercise    Narrative    US ALISON DOPPLER NO EXERCISE, 1-2 LEVELS, BILATERAL   1/29/2020 3:45 PM     HISTORY: Left foot wound.    COMPARISON: None.    FINDINGS:  Right ALISON:   DP: 0.78  PT: 0.98.    Left ALISON:   DP: 0.39   PT: 0.39.    Right Digital Brachial Index: 0.48.  Left Digital Brachial index: 0.16    Waveforms: Monophasic bilaterally. Normal right  first digital  waveforms. Severely dampened left first digital waveform.      Impression    IMPRESSION:   1. Right ALISON is normal without evidence of arterial insufficiency,  however waveforms are monophasic suggesting ALISON might not be accurate.  2. Left ALISON shows severe arterial insufficiency.  3. Abnormal digital brachial indices bilaterally left worse than  right.    ALISON CRITERIA:  >0.95 Normal  0.90 - 0.94 Mild  0.5 - 0.89 Moderate  0.2 - 0.49 Severe  <0.2 Critical   US Lower Extremity Arterial Duplex Bilateral    Narrative    ULTRASOUND LOWER EXTREMITY ARTERIAL DUPLEX BILATERAL  1/29/2020 6:54  PM     HISTORY:  Abnormal ankle-brachial index. Patient has a history of  peripheral arterial disease. The right and left ankle-brachial indices  are 0.98 and 0.39.    COMPARISON: Ankle-brachial indices dated 1/29/2020.    FINDINGS: Color Doppler and spectral waveform analysis performed.    Right lower extremity: There is extensive scattered calcified plaque  in the sampled infrainguinal arteries of the right lower extremity.  There is an elevation in peak systolic velocity in the mid right  superficial femoral artery equaling 286 cm/s. Velocities proximal and  distal to this level are 109 and 88 cm/s.    There is an elevation in peak systolic velocity in the right common  femoral artery equaling 205 cm/s. Velocities distal to this are 109  cm/s.    Right popliteal artery and tibioperoneal trunk are patent. The right  proximal and distal posterior tibial artery, proximal anterior tibial  artery, and proximal peroneal arteries are patent. The distal right  peroneal artery is not visualized. Severely dampened waveforms are  identified in the distal right anterior tibial artery.    Overall, there is a progressive dampening of waveforms from the  proximal to distal sampled infrainguinal arteries of the right lower  extremity.    Left lower extremity: There is extensive scattered calcified plaque in  the sampled infrainguinal  arteries of the left lower extremity.  Monophasic waveforms are noted throughout the left lower extremity.  These progressively become more dampened from proximal to distal. No  focal elevations in peak systolic velocity are identified. The distal  peroneal artery and anterior tibial artery are not well seen.      Impression    IMPRESSION:  1. Extensive calcified plaque in the sampled infrainguinal arteries of  the lower extremities. Elevations in peak systolic velocity in the  right common femoral artery and mid superficial femoral artery would  indicate focal stenoses.  2. Probable distal peroneal artery occlusions. Probable significant  steno-occlusive disease involving the anterior tibial arteries  bilaterally with severely dampened waveforms in the distal anterior  tibial arteries bilaterally.  3. Probable inflow iliac steno-occlusive disease involving the left  side contributing to diffusely monophasic waveforms throughout the  left lower extremity.    ALESSIA SCHULTZ MD   IR Lower Extremity Angiogram Left    Narrative    IR LOWER EXTREMITY ANGIOGRAM LEFT 1/30/2020 5:12 PM    HISTORY: 79-year-old woman with history of significant peripheral  arterial disease as demonstrated clinically and via ultrasound imaging  performed the same day. Patient has considerably diminished ALISON values  in the left lower extremity with diffuse monophasic waveforms. Patient  has bilateral arterial insufficiency, though left more so than right.  Focus will be made on the left lower extremity.    TECHNIQUE: Patient was brought to the Interventional Radiology  Department after informed consent obtained with patient's daughter.  Patient was placed in a supine position. Skin overlying the right  groin was prepped and draped in standard sterile fashion as well as  the left groin. Ultrasound was used to visualize the common femoral  arteries given diminished palpable pulse on the right and no clear  palpable pulse on the left. Scattered  calcifications noted in both  common femoral arteries, though attempts are made to access common  femoral arteries in location devoid of calcification. Ultrasound  confirmed patency of both common femoral arteries and images stored  for documentation. 1% lidocaine was used for local anesthesia.  Micropuncture kit was used to access the right common femoral artery  initially and subsequently, the left common femoral artery. Over a  series of maneuvers, 6 [place] sheathes were replaced retrograde  bilaterally. Over a guidewire, a pigtail catheter was advanced into  the abdominal aorta where angiogram performed. Diffuse atherosclerotic  plaque noted throughout the abdominal aorta, especially infrarenal  segment as well as the aortoiliac bifurcation. Attempts were made to  cross the aortic bifurcation from right to left, though given severity  of plaque, this was unable to be performed. Pigtail catheter then  pulled back to the distal abdominal aorta where pelvic angiograms  performed. Retrograde access was obtained in the left common femoral  artery. Bilateral 6 [place]ese Sharan sheath were placed in both groins.  Angiogram was performed throughout the left lower extremity  demonstrating severe plaque and irregularity in the distal left  superficial femoral and above-knee popliteal arteries. Given plan for  bilateral kissing common iliac arterial stents, idea was to initially  treat the left lower extremity prior to stent placement as up and over  access would no longer be available. Given inability to cross the  aortic bifurcation, 4 mm balloon angioplasty was used to dilate the  left common iliac artery in an effort to form a tract for placement of  the sheath. A gooseneck snare was placed from the left groin access  and placed in the distal abdominal aorta. A stiff angled Glidewire was  advanced from the right groin. The Glidewire was snared from left  groin approach and pulled through the left sheath for through  "and  through access. The right groin sheath was then advanced to the left  external iliac artery. A Berenstein catheter was placed in the distal  left superficial femoral artery where angiogram performed.  Considerable atherosclerotic plaque was noted throughout this arterial  segment. Attempts were made to advance a Glidewire through this  location, though given severity of plaque, the wire entered a  dissection flap. Considerable and numerous attempts were made to gain  reentry into the true lumen. A [place] microcatheter and 0.014 wires  were also used in an attempt to regain entry into the true lumen,  though ultimately were unsuccessful. Dissection was noted to the level  of the above-knee popliteal artery. At this point, it was decided to  abort further attempts to gain access through this arterial system  given concern for extending dissection to the below-knee popliteal  artery which appears to be a suitable location for landing zone of a  bypass graft. Prior to intervention, 4000 units of heparin was  administered. During intervention, an additional 2500 units of heparin  was administered. Ultimately, areas of considerable plaque and  stenosis noted in the distal SFA and above-knee popliteal arteries,  though the below knee popliteal artery and runoff arteries remain  intact via collaterals at completion. Attention was then paid to the  distal abdominal aorta and common iliac arteries where both sheathes  were advanced into the distal abdominal aorta and angiograms  performed. Bilateral kissing common iliac arterial stents were then  deployed from the common iliac arteries, \"built up\" into the distal  abdominal aorta. The stents were 8 mm x 57 mm balloon-expandable  Visi-Pro stents. Completion angiogram demonstrates good result of  stent placement with improved blood flow through the distal abdominal  aorta and common iliac arteries. Left groin sheath was then pulled  back to the external iliac artery where " angiogram performed throughout  the left lower extremity to confirm patency of the runoff arteries  including the below knee popliteal and posterior tibial artery.  Angio-Seal closure devices were then deployed at access site. Patient  is not the best candidate for Angio-Seal closure device given degree  of calcification and nearby arterial branches, though given patient's  underlying dementia and inability to hold still throughout the exam,  they were deployed nonetheless. Plan will be for heparin treatment to  begin two hours after completion of this exam.    Sedation: Moderate level of sedation achieved with 3 mg IV Versed, 200  mcg IV fentanyl.  Sedation time: 194 minutes.  Please note the above medications were administered by the  Interventional Radiology Staff under my direct supervision. The  patient's vital signs were monitored and remained stable throughout  the procedure.    A total of 6500 units of heparin administered during the exam.  Fluoroscopic time: 50.9 minutes.  Air (Kerma): 340 mGy.  Contrast: 131 of Visipaque administered intra-arterially without  complication.    FINDINGS: 40 spot fluoroscopic images and angiogram sequences obtained  throughout the procedure. Please see description above.      Impression    IMPRESSION:  1. Deployment of bilateral common iliac kissing stents extending into  the distal abdominal aorta, 8 mm x 57 mm. Patient had considerable  plaque and stenosis at the aortobiiliac junction, left more so than  right. Patient now has palpable bilateral common femoral pulses by  completion.  2. Severe atherosclerotic plaque and irregularity throughout the  distal SFA and above-knee popliteal artery. Attempts were made to  treat this with balloon angioplasty, though unable to maintain access  to the true lumen given friability of arteries in extent of plaque. By  completion, runoff remains unchanged with intact below-knee popliteal  and runoff via the posterior tibial artery.  Peroneal artery is  partially patent, though small in size. 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    CALI OVALLE MD   US Lower Extremity Venous Mapping Right    Narrative    ULTRASOUND LOWER EXTREMITY VENOUS MAPPING RIGHT  1/30/2020 12:18 PM     HISTORY: Preoperative planning for lower extremity bypass graft  surgery.    COMPARISON: None.    FINDINGS:   Right lower extremity: The right greater saphenous vein is patent. Its  diameters from the saphenofemoral junction to the knee range between  5.4 to 3.0 mm. Its diameters from below the knee to the ankle range  between 3.2 to 2.1 mm.      Impression    IMPRESSION: Right greater saphenous vein mapping performed as above.    ALESSIA SCHULTZ MD       Discharge Medications   Discharge Medication List as of 2/1/2020 10:39 AM      START taking these medications    Details   traZODone (DESYREL) 50 MG tablet Take 1 tablet (50 mg) by mouth At Bedtime, Disp-30 tablet, R-0, Transitional         CONTINUE these medications which have CHANGED    Details   isosorbide mononitrate (IMDUR) 30 MG 24 hr tablet Take 0.5 tablets (15 mg) by mouth daily, Disp-30 tablet, R-0, Transitional      metoprolol succinate ER (TOPROL-XL) 25 MG 24 hr tablet Take 0.5 tablets (12.5 mg) by mouth 2 times daily, Disp-60 tablet, R-0, Transitional         CONTINUE these medications which have NOT CHANGED    Details   ASPIRIN EC PO Take 81 mg by mouth daily , Historical      atorvastatin (LIPITOR) 40 MG tablet Take 40 mg by mouth daily, Historical      magnesium oxide (MAG-OX) 400 MG tablet Take 400 mg by mouth 2 times daily, Historical      Multiple Vitamins-Minerals (CENTRUM SILVER) per tablet Take 1 tablet by mouth daily, Historical      nitroglycerin (NITROSTAT) 0.4 MG SL tablet Place 1 tablet (0.4 mg) under the tongue every 5 minutes as needed X 3 doses total if needed for chest pain., Disp-25 tablet, R-3, E-Prescribe      OMEPRAZOLE PO Take 20 mg by mouth every morning , Historical       pioglitazone (ACTOS) 15 MG tablet Take 15 mg by mouth daily, Historical      rOPINIRole (REQUIP) 1 MG tablet Take 1 mg by mouth At Bedtime, Historical         STOP taking these medications       AMLODIPINE BESYLATE PO Comments:   Reason for Stopping:         hydrochlorothiazide (HYDRODIURIL) 25 MG tablet Comments:   Reason for Stopping:             Allergies   Allergies   Allergen Reactions     Sulfa Drugs Rash

## 2020-02-01 NOTE — PLAN OF CARE
AVSS, tele SR. Orientation waxes/waned throughout shift. Very easily redirectable and pleasant. Groin site soft, dressing CDI.Dopplerable pulses, N/T in toes. Up ambulating in halls SBA ww and up in chair most of shift. Incontinent of urine. Tolerating diet, BS covered per POC. Plan to discharge to TCU tomorrow via daughter @ 11am.

## 2020-02-01 NOTE — PLAN OF CARE
"PT:  Per chart review, patient discharging to TCU at 11 AM today with daughter transporting, will discharge from acute PT at this time.     Physical Therapy Discharge Summary    Reason for therapy discharge:    Discharged to transitional care facility.    Progress towards therapy goal(s). See goals on Care Plan in [facility] electronic health record for goal details.  Goals not met.  Barriers to achieving goals:   discharge from facility.    Therapy recommendation(s):    Continued therapy is recommended.  Rationale/Recommendations: Per recommendations from previous treating therapist, \"Patient would benefit from continued physical therapy in the setting of a TCU for improving functional mobility, LE strength and tolerance for functional activities in order to return to baseline of functioning.\"      "

## 2020-02-01 NOTE — PLAN OF CARE
Occupational Therapy Discharge Summary    Reason for therapy discharge:    Discharged to transitional care facility.    Progress towards therapy goal(s). See goals on Care Plan in [facility] electronic health record for goal details.  Goals not met.  Barriers to achieving goals:   discharge from facility.    Therapy recommendation(s):    Continued therapy is recommended.  Rationale/Recommendations:  pt is below baseline, would benefit from continued skilled therapy in order to maximize I and return to PLOF.

## 2020-02-01 NOTE — PLAN OF CARE
Orientation fluctuated throughout shift.VSS on RA. BS active.  LS clear. Groin site soft, CDI. Pulses palpable, but faint. Denies pain. Up SBA. Tolerating Mod carb diet. Incontinent of urine  Pt not wanting to wake up for cares this AM. Tele:SR Plan to discharge to TCU this morning.

## 2020-02-03 DIAGNOSIS — R09.89 OTHER SPECIFIED SYMPTOMS AND SIGNS INVOLVING THE CIRCULATORY AND RESPIRATORY SYSTEMS: Primary | ICD-10-CM

## 2020-02-03 DIAGNOSIS — I73.9 PAD (PERIPHERAL ARTERY DISEASE) (H): ICD-10-CM

## 2020-03-12 ENCOUNTER — TRANSFERRED RECORDS (OUTPATIENT)
Dept: HEALTH INFORMATION MANAGEMENT | Facility: CLINIC | Age: 80
End: 2020-03-12

## 2020-03-17 ENCOUNTER — APPOINTMENT (OUTPATIENT)
Dept: ULTRASOUND IMAGING | Facility: CLINIC | Age: 80
DRG: 190 | End: 2020-03-17
Attending: HOSPITALIST
Payer: COMMERCIAL

## 2020-03-17 ENCOUNTER — APPOINTMENT (OUTPATIENT)
Dept: CT IMAGING | Facility: CLINIC | Age: 80
DRG: 190 | End: 2020-03-17
Attending: EMERGENCY MEDICINE
Payer: COMMERCIAL

## 2020-03-17 ENCOUNTER — HOSPITAL ENCOUNTER (INPATIENT)
Facility: CLINIC | Age: 80
LOS: 5 days | Discharge: HOME HEALTH | DRG: 190 | End: 2020-03-22
Attending: EMERGENCY MEDICINE | Admitting: HOSPITALIST
Payer: COMMERCIAL

## 2020-03-17 DIAGNOSIS — J44.1 COPD WITH ACUTE EXACERBATION (H): Primary | ICD-10-CM

## 2020-03-17 DIAGNOSIS — J44.1 COPD EXACERBATION (H): ICD-10-CM

## 2020-03-17 LAB
ALBUMIN SERPL-MCNC: 3.7 G/DL (ref 3.4–5)
ALBUMIN UR-MCNC: NEGATIVE MG/DL
ALP SERPL-CCNC: 98 U/L (ref 40–150)
ALT SERPL W P-5'-P-CCNC: 25 U/L (ref 0–50)
ANION GAP SERPL CALCULATED.3IONS-SCNC: 2 MMOL/L (ref 3–14)
APPEARANCE UR: CLEAR
AST SERPL W P-5'-P-CCNC: 30 U/L (ref 0–45)
BASE EXCESS BLDV CALC-SCNC: 3.9 MMOL/L
BASOPHILS # BLD AUTO: 0 10E9/L (ref 0–0.2)
BASOPHILS NFR BLD AUTO: 0.1 %
BILIRUB SERPL-MCNC: 0.3 MG/DL (ref 0.2–1.3)
BILIRUB UR QL STRIP: NEGATIVE
BUN SERPL-MCNC: 13 MG/DL (ref 7–30)
C PNEUM DNA SPEC QL NAA+PROBE: NOT DETECTED
CALCIUM SERPL-MCNC: 9.4 MG/DL (ref 8.5–10.1)
CHLORIDE SERPL-SCNC: 104 MMOL/L (ref 94–109)
CO2 SERPL-SCNC: 32 MMOL/L (ref 20–32)
COLOR UR AUTO: YELLOW
CREAT SERPL-MCNC: 0.69 MG/DL (ref 0.52–1.04)
D DIMER PPP FEU-MCNC: 2.4 UG/ML FEU (ref 0–0.5)
DIFFERENTIAL METHOD BLD: NORMAL
EOSINOPHIL # BLD AUTO: 0.1 10E9/L (ref 0–0.7)
EOSINOPHIL NFR BLD AUTO: 1.7 %
ERYTHROCYTE [DISTWIDTH] IN BLOOD BY AUTOMATED COUNT: 14.2 % (ref 10–15)
FLUAV H1 2009 PAND RNA SPEC QL NAA+PROBE: NOT DETECTED
FLUAV H1 RNA SPEC QL NAA+PROBE: NOT DETECTED
FLUAV H3 RNA SPEC QL NAA+PROBE: NOT DETECTED
FLUAV RNA SPEC QL NAA+PROBE: NOT DETECTED
FLUAV+FLUBV AG SPEC QL: NEGATIVE
FLUAV+FLUBV AG SPEC QL: NEGATIVE
FLUBV RNA SPEC QL NAA+PROBE: NOT DETECTED
GFR SERPL CREATININE-BSD FRML MDRD: 82 ML/MIN/{1.73_M2}
GLUCOSE BLDC GLUCOMTR-MCNC: 154 MG/DL (ref 70–99)
GLUCOSE BLDC GLUCOMTR-MCNC: 188 MG/DL (ref 70–99)
GLUCOSE BLDC GLUCOMTR-MCNC: 200 MG/DL (ref 70–99)
GLUCOSE BLDC GLUCOMTR-MCNC: 208 MG/DL (ref 70–99)
GLUCOSE SERPL-MCNC: 144 MG/DL (ref 70–99)
GLUCOSE UR STRIP-MCNC: 70 MG/DL
HADV DNA SPEC QL NAA+PROBE: NOT DETECTED
HCO3 BLDV-SCNC: 33 MMOL/L (ref 21–28)
HCOV PNL SPEC NAA+PROBE: NOT DETECTED
HCT VFR BLD AUTO: 41.9 % (ref 35–47)
HGB BLD-MCNC: 13.3 G/DL (ref 11.7–15.7)
HGB UR QL STRIP: NEGATIVE
HMPV RNA SPEC QL NAA+PROBE: NOT DETECTED
HPIV1 RNA SPEC QL NAA+PROBE: NOT DETECTED
HPIV2 RNA SPEC QL NAA+PROBE: NOT DETECTED
HPIV3 RNA SPEC QL NAA+PROBE: NOT DETECTED
HPIV4 RNA SPEC QL NAA+PROBE: NOT DETECTED
IMM GRANULOCYTES # BLD: 0 10E9/L (ref 0–0.4)
IMM GRANULOCYTES NFR BLD: 0.1 %
INTERPRETATION ECG - MUSE: NORMAL
INTERPRETATION ECG - MUSE: NORMAL
KETONES UR STRIP-MCNC: NEGATIVE MG/DL
LACTATE BLD-SCNC: 1 MMOL/L (ref 0.7–2)
LEUKOCYTE ESTERASE UR QL STRIP: NEGATIVE
LYMPHOCYTES # BLD AUTO: 1.1 10E9/L (ref 0.8–5.3)
LYMPHOCYTES NFR BLD AUTO: 14.3 %
M PNEUMO DNA SPEC QL NAA+PROBE: NOT DETECTED
MCH RBC QN AUTO: 31.5 PG (ref 26.5–33)
MCHC RBC AUTO-ENTMCNC: 31.7 G/DL (ref 31.5–36.5)
MCV RBC AUTO: 99 FL (ref 78–100)
MICROBIOLOGIST REVIEW: NORMAL
MONOCYTES # BLD AUTO: 0.1 10E9/L (ref 0–1.3)
MONOCYTES NFR BLD AUTO: 1.6 %
NEUTROPHILS # BLD AUTO: 6.1 10E9/L (ref 1.6–8.3)
NEUTROPHILS NFR BLD AUTO: 82.2 %
NITRATE UR QL: NEGATIVE
NT-PROBNP SERPL-MCNC: 809 PG/ML (ref 0–1800)
O2/TOTAL GAS SETTING VFR VENT: ABNORMAL %
OXYHGB MFR BLDV: 39 %
PCO2 BLDV: 70 MM HG (ref 40–50)
PH BLDV: 7.28 PH (ref 7.32–7.43)
PH UR STRIP: 5 PH (ref 5–7)
PLATELET # BLD AUTO: 215 10E9/L (ref 150–450)
PO2 BLDV: 26 MM HG (ref 25–47)
POTASSIUM SERPL-SCNC: 3.1 MMOL/L (ref 3.4–5.3)
POTASSIUM SERPL-SCNC: 3.4 MMOL/L (ref 3.4–5.3)
PROCALCITONIN SERPL-MCNC: 1.64 NG/ML
PROT SERPL-MCNC: 7.9 G/DL (ref 6.8–8.8)
RBC # BLD AUTO: 4.22 10E12/L (ref 3.8–5.2)
RBC #/AREA URNS AUTO: 0 /HPF (ref 0–2)
RSV RNA SPEC QL NAA+PROBE: NOT DETECTED
RSV RNA SPEC QL NAA+PROBE: NOT DETECTED
RV+EV RNA SPEC QL NAA+PROBE: NOT DETECTED
SODIUM SERPL-SCNC: 138 MMOL/L (ref 133–144)
SOURCE: ABNORMAL
SP GR UR STRIP: 1.03 (ref 1–1.03)
SPECIMEN SOURCE: NORMAL
TROPONIN I SERPL-MCNC: <0.015 UG/L (ref 0–0.04)
UROBILINOGEN UR STRIP-MCNC: NORMAL MG/DL (ref 0–2)
WBC # BLD AUTO: 7.5 10E9/L (ref 4–11)
WBC #/AREA URNS AUTO: 1 /HPF (ref 0–5)

## 2020-03-17 PROCEDURE — 00000146 ZZHCL STATISTIC GLUCOSE BY METER IP

## 2020-03-17 PROCEDURE — 96374 THER/PROPH/DIAG INJ IV PUSH: CPT | Mod: 59

## 2020-03-17 PROCEDURE — 84484 ASSAY OF TROPONIN QUANT: CPT | Performed by: HOSPITALIST

## 2020-03-17 PROCEDURE — 84132 ASSAY OF SERUM POTASSIUM: CPT | Performed by: HOSPITALIST

## 2020-03-17 PROCEDURE — 87486 CHLMYD PNEUM DNA AMP PROBE: CPT | Performed by: EMERGENCY MEDICINE

## 2020-03-17 PROCEDURE — 25000128 H RX IP 250 OP 636: Performed by: EMERGENCY MEDICINE

## 2020-03-17 PROCEDURE — 99223 1ST HOSP IP/OBS HIGH 75: CPT | Mod: AI | Performed by: HOSPITALIST

## 2020-03-17 PROCEDURE — 83605 ASSAY OF LACTIC ACID: CPT | Performed by: EMERGENCY MEDICINE

## 2020-03-17 PROCEDURE — 25000125 ZZHC RX 250: Performed by: HOSPITALIST

## 2020-03-17 PROCEDURE — 87633 RESP VIRUS 12-25 TARGETS: CPT | Performed by: EMERGENCY MEDICINE

## 2020-03-17 PROCEDURE — U0002 COVID-19 LAB TEST NON-CDC: HCPCS | Performed by: EMERGENCY MEDICINE

## 2020-03-17 PROCEDURE — 12000000 ZZH R&B MED SURG/OB

## 2020-03-17 PROCEDURE — 87581 M.PNEUMON DNA AMP PROBE: CPT | Performed by: EMERGENCY MEDICINE

## 2020-03-17 PROCEDURE — 25000132 ZZH RX MED GY IP 250 OP 250 PS 637: Performed by: HOSPITALIST

## 2020-03-17 PROCEDURE — 85379 FIBRIN DEGRADATION QUANT: CPT | Performed by: HOSPITALIST

## 2020-03-17 PROCEDURE — 84484 ASSAY OF TROPONIN QUANT: CPT | Performed by: EMERGENCY MEDICINE

## 2020-03-17 PROCEDURE — 93005 ELECTROCARDIOGRAM TRACING: CPT | Mod: 76

## 2020-03-17 PROCEDURE — 84145 PROCALCITONIN (PCT): CPT | Performed by: HOSPITALIST

## 2020-03-17 PROCEDURE — 71275 CT ANGIOGRAPHY CHEST: CPT

## 2020-03-17 PROCEDURE — 99285 EMERGENCY DEPT VISIT HI MDM: CPT | Mod: 25

## 2020-03-17 PROCEDURE — 25000128 H RX IP 250 OP 636: Performed by: HOSPITALIST

## 2020-03-17 PROCEDURE — 93005 ELECTROCARDIOGRAM TRACING: CPT

## 2020-03-17 PROCEDURE — 25000131 ZZH RX MED GY IP 250 OP 636 PS 637: Performed by: HOSPITALIST

## 2020-03-17 PROCEDURE — 83880 ASSAY OF NATRIURETIC PEPTIDE: CPT | Performed by: HOSPITALIST

## 2020-03-17 PROCEDURE — 36415 COLL VENOUS BLD VENIPUNCTURE: CPT | Performed by: HOSPITALIST

## 2020-03-17 PROCEDURE — G0463 HOSPITAL OUTPT CLINIC VISIT: HCPCS

## 2020-03-17 PROCEDURE — 85025 COMPLETE CBC W/AUTO DIFF WBC: CPT | Performed by: EMERGENCY MEDICINE

## 2020-03-17 PROCEDURE — 94640 AIRWAY INHALATION TREATMENT: CPT | Mod: 76

## 2020-03-17 PROCEDURE — 40000275 ZZH STATISTIC RCP TIME EA 10 MIN

## 2020-03-17 PROCEDURE — 94640 AIRWAY INHALATION TREATMENT: CPT

## 2020-03-17 PROCEDURE — 25800030 ZZH RX IP 258 OP 636: Performed by: EMERGENCY MEDICINE

## 2020-03-17 PROCEDURE — 87040 BLOOD CULTURE FOR BACTERIA: CPT | Performed by: HOSPITALIST

## 2020-03-17 PROCEDURE — 93970 EXTREMITY STUDY: CPT

## 2020-03-17 PROCEDURE — 25000125 ZZHC RX 250

## 2020-03-17 PROCEDURE — 25000125 ZZHC RX 250: Performed by: EMERGENCY MEDICINE

## 2020-03-17 PROCEDURE — 87804 INFLUENZA ASSAY W/OPTIC: CPT | Performed by: EMERGENCY MEDICINE

## 2020-03-17 PROCEDURE — 81001 URINALYSIS AUTO W/SCOPE: CPT | Performed by: HOSPITALIST

## 2020-03-17 PROCEDURE — 96361 HYDRATE IV INFUSION ADD-ON: CPT

## 2020-03-17 PROCEDURE — 82805 BLOOD GASES W/O2 SATURATION: CPT | Performed by: EMERGENCY MEDICINE

## 2020-03-17 PROCEDURE — 80053 COMPREHEN METABOLIC PANEL: CPT | Performed by: EMERGENCY MEDICINE

## 2020-03-17 RX ORDER — DOXYCYCLINE 100 MG/1
100 CAPSULE ORAL 2 TIMES DAILY
COMMUNITY
End: 2020-06-02

## 2020-03-17 RX ORDER — NICOTINE POLACRILEX 4 MG
15-30 LOZENGE BUCCAL
Status: DISCONTINUED | OUTPATIENT
Start: 2020-03-17 | End: 2020-03-22 | Stop reason: HOSPADM

## 2020-03-17 RX ORDER — ALBUTEROL SULFATE 0.83 MG/ML
3 SOLUTION RESPIRATORY (INHALATION)
Status: DISCONTINUED | OUTPATIENT
Start: 2020-03-17 | End: 2020-03-22 | Stop reason: HOSPADM

## 2020-03-17 RX ORDER — ONDANSETRON 2 MG/ML
4 INJECTION INTRAMUSCULAR; INTRAVENOUS EVERY 6 HOURS PRN
Status: DISCONTINUED | OUTPATIENT
Start: 2020-03-17 | End: 2020-03-22 | Stop reason: HOSPADM

## 2020-03-17 RX ORDER — HEPARIN SODIUM 5000 [USP'U]/.5ML
5000 INJECTION, SOLUTION INTRAVENOUS; SUBCUTANEOUS EVERY 12 HOURS
Status: DISCONTINUED | OUTPATIENT
Start: 2020-03-17 | End: 2020-03-22 | Stop reason: HOSPADM

## 2020-03-17 RX ORDER — ASPIRIN 81 MG/1
81 TABLET ORAL DAILY
Status: DISCONTINUED | OUTPATIENT
Start: 2020-03-17 | End: 2020-03-22 | Stop reason: HOSPADM

## 2020-03-17 RX ORDER — MAGNESIUM OXIDE 400 MG/1
400 TABLET ORAL 2 TIMES DAILY
Status: DISCONTINUED | OUTPATIENT
Start: 2020-03-17 | End: 2020-03-22 | Stop reason: HOSPADM

## 2020-03-17 RX ORDER — FAMOTIDINE 20 MG/1
20 TABLET, FILM COATED ORAL 2 TIMES DAILY
Status: DISCONTINUED | OUTPATIENT
Start: 2020-03-17 | End: 2020-03-17

## 2020-03-17 RX ORDER — ROPINIROLE 1 MG/1
1 TABLET, FILM COATED ORAL AT BEDTIME
Status: DISCONTINUED | OUTPATIENT
Start: 2020-03-17 | End: 2020-03-22 | Stop reason: HOSPADM

## 2020-03-17 RX ORDER — METHYLPREDNISOLONE SODIUM SUCCINATE 125 MG/2ML
125 INJECTION, POWDER, LYOPHILIZED, FOR SOLUTION INTRAMUSCULAR; INTRAVENOUS ONCE
Status: COMPLETED | OUTPATIENT
Start: 2020-03-17 | End: 2020-03-17

## 2020-03-17 RX ORDER — IPRATROPIUM BROMIDE AND ALBUTEROL SULFATE 2.5; .5 MG/3ML; MG/3ML
SOLUTION RESPIRATORY (INHALATION)
Status: COMPLETED
Start: 2020-03-17 | End: 2020-03-17

## 2020-03-17 RX ORDER — POTASSIUM CHLORIDE 1500 MG/1
20-40 TABLET, EXTENDED RELEASE ORAL
Status: DISCONTINUED | OUTPATIENT
Start: 2020-03-17 | End: 2020-03-22 | Stop reason: HOSPADM

## 2020-03-17 RX ORDER — MULTIPLE VITAMINS W/ MINERALS TAB 9MG-400MCG
1 TAB ORAL DAILY
Status: DISCONTINUED | OUTPATIENT
Start: 2020-03-17 | End: 2020-03-22 | Stop reason: HOSPADM

## 2020-03-17 RX ORDER — POLYETHYLENE GLYCOL 3350 17 G/17G
17 POWDER, FOR SOLUTION ORAL DAILY PRN
Status: DISCONTINUED | OUTPATIENT
Start: 2020-03-17 | End: 2020-03-22 | Stop reason: HOSPADM

## 2020-03-17 RX ORDER — CETIRIZINE HYDROCHLORIDE 5 MG/1
5 TABLET ORAL DAILY
Status: DISCONTINUED | OUTPATIENT
Start: 2020-03-17 | End: 2020-03-22 | Stop reason: HOSPADM

## 2020-03-17 RX ORDER — METHYLPREDNISOLONE SODIUM SUCCINATE 125 MG/2ML
60 INJECTION, POWDER, LYOPHILIZED, FOR SOLUTION INTRAMUSCULAR; INTRAVENOUS EVERY 8 HOURS
Status: DISCONTINUED | OUTPATIENT
Start: 2020-03-17 | End: 2020-03-17

## 2020-03-17 RX ORDER — IOPAMIDOL 755 MG/ML
58 INJECTION, SOLUTION INTRAVASCULAR ONCE
Status: COMPLETED | OUTPATIENT
Start: 2020-03-17 | End: 2020-03-17

## 2020-03-17 RX ORDER — ONDANSETRON 4 MG/1
4 TABLET, ORALLY DISINTEGRATING ORAL EVERY 6 HOURS PRN
Status: DISCONTINUED | OUTPATIENT
Start: 2020-03-17 | End: 2020-03-22 | Stop reason: HOSPADM

## 2020-03-17 RX ORDER — IPRATROPIUM BROMIDE AND ALBUTEROL SULFATE 2.5; .5 MG/3ML; MG/3ML
3 SOLUTION RESPIRATORY (INHALATION) ONCE
Status: COMPLETED | OUTPATIENT
Start: 2020-03-17 | End: 2020-03-17

## 2020-03-17 RX ORDER — LIDOCAINE 40 MG/G
CREAM TOPICAL
Status: DISCONTINUED | OUTPATIENT
Start: 2020-03-17 | End: 2020-03-22 | Stop reason: HOSPADM

## 2020-03-17 RX ORDER — NITROFURANTOIN 25; 75 MG/1; MG/1
100 CAPSULE ORAL 2 TIMES DAILY
Status: ON HOLD | COMMUNITY
End: 2020-03-22

## 2020-03-17 RX ORDER — NITROGLYCERIN 0.4 MG/1
0.4 TABLET SUBLINGUAL EVERY 5 MIN PRN
Status: DISCONTINUED | OUTPATIENT
Start: 2020-03-17 | End: 2020-03-22 | Stop reason: HOSPADM

## 2020-03-17 RX ORDER — NALOXONE HYDROCHLORIDE 0.4 MG/ML
.1-.4 INJECTION, SOLUTION INTRAMUSCULAR; INTRAVENOUS; SUBCUTANEOUS
Status: DISCONTINUED | OUTPATIENT
Start: 2020-03-17 | End: 2020-03-22 | Stop reason: HOSPADM

## 2020-03-17 RX ORDER — POTASSIUM CHLORIDE 1.5 G/1.58G
20-40 POWDER, FOR SOLUTION ORAL
Status: DISCONTINUED | OUTPATIENT
Start: 2020-03-17 | End: 2020-03-22 | Stop reason: HOSPADM

## 2020-03-17 RX ORDER — IPRATROPIUM BROMIDE AND ALBUTEROL SULFATE 2.5; .5 MG/3ML; MG/3ML
3 SOLUTION RESPIRATORY (INHALATION)
Status: DISCONTINUED | OUTPATIENT
Start: 2020-03-17 | End: 2020-03-18

## 2020-03-17 RX ORDER — FUROSEMIDE 10 MG/ML
20 INJECTION INTRAMUSCULAR; INTRAVENOUS DAILY
Status: DISCONTINUED | OUTPATIENT
Start: 2020-03-17 | End: 2020-03-18

## 2020-03-17 RX ORDER — DEXTROSE MONOHYDRATE 25 G/50ML
25-50 INJECTION, SOLUTION INTRAVENOUS
Status: DISCONTINUED | OUTPATIENT
Start: 2020-03-17 | End: 2020-03-22 | Stop reason: HOSPADM

## 2020-03-17 RX ORDER — POTASSIUM CL/LIDO/0.9 % NACL 10MEQ/0.1L
10 INTRAVENOUS SOLUTION, PIGGYBACK (ML) INTRAVENOUS
Status: DISCONTINUED | OUTPATIENT
Start: 2020-03-17 | End: 2020-03-22 | Stop reason: HOSPADM

## 2020-03-17 RX ORDER — POTASSIUM CHLORIDE 29.8 MG/ML
20 INJECTION INTRAVENOUS
Status: DISCONTINUED | OUTPATIENT
Start: 2020-03-17 | End: 2020-03-22 | Stop reason: HOSPADM

## 2020-03-17 RX ORDER — ATORVASTATIN CALCIUM 40 MG/1
40 TABLET, FILM COATED ORAL DAILY
Status: DISCONTINUED | OUTPATIENT
Start: 2020-03-17 | End: 2020-03-22 | Stop reason: HOSPADM

## 2020-03-17 RX ORDER — BUDESONIDE 0.5 MG/2ML
0.5 INHALANT ORAL 2 TIMES DAILY
Status: DISCONTINUED | OUTPATIENT
Start: 2020-03-17 | End: 2020-03-18

## 2020-03-17 RX ORDER — FLUTICASONE PROPIONATE 50 MCG
1 SPRAY, SUSPENSION (ML) NASAL DAILY
Status: DISCONTINUED | OUTPATIENT
Start: 2020-03-17 | End: 2020-03-22 | Stop reason: HOSPADM

## 2020-03-17 RX ORDER — PIPERACILLIN SODIUM, TAZOBACTAM SODIUM 3; .375 G/15ML; G/15ML
3.38 INJECTION, POWDER, LYOPHILIZED, FOR SOLUTION INTRAVENOUS EVERY 6 HOURS
Status: DISCONTINUED | OUTPATIENT
Start: 2020-03-17 | End: 2020-03-18

## 2020-03-17 RX ORDER — ACETAMINOPHEN 325 MG/1
650 TABLET ORAL EVERY 4 HOURS PRN
Status: DISCONTINUED | OUTPATIENT
Start: 2020-03-17 | End: 2020-03-22 | Stop reason: HOSPADM

## 2020-03-17 RX ORDER — POTASSIUM CHLORIDE 7.45 MG/ML
10 INJECTION INTRAVENOUS
Status: DISCONTINUED | OUTPATIENT
Start: 2020-03-17 | End: 2020-03-22 | Stop reason: HOSPADM

## 2020-03-17 RX ADMIN — FUROSEMIDE 20 MG: 10 INJECTION, SOLUTION INTRAVENOUS at 09:43

## 2020-03-17 RX ADMIN — SODIUM CHLORIDE 1000 ML: 9 INJECTION, SOLUTION INTRAVENOUS at 06:02

## 2020-03-17 RX ADMIN — IPRATROPIUM BROMIDE AND ALBUTEROL SULFATE 3 ML: .5; 3 SOLUTION RESPIRATORY (INHALATION) at 10:42

## 2020-03-17 RX ADMIN — OMEPRAZOLE 20 MG: 20 CAPSULE, DELAYED RELEASE ORAL at 09:43

## 2020-03-17 RX ADMIN — FAMOTIDINE 20 MG: 20 TABLET, FILM COATED ORAL at 09:43

## 2020-03-17 RX ADMIN — IPRATROPIUM BROMIDE AND ALBUTEROL SULFATE 3 ML: 2.5; .5 SOLUTION RESPIRATORY (INHALATION) at 04:53

## 2020-03-17 RX ADMIN — METHYLPREDNISOLONE SODIUM SUCCINATE 125 MG: 125 INJECTION, POWDER, FOR SOLUTION INTRAMUSCULAR; INTRAVENOUS at 05:08

## 2020-03-17 RX ADMIN — ROPINIROLE HYDROCHLORIDE 1 MG: 1 TABLET, FILM COATED ORAL at 21:07

## 2020-03-17 RX ADMIN — IPRATROPIUM BROMIDE AND ALBUTEROL SULFATE 3 ML: .5; 3 SOLUTION RESPIRATORY (INHALATION) at 15:08

## 2020-03-17 RX ADMIN — HEPARIN SODIUM 5000 UNITS: 5000 INJECTION, SOLUTION INTRAVENOUS; SUBCUTANEOUS at 09:43

## 2020-03-17 RX ADMIN — ACETAMINOPHEN 650 MG: 325 TABLET, FILM COATED ORAL at 17:18

## 2020-03-17 RX ADMIN — SODIUM CHLORIDE 85 ML: 9 INJECTION, SOLUTION INTRAVENOUS at 06:41

## 2020-03-17 RX ADMIN — ISOSORBIDE MONONITRATE 15 MG: 30 TABLET, EXTENDED RELEASE ORAL at 09:43

## 2020-03-17 RX ADMIN — BUDESONIDE 0.5 MG: 0.5 INHALANT RESPIRATORY (INHALATION) at 10:42

## 2020-03-17 RX ADMIN — ATORVASTATIN CALCIUM 40 MG: 40 TABLET, FILM COATED ORAL at 09:43

## 2020-03-17 RX ADMIN — FLUTICASONE PROPIONATE 1 SPRAY: 50 SPRAY, METERED NASAL at 11:05

## 2020-03-17 RX ADMIN — ASPIRIN 81 MG: 81 TABLET, DELAYED RELEASE ORAL at 09:43

## 2020-03-17 RX ADMIN — INSULIN ASPART 1 UNITS: 100 INJECTION, SOLUTION INTRAVENOUS; SUBCUTANEOUS at 17:11

## 2020-03-17 RX ADMIN — IPRATROPIUM BROMIDE AND ALBUTEROL SULFATE 3 ML: .5; 3 SOLUTION RESPIRATORY (INHALATION) at 13:50

## 2020-03-17 RX ADMIN — POTASSIUM CHLORIDE 40 MEQ: 1500 TABLET, EXTENDED RELEASE ORAL at 09:43

## 2020-03-17 RX ADMIN — PIPERACILLIN SODIUM AND TAZOBACTAM SODIUM 3.38 G: 3; .375 INJECTION, POWDER, LYOPHILIZED, FOR SOLUTION INTRAVENOUS at 13:40

## 2020-03-17 RX ADMIN — IOPAMIDOL 58 ML: 755 INJECTION, SOLUTION INTRAVENOUS at 06:40

## 2020-03-17 RX ADMIN — SODIUM CHLORIDE 1000 ML: 9 INJECTION, SOLUTION INTRAVENOUS at 05:17

## 2020-03-17 RX ADMIN — INSULIN ASPART 2 UNITS: 100 INJECTION, SOLUTION INTRAVENOUS; SUBCUTANEOUS at 11:04

## 2020-03-17 RX ADMIN — HEPARIN SODIUM 5000 UNITS: 5000 INJECTION, SOLUTION INTRAVENOUS; SUBCUTANEOUS at 21:09

## 2020-03-17 RX ADMIN — POTASSIUM CHLORIDE 20 MEQ: 1500 TABLET, EXTENDED RELEASE ORAL at 11:24

## 2020-03-17 RX ADMIN — BUDESONIDE 0.5 MG: 0.5 INHALANT RESPIRATORY (INHALATION) at 19:13

## 2020-03-17 RX ADMIN — Medication 400 MG: at 21:07

## 2020-03-17 RX ADMIN — METOPROLOL SUCCINATE 12.5 MG: 25 TABLET, EXTENDED RELEASE ORAL at 09:43

## 2020-03-17 RX ADMIN — Medication 400 MG: at 09:43

## 2020-03-17 RX ADMIN — IPRATROPIUM BROMIDE AND ALBUTEROL SULFATE 3 ML: .5; 3 SOLUTION RESPIRATORY (INHALATION) at 19:13

## 2020-03-17 RX ADMIN — PIPERACILLIN SODIUM AND TAZOBACTAM SODIUM 3.38 G: 3; .375 INJECTION, POWDER, LYOPHILIZED, FOR SOLUTION INTRAVENOUS at 20:59

## 2020-03-17 RX ADMIN — IPRATROPIUM BROMIDE AND ALBUTEROL SULFATE 3 ML: .5; 3 SOLUTION RESPIRATORY (INHALATION) at 04:53

## 2020-03-17 RX ADMIN — INSULIN GLARGINE 10 UNITS: 100 INJECTION, SOLUTION SUBCUTANEOUS at 11:04

## 2020-03-17 ASSESSMENT — ACTIVITIES OF DAILY LIVING (ADL)
ADLS_ACUITY_SCORE: 21
ADLS_ACUITY_SCORE: 23
BATHING: 0-->INDEPENDENT
RETIRED_EATING: 0-->INDEPENDENT
SWALLOWING: 0-->SWALLOWS FOODS/LIQUIDS WITHOUT DIFFICULTY
TRANSFERRING: 1-->ASSISTIVE EQUIPMENT
DRESS: 0-->INDEPENDENT
COGNITION: 1 - ATTENTION OR MEMORY DEFICITS
AMBULATION: 1-->ASSISTIVE EQUIPMENT
TOILETING: 1-->ASSISTIVE EQUIPMENT
ADLS_ACUITY_SCORE: 23
WHICH_OF_THE_ABOVE_FUNCTIONAL_RISKS_HAD_A_RECENT_ONSET_OR_CHANGE?: AMBULATION;TRANSFERRING;TOILETING;BATHING;DRESSING;EATING
RETIRED_COMMUNICATION: 0-->UNDERSTANDS/COMMUNICATES WITHOUT DIFFICULTY

## 2020-03-17 ASSESSMENT — ENCOUNTER SYMPTOMS
SHORTNESS OF BREATH: 1
FEVER: 0
COUGH: 0
ABDOMINAL PAIN: 0

## 2020-03-17 NOTE — PROGRESS NOTES
RECEIVING UNIT ED HANDOFF REVIEW    ED Nurse Handoff Report was reviewed by: Rod Reddy RN on March 17, 2020 at 8:25 AM     Chart reviewed, ok to send pt.

## 2020-03-17 NOTE — PHARMACY-ADMISSION MEDICATION HISTORY
Pharmacy Medication History  Admission medication history interview status for the 3/17/2020  admission is complete. See EPIC admission navigator for prior to admission medications     Medication history sources: Patient's family/friend (daughter and called her  pharmacy)  Medication history source reliability: Good  Adherence assessment: Good    Significant changes made to the medication list:  Recently filled Doxycycline & Macrobid          Medication reconciliation completed by provider prior to medication history? No    Time spent in this activity: 25min      Prior to Admission medications    Medication Sig Last Dose Taking? Auth Provider   ASPIRIN EC PO Take 81 mg by mouth daily  3/16/2020 at Unknown time Yes Unknown, Entered By History   atorvastatin (LIPITOR) 40 MG tablet Take 40 mg by mouth daily 3/16/2020 at Unknown time Yes Unknown, Entered By History   doxycycline hyclate (VIBRAMYCIN) 100 MG capsule Take 100 mg by mouth 2 times daily For 7days. 3/16/2020 at Unknown time Yes Unknown, Entered By History   isosorbide mononitrate (IMDUR) 30 MG 24 hr tablet Take 0.5 tablets (15 mg) by mouth daily 3/16/2020 at Unknown time Yes Mohan Odonnell MD   magnesium oxide (MAG-OX) 400 MG tablet Take 400 mg by mouth 2 times daily 3/16/2020 at Unknown time Yes Unknown, Entered By History   metoprolol succinate ER (TOPROL-XL) 25 MG 24 hr tablet Take 0.5 tablets (12.5 mg) by mouth 2 times daily 3/16/2020 at Unknown time Yes Mohan Odonnell MD   Multiple Vitamins-Minerals (CENTRUM SILVER) per tablet Take 1 tablet by mouth daily 3/16/2020 at Unknown time Yes Reported, Patient   nitroFURantoin macrocrystal-monohydrate (MACROBID) 100 MG capsule Take 100 mg by mouth 2 times daily For 7 days. 3/16/2020 at Unknown time Yes Unknown, Entered By History   nitroglycerin (NITROSTAT) 0.4 MG SL tablet Place 1 tablet (0.4 mg) under the tongue every 5 minutes as needed X 3 doses total if needed for chest pain.  Yes Fidencio  Briseida Russell, DO   OMEPRAZOLE PO Take 20 mg by mouth every morning  3/16/2020 at Unknown time Yes Reported, Patient   pioglitazone (ACTOS) 15 MG tablet Take 15 mg by mouth daily 3/16/2020 at Unknown time Yes Unknown, Entered By History   rOPINIRole (REQUIP) 1 MG tablet Take 1 mg by mouth At Bedtime 3/16/2020 at Unknown time Yes Unknown, Entered By History

## 2020-03-17 NOTE — ED PROVIDER NOTES
"  History     Chief Complaint:  Shortness of Breath    The history is provided by the patient.      Mel Castellanos is a 79 year old female on aspirin with a history of hypertension and hyperlipidemia who presents to the emergency department via EMS for evaluation of shortness of breath. EMS report that the patient's daughter called tonight 3/17/2020 with concerns about the patient's shortness of breath. They state that they gave the patient 1 DuoNeb en route to the ED and the patient was 91% on 2 L of O2.    The patient reports that her shortness of breath started yesterday 3/17/2020 and continued throughout the day. She states that she typically uses a nebulizer but did not use one tonight. The patient also states that she no longer feels short of breath but mentions that the room feels cold. She denies a cough, fever, congestion, chest pain, or abdominal pain. The patient reports that she currently lives with daughter and states that her daughter \"drives her nuts\".    Allergies:  Sulfa Drugs     Medications:    Aspirin  Lipitor  Imdur  Toprol  Nitrostat  Actos  Requip  Desyrel     Past Medical History:    Insomnia  Arthritis  Emphysema  Hyperlipidemia  HTN  MI  Restless leg syndrome  TIA  DM2    Past Surgical History:    Appendectomy  Cholecystectomy    Family History:    Coronary artery disease    Social History:  Presents via EMS  Current every day smoker  No alcohol use  No drug use  Marital Status:   [5]     Review of Systems   Constitutional: Negative for fever.   HENT: Negative for congestion.    Respiratory: Positive for shortness of breath. Negative for cough.    Cardiovascular: Negative for chest pain.   Gastrointestinal: Negative for abdominal pain.   All other systems reviewed and are negative.    Physical Exam     Patient Vitals for the past 24 hrs:   BP Temp Temp src Pulse Heart Rate Resp SpO2   03/17/20 0715 134/70 -- -- -- 95 17 95 %   03/17/20 0701 -- -- -- -- 93 22 96 %   03/17/20 0700 " 121/61 -- -- 96 93 22 96 %   03/17/20 0643 -- -- -- -- 94 20 96 %   03/17/20 0642 -- -- -- -- -- -- 95 %   03/17/20 0640 129/64 -- -- 95 -- -- --   03/17/20 0620 129/56 -- -- 93 95 20 97 %   03/17/20 0605 124/57 -- -- 96 95 28 96 %   03/17/20 0603 -- 99.1  F (37.3  C) Oral -- -- -- --   03/17/20 0530 (!) 83/65 -- -- 95 92 27 95 %   03/17/20 0520 (!) 83/55 -- -- 94 93 29 93 %   03/17/20 0518 (!) 72/60 -- -- 95 92 26 93 %   03/17/20 0510 -- -- -- -- 92 25 93 %   03/17/20 0509 (!) 77/60 -- -- 92 92 26 93 %   03/17/20 0447 94/81 -- -- 97 -- -- 91 %   03/17/20 0441 -- 99.3  F (37.4  C) Oral -- -- -- --       Physical Exam  General: Appears well-developed and well-nourished.   Head: No signs of trauma.   Mouth/Throat: Oropharynx is clear and moist.   CV: Normal rate and regular rhythm.    Resp: Effort normal and breath sounds normal. No respiratory distress.   GI: Soft. There is no tenderness.  No rebound or guarding.  Normal bowel sounds.  No CVA tenderness.  MSK: Normal range of motion. no edema. No Calf tenderness.  Neuro: The patient is alert and oriented, but poor overall historian.  Speech normal.  Skin: Skin is warm and dry. No rash noted.   Psych: normal mood and affect. behavior is normal.       Emergency Department Course     ECG:  Time: 0455  Vent. Rate 97 bpm. TN interval 176. QRS duration 80. QT/QTc 340/431. P-R-T axis 82 81 -90.  Normal sinus rhythm with sinus arrhythmia  Marked ST abnormality, possible inferior subendocardial injury  Abnormal ECG  Read time: 0450      Time: 0603  Vent. Rate 96 bpm. TN interval 196. QRS duration 78. QT/QTc 364/459. P-R-T axis 75 78 257.  Normal sinus rhythm.  ST and T wave abnormality, consider inferior ischemia  Abnormal ECG  Read time: 0610     Imaging:    As per radiology.                          CT Chest Pulmonary Embolism w Contrast   Final Result   IMPRESSION:    1. No evidence of pulmonary embolism.   2. Bilateral interlobular septal thickening, likely represent    pulmonary edema.   3. Few scattered patchy consolidative pulmonary opacities, for example   in the right lower lobe and lingula, could be infectious or   atelectatic.   4. Multiple mildly enlarged mediastinal and hilar lymph nodes,   indeterminate, could be reactive.   5. The main pulmonary artery is enlarged measuring 3.3 cm, this can be   seen with pulmonary hypertension.   6. Few scattered pulmonary nodules for example 5 mm right upper lobe   nodule, not well seen on prior studies, an indeterminate, could be   infectious, recommend attention on follow-up.      ELVER COOK MD          Laboratory:  Laboratory findings were communicated with the patient who voiced understanding of the findings.    CBC: WBC: 7.5, HGB: 13.3, PLT: 215   CMP: Glucose 144 (H), potassium 3.1 (L) Anion Gap 2 (L), o/w WNL (Creatinine: 0.69)     Blood gas venous and oxyhgb: pH venous 7.28 (L), PCO2 venous 70 (H), Bicarbonate venous 33 (H)    Lactic acid: 1.0    0638 Troponin I: <0.015      Corona virus swab: in process    Influenza A/B antigen: negative    Interventions:  0453 Duoneb 3 mL Neb  0508 Solumedrol 125 mg IV  0517 NS 1L IV   0602 NS 1L IV     Emergency Department Course:  Past medical records, nursing notes, and vitals reviewed.    0458 I performed an exam of the patient as documented above.     EKG obtained in the ED, see results above.     IV was inserted and blood was drawn for laboratory testing, results above.    The patient was sent for a CT chest while in the emergency department, results above.     0642 I rechecked the patient and discussed the results of her workup thus far.     8554 I spoke with Dr. Peterson, Hospitalist, regarding the patient.     Findings and plan explained to the Patient who consents to admission. Discussed the patient with Dr. Peterson, who will admit the patient to a observation bed for further monitoring, evaluation, and treatment.    I personally reviewed the laboratory and imaging results  with the Patient and answered all related questions prior to admission.     Impression & Plan     Medical Decision Making:  Mel Castellanos is a 79 year old woman who presents due to shortness of breath.  It seems that she had woken up with some shortness of breath and wheezing.  She does have a history of COPD/emphysema.  She did not use any breathing treatments prior to arrival.  She did receive a nebulizer with EMS and on presentation she is feeling a bit better.  Her O2 sats were appropriate on O2.  I did give Solu-Medrol and over time her symptoms resolved.  She did have a temperature of 99 and her history is somewhat limited, so initially I had obtained an influenza swab and also a COVID test.  Clinically the patient does not have significant risk factors for this.  The patient will be admitted to the hospital service for monitoring and pulmonary toilet.    Diagnosis:     ICD-10-CM   1. COPD exacerbation (H)  J44.1       Disposition:  Admitted to Dr. Peterson.    Scribe Disclosure:  Cristino BAIG, am serving as a scribe at 4:50 AM on 3/17/2020 to document services personally performed by Guicho Ford MD based on my observations and the provider's statements to me.         Guicho Ford MD  03/18/20 1621

## 2020-03-17 NOTE — PLAN OF CARE
Admission    Patient arrives to room 627 via cart from ED.  Care plan note: vss, on3 LPM NA upon arriving to unit, but able to wean off O2 at around 1344. Multiple times of INC of lg urine noted after IV lasix was given. Pt disoriented to situation, place, and time. Forgetful. Found to pulled out IV and TELE off once. Able to be redirected. Very talkative, about her childhood memories. Lung fine crackles. +BS. Pain on BLE, tylenol once, effective. Wound on top of left foot, dressing changed. Scratching marks on BLE, lotion applied. Possible pressure injury noted pn coccyx upon admission, wound care RN consulted and seen. Turn and reposition Q 2 hours. /208/188. Tele NSR. Serial Trop had been neg. Procal 1.64, D-Dimer 2.4. MD aware, ordered BC x2 and UA, both done. Info from [facility] urgent care obtained and placed in front of chart per MD request. Pt started on Zyson and scheduled nebs. Placed in contact, droplet and airborne (when using nebs) for COVITD 19 rule out.   K+ 3.1, replaced, recheck result pending.     Inpatient nursing criteria listed below were met:    PCD's Documented: Yes  Skin issues/needs documented :Yes  Isolation education started/completed Yes  Patient allergies verified with patient: Yes  Verified completion of [place] Risk Assessment Tool:  Yes  Verified completion of Guardianship screening tool: Yes  Fall Prevention: Care plan updated, Education given and documented Yes  Care Plan initiated: Yes  Home medications documented in belongings flowsheet: NA  Patient belongings documented in belongings flowsheet: Yes  Reminder note (belongings/ medications) placed in discharge instructions:Yes  Admission profile/ required documentation complete: Yes  Bedside Report Letter given and explained to patient NA

## 2020-03-17 NOTE — ED NOTES
[facility]  ED Nurse Handoff Report    ED Chief complaint: Shortness of Breath      ED Diagnosis:   Final diagnoses:   None       Code Status: Full Code    Allergies:   Allergies   Allergen Reactions     Sulfa Drugs Rash       Patient Story: Pt has hx of COPD. Pt woke up this AM feeling short of breath. EMS reports SAO2 was 88-90% on room air. Pt does not use O2 at home  Focused Assessment:  COPD. Lives at home with her daughter. Increased SOB at home this AM. No sick contacts.     Treatments and/or interventions provided: duoneb, 1 liter normal saline bolus, solmedrol, O2 2 liters per NC  Patient's response to treatments and/or interventions:     To be done/followed up on inpatient unit:      Does this patient have any cognitive concerns?: alert and oriented    Activity level - Baseline/Home:  Independent  Activity Level - Current:   Stand with Assist    Patient's Preferred language: English   Needed?: No    Isolation: Droplet  Infection: pt being tested for COVID 19  Other   Bariatric?: No    Vital Signs:   Vitals:    03/17/20 0520 03/17/20 0530 03/17/20 0603 03/17/20 0605   BP: (!) 83/55 (!) 83/65  124/57   Pulse: 94 95  96   Resp: 29 27  28   Temp:   99.1  F (37.3  C)    TempSrc:   Oral    SpO2: 93% 95%  96%       Cardiac Rhythm:Cardiac Rhythm: Normal sinus rhythm    Was the PSS-3 completed:   Yes  What interventions are required if any?               Family Comments:   OBS brochure/video discussed/provided to patient/family: N/A              Name of person given brochure if not patient:               Relationship to patient:     For the majority of the shift this patient's behavior was Green.   Behavioral interventions performed were .    ED NURSE PHONE NUMBER: [phone]

## 2020-03-17 NOTE — ED NOTES
RN rounding on pt. Pt had taken off her O2. Pt's O2 saturations at 85% on RA. RN placed 3L of O2 via NC on pt. Pt's O2 saturation 92% on 3L of O2.

## 2020-03-17 NOTE — ED TRIAGE NOTES
Pt has hx of COPD. Pt woke up this AM feeling short of breath. EMS reports SAO2 was 88-90% on room air. Pt does not use O2 at home.

## 2020-03-17 NOTE — H&P
"[facility]  History and Physical - Hospitalist Service       Date of Admission:  3/17/2020    Assessment & Plan   Mel Castellanos is an 79 year old female with history of HTN, unstable angina, insomnia, ELS, TIA, T2DM, recurrent UTIs, and emphysema who presented to the ED via EMS due to worsening shortness of breath.  She is being admitted to manage presumed COPD exacerbation    Possible acute COPD exacerbation  Possible pneumonia/fluid overload: Patient with emphysema, underlying history of tobacco use.  Fever and sudden onset shortness of breath 30-minute PTA.  SPO2 88 on room air per EMS.  CT PE study ruled out PE but shows   -Admit to inpatient  -Supplemental oxygen, wean as able. VBG showed CO2 retention, if worsened dyspnea, check ABG and will place on BiPAP.  -Received IV Solu-Medrol in ER, does not have much wheezing. CT shows possible pulmonary edema/bronchopneumonia. Will discontinue his IV steroid, treat with Scheduled DuoNeb and Pulmicort, PRN albuterol. Incentive spirometry as able.    - Checked proBNP- not elevated but given leg edema and CT findings, will treat with IV lasix, follow I&O, daily weight.  - Checked procalcitonin, elevated - blood cultures x2 ordered and started on Zosyn.   -Follow serial troponin given abnormal EKG, and 2D echo as well.  -Follow COVID-19 result. On contact and droplet isolation.    Hypokalemia  -Replace per protocol    Deconditioned  Concern of safety at home - unclear baseline mentation   Per chart review from discharge after recent hospitalization, daughter possibly having issues at home. Patient likely is cognitively impaired at baseline. Patient's daughter was reportedly intoxicated when EMS picked up patient last time and reportedly she has depression and attempted suicide recently. The patient and this daughter have been living together to \"take care\" of each other.  Significant home safety concerns.  Patient was evaluated by psychiatry as well " during her last hospital stay 2 months ago and was deemed still lacks decisional capacity. SW was consulted, s/w consult for follow up.     Skin excoriation, petechiae, small scabs and Lt foot ulcer suspect ischemic  PVD  - Followed by WOC RN as outpatient.  -Given leg edema and pain, checked d-dimer, elevated and so r/o DVT.  -Patient with history of lower extremity angiogram and bilateral iliac stenting, continue ASA, beta-blocker and statin as above.  -Daughter reports patient had MRSA positive in her leg wound and was restarted on doxycycline yesterday.  Leg wound does not appear infected.  Obtain outpatient result but I am inclined not to continue with doxycycline at this time.  -Wound care consult    UTI  -Per daughter, was at urgent care clinic yesterday, diagnosed with UTI and was restarted on Macrobid.  -Repeat UA, obtain culture data from outpatient  -Antibiotic based on repeat UA and outpatient culture data.     Hypertension:  Blood pressure was soft at presentation.  Denies dizziness.  Patient was hypotensive on recent admission and multiple antihypertensives were discontinued.  -Resume PTA Imdur and metoprolol with hold parameters in place.      History of CAD/MI:  -Resume PTA ASA 81 mg p.o. daily, Lipitor 40 mg p.o. daily, Imdur 15 mg p.o. daily, Toprol-XL 12.5 mg p.o. daily and PRN sublingual nitroglycerin.       T2DM  HbA1c 6.8 in January.  PTA is on pioglitazone.    -Hold PTA pioglitazone  -Expect uncontrolled blood sugar due to steroid, start on Lantus 10 units subcu daily and 1 unit insulin per 15 g CHO and medium insulin resistance sliding scale.  -Moderate carb diet and hypoglycemia protocol     Restless leg syndrome  - Continue PTA ropinirole.     Diet: Moderate carbohydrate diet  DVT Prophylaxis: Heparin SQ  Tavera Catheter: not present  Code Status: Full code per recent admission    Disposition Plan   Expected discharge: 2 - 3 days, recommended to TBD, pending clinical course once Dyspnea  improves and safe discharge planning made.  Entered: Oleg Peterson MD 03/17/2020, 8:52 AM     The patient's care was discussed with the Patient.    Oleg Peterson MD  [facility]    ______________________________________________________________________    Chief Complaint   Shortness of breath    History is obtained from the patient who is a very poor historian, chart review, discussion with ER physician    History of Present Illness   Mel Castellanos is an 79 year old female with history of HTN, unstable angina, insomnia, ELS, TIA, T2DM, recurrent UTIs, and emphysema who presented to the ED via EMS due to worsening shortness of breath.    Patient is a very poor historian and states he had shortness of breath for years and cannot recall when it started getting worse. Daughter stated she had abrupt onset dyspnea and fever this morning. She reports a dry cough. Denies fever. No chest pain but reports epigastric abdominal pain. No urinary symptoms.  Denies orthopnea or PND.  Patient was not sure if she uses oxygen at home but it appears she in not on supplemental O2. She reports using nebulizer though it has not been listed in her home medication.    Daughter reports she was in the urgent care clinic yesterday, and was started on doxycycline for MRSA infection of foot, and     In ER, patient was evaluated by Dr. Ford.  Patient reportedly was hypoxic at 85% on room air and was placed on 2-3 LPM NC O2.  She had low-grade temp of 99.3.  CBC was normal.  Lactic acid negative.  Rapid influenza was negative.  COVID-19 swab has been sent. CT chest PE protocol was completed, Mild upper lobe predominant emphysematous changes. Mucus debris within the trachea, Bilateral mild interlobular septal thickening, could represent pulmonary edema. Few patchy pulmonary opacities, for example in the posterior aspect of the superior segment of the right lower lobe and lingula could be due to atelectatic  or  infectious. Twelve-lead EKG showed ST depression on inferior leads and T inversion in precordial leads.  Troponin was negative.  Patient was given Solu-Medrol, DuoNeb, normal saline IV bolus and an admission was requested.      Review of Systems    The 10 point Review of Systems is negative other than noted in the HPI or here.      Past Medical History    I have reviewed this patient's medical history and updated it with pertinent information if needed.   Past Medical History:   Diagnosis Date     Arthritis      Emphysema of lung (H)      High cholesterol      HTN (hypertension)      Insomnia      MI (myocardial infarction) (H)      Restless leg syndrome      TIA (transient ischaemic attack)      Type 2 diabetes mellitus without complications (H)      UTI (urinary tract infection)        Past Surgical History   I have reviewed this patient's surgical history and updated it with pertinent information if needed.  Past Surgical History:   Procedure Laterality Date     APPENDECTOMY       CHOLECYSTECTOMY       IR LOWER EXTREMITY ANGIOGRAM LEFT  1/30/2020       Social History   I have reviewed this patient's social history and updated it with pertinent information if needed.  Social History     Tobacco Use     Smoking status: Current Every Day Smoker     Packs/day: 0.25     Smokeless tobacco: Never Used     Tobacco comment: 1/4-1/2 pack per day    Substance Use Topics     Alcohol use: No     Drug use: No       Family History   I have reviewed this patient's family history and updated it with pertinent information if needed.   Family History   Problem Relation Age of Onset     Coronary Artery Disease Father         Prior to Admission Medications   Prior to Admission Medications   Prescriptions Last Dose Informant Patient Reported? Taking?   ASPIRIN EC PO 3/16/2020 at Unknown time Daughter Yes Yes   Sig: Take 81 mg by mouth daily    Multiple Vitamins-Minerals (CENTRUM SILVER) per tablet 3/16/2020 at Unknown time  Daughter Yes Yes   Sig: Take 1 tablet by mouth daily   OMEPRAZOLE PO 3/16/2020 at Unknown time Daughter Yes Yes   Sig: Take 20 mg by mouth every morning    atorvastatin (LIPITOR) 40 MG tablet 3/16/2020 at Unknown time Daughter Yes Yes   Sig: Take 40 mg by mouth daily   doxycycline hyclate (VIBRAMYCIN) 100 MG capsule 3/16/2020 at Unknown time  Yes Yes   Sig: Take 100 mg by mouth 2 times daily For 7days.   isosorbide mononitrate (IMDUR) 30 MG 24 hr tablet 3/16/2020 at Unknown time  No Yes   Sig: Take 0.5 tablets (15 mg) by mouth daily   magnesium oxide (MAG-OX) 400 MG tablet 3/16/2020 at Unknown time Daughter Yes Yes   Sig: Take 400 mg by mouth 2 times daily   metoprolol succinate ER (TOPROL-XL) 25 MG 24 hr tablet 3/16/2020 at Unknown time  No Yes   Sig: Take 0.5 tablets (12.5 mg) by mouth 2 times daily   nitroFURantoin macrocrystal-monohydrate (MACROBID) 100 MG capsule 3/16/2020 at Unknown time  Yes Yes   Sig: Take 100 mg by mouth 2 times daily For 7 days.   nitroglycerin (NITROSTAT) 0.4 MG SL tablet  Daughter No Yes   Sig: Place 1 tablet (0.4 mg) under the tongue every 5 minutes as needed X 3 doses total if needed for chest pain.   pioglitazone (ACTOS) 15 MG tablet 3/16/2020 at Unknown time Daughter Yes Yes   Sig: Take 15 mg by mouth daily   rOPINIRole (REQUIP) 1 MG tablet 3/16/2020 at Unknown time Daughter Yes Yes   Sig: Take 1 mg by mouth At Bedtime      Facility-Administered Medications: None     Allergies   Allergies   Allergen Reactions     Sulfa Drugs Rash       Physical Exam   Vital Signs: Temp: 99.1  F (37.3  C) Temp src: Oral BP: 123/58 Pulse: 92 Heart Rate: 92 Resp: 17 SpO2: 94 % O2 Device: Nasal cannula Oxygen Delivery: 3 LPM  Weight: 0 lbs 0 oz    General: AAOx3, ill-appearing but not in distress  HEENT: PERRLA EOMI. Mucosa moist.   Lungs: Bilateral equal air entry.  Diminished air entry, no overt wheezing or crackles.  Patient with shallow breaths and mildly tachypneic to mid 20s.  Able to speak short  sentences.  On 2 LPM NC O2.     CVS: S1S2 regular, no tachycardia or murmur.   Abdomen: Soft, mild epigastric tenderness, nondistended. BS heard.  MSK: Both legs appear firm and edematous with woody hard feeling, had excoriated skin with scabs in both legs.  An ulcer about 2 x 3 cm in size on the left foot dorsum, no surrounding cellulitis changes.  Legs are tender to touch.  Neuro: AAOX3. CN 2-12 normal. Strength symmetrical.  Skin: Scabs and excoriated the skin both legs    Data   Data reviewed today: I reviewed all medications, new labs and imaging results over the last 24 hours. I personally reviewed the EKG tracing showing Sinus, ST depression on inferior leads and T inversion in precordial leads.    Recent Labs   Lab 03/17/20  0450   WBC 7.5   HGB 13.3   MCV 99         POTASSIUM 3.1*   CHLORIDE 104   CO2 32   BUN 13   CR 0.69   ANIONGAP 2*   LAUREN 9.4   *   ALBUMIN 3.7   PROTTOTAL 7.9   BILITOTAL 0.3   ALKPHOS 98   ALT 25   AST 30   TROPI <0.015     No results found for this or any previous visit (from the past 24 hour(s)).

## 2020-03-17 NOTE — PLAN OF CARE
OT: orders received and chart reviewed, OT orders received for cognitive eval. Pt arrived to ED at 4 AM and was just admitted to station 66 at 8 AM, will hold OT to allow for medical mgmt prior to therapy eval.

## 2020-03-17 NOTE — PLAN OF CARE
PT: Orders received, chart reviewed. Attempted to see patient for PT eval but ultrasound just arrived to her room. Pt not available for PT at this time.

## 2020-03-17 NOTE — PROGRESS NOTES
[facility] Nurse Inpatient Wound Assessment   Reason for consultation: Left foot wound     Assessment  Bilateral lower legs with multiple scabs and excoriations which appear to be sure to patient scratching at legs.     Left dorsal foot wound noted.     Coccyx pink with very bony protrusion, Stage 1 nonblanchable erythema.    Treatment Plan  Left dorsal foot wound: Every 3 days   Orders   1. Cleanse with wound cleanser and pat dry  2. Apply 4x4 Mepilex  3. Change every three days    BLE: Daily and prn:  1.  Cleanse with mild skin cleanser.  2.  Apply Sween 24 cream.   3.  Leave open to air.  Elevate legs on pillows, floating heels at all times.       Pressure Injury Prevention (PIP) Plan:  If patient is declining pressure injury prevention interventions: Explore reason why and address patient's concerns and Educate on pressure injury risk and prevention intervention(s)  Mattress: Follow bed algorithm, reassess daily and order specialty mattress, if indicated.  HOB: Maintain at or below 30 degrees, unless contraindicated  Repositioning in bed: Every 1-2 hours , Left/right positioning; avoid supine and Raise foot of bed prior to raising head of bed, to reduce patient sliding down (shear)  Heels: Keep elevated off mattress and Pillows under calves  Protective Dressing: Sacral Mepilex (#[id number])  Positioning Equipment: None  Chair positioning: Chair cushion (#[id number]) , Repositions self: patient to shift weight every 15 minutes and Assist patient to reposition hourly   If patient has a buttock pressure injury, or high risk for PI use chair cushion or SPS.  Moisture Management: Perineal cleansing /protection: Follow Incontinence Protocol, Clean and dry skin folds with bathing  and Moisturize dry skin  Under Devices: Inspect skin under all medical devices during skin inspection , Ensure tubes are stabilized without tension and Ensure patient is not lying on medical devices or equipment when repositioned  Ask provider to  discontinue device when no longer needed.    Nursing to notify the Provider(s) and re-consult the WOC Nurse if wound(s) deteriorates or new skin concern.    Patient History  According to provider note(s): Patient Story: Pt has hx of COPD. Pt woke up this AM feeling short of breath. EMS reports SAO2 was 88-90% on room air. Pt does not use O2 at home    Objective Data  Containment of urine/stool: Brief    Active Diet Order  Orders Placed This Encounter      Moderate Consistent CHO Diet      Output:   No intake/output data recorded.    Risk Assessment:                                                Labs:   Recent Labs   Lab 03/17/20  0450   ALBUMIN 3.7   HGB 13.3   WBC 7.5       Physical Exam  Skin inspection: focused Sacrum and BLE    Wound Location:  Sacrum  Stage 1 pressure injury present on admission  Measurements: 2x2cm nonblanchable erythema  Pain: denies      Left dorsal foot wound etiology unclear  2 x 1.7cm 80% slough versus fibrin 20% partial thickness edges  Meryl wound skin intact    Interventions  Current support surface: Standard  Atmos Air mattress  Current off-loading measures: Pillows  Visual inspection and assessment completed 3/17  Wound Care: completed by RN  Supplies: floor stock  Education provided: importance of repositioning and plan of care  Discussed plan of care with Patient and Nurse    Tricia Little, RN, CWOCN

## 2020-03-18 ENCOUNTER — APPOINTMENT (OUTPATIENT)
Dept: OCCUPATIONAL THERAPY | Facility: CLINIC | Age: 80
DRG: 190 | End: 2020-03-18
Attending: HOSPITALIST
Payer: COMMERCIAL

## 2020-03-18 ENCOUNTER — APPOINTMENT (OUTPATIENT)
Dept: PHYSICAL THERAPY | Facility: CLINIC | Age: 80
DRG: 190 | End: 2020-03-18
Attending: HOSPITALIST
Payer: COMMERCIAL

## 2020-03-18 LAB
ANION GAP SERPL CALCULATED.3IONS-SCNC: 4 MMOL/L (ref 3–14)
BASOPHILS # BLD AUTO: 0 10E9/L (ref 0–0.2)
BASOPHILS NFR BLD AUTO: 0.1 %
BUN SERPL-MCNC: 20 MG/DL (ref 7–30)
CALCIUM SERPL-MCNC: 9.3 MG/DL (ref 8.5–10.1)
CHLORIDE SERPL-SCNC: 108 MMOL/L (ref 94–109)
CO2 SERPL-SCNC: 28 MMOL/L (ref 20–32)
CREAT SERPL-MCNC: 0.74 MG/DL (ref 0.52–1.04)
DIFFERENTIAL METHOD BLD: ABNORMAL
EOSINOPHIL # BLD AUTO: 0.1 10E9/L (ref 0–0.7)
EOSINOPHIL NFR BLD AUTO: 0.8 %
ERYTHROCYTE [DISTWIDTH] IN BLOOD BY AUTOMATED COUNT: 14.5 % (ref 10–15)
GFR SERPL CREATININE-BSD FRML MDRD: 76 ML/MIN/{1.73_M2}
GLUCOSE BLDC GLUCOMTR-MCNC: 110 MG/DL (ref 70–99)
GLUCOSE BLDC GLUCOMTR-MCNC: 116 MG/DL (ref 70–99)
GLUCOSE BLDC GLUCOMTR-MCNC: 187 MG/DL (ref 70–99)
GLUCOSE BLDC GLUCOMTR-MCNC: 76 MG/DL (ref 70–99)
GLUCOSE BLDC GLUCOMTR-MCNC: 98 MG/DL (ref 70–99)
GLUCOSE SERPL-MCNC: 115 MG/DL (ref 70–99)
HCT VFR BLD AUTO: 34.4 % (ref 35–47)
HGB BLD-MCNC: 11 G/DL (ref 11.7–15.7)
IMM GRANULOCYTES # BLD: 0 10E9/L (ref 0–0.4)
IMM GRANULOCYTES NFR BLD: 0.3 %
LYMPHOCYTES # BLD AUTO: 2.2 10E9/L (ref 0.8–5.3)
LYMPHOCYTES NFR BLD AUTO: 21.3 %
MCH RBC QN AUTO: 31.5 PG (ref 26.5–33)
MCHC RBC AUTO-ENTMCNC: 32 G/DL (ref 31.5–36.5)
MCV RBC AUTO: 99 FL (ref 78–100)
MONOCYTES # BLD AUTO: 0.5 10E9/L (ref 0–1.3)
MONOCYTES NFR BLD AUTO: 4.3 %
NEUTROPHILS # BLD AUTO: 7.7 10E9/L (ref 1.6–8.3)
NEUTROPHILS NFR BLD AUTO: 73.2 %
PLATELET # BLD AUTO: 178 10E9/L (ref 150–450)
POTASSIUM SERPL-SCNC: 3.7 MMOL/L (ref 3.4–5.3)
RBC # BLD AUTO: 3.49 10E12/L (ref 3.8–5.2)
SODIUM SERPL-SCNC: 140 MMOL/L (ref 133–144)
WBC # BLD AUTO: 10.5 10E9/L (ref 4–11)

## 2020-03-18 PROCEDURE — 25000125 ZZHC RX 250: Performed by: HOSPITALIST

## 2020-03-18 PROCEDURE — 99232 SBSQ HOSP IP/OBS MODERATE 35: CPT | Performed by: HOSPITALIST

## 2020-03-18 PROCEDURE — 97535 SELF CARE MNGMENT TRAINING: CPT | Mod: GO

## 2020-03-18 PROCEDURE — 25000131 ZZH RX MED GY IP 250 OP 636 PS 637: Performed by: HOSPITALIST

## 2020-03-18 PROCEDURE — 97161 PT EVAL LOW COMPLEX 20 MIN: CPT | Mod: GP

## 2020-03-18 PROCEDURE — 12000000 ZZH R&B MED SURG/OB

## 2020-03-18 PROCEDURE — 94640 AIRWAY INHALATION TREATMENT: CPT

## 2020-03-18 PROCEDURE — 36415 COLL VENOUS BLD VENIPUNCTURE: CPT | Performed by: HOSPITALIST

## 2020-03-18 PROCEDURE — 94640 AIRWAY INHALATION TREATMENT: CPT | Mod: 76

## 2020-03-18 PROCEDURE — 97530 THERAPEUTIC ACTIVITIES: CPT | Mod: GO

## 2020-03-18 PROCEDURE — 40000275 ZZH STATISTIC RCP TIME EA 10 MIN

## 2020-03-18 PROCEDURE — 25000132 ZZH RX MED GY IP 250 OP 250 PS 637: Performed by: HOSPITALIST

## 2020-03-18 PROCEDURE — 00000146 ZZHCL STATISTIC GLUCOSE BY METER IP

## 2020-03-18 PROCEDURE — 85025 COMPLETE CBC W/AUTO DIFF WBC: CPT | Performed by: HOSPITALIST

## 2020-03-18 PROCEDURE — 80048 BASIC METABOLIC PNL TOTAL CA: CPT | Performed by: HOSPITALIST

## 2020-03-18 PROCEDURE — 97166 OT EVAL MOD COMPLEX 45 MIN: CPT | Mod: GO

## 2020-03-18 PROCEDURE — 25000128 H RX IP 250 OP 636: Performed by: HOSPITALIST

## 2020-03-18 PROCEDURE — 97530 THERAPEUTIC ACTIVITIES: CPT | Mod: GP

## 2020-03-18 RX ORDER — ALBUTEROL SULFATE 90 UG/1
2 AEROSOL, METERED RESPIRATORY (INHALATION) EVERY 6 HOURS PRN
Status: DISCONTINUED | OUTPATIENT
Start: 2020-03-18 | End: 2020-03-22 | Stop reason: HOSPADM

## 2020-03-18 RX ORDER — IPRATROPIUM BROMIDE AND ALBUTEROL SULFATE 2.5; .5 MG/3ML; MG/3ML
3 SOLUTION RESPIRATORY (INHALATION) EVERY 4 HOURS PRN
Status: DISCONTINUED | OUTPATIENT
Start: 2020-03-18 | End: 2020-03-22 | Stop reason: HOSPADM

## 2020-03-18 RX ORDER — AZITHROMYCIN 250 MG/1
250 TABLET, FILM COATED ORAL DAILY
Status: DISCONTINUED | OUTPATIENT
Start: 2020-03-19 | End: 2020-03-19

## 2020-03-18 RX ORDER — AZITHROMYCIN 250 MG/1
500 TABLET, FILM COATED ORAL ONCE
Status: COMPLETED | OUTPATIENT
Start: 2020-03-18 | End: 2020-03-18

## 2020-03-18 RX ADMIN — HEPARIN SODIUM 5000 UNITS: 5000 INJECTION, SOLUTION INTRAVENOUS; SUBCUTANEOUS at 21:24

## 2020-03-18 RX ADMIN — FUROSEMIDE 20 MG: 10 INJECTION, SOLUTION INTRAVENOUS at 09:30

## 2020-03-18 RX ADMIN — IPRATROPIUM BROMIDE AND ALBUTEROL SULFATE 3 ML: .5; 3 SOLUTION RESPIRATORY (INHALATION) at 07:19

## 2020-03-18 RX ADMIN — INSULIN GLARGINE 10 UNITS: 100 INJECTION, SOLUTION SUBCUTANEOUS at 10:02

## 2020-03-18 RX ADMIN — MULTIPLE VITAMINS W/ MINERALS TAB 1 TABLET: TAB at 09:32

## 2020-03-18 RX ADMIN — BUDESONIDE 0.5 MG: 0.5 INHALANT RESPIRATORY (INHALATION) at 07:20

## 2020-03-18 RX ADMIN — AZITHROMYCIN MONOHYDRATE 500 MG: 250 TABLET ORAL at 09:32

## 2020-03-18 RX ADMIN — PIPERACILLIN SODIUM AND TAZOBACTAM SODIUM 3.38 G: 3; .375 INJECTION, POWDER, LYOPHILIZED, FOR SOLUTION INTRAVENOUS at 02:04

## 2020-03-18 RX ADMIN — CETIRIZINE HYDROCHLORIDE 5 MG: 5 TABLET ORAL at 09:30

## 2020-03-18 RX ADMIN — ASPIRIN 81 MG: 81 TABLET, DELAYED RELEASE ORAL at 09:31

## 2020-03-18 RX ADMIN — ISOSORBIDE MONONITRATE 15 MG: 30 TABLET, EXTENDED RELEASE ORAL at 09:31

## 2020-03-18 RX ADMIN — OMEPRAZOLE 20 MG: 20 CAPSULE, DELAYED RELEASE ORAL at 09:31

## 2020-03-18 RX ADMIN — FLUTICASONE PROPIONATE 1 SPRAY: 50 SPRAY, METERED NASAL at 09:30

## 2020-03-18 RX ADMIN — ROPINIROLE HYDROCHLORIDE 1 MG: 1 TABLET, FILM COATED ORAL at 21:23

## 2020-03-18 RX ADMIN — Medication 400 MG: at 21:23

## 2020-03-18 RX ADMIN — Medication 400 MG: at 09:30

## 2020-03-18 RX ADMIN — FLUTICASONE FUROATE 1 PUFF: 100 POWDER RESPIRATORY (INHALATION) at 21:23

## 2020-03-18 RX ADMIN — IPRATROPIUM BROMIDE AND ALBUTEROL SULFATE 3 ML: .5; 3 SOLUTION RESPIRATORY (INHALATION) at 15:07

## 2020-03-18 RX ADMIN — HEPARIN SODIUM 5000 UNITS: 5000 INJECTION, SOLUTION INTRAVENOUS; SUBCUTANEOUS at 09:30

## 2020-03-18 RX ADMIN — ATORVASTATIN CALCIUM 40 MG: 40 TABLET, FILM COATED ORAL at 09:31

## 2020-03-18 ASSESSMENT — ACTIVITIES OF DAILY LIVING (ADL)
ADLS_ACUITY_SCORE: 25
ADLS_ACUITY_SCORE: 23
ADLS_ACUITY_SCORE: 25
ADLS_ACUITY_SCORE: 25
ADLS_ACUITY_SCORE: 23
ADLS_ACUITY_SCORE: 25
PREVIOUS_RESPONSIBILITIES: MEAL PREP;HOUSEKEEPING;LAUNDRY

## 2020-03-18 NOTE — PROGRESS NOTES
Patient given nebulizer treatments as ordered. BS are diminished t/o pre and post. SpO2 is 93-94% on room air

## 2020-03-18 NOTE — PROGRESS NOTES
03/18/20 1020   Quick Adds   Type of Visit Initial PT Evaluation   Living Environment   Lives With child(mariam), adult  (daughter)   Living Arrangements condominium   Home Accessibility no concerns   Self-Care   Usual Activity Tolerance moderate   Current Activity Tolerance fair   Regular Exercise Yes   Activity/Exercise Type walking   Equipment Currently Used at Home cane, straight   Functional Level Prior   Ambulation 1-->assistive equipment   Transferring 1-->assistive equipment   Fall history within last six months no   Which of the above functional risks had a recent onset or change? ambulation;transferring   General Information   Onset of Illness/Injury or Date of Surgery - Date 03/17/20   Referring Physician Oleg Peterson MD   Patient/Family Goals Statement Return home   Pertinent History of Current Problem (include personal factors and/or comorbidities that impact the POC) Pt admitted with a COPD exacerbation, fluid overload and possible pneumonia. PMH: HTN, unstable angina, TIA, DM2, emphysema.   Precautions/Limitations fall precautions   Weight-Bearing Status - LLE full weight-bearing   Weight-Bearing Status - RLE full weight-bearing   General Observations Up in the chair   Cognitive Status Examination   Orientation person   Level of Consciousness alert   Follows Commands and Answers Questions 100% of the time;able to follow single-step instructions   Personal Safety and Judgment impaired   Memory impaired   Pain Assessment   Patient Currently in Pain No   Posture    Posture Forward head position;Protracted shoulders   Range of Motion (ROM)   ROM Quick Adds No deficits were identified   Strength   Strength Comments B hip flex 4/5, knee ext 5/5, DF 5/5 on R (L foot painful NT)   Bed Mobility   Bed Mobility Comments NT, pt in the chair   Transfer Skills   Transfer Comments Sit to stand with FWW and SBA   Gait   Gait Comments Pt ambulated 10 ft with FWW and SBA. Pt moves quickly and bumps walker into  "things.   Balance   Balance Comments Balance dec due to poor safety and moving too quickly.   Sensory Examination   Sensory Perception Comments Denies numbness or tingling   General Therapy Interventions   Planned Therapy Interventions gait training;neuromuscular re-education;transfer training;strengthening   Clinical Impression   Criteria for Skilled Therapeutic Intervention yes, treatment indicated   PT Diagnosis Difficulty ambulating   Influenced by the following impairments Dec strength, balance, activity tolerance   Functional limitations due to impairments Difficulty ambulating and transferring   Clinical Presentation Stable/Uncomplicated   Clinical Presentation Rationale medically improved   Clinical Decision Making (Complexity) Low complexity   Therapy Frequency 5x/week   Predicted Duration of Therapy Intervention (days/wks) 1 week   Anticipated Discharge Disposition Transitional Care Facility   Risk & Benefits of therapy have been explained Yes   Patient, Family & other staff in agreement with plan of care Yes   [facility] [employer] \"6 Clicks\"   2016, Trustees of [facility], under license to [employer].  All rights reserved.   6 Clicks Short Forms Basic Mobility Inpatient Short Form   [facility] AM-PAC  \"6 Clicks\" V.2 Basic Mobility Inpatient Short Form   1. Turning from your back to your side while in a flat bed without using bedrails? 4 - None   2. Moving from lying on your back to sitting on the side of a flat bed without using bedrails? 4 - None   3. Moving to and from a bed to a chair (including a wheelchair)? 3 - A Little   4. Standing up from a chair using your arms (e.g., wheelchair, or bedside chair)? 3 - A Little   5. To walk in hospital room? 3 - A Little   6. Climbing 3-5 steps with a railing? 3 - A Little   Basic Mobility Raw Score (Score out of 24.Lower scores equate to lower levels of function) 20   Total Evaluation Time   Total Evaluation Time (Minutes) 15     "

## 2020-03-18 NOTE — PROGRESS NOTES
ILIA:  D: Writer received a call from Tracy at [employer], her direct number is [phone].  She shares that patient is open to their agency for home RN, PT,OT, HHA and SW.  If patient discharges M-F to home she requests a call to her directly and orders can be faxed to [phone].  If patient discharges on the w/e, their main office number should be used which is [phone].  Care Coordinator informed.    Note a SW consult has been ordered for discharge planning.  PT and OT have also been consulted.  Either SW or CT -RN  follow and complete assessment for discharge planning.

## 2020-03-18 NOTE — PLAN OF CARE
PT:  Discharge Planner PT   Patient plan for discharge: Return home  Current status: Currently pt requires SBA for sit to stand with FWW, SBA for gait 10 ftx2 with FWW with impulsive movement and moving quickly which limits her balance, SBA for toilet transfers. Session discontinued due to need to use the bathroom again. Pt demonstrates dec strength, balance, activity tolerance and difficulty ambulating and transferring and would benefit from skilled PT services in order to improve this.  Barriers to return to prior living situation: Cognition, falls risk, needs supervision for mobility.  Recommendations for discharge: TCU  Rationale for recommendations: Pt would benefit from TCU for improvement of strength, balance and safety with AD to reduce her risk for falls and improve strength and mobility.       Entered by: Sarah Alaniz 03/18/2020 11:11 AM

## 2020-03-18 NOTE — PROGRESS NOTES
[facility]    Hospitalist Progress Note    Date of Admission:  3/17/2020    Assessment & Plan       Mel Castellanos is an 79 year old female with history of HTN, unstable angina, insomnia, ELS, TIA, T2DM, recurrent UTIs, and emphysema who presented to the ED via EMS due to worsening shortness of breath.  She is being admitted to manage presumed COPD exacerbation     Possible acute COPD exacerbation  Possible pneumonia/fluid overload: Patient with emphysema, underlying history of tobacco use.  Fever and sudden onset shortness of breath 30-minute PTA.  SPO2 88 on room air per EMS.  CT PE study ruled out PE but shows  shows possible pulmonary edema/bronchopneumonia.  -Admit to inpatient  -Weaned off oxygen.  Saturating well on room air.  -Received IV Solu-Medrol in ER.  Discontinued steroids given lack of significant wheezing.  Placed on scheduled DuoNebs and scheduled Pulmicort nebs.  By the next day patient was feeling much better and back to baseline.  Will stop scheduled duo nebs today and change to PRN nebulizers/inhalers and also change Pulmicort nebs to Pulmicort inhaler.  - Received IV Lasix 20 mg daily x2 doses.  Discontinue as she does not look volume overloaded and feels back to baseline.  - Procalcitonin at 1.6- blood cultures x2 ordered and received Zosyn on hospital day 1.  Switched to oral azithromycin today and complete 5-day course.  -Follow serial troponin given abnormal EKG, and 2D echo as well.  -Follow COVID-19 result. On contact and droplet isolation. Low suspicion for COVID 19 as she denies shortness of breath, has been afebrile and feels at baseline.     Hypokalemia  -Replace per protocol     Deconditioned  Concern of safety at home - unclear baseline mentation   Per chart review from discharge after recent hospitalization, daughter possibly having issues at home. Patient likely is cognitively impaired at baseline. Patient's daughter was reportedly intoxicated when EMS picked up  "patient last time and reportedly she has depression and attempted suicide recently. The patient and this daughter have been living together to \"take care\" of each other.  Significant home safety concerns.  Patient was evaluated by psychiatry as well during her last hospital stay 2 months ago and was deemed lacking decisional capacity. SW was consulted, s/w consult for follow up.     Skin excoriation, petechiae, small scabs and Lt foot ulcer suspect ischemic  PVD  - Followed by WOC RN as outpatient.  -Given leg edema and pain, checked d-dimer, elevated and so r/o DVT.  -Patient with history of lower extremity angiogram and bilateral iliac stenting, continue ASA, beta-blocker and statin as above.  -Daughter reports patient had MRSA positive in her leg wound and was restarted on doxycycline yesterday.  Leg wound does not appear infected.  Obtain outpatient result but I am inclined not to continue with doxycycline at this time.  -Wound care consult     UTI  -Per daughter, was at urgent care clinic yesterday, diagnosed with UTI and was restarted on Macrobid.  -Repeat UA, obtain culture data from outpatient  -Antibiotic based on repeat UA and outpatient culture data.     Hypertension:  Blood pressure was soft at presentation.  Denies dizziness.  Patient was hypotensive on recent admission and multiple antihypertensives were discontinued.  -Resume PTA Imdur and metoprolol with hold parameters in place.      History of CAD/MI:  -Resume PTA ASA 81 mg p.o. daily, Lipitor 40 mg p.o. daily, Imdur 15 mg p.o. daily, Toprol-XL 12.5 mg p.o. daily and PRN sublingual nitroglycerin.       T2DM  HbA1c 6.8 in January.  PTA is on pioglitazone.    -Hold PTA pioglitazone  -Continue on sliding scale insulin  -Moderate carb diet and hypoglycemia protocol     Restless leg syndrome  - Continue PTA ropinirole.     Diet: Moderate carbohydrate diet  DVT Prophylaxis: Heparin SQ  Tavera Catheter: not present  Code Status: Full code per recent " admission    Disposition: Await therapy evaluation.  Likely needs TCU at discharge.  Social work consulted.    Kelin Castillo MD  [employer] Page (7am - 6pm, M-F)    Interval History   Afebrile overnight.  This morning when seen she was standing up in her room.  States she feels at baseline.  Denies chest pain, shortness of breath, cough.    -Data reviewed today: I reviewed all new labs and imaging results over the last 24 hours. I personally reviewed CT scan with result as noted above    Physical Exam   Temp: 98.1  F (36.7  C) Temp src: Oral BP: 96/69 Pulse: 79 Heart Rate: 80 Resp: 16 SpO2: 96 % O2 Device: None (Room air)    Vitals:    03/17/20 0857   Weight: 65.6 kg (144 lb 11.2 oz)     Vital Signs with Ranges  Temp:  [98.1  F (36.7  C)-99.2  F (37.3  C)] 98.1  F (36.7  C)  Pulse:  [79-81] 79  Heart Rate:  [80-99] 80  Resp:  [16-18] 16  BP: ()/(43-69) 96/69  SpO2:  [94 %-97 %] 96 %  I/O last 3 completed shifts:  In: 300 [P.O.:300]  Out: 30 [Urine:30]    Constitutional: Alert, appears comfortable, in no acute distress  Respiratory: Non labored breathing, clear to auscultation bilaterally, saturating well on room air  Cardiovascular: Heart sounds regular rate and rhythm, no murmurs, no leg edema  GI: Abdomen is soft, non distended, non tender. Normal BS  Neuro: alert, converses appropriately, oriented x3, moving all extremities, fluent speech, no facial asymmetry  Psych: Calm and pleasant    Medications       aspirin  81 mg Oral Daily     atorvastatin  40 mg Oral Daily     [START ON 3/19/2020] azithromycin  250 mg Oral Daily     budesonide  0.5 mg Nebulization BID     cetirizine  5 mg Oral Daily     fluticasone  1 spray Both Nostrils Daily     furosemide  20 mg Intravenous Daily     heparin ANTICOAGULANT  5,000 Units Subcutaneous Q12H     insulin aspart  1-7 Units Subcutaneous TID AC     insulin aspart  1-5 Units Subcutaneous At Bedtime     ipratropium - albuterol 0.5 mg/2.5 mg/3 mL  3 mL Nebulization Q4H While  awake     isosorbide mononitrate  15 mg Oral Daily     magnesium oxide  400 mg Oral BID     metoprolol succinate ER  12.5 mg Oral BID     multivitamin w/minerals  1 tablet Oral Daily     omeprazole  20 mg Oral QAM     rOPINIRole  1 mg Oral At Bedtime     sodium chloride (PF)  3 mL Intracatheter Q8H       Data   Recent Labs   Lab 03/18/20  0800 03/17/20  1835 03/17/20  1159 03/17/20  0450   WBC 10.5  --   --  7.5   HGB 11.0*  --   --  13.3   MCV 99  --   --  99     --   --  215     --   --  138   POTASSIUM 3.7 3.4  --  3.1*   CHLORIDE 108  --   --  104   CO2 28  --   --  32   BUN 20  --   --  13   CR 0.74  --   --  0.69   ANIONGAP 4  --   --  2*   LAUREN 9.3  --   --  9.4   *  --   --  144*   ALBUMIN  --   --   --  3.7   PROTTOTAL  --   --   --  7.9   BILITOTAL  --   --   --  0.3   ALKPHOS  --   --   --  98   ALT  --   --   --  25   AST  --   --   --  30   TROPI  --  <0.015 <0.015 <0.015       Imaging  Recent Results (from the past 24 hour(s))   US Lower Extremity Venous Duplex Bilateral    Narrative    VENOUS ULTRASOUND BILATERAL LOWER EXTREMITIES  3/17/2020 3:08 PM     HISTORY: Rule out DVT, bilateral leg pain and swelling.    COMPARISON: None.    TECHNIQUE: Color Doppler and spectral waveform analysis performed  throughout the deep veins of both lower extremities.    FINDINGS: Both common femoral, proximal greater saphenous, femoral,  and popliteal veins demonstrate normal blood flow, compression, and  augmentation. The visible posterior tibial and peroneal veins are  compressible, though difficult to visualize as patient was not able to  well tolerate compression.      Impression    IMPRESSION: Negative for deep venous thrombosis in both lower  extremities.    CALI OVALLE MD

## 2020-03-18 NOTE — PLAN OF CARE
DATE & TIME: 3/17/2020  NOC shift       Cognitive Concerns/ Orientation : A&Ox2. Disoriented to time/place. Forgetful  BEHAVIOR & AGGRESSION TOOL COLOR: Green  CIWA SCORE: N/a      ABNL VS/O2: VSS on RA  MOBILITY: A-1 with gb/walker. T/R Q2H  PAIN MANAGMENT: Denies  DIET: Mod carb  BOWEL/BLADDER: Incontinent. Purewick in place. No BM this shift   ABNL LAB/BG: BG 98, D-dimer 2.4, negative for influenza  DRAIN/DEVICES: R PIV SL, purewick   TELEMETRY RHYTHM: NSR  SKIN: L dorsal foot wound, mepilex CDI. Pressure injury on sacrum, mepilex CDI. Scattered scraps/bruising throughout body.   TESTS/PROCEDURES: BLE US and chest CT  D/C DAY/GOALS/PLACE: 2-3 days  OTHER IMPORTANT INFO: On IV abx. Droplet, contact precautions in place for COVID 19 rule out (airborne with nebs). Daughter, Henrique, would like to be notified when COVID-19 results come back. Henrique stated she is immunocompromised and lives with patient, currently self isolating until results come back.

## 2020-03-18 NOTE — PROVIDER NOTIFICATION
BS this AM was 110.  No sliding scale given.  6Units given for carb coverage. BS this afternoon was 76.  Orange juice given.  No coverage.  Call to md.  Lantus and carb coverage Insulin discharge at this time.

## 2020-03-18 NOTE — PLAN OF CARE
DATE & TIME: 3/18/2020 8289-7465   Cognitive Concerns/ Orientation : Forgetful.  Impulsive   BEHAVIOR & AGGRESSION TOOL COLOR: Green  CIWA SCORE: NA  ABNL VS/O2: VSS  MOBILITY: SBA due to impulsivity.  Won't call when needs help.  Continuous reminders  PAIN MANAGMENT: Denies  DIET: Mod Carb  BOWEL/BLADDER: Continent.  Loose stools X2 today  ABNL LAB/BG:  and 76.  Ordange juice given and lunch ordered for BS of 76  DRAIN/DEVICES: IV saline locked  TELEMETRY RHYTHM: NSR  SKIN: Wound on Left foot.  Drsg to be changed every 3 days.  Changed yesterday.  Scab on middle left toe.  Wound on left inner ankle.  Open to air.  WOC consulted.  Reported mepilex on coccyx.  Pt refused to let nurse see due to loose stools  TESTS/PROCEDURES: NA  D/C DAY/GOALS/PLACE: 1-2 Pending placement.  TCU recommended  OTHER IMPORTANT INFO: JANET

## 2020-03-18 NOTE — PLAN OF CARE
OT: Eval complete and Tx initiated. Pt admitted for COPD exacerbation and possible pneumonia. Prior to admit, pt lives in [facility] with adult daughter and reports I with ADLs and household mgmt. Pt states daughter manages meds for her and provides transportation.     Discharge Planner OT   Patient plan for discharge: Home soon    Current status: Pt required min A with FWW for toilet transfer. Pt stood at sink with SB-min A and FWW to complete morning ADL routine. Pt completed LE dressing mgmt with SB-min A and set up. Pt confused stating it was currently October.     Barriers to return to prior living situation: Current level of A required; Fall risk; Pain level    Recommendations for discharge: TCU    Rationale for recommendations: Pt limited by pain, impaired cognition and safety, and decreased balance and activity tolerance; thus, OT will continue with daily intervention to ensure safety with ADLs. Pt hopeful to return home. If declines TCU stay, recommend 24 hr supervision from family for A with all IADLs, use of FWW at all times, and home OT and RN.         Entered by: Isaac Dill 03/18/2020 9:30 AM

## 2020-03-18 NOTE — PROGRESS NOTES
DATE & TIME: 3/17/2020 2678-8218   Cognitive Concerns/ Orientation : A&Ox2; disoriented to time/place   BEHAVIOR & AGGRESSION TOOL COLOR: green  CIWA SCORE: na   ABNL VS/O2: VSS on RA; BP running soft - held metoprolol   MOBILITY: In bed throughout shift, per report Ax1 with gait belt and walker. T/R, patient able to turn well  PAIN MANAGMENT: denied on shift  DIET: mod CHO  BOWEL/BLADDER: purewick in place, no BM on shift   ABNL LAB/BG:  D-dimer 2.4 serial trops negative Procal 1.64  DRAIN/DEVICES: PIV SL R hand, purewick   TELEMETRY RHYTHM: NSR  SKIN: L dorsal foot wound, mepilex CDI. Pressure injury on sacrum, mepilex CDI. Scattered scraps/bruising throughout body.   TESTS/PROCEDURES: US of BLE, negative  D/C DAY/GOALS/PLACE: pending  OTHER IMPORTANT INFO: Droplet, contact precautions in place for COVID 19 rule out (airborne with nebs). Pt denying SOB on shift. IV zosyn started. Daughter, Henrique, would like to be notified when COVID-19 results come back. Henrique stated she is immunocompromised and lives with patient, currently self isolating until results come back.

## 2020-03-18 NOTE — PROGRESS NOTES
03/18/20 0847   Quick Adds   Type of Visit Initial Occupational Therapy Evaluation   Living Environment   Lives With child(mariam), adult   Living Arrangements condominium   Home Accessibility no concerns   Transportation Anticipated family or friend will provide  (Pt no longer drives. Daughter provides as needed)   Self-Care   Usual Activity Tolerance moderate   Current Activity Tolerance fair   Equipment Currently Used at Home cane, straight;grab bar, tub/shower;shower chair;grab bar, toilet   Functional Level   Ambulation 1-->assistive equipment   Transferring 1-->assistive equipment   Toileting 1-->assistive equipment   Bathing 1-->assistive equipment   Dressing 0-->independent   Eating 0-->independent   Communication 0-->understands/communicates without difficulty   Swallowing 0-->swallows foods/liquids without difficulty   Cognition 1 - attention or memory deficits   Fall history within last six months no   General Information   Onset of Illness/Injury or Date of Surgery - Date 03/17/20   Referring Physician Dr. Peterson   Patient/Family Goals Statement Pt hopes to discharge home soon   Additional Occupational Profile Info/Pertinent History of Current Problem Pt admitted for COPD exacerbation and possible pneumonia.    Precautions/Limitations fall precautions   Cognitive Status Examination   Orientation place;person   Level of Consciousness alert;confused   Follows Commands (Cognition) WNL   Memory impaired   Visual Perception   Visual Perception Wears glasses  (for reading only)   Pain Assessment   Patient Currently in Pain No   Range of Motion (ROM)   ROM Comment B UE WNL   Strength   Strength Comments B UE 5/5 on MMT   Transfer Skills   Transfer Transfer Safety Analysis Bed/Chair;Transfer Skill: Stand to Sit;Transfer Safety Analysis Sit/Stand   Transfer Skill: Bed to Chair/Chair to Bed   Level of [place]: Bed to Chair minimum assist (75% patients effort)   Transfer Safety Analysis Bed/Chair   Impairments  Contributing to Impaired Transfers impaired balance;pain   Transfer Skill: Sit to Stand   Level of [place]: Sit/Stand minimum assist (75% patients effort)   Transfer Safety Analysis Sit/Stand   Impaired Transfers: Sit/Stand impaired balance;pain   Toilet Transfer   Toilet Transfer Toilet Transfer Skill;Toilet Transfer Safety Analysis   Transfer Skill: Toilet Transfer   Level of [place]: Toilet minimum assist (75% patients effort)   Assistive Device standard walker   Transfer Safety Analysis Toilet   Transfer Safety Analysis Toilet impaired balance;pain   Upper Body Dressing   Level of [place]: Dress Upper Body stand-by assist   Lower Body Dressing   Level of [place]: Dress Lower Body stand-by assist   Toileting   Level of [place]: Toilet stand-by assist   Grooming   Level of [place]: Grooming stand-by assist   Eating/Self Feeding   Level of [place]: Eating independent   Instrumental Activities of Daily Living (IADL)   Previous Responsibilities meal prep;housekeeping;laundry   IADL Comments Pt's daughter sets up meds in pillbox for pt.    Activities of Daily Living Analysis   Impairments Contributing to Impaired Activities of Daily Living cognition impaired;pain;balance impaired   General Therapy Interventions   Planned Therapy Interventions ADL retraining;cognition;transfer training   Clinical Impression   Criteria for Skilled Therapeutic Interventions Met yes, treatment indicated   OT Diagnosis Decreased I and safety with ADLs   Influenced by the following impairments Pain; Impaired cognition and safety; Decreased balance and activity tolerance   Assessment of Occupational Performance 3-5 Performance Deficits   Identified Performance Deficits Dressing; Toileting; Bathing; IADLs   Clinical Decision Making (Complexity) Moderate complexity   Therapy Frequency Daily   Predicted Duration of Therapy Intervention (days/wks) 5 days   Anticipated Discharge Disposition Transitional Care  "Facility   Risks and Benefits of Treatment have been explained. Yes   Patient, Family & other staff in agreement with plan of care Yes   [facility] TM \"6 Clicks\"   2016, Trustees of [facility], under license to [employer].  All rights reserved.   6 Clicks Short Forms Daily Activity Inpatient Short Form   [facility] AM-PAC  \"6 Clicks\" Daily Activity Inpatient Short Form   1. Putting on and taking off regular lower body clothing? 3 - A Little   2. Bathing (including washing, rinsing, drying)? 3 - A Little   3. Toileting, which includes using toilet, bedpan or urinal? 3 - A Little   4. Putting on and taking off regular upper body clothing? 3 - A Little   5. Taking care of personal grooming such as brushing teeth? 3 - A Little   6. Eating meals? 4 - None   Daily Activity Raw Score (Score out of 24.Lower scores equate to lower levels of function) 19   Total Evaluation Time   Total Evaluation Time (Minutes) 10     "

## 2020-03-19 ENCOUNTER — APPOINTMENT (OUTPATIENT)
Dept: OCCUPATIONAL THERAPY | Facility: CLINIC | Age: 80
DRG: 190 | End: 2020-03-19
Payer: COMMERCIAL

## 2020-03-19 ENCOUNTER — APPOINTMENT (OUTPATIENT)
Dept: PHYSICAL THERAPY | Facility: CLINIC | Age: 80
DRG: 190 | End: 2020-03-19
Payer: COMMERCIAL

## 2020-03-19 LAB
GLUCOSE BLDC GLUCOMTR-MCNC: 118 MG/DL (ref 70–99)
GLUCOSE BLDC GLUCOMTR-MCNC: 132 MG/DL (ref 70–99)
GLUCOSE BLDC GLUCOMTR-MCNC: 139 MG/DL (ref 70–99)
GLUCOSE BLDC GLUCOMTR-MCNC: 216 MG/DL (ref 70–99)
GLUCOSE BLDC GLUCOMTR-MCNC: 223 MG/DL (ref 70–99)

## 2020-03-19 PROCEDURE — 00000146 ZZHCL STATISTIC GLUCOSE BY METER IP

## 2020-03-19 PROCEDURE — 25000132 ZZH RX MED GY IP 250 OP 250 PS 637: Performed by: HOSPITALIST

## 2020-03-19 PROCEDURE — 25000128 H RX IP 250 OP 636: Performed by: HOSPITALIST

## 2020-03-19 PROCEDURE — 97750 PHYSICAL PERFORMANCE TEST: CPT | Mod: GP

## 2020-03-19 PROCEDURE — 99232 SBSQ HOSP IP/OBS MODERATE 35: CPT | Performed by: HOSPITALIST

## 2020-03-19 PROCEDURE — 12000000 ZZH R&B MED SURG/OB

## 2020-03-19 PROCEDURE — 97535 SELF CARE MNGMENT TRAINING: CPT | Mod: GO | Performed by: OCCUPATIONAL THERAPY ASSISTANT

## 2020-03-19 RX ORDER — DOXYCYCLINE 100 MG/1
100 CAPSULE ORAL 2 TIMES DAILY
Status: DISCONTINUED | OUTPATIENT
Start: 2020-03-19 | End: 2020-03-22 | Stop reason: HOSPADM

## 2020-03-19 RX ORDER — NITROFURANTOIN 25; 75 MG/1; MG/1
100 CAPSULE ORAL 2 TIMES DAILY
Status: DISCONTINUED | OUTPATIENT
Start: 2020-03-19 | End: 2020-03-22 | Stop reason: HOSPADM

## 2020-03-19 RX ADMIN — NITROFURANTOIN MONOHYDRATE/MACROCRYSTALLINE 100 MG: 25; 75 CAPSULE ORAL at 20:36

## 2020-03-19 RX ADMIN — METOPROLOL SUCCINATE 12.5 MG: 25 TABLET, EXTENDED RELEASE ORAL at 20:36

## 2020-03-19 RX ADMIN — INSULIN ASPART 2 UNITS: 100 INJECTION, SOLUTION INTRAVENOUS; SUBCUTANEOUS at 12:37

## 2020-03-19 RX ADMIN — METOPROLOL SUCCINATE 12.5 MG: 25 TABLET, EXTENDED RELEASE ORAL at 09:32

## 2020-03-19 RX ADMIN — DOXYCYCLINE 100 MG: 100 CAPSULE ORAL at 20:36

## 2020-03-19 RX ADMIN — FLUTICASONE PROPIONATE 1 SPRAY: 50 SPRAY, METERED NASAL at 09:31

## 2020-03-19 RX ADMIN — ISOSORBIDE MONONITRATE 15 MG: 30 TABLET, EXTENDED RELEASE ORAL at 09:32

## 2020-03-19 RX ADMIN — CETIRIZINE HYDROCHLORIDE 5 MG: 5 TABLET ORAL at 09:32

## 2020-03-19 RX ADMIN — AZITHROMYCIN MONOHYDRATE 250 MG: 250 TABLET ORAL at 09:32

## 2020-03-19 RX ADMIN — ATORVASTATIN CALCIUM 40 MG: 40 TABLET, FILM COATED ORAL at 09:32

## 2020-03-19 RX ADMIN — HEPARIN SODIUM 5000 UNITS: 5000 INJECTION, SOLUTION INTRAVENOUS; SUBCUTANEOUS at 09:30

## 2020-03-19 RX ADMIN — ASPIRIN 81 MG: 81 TABLET, DELAYED RELEASE ORAL at 09:31

## 2020-03-19 RX ADMIN — Medication 400 MG: at 09:32

## 2020-03-19 RX ADMIN — Medication 400 MG: at 20:36

## 2020-03-19 RX ADMIN — ROPINIROLE HYDROCHLORIDE 1 MG: 1 TABLET, FILM COATED ORAL at 20:36

## 2020-03-19 RX ADMIN — MULTIPLE VITAMINS W/ MINERALS TAB 1 TABLET: TAB at 09:31

## 2020-03-19 RX ADMIN — OMEPRAZOLE 20 MG: 20 CAPSULE, DELAYED RELEASE ORAL at 09:31

## 2020-03-19 RX ADMIN — HEPARIN SODIUM 5000 UNITS: 5000 INJECTION, SOLUTION INTRAVENOUS; SUBCUTANEOUS at 20:37

## 2020-03-19 ASSESSMENT — ACTIVITIES OF DAILY LIVING (ADL)
ADLS_ACUITY_SCORE: 23

## 2020-03-19 NOTE — PROGRESS NOTES
[facility]    Medicine Progress Note - Hospitalist Service       Date of Admission:  3/17/2020  Assessment & Plan   Mel Castellanos is an 79 year old female with history of HTN, unstable angina, insomnia, ELS, TIA, T2DM, recurrent UTIs, and emphysema who presented to the ED via EMS due to worsening shortness of breath.  She is being admitted to manage presumed COPD exacerbation     Acute COPD exacerbation  Possible pneumonia/fluid overload  Patient with emphysema, underlying history of tobacco use.  Fever and sudden onset shortness of breath 30-minute PTA.  SPO2 88 on room air per EMS.  CT PE study ruled out PE but shows shows possible pulmonary edema/bronchopneumonia.  -Admit to inpatient.  -Weaned off oxygen.  Saturating well on room air.  -Received IV Solu-Medrol in ER.  Discontinued steroids given lack of significant wheezing.  Placed on scheduled DuoNebs and scheduled Pulmicort nebs.  By the next day patient was feeling much better and back to baseline.  - Stopped scheduled duo nebs today and changed to PRN nebulizers/inhalers and also change Pulmicort nebs to Pulmicort inhaler.  - Received IV Lasix 20 mg daily x2 doses. Discontinued as she does not look volume overloaded and feels back to baseline.  - Procalcitonin at 1.6- blood cultures x2 ordered and received Zosyn on hospital day 1.  Switched to oral azithromycin on 3/18, will discontinue as she is being restarted on doxycycline for skin infection as noted below.  -Serial troponins negative.  -Follow COVID-19 result. On contact and droplet isolation. Low suspicion for COVID 19 as she denies shortness of breath, has been afebrile and feels at baseline.     Hypokalemia  - Replace per protocol.     Deconditioned  Concern of safety at home - unclear baseline mentation   Per chart review from discharge after recent hospitalization, daughter possibly having issues at home. Patient likely is cognitively impaired at baseline. Patient's daughter was  "reportedly intoxicated when EMS picked up patient last time and reportedly she has depression and attempted suicide recently. The patient and this daughter have been living together to \"take care\" of each other.  Significant home safety concerns.  Patient was evaluated by psychiatry as well during her last hospital stay 2 months ago and was deemed lacking decisional capacity. SW was consulted, s/w consult for follow up.  - Patient does not want to go to a TCU, wants to go home.  - Possible discharge home tomorrow with her daughter if they refuse TCU.     Skin excoriation, petechiae, small scabs and Lt foot ulcer suspect ischemic  PVD  - Followed by WOC RN as outpatient.  - Given leg edema and pain, checked d-dimer which was elevated and so r/o DVT.  - Patient with history of lower extremity angiogram and bilateral iliac stenting.  - Continue ASA, beta-blocker and statin as above.  - Daughter reports patient had MRSA positive in her leg wound and was restarted on doxycycline prior to admission.  - Restart PTA doxycycline.  - Wound care consult.     UTI  -Per daughter, was at urgent care clinic prior to admission, diagnosed with UTI and was restarted on Macrobid.  - Repeat UA negative, however culture from the clinic grew enterococcus, sensitive for Macrobid.   - Restart Macrobid.     Hypertension  Blood pressure was soft at presentation.  Denies dizziness.  Patient was hypotensive on recent admission and multiple antihypertensives were discontinued.  - Continue PTA Imdur and metoprolol with hold parameters in place.      History of CAD/MI  - Continue PTA ASA 81 mg p.o. daily, Lipitor 40 mg p.o. daily, Imdur 15 mg p.o. daily, Toprol-XL 12.5 mg p.o. daily and PRN sublingual nitroglycerin.       T2DM  HbA1c 6.8 in January.  PTA is on pioglitazone.    - Hold PTA pioglitazone.  - Continue on sliding scale insulin.  - Moderate carb diet and hypoglycemia protocol.  - Blood sugars fairly well controlled.     Restless leg " syndrome  - Continue PTA ropinirole.       Diet: Moderate Consistent CHO Diet    DVT Prophylaxis: Heparin SQ  Tavera Catheter: not present  Code Status: Full Code    Disposition Plan   Expected discharge: 1-2 days pending COVID-19 results. She lives with her daughter, who is immunocompromised (possibly due to medications for multiple sclerosis?). Patient will require assistance from family after discharge (see therapy notes), so patient and daughter will require close contact. Would prefer to have negative COVID-19 testing prior to discharge home with her daughter, lab was sent in AM on 3/17, hopefully will have results by tomorrow.  Entered: Behzad Frank MD 03/19/2020, 3:43 PM       The patient's care was discussed with the Bedside Nurse, Care Coordinator/ and Patient.    Behzad Farnk MD  Hospitalist Service  [facility]    ______________________________________________________________________    Interval History   Mel Castellanos feels much better. No specific complaints/concerns for me. Denies fevers, chest pain, shortness of breath, nausea, abdominal pain.    Data reviewed today: I reviewed all medications, new labs and imaging results over the last 24 hours. I personally reviewed no images or EKG's today.    Physical Exam   Vital Signs: Temp: 97.6  F (36.4  C) Temp src: Oral BP: 109/49 Pulse: 91 Heart Rate: 72 Resp: 16 SpO2: 96 % O2 Device: None (Room air)    Weight: 144 lbs 11.2 oz  Constitutional: awake, alert, cooperative, no apparent distress  Respiratory: clear to auscultation bilaterally, no crackles or wheezing  Cardiovascular: regular rate and rhythm, normal S1 and S2, no murmur noted  GI: normal bowel sounds, soft, non-distended, non-tender  Skin: warm, dry  Musculoskeletal: no lower extremity pitting edema present  Neurologic: awake, alert, oriented to name, place and time    Data   Recent Labs   Lab 03/18/20  0800 03/17/20  1835 03/17/20  1159 03/17/20  0450    WBC 10.5  --   --  7.5   HGB 11.0*  --   --  13.3   MCV 99  --   --  99     --   --  215     --   --  138   POTASSIUM 3.7 3.4  --  3.1*   CHLORIDE 108  --   --  104   CO2 28  --   --  32   BUN 20  --   --  13   CR 0.74  --   --  0.69   ANIONGAP 4  --   --  2*   LAUREN 9.3  --   --  9.4   *  --   --  144*   ALBUMIN  --   --   --  3.7   PROTTOTAL  --   --   --  7.9   BILITOTAL  --   --   --  0.3   ALKPHOS  --   --   --  98   ALT  --   --   --  25   AST  --   --   --  30   TROPI  --  <0.015 <0.015 <0.015     Medications       aspirin  81 mg Oral Daily     atorvastatin  40 mg Oral Daily     azithromycin  250 mg Oral Daily     cetirizine  5 mg Oral Daily     fluticasone  1 puff Inhalation QPM     fluticasone  1 spray Both Nostrils Daily     heparin ANTICOAGULANT  5,000 Units Subcutaneous Q12H     insulin aspart  1-7 Units Subcutaneous TID AC     insulin aspart  1-5 Units Subcutaneous At Bedtime     isosorbide mononitrate  15 mg Oral Daily     magnesium oxide  400 mg Oral BID     metoprolol succinate ER  12.5 mg Oral BID     multivitamin w/minerals  1 tablet Oral Daily     omeprazole  20 mg Oral QAM     rOPINIRole  1 mg Oral At Bedtime     sodium chloride (PF)  3 mL Intracatheter Q8H

## 2020-03-19 NOTE — PLAN OF CARE
Discharge Planner OT   Patient plan for discharge: Home      Current status: attempted to have pt complete sit to stand however pt refused after setup due to fear of having diarrhea upon standing, pt completed cognitive assessment of SLUMS with pt score of 5/30 indicates dementia level, impairments noted with STM, attention, executive functions and orientation.      Barriers to return to prior living situation: Current level of A required; Fall risk; Pain level     Recommendations for discharge: TCU per plan established by the Occupational therapist     Rationale for recommendations: Pt limited by pain, impaired cognition and safety, and decreased balance and activity tolerance; thus, OT will continue with daily intervention to ensure safety with ADLs. Pt hopeful to return home. If declines TCU stay, recommend 24 hr supervision from family for A with all IADLs, use of FWW at all times, and home OT and RN.         Entered by: Nimisha Coleman 03/19/2020 12:35 PM

## 2020-03-19 NOTE — PLAN OF CARE
"PT:  Discharge Planner PT   Patient plan for discharge: Return home with daughter  Current status: Pt in the chair and was independent with transfers without AD. Performed [place] balance assessment and scored 26/56 putting pt at high risk for falls - see below for details. Pt denies being at risk to fall because \"I hold onto things.\"   Barriers to return to prior living situation: High falls risk, needs assist with mobility, impulsivity.  Recommendations for discharge: TCU  Rationale for recommendations: Pt/daughter are refusing TCU. If pt returns home, recommend home PT, use of FWW and supervision for mobility. Pt would benefit from continued PT while admitted and at discharge to improve balance, mobility to increase independence, reduce falls risk and increase safety before returning home.       Entered by: Sarah Alaniz 03/19/2020 5:25 PM       "

## 2020-03-19 NOTE — PLAN OF CARE
DATE & TIME: 3/18/2020 7423-8280    Cognitive Concerns/ Orientation : A/Ox 2;forgetful   BEHAVIOR & AGGRESSION TOOL COLOR: green; pleasant   CIWA SCORE:    ABNL VS/O2: BP on the softer side 90's. RA.   MOBILITY: SBA  PAIN MANAGMENT: denies   DIET: CHO   BOWEL/BLADDER: incontinent at times   ABNL LAB/BG:   DRAIN/DEVICES: saline locked   TELEMETRY RHYTHM:   SKIN: scabs, bruises, tight/firm/waxy BLE  TESTS/PROCEDURES:   D/C DAY/GOALS/PLACE: Discharge to TCU per PT/OT   OTHER IMPORTANT INFO:

## 2020-03-19 NOTE — PLAN OF CARE
DATE & TIME: 3/19/2020 4269-4487    Cognitive Concerns/ Orientation : disoriented to time and situation.    BEHAVIOR & AGGRESSION TOOL COLOR: green. Pulled off tele. Discontinued.   CIWA SCORE: na    ABNL VS/O2: vss, on RA. Slight fine crackles in all fields.   MOBILITY: SBA x1.   PAIN MANAGMENT: denied pain.   DIET: mod cho.   BOWEL/BLADDER: inc of bladder, continent of bowel.   ABNL LAB/BG: /223  DRAIN/DEVICES: PIV on right arm, sl.   TELEMETRY RHYTHM: NSR, discontinued.   SKIN: wound on top of left foot, dressing changed, BLE 1+ edema, cool to touch ruled out DVT, multiple scratch marks/scars. Heels boggy. Redness blanchable coccyx, meplex changed, CDI.   TESTS/PROCEDURES: pending covit 19 test.   D/C DAY/GOALS/PLACE: pending, daughter is immunocompromised, pt lives with her. Will need to know covit 19 result.   OTHER IMPORTANT INFO:  1616: Spoke with DaughterHenrique, on the phone, updated of pt's condition.

## 2020-03-20 LAB
GLUCOSE BLDC GLUCOMTR-MCNC: 147 MG/DL (ref 70–99)
GLUCOSE BLDC GLUCOMTR-MCNC: 148 MG/DL (ref 70–99)
GLUCOSE BLDC GLUCOMTR-MCNC: 150 MG/DL (ref 70–99)
GLUCOSE BLDC GLUCOMTR-MCNC: 200 MG/DL (ref 70–99)
GLUCOSE BLDC GLUCOMTR-MCNC: 201 MG/DL (ref 70–99)
PLATELET # BLD AUTO: 213 10E9/L (ref 150–450)

## 2020-03-20 PROCEDURE — 25000132 ZZH RX MED GY IP 250 OP 250 PS 637: Performed by: HOSPITALIST

## 2020-03-20 PROCEDURE — 00000146 ZZHCL STATISTIC GLUCOSE BY METER IP

## 2020-03-20 PROCEDURE — 12000000 ZZH R&B MED SURG/OB

## 2020-03-20 PROCEDURE — 25000128 H RX IP 250 OP 636: Performed by: HOSPITALIST

## 2020-03-20 PROCEDURE — U0001 2019-NCOV DIAGNOSTIC P: HCPCS | Performed by: INTERNAL MEDICINE

## 2020-03-20 PROCEDURE — 99207 ZZC MOONLIGHTING INDICATOR: CPT | Performed by: INTERNAL MEDICINE

## 2020-03-20 PROCEDURE — 85049 AUTOMATED PLATELET COUNT: CPT | Performed by: HOSPITALIST

## 2020-03-20 PROCEDURE — 99233 SBSQ HOSP IP/OBS HIGH 50: CPT | Performed by: INTERNAL MEDICINE

## 2020-03-20 PROCEDURE — 36415 COLL VENOUS BLD VENIPUNCTURE: CPT | Performed by: HOSPITALIST

## 2020-03-20 RX ADMIN — MULTIPLE VITAMINS W/ MINERALS TAB 1 TABLET: TAB at 08:47

## 2020-03-20 RX ADMIN — NITROFURANTOIN MONOHYDRATE/MACROCRYSTALLINE 100 MG: 25; 75 CAPSULE ORAL at 20:34

## 2020-03-20 RX ADMIN — METOPROLOL SUCCINATE 12.5 MG: 25 TABLET, EXTENDED RELEASE ORAL at 08:47

## 2020-03-20 RX ADMIN — CETIRIZINE HYDROCHLORIDE 5 MG: 5 TABLET ORAL at 08:47

## 2020-03-20 RX ADMIN — DOXYCYCLINE 100 MG: 100 CAPSULE ORAL at 08:47

## 2020-03-20 RX ADMIN — NITROFURANTOIN MONOHYDRATE/MACROCRYSTALLINE 100 MG: 25; 75 CAPSULE ORAL at 08:47

## 2020-03-20 RX ADMIN — DOXYCYCLINE 100 MG: 100 CAPSULE ORAL at 20:34

## 2020-03-20 RX ADMIN — ISOSORBIDE MONONITRATE 15 MG: 30 TABLET, EXTENDED RELEASE ORAL at 08:47

## 2020-03-20 RX ADMIN — INSULIN ASPART 1 UNITS: 100 INJECTION, SOLUTION INTRAVENOUS; SUBCUTANEOUS at 10:53

## 2020-03-20 RX ADMIN — FLUTICASONE PROPIONATE 1 SPRAY: 50 SPRAY, METERED NASAL at 08:59

## 2020-03-20 RX ADMIN — HEPARIN SODIUM 5000 UNITS: 5000 INJECTION, SOLUTION INTRAVENOUS; SUBCUTANEOUS at 20:43

## 2020-03-20 RX ADMIN — HEPARIN SODIUM 5000 UNITS: 5000 INJECTION, SOLUTION INTRAVENOUS; SUBCUTANEOUS at 08:50

## 2020-03-20 RX ADMIN — ASPIRIN 81 MG: 81 TABLET, DELAYED RELEASE ORAL at 08:47

## 2020-03-20 RX ADMIN — INSULIN ASPART 2 UNITS: 100 INJECTION, SOLUTION INTRAVENOUS; SUBCUTANEOUS at 13:54

## 2020-03-20 RX ADMIN — ROPINIROLE HYDROCHLORIDE 1 MG: 1 TABLET, FILM COATED ORAL at 21:40

## 2020-03-20 RX ADMIN — Medication 400 MG: at 08:47

## 2020-03-20 RX ADMIN — Medication 400 MG: at 20:32

## 2020-03-20 RX ADMIN — FLUTICASONE FUROATE 1 PUFF: 100 POWDER RESPIRATORY (INHALATION) at 20:35

## 2020-03-20 RX ADMIN — INSULIN ASPART 2 UNITS: 100 INJECTION, SOLUTION INTRAVENOUS; SUBCUTANEOUS at 18:45

## 2020-03-20 RX ADMIN — OMEPRAZOLE 20 MG: 20 CAPSULE, DELAYED RELEASE ORAL at 08:47

## 2020-03-20 RX ADMIN — ATORVASTATIN CALCIUM 40 MG: 40 TABLET, FILM COATED ORAL at 08:47

## 2020-03-20 ASSESSMENT — ACTIVITIES OF DAILY LIVING (ADL)
ADLS_ACUITY_SCORE: 23
ADLS_ACUITY_SCORE: 23
ADLS_ACUITY_SCORE: 25
ADLS_ACUITY_SCORE: 23

## 2020-03-20 NOTE — DISCHARGE INSTRUCTIONS
Pt belongings: clothing, denture (top), purse. Lighter (3) in locker #9.     DR Cee from  [facility]  will call you on 3/27 at 11 am      L ankle wound:  Change dressing every 3 days.  Wash and lotion both legs daily.

## 2020-03-20 NOTE — PROGRESS NOTES
DATE & TIME: 3/20/2020 5957-5594    Cognitive Concerns/ Orientation : A&Ox1-2; oriented to self only, back and forth orientation of place   BEHAVIOR & AGGRESSION TOOL COLOR: green; impulsive, setting off bed alarm  CIWA SCORE: na   ABNL VS/O2: VSS on RA, patient complaining of dyspnea with exertion   MOBILITY: A1 with gb and walker; pt impulsive and does not allow assistive devices most times. Fall risk, shaky gait  PAIN MANAGMENT: denied pain  DIET: mod cho  BOWEL/BLADDER: incontinent of bladder. No BM on shift   ABNL LAB/BG: refused BG on shift.   DRAIN/DEVICES: PIV in RUE SL  TELEMETRY RHYTHM: na  SKIN: Wound on top left of foot, dressing CDI. Redness blanchable on coccyx, mepilex CDI. Scattered scraps/bruises, cool.   TESTS/PROCEDURES: na  D/C DAY/GOALS/PLACE: pending covid-19 results  OTHER IMPORTANT INFO:

## 2020-03-20 NOTE — PROGRESS NOTES
[facility]    Medicine Progress Note - Hospitalist Service       Date of Admission:  3/17/2020  Assessment & Plan   Mel Castellanos is an 79 year old female with history of HTN, unstable angina, insomnia, ELS, TIA, T2DM, recurrent UTIs, and emphysema who presented to the ED via EMS due to worsening shortness of breath.  She is being admitted to manage presumed COPD exacerbation     Acute COPD exacerbation  Possible pneumonia/fluid overload  Patient with emphysema, underlying history of tobacco use.  Fever and sudden onset shortness of breath 30-minute PTA.  SPO2 88 on room air per EMS.  CT PE study ruled out PE but shows shows possible pulmonary edema/bronchopneumonia.  -Admit to inpatient.  -Weaned off oxygen.  Saturating well on room air.  -Received IV Solu-Medrol in ER.  Discontinued steroids given lack of significant wheezing.  Placed on scheduled DuoNebs and scheduled Pulmicort nebs.  By the next day patient was feeling much better and back to baseline.  - Stopped scheduled duo nebs today and changed to PRN nebulizers/inhalers and also change Pulmicort nebs to Pulmicort inhaler.  - Received IV Lasix 20 mg daily x2 doses. Discontinued as she does not look volume overloaded and feels back to baseline.  - Procalcitonin at 1.6- blood cultures x2 ordered and received Zosyn on hospital day 1.  Switched to oral azithromycin on 3/18, will discontinue as she is being restarted on doxycycline for skin infection as noted below.  -Serial troponins negative.  -Follow COVID-19 result. On contact and droplet isolation. Low suspicion for COVID 19 as she denies shortness of breath, has been afebrile and feels at baseline.     Hypokalemia  - Replace per protocol.     Deconditioned  Concern of safety at home - unclear baseline mentation   Per chart review from discharge after recent hospitalization, daughter possibly having issues at home. Patient likely is cognitively impaired at baseline. Patient's daughter was  "reportedly intoxicated when EMS picked up patient last time and reportedly she has depression and attempted suicide recently. The patient and this daughter have been living together to \"take care\" of each other.  Significant home safety concerns.  Patient was evaluated by psychiatry as well during her last hospital stay 2 months ago and was deemed lacking decisional capacity. SW was consulted, s/w consult for follow up.  - Patient does not want to go to a TCU, wants to go home.  - Possible discharge home tomorrow with her daughter if they refuse TCU.     Skin excoriation, petechiae, small scabs and Lt foot ulcer suspect ischemic  PVD  - Followed by WOC RN as outpatient.  - Given leg edema and pain, checked d-dimer which was elevated and so r/o DVT.  - Patient with history of lower extremity angiogram and bilateral iliac stenting.  - Continue ASA, beta-blocker and statin as above.  - Daughter reports patient had MRSA positive in her leg wound and was restarted on doxycycline prior to admission.  - Restart PTA doxycycline.  - Wound care consult.     UTI  -Per daughter, was at urgent care clinic prior to admission, diagnosed with UTI and was restarted on Macrobid.  - Repeat UA negative, however culture from the clinic grew enterococcus, sensitive for Macrobid.   - Restart Macrobid.     Hypertension  Blood pressure was soft at presentation.  Denies dizziness.  Patient was hypotensive on recent admission and multiple antihypertensives were discontinued.  - Continue PTA Imdur and metoprolol with hold parameters in place.      History of CAD/MI  - Continue PTA ASA 81 mg p.o. daily, Lipitor 40 mg p.o. daily, Imdur 15 mg p.o. daily, Toprol-XL 12.5 mg p.o. daily and PRN sublingual nitroglycerin.       T2DM  HbA1c 6.8 in January.  PTA is on pioglitazone.    - Hold PTA pioglitazone.  - Continue on sliding scale insulin.  - Moderate carb diet and hypoglycemia protocol.  - Blood sugars fairly well controlled.     Restless leg " syndrome  - Continue PTA ropinirole.       Diet: Moderate Consistent CHO Diet    DVT Prophylaxis: Heparin SQ  Tavera Catheter: not present  Code Status: Full Code    Disposition Plan   Expected discharge: 1-2 days pending COVID-19 results. She lives with her daughter, who is immunocompromised (possibly due to medications for multiple sclerosis?). Patient will require assistance from family after discharge (see therapy notes), so patient and daughter will require close contact.     Entered: Damaris Dong MD 03/20/2020, 12:19 PM       The patient's care was discussed with the Bedside Nurse, Care Coordinator/ and Patient.    Damaris Dong MD  Hospitalist Service  [facility]    ______________________________________________________________________    Interval History   No specific complaints/concerns for me. Denies fevers, chest pain, shortness of breath, nausea, abdominal pain.    Data reviewed today: I reviewed all medications, new labs and imaging results over the last 24 hours. I personally reviewed no images or EKG's today.    Physical Exam   Vital Signs: Temp: 99.1  F (37.3  C) Temp src: Oral BP: 120/54   Heart Rate: 89 Resp: 18 SpO2: 91 % O2 Device: None (Room air)    Weight: 144 lbs 11.2 oz  Constitutional: awake, alert, cooperative, no apparent distress  Respiratory: clear to auscultation bilaterally, no crackles or wheezing  Cardiovascular: regular rate and rhythm, normal S1 and S2, no murmur noted  GI: normal bowel sounds, soft, non-distended, non-tender  Skin: warm, dry  Musculoskeletal: no lower extremity pitting edema present  Neurologic: awake, alert, oriented to name, place and time    Data   Recent Labs   Lab 03/20/20  0930 03/18/20  0800 03/17/20  1835 03/17/20  1159 03/17/20  0450   WBC  --  10.5  --   --  7.5   HGB  --  11.0*  --   --  13.3   MCV  --  99  --   --  99    178  --   --  215   NA  --  140  --   --  138   POTASSIUM  --  3.7 3.4  --  3.1*   CHLORIDE  --  108   --   --  104   CO2  --  28  --   --  32   BUN  --  20  --   --  13   CR  --  0.74  --   --  0.69   ANIONGAP  --  4  --   --  2*   LAUREN  --  9.3  --   --  9.4   GLC  --  115*  --   --  144*   ALBUMIN  --   --   --   --  3.7   PROTTOTAL  --   --   --   --  7.9   BILITOTAL  --   --   --   --  0.3   ALKPHOS  --   --   --   --  98   ALT  --   --   --   --  25   AST  --   --   --   --  30   TROPI  --   --  <0.015 <0.015 <0.015     Medications       aspirin  81 mg Oral Daily     atorvastatin  40 mg Oral Daily     cetirizine  5 mg Oral Daily     doxycycline hyclate  100 mg Oral BID     fluticasone  1 puff Inhalation QPM     fluticasone  1 spray Both Nostrils Daily     heparin ANTICOAGULANT  5,000 Units Subcutaneous Q12H     insulin aspart  1-7 Units Subcutaneous TID AC     insulin aspart  1-5 Units Subcutaneous At Bedtime     isosorbide mononitrate  15 mg Oral Daily     magnesium oxide  400 mg Oral BID     metoprolol succinate ER  12.5 mg Oral BID     multivitamin w/minerals  1 tablet Oral Daily     nitroFURantoin macrocrystal-monohydrate  100 mg Oral BID     omeprazole  20 mg Oral QAM     rOPINIRole  1 mg Oral At Bedtime     sodium chloride (PF)  3 mL Intracatheter Q8H
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
## 2020-03-21 ENCOUNTER — APPOINTMENT (OUTPATIENT)
Dept: OCCUPATIONAL THERAPY | Facility: CLINIC | Age: 80
DRG: 190 | End: 2020-03-21
Payer: COMMERCIAL

## 2020-03-21 LAB
ANION GAP SERPL CALCULATED.3IONS-SCNC: 5 MMOL/L (ref 3–14)
BUN SERPL-MCNC: 16 MG/DL (ref 7–30)
CALCIUM SERPL-MCNC: 9.5 MG/DL (ref 8.5–10.1)
CHLORIDE SERPL-SCNC: 105 MMOL/L (ref 94–109)
CO2 SERPL-SCNC: 28 MMOL/L (ref 20–32)
CREAT SERPL-MCNC: 0.56 MG/DL (ref 0.52–1.04)
ERYTHROCYTE [DISTWIDTH] IN BLOOD BY AUTOMATED COUNT: 13.5 % (ref 10–15)
GFR SERPL CREATININE-BSD FRML MDRD: 88 ML/MIN/{1.73_M2}
GLUCOSE BLDC GLUCOMTR-MCNC: 104 MG/DL (ref 70–99)
GLUCOSE BLDC GLUCOMTR-MCNC: 133 MG/DL (ref 70–99)
GLUCOSE BLDC GLUCOMTR-MCNC: 135 MG/DL (ref 70–99)
GLUCOSE BLDC GLUCOMTR-MCNC: 138 MG/DL (ref 70–99)
GLUCOSE BLDC GLUCOMTR-MCNC: 266 MG/DL (ref 70–99)
GLUCOSE SERPL-MCNC: 141 MG/DL (ref 70–99)
HCT VFR BLD AUTO: 36.3 % (ref 35–47)
HGB BLD-MCNC: 11.9 G/DL (ref 11.7–15.7)
MCH RBC QN AUTO: 31.7 PG (ref 26.5–33)
MCHC RBC AUTO-ENTMCNC: 32.8 G/DL (ref 31.5–36.5)
MCV RBC AUTO: 97 FL (ref 78–100)
PLATELET # BLD AUTO: 213 10E9/L (ref 150–450)
POTASSIUM SERPL-SCNC: 3.6 MMOL/L (ref 3.4–5.3)
RBC # BLD AUTO: 3.75 10E12/L (ref 3.8–5.2)
SODIUM SERPL-SCNC: 138 MMOL/L (ref 133–144)
WBC # BLD AUTO: 7.3 10E9/L (ref 4–11)

## 2020-03-21 PROCEDURE — 12000000 ZZH R&B MED SURG/OB

## 2020-03-21 PROCEDURE — 99207 ZZC MOONLIGHTING INDICATOR: CPT | Performed by: INTERNAL MEDICINE

## 2020-03-21 PROCEDURE — 80048 BASIC METABOLIC PNL TOTAL CA: CPT | Performed by: INTERNAL MEDICINE

## 2020-03-21 PROCEDURE — 25000132 ZZH RX MED GY IP 250 OP 250 PS 637: Performed by: HOSPITALIST

## 2020-03-21 PROCEDURE — 00000146 ZZHCL STATISTIC GLUCOSE BY METER IP

## 2020-03-21 PROCEDURE — 36415 COLL VENOUS BLD VENIPUNCTURE: CPT | Performed by: INTERNAL MEDICINE

## 2020-03-21 PROCEDURE — 99233 SBSQ HOSP IP/OBS HIGH 50: CPT | Performed by: INTERNAL MEDICINE

## 2020-03-21 PROCEDURE — 25000128 H RX IP 250 OP 636: Performed by: HOSPITALIST

## 2020-03-21 PROCEDURE — 97535 SELF CARE MNGMENT TRAINING: CPT | Mod: GO

## 2020-03-21 PROCEDURE — 85027 COMPLETE CBC AUTOMATED: CPT | Performed by: INTERNAL MEDICINE

## 2020-03-21 RX ADMIN — OMEPRAZOLE 20 MG: 20 CAPSULE, DELAYED RELEASE ORAL at 09:37

## 2020-03-21 RX ADMIN — METOPROLOL SUCCINATE 12.5 MG: 25 TABLET, EXTENDED RELEASE ORAL at 09:38

## 2020-03-21 RX ADMIN — NITROFURANTOIN MONOHYDRATE/MACROCRYSTALLINE 100 MG: 25; 75 CAPSULE ORAL at 09:38

## 2020-03-21 RX ADMIN — FLUTICASONE PROPIONATE 1 SPRAY: 50 SPRAY, METERED NASAL at 09:41

## 2020-03-21 RX ADMIN — ATORVASTATIN CALCIUM 40 MG: 40 TABLET, FILM COATED ORAL at 09:39

## 2020-03-21 RX ADMIN — DOXYCYCLINE 100 MG: 100 CAPSULE ORAL at 09:38

## 2020-03-21 RX ADMIN — FLUTICASONE FUROATE 1 PUFF: 100 POWDER RESPIRATORY (INHALATION) at 21:47

## 2020-03-21 RX ADMIN — ISOSORBIDE MONONITRATE 15 MG: 30 TABLET, EXTENDED RELEASE ORAL at 09:37

## 2020-03-21 RX ADMIN — Medication 400 MG: at 21:33

## 2020-03-21 RX ADMIN — ROPINIROLE HYDROCHLORIDE 1 MG: 1 TABLET, FILM COATED ORAL at 21:32

## 2020-03-21 RX ADMIN — NITROFURANTOIN MONOHYDRATE/MACROCRYSTALLINE 100 MG: 25; 75 CAPSULE ORAL at 21:44

## 2020-03-21 RX ADMIN — HEPARIN SODIUM 5000 UNITS: 5000 INJECTION, SOLUTION INTRAVENOUS; SUBCUTANEOUS at 09:31

## 2020-03-21 RX ADMIN — MULTIPLE VITAMINS W/ MINERALS TAB 1 TABLET: TAB at 09:39

## 2020-03-21 RX ADMIN — DOXYCYCLINE 100 MG: 100 CAPSULE ORAL at 21:32

## 2020-03-21 RX ADMIN — Medication 400 MG: at 09:37

## 2020-03-21 RX ADMIN — ASPIRIN 81 MG: 81 TABLET, DELAYED RELEASE ORAL at 09:39

## 2020-03-21 RX ADMIN — HEPARIN SODIUM 5000 UNITS: 5000 INJECTION, SOLUTION INTRAVENOUS; SUBCUTANEOUS at 21:34

## 2020-03-21 RX ADMIN — CETIRIZINE HYDROCHLORIDE 5 MG: 5 TABLET ORAL at 09:38

## 2020-03-21 ASSESSMENT — ACTIVITIES OF DAILY LIVING (ADL)
ADLS_ACUITY_SCORE: 25

## 2020-03-21 NOTE — PLAN OF CARE
DATE & TIME: 3/20/20, 3839-5792          Cognitive Concerns/ Orientation : A&O x self and to place.  BEHAVIOR & AGGRESSION TOOL COLOR: Green  CIWA SCORE: N/a    ABNL VS/O2:  VSS on room air 95%  MOBILITY: Assist x 1, GB and walker; walked in room/to bathroom; up in chair mostly  PAIN MANAGMENT: Denied  DIET: Mod CHO; good intake  BOWEL/BLADDER: Voids in bathroom with incontinence. No BM this shift  ABNL LAB/B, 135  DRAIN/DEVICES: None  TELEMETRY RHYTHM: N/a  SKIN: Wound to LLE, dressing intact, Mepilex to coccyx for protection  TESTS/PROCEDURES: N/a  D/C DAY/GOALS/PLACE: Pending COVID-19 results  OTHER IMPORTANT INFO: Contact and droplets precautions maintained

## 2020-03-21 NOTE — PLAN OF CARE
Cognitive Concerns/ Orientation : A&O x self and to place.  BEHAVIOR & AGGRESSION TOOL COLOR: Green  CIWA SCORE: N/a    ABNL VS/O2:  VSS on room air 95%  MOBILITY: Assist x 1, GB and walker  PAIN MANAGMENT: Denies  DIET: Mod CHO; good intake  BOWEL/BLADDER: Voids in bathroom with incontinence/briefs. No BM this shift  ABNL LAB/BG:   DRAIN/DEVICES: None  TELEMETRY RHYTHM: N/a  SKIN: performed skin care to BLE as ordered by WOC.  Dressing to LLE ankle/foot, changed. (Q-3 days per WOC orders) Mepilex to coccyx for protection  TESTS/PROCEDURES: N/a  D/C DAY/GOALS/PLACE: Pending COVID-19 results  OTHER IMPORTANT INFO: Contact and modified droplet (negative pressure) precautions maintained.    Tearful regarding death of son.  Daughter discussed this with her over the phone this evening.  Patient reassured, received redirection and emotional support well.    >30 min conversation with daughter tonight.  She would like MD updates in the AM.  Will leave sticky note for MD.

## 2020-03-21 NOTE — PROGRESS NOTES
SW:    D: Consult acknowledge, not complete. Chart reviewed. COVID results pending- not appropriate to see at this time.   P: Following for discharge planning.     ANNIE Lazo

## 2020-03-21 NOTE — PROGRESS NOTES
[facility]    Medicine Progress Note - Hospitalist Service       Date of Admission:  3/17/2020  Assessment & Plan   Mel Castellanos is an 79 year old female with history of HTN, unstable angina, insomnia, ELS, TIA, T2DM, recurrent UTIs, and emphysema who presented to the ED via EMS due to worsening shortness of breath.  She is being admitted to manage presumed COPD exacerbation     Acute COPD exacerbation  Possible pneumonia/fluid overload  Patient with emphysema, underlying history of tobacco use.  Fever and sudden onset shortness of breath 30-minute PTA.  SPO2 88 on room air per EMS.  CT PE study ruled out PE but shows shows possible pulmonary edema/bronchopneumonia.  -Admit to inpatient.  -Weaned off oxygen.  Saturating well on room air.  -Received IV Solu-Medrol in ER.  Discontinued steroids given lack of significant wheezing.  Placed on scheduled DuoNebs and scheduled Pulmicort nebs.  By the next day patient was feeling much better and back to baseline.  - Stopped scheduled duo nebs today and changed to PRN nebulizers/inhalers and also change Pulmicort nebs to Pulmicort inhaler.  - Received IV Lasix 20 mg daily x2 doses. Discontinued as she does not look volume overloaded and feels back to baseline.  - Procalcitonin at 1.6- blood cultures x2 ordered and received Zosyn on hospital day 1.  Switched to oral azithromycin on 3/18, will discontinue as she is being restarted on doxycycline for skin infection as noted below.  -Serial troponins negative.  -Follow COVID-19 result. On contact and droplet isolation. Low suspicion for COVID 19 as she denies shortness of breath, has been afebrile and feels at baseline.     Hypokalemia  - Replace per protocol.     Deconditioned  Concern of safety at home - unclear baseline mentation   Per chart review from discharge after recent hospitalization, daughter possibly having issues at home. Patient likely is cognitively impaired at baseline. Patient's daughter was  "reportedly intoxicated when EMS picked up patient last time and reportedly she has depression and attempted suicide recently. The patient and this daughter have been living together to \"take care\" of each other.  Significant home safety concerns.  Patient was evaluated by psychiatry as well during her last hospital stay 2 months ago and was deemed lacking decisional capacity. SW was consulted, s/w consult for follow up.  - Patient does not want to go to a TCU, wants to go home.  - Possible discharge home tomorrow with her daughter if they refuse TCU.     Skin excoriation, petechiae, small scabs and Lt foot ulcer suspect ischemic  PVD  - Followed by WOC RN as outpatient.  - Given leg edema and pain, checked d-dimer which was elevated and so r/o DVT.  - Patient with history of lower extremity angiogram and bilateral iliac stenting.  - Continue ASA, beta-blocker and statin as above.  - Daughter reports patient had MRSA positive in her leg wound and was restarted on doxycycline prior to admission.  - Restart PTA doxycycline.  - Wound care consult.     UTI  -Per daughter, was at urgent care clinic prior to admission, diagnosed with UTI and was restarted on Macrobid.  - Repeat UA negative, however culture from the clinic grew enterococcus, sensitive for Macrobid.   - Restart Macrobid.     Hypertension  Blood pressure was soft at presentation.  Denies dizziness.  Patient was hypotensive on recent admission and multiple antihypertensives were discontinued.  - Continue PTA Imdur and metoprolol with hold parameters in place.      History of CAD/MI  - Continue PTA ASA 81 mg p.o. daily, Lipitor 40 mg p.o. daily, Imdur 15 mg p.o. daily, Toprol-XL 12.5 mg p.o. daily and PRN sublingual nitroglycerin.       T2DM  HbA1c 6.8 in January.  PTA is on pioglitazone.    - Hold PTA pioglitazone.  - Continue on sliding scale insulin.  - Moderate carb diet and hypoglycemia protocol.  - Blood sugars fairly well controlled.     Restless leg " syndrome  - Continue PTA ropinirole.       Diet: Moderate Consistent CHO Diet    DVT Prophylaxis: Heparin SQ  Tavera Catheter: not present  Code Status: Full Code    Disposition Plan   Expected discharge: 1-2 days pending COVID-19 results. She lives with her daughter, who is immunocompromised (possibly due to medications for multiple sclerosis?). Patient will require assistance from family after discharge (see therapy notes), so patient and daughter will require close contact.     Entered: Damaris Dong MD 03/21/2020, 12:28 PM       The patient's care was discussed with the Bedside Nurse, Care Coordinator/ and Patient.    Damaris Dong MD  Hospitalist Service  [facility]    ______________________________________________________________________    Interval History   No specific complaints/concerns for me. Denies fevers, chest pain, shortness of breath, nausea, abdominal pain.    Data reviewed today: I reviewed all medications, new labs and imaging results over the last 24 hours. I personally reviewed no images or EKG's today.    Physical Exam   Vital Signs: Temp: 98.7  F (37.1  C) Temp src: Oral BP: 122/51 Pulse: 86 Heart Rate: 90 Resp: 18 SpO2: 90 % O2 Device: None (Room air)    Weight: 138 lbs 9.6 oz  Constitutional: awake, alert, cooperative, no apparent distress  Respiratory: clear to auscultation bilaterally, no crackles or wheezing  Cardiovascular: regular rate and rhythm, normal S1 and S2, no murmur noted  GI: normal bowel sounds, soft, non-distended, non-tender  Skin: warm, dry  Musculoskeletal: no lower extremity pitting edema present  Neurologic: awake, alert, oriented to name, place and time    Data   Recent Labs   Lab 03/21/20  0934 03/20/20  0930 03/18/20  0800 03/17/20  1835 03/17/20  1159 03/17/20  0450   WBC 7.3  --  10.5  --   --  7.5   HGB 11.9  --  11.0*  --   --  13.3   MCV 97  --  99  --   --  99    213 178  --   --  215     --  140  --   --  138   POTASSIUM  3.6  --  3.7 3.4  --  3.1*   CHLORIDE 105  --  108  --   --  104   CO2 28  --  28  --   --  32   BUN 16  --  20  --   --  13   CR 0.56  --  0.74  --   --  0.69   ANIONGAP 5  --  4  --   --  2*   LAUREN 9.5  --  9.3  --   --  9.4   *  --  115*  --   --  144*   ALBUMIN  --   --   --   --   --  3.7   PROTTOTAL  --   --   --   --   --  7.9   BILITOTAL  --   --   --   --   --  0.3   ALKPHOS  --   --   --   --   --  98   ALT  --   --   --   --   --  25   AST  --   --   --   --   --  30   TROPI  --   --   --  <0.015 <0.015 <0.015     Medications       aspirin  81 mg Oral Daily     atorvastatin  40 mg Oral Daily     cetirizine  5 mg Oral Daily     doxycycline hyclate  100 mg Oral BID     fluticasone  1 puff Inhalation QPM     fluticasone  1 spray Both Nostrils Daily     heparin ANTICOAGULANT  5,000 Units Subcutaneous Q12H     insulin aspart  1-7 Units Subcutaneous TID AC     insulin aspart  1-5 Units Subcutaneous At Bedtime     isosorbide mononitrate  15 mg Oral Daily     magnesium oxide  400 mg Oral BID     metoprolol succinate ER  12.5 mg Oral BID     multivitamin w/minerals  1 tablet Oral Daily     nitroFURantoin macrocrystal-monohydrate  100 mg Oral BID     omeprazole  20 mg Oral QAM     rOPINIRole  1 mg Oral At Bedtime     sodium chloride (PF)  3 mL Intracatheter Q8H

## 2020-03-21 NOTE — PLAN OF CARE
DATE & TIME: 3/20/20, 1370 - 0820   Cognitive Concerns/ Orientation : A&O x self only   BEHAVIOR & AGGRESSION TOOL COLOR: Green  CIWA SCORE: N/a   ABNL VS/O2: BP soft, Other VSS on room air  MOBILITY: Assist x 1, GB and walker  PAIN MANAGMENT: Denied  DIET: Mod CHO  BOWEL/BLADDER: Voids in bathroom with some incontinence. No BM this shift  ABNL LAB/B  DRAIN/DEVICES: None  TELEMETRY RHYTHM: N/a  SKIN: Wounds, Mepilex to coccyx  TESTS/PROCEDURES: N/a  D/C DAY/GOALS/PLACE: Pending COVID-19 results  OTHER IMPORTANT INFO: Contact and droplets precautions maintained

## 2020-03-21 NOTE — PLAN OF CARE
DATE & TIME: 3/20/2020 4630-6616    Cognitive Concerns/ Orientation : A+Ox1   BEHAVIOR & AGGRESSION TOOL COLOR: Green; calm & cooperative  CIWA SCORE: NA   ABNL VS/O2: None  MOBILITY: Assist x1  PAIN MANAGMENT: Denies  DIET: Mod Carb  BOWEL/BLADDER: Incontinent at times; puts 5 pads at times in undergarment  ABNL LAB/BG: NA  DRAIN/DEVICES: None  TELEMETRY RHYTHM: NA  SKIN: Wounds  TESTS/PROCEDURES: None  D/C DAY/GOALS/PLACE: Waiting for Coronavirus test results; re-swabbed today and sent to different lab  OTHER IMPORTANT INFO: NA

## 2020-03-21 NOTE — PLAN OF CARE
Discharge Planner OT   Patient plan for discharge: Return home  Current status: Pt seated in chair upon OT arrival. Pt agreeable to therapy. Pt demo'd tangential speech during OT treatment, requiring mod verbal cues for attending to tasks. Pt completed sit <> stand transfer with SBA and FWW; pt required cues for safe use of FWW. Pt ambulated from chair to bathroom with SBA and FWW. Pt completed toilet transfer with SBA and AE. Pt completed toileting tasks with SBA. Pt completed standing g/h at sink with SBA. Session ended with pt seated in chair. All needs in reach; chair alarm on.   Barriers to return to prior living situation: Decreased safety awareness; decreased activity tolerance; decreased ind with I/ADL's; confusion  Recommendations for discharge: TCU  Rationale for recommendations: Pt demonstrated min safety awareness during OT session, with mod cues for safety and attending to ADL's and functional mobility. Pt would benefit from continued skilled OT services for improved ind with I/ADL's. If pt refuses TCU, recommended  OT for home safety eval, in addition to 24-hour supervision from family.        Entered by: Susan Puentes 03/21/2020 12:01 PM

## 2020-03-22 VITALS
TEMPERATURE: 98.3 F | DIASTOLIC BLOOD PRESSURE: 62 MMHG | RESPIRATION RATE: 18 BRPM | OXYGEN SATURATION: 93 % | SYSTOLIC BLOOD PRESSURE: 125 MMHG | BODY MASS INDEX: 24.47 KG/M2 | WEIGHT: 138.1 LBS | HEART RATE: 90 BPM

## 2020-03-22 LAB
ANION GAP SERPL CALCULATED.3IONS-SCNC: 4 MMOL/L (ref 3–14)
BUN SERPL-MCNC: 20 MG/DL (ref 7–30)
CALCIUM SERPL-MCNC: 9.7 MG/DL (ref 8.5–10.1)
CHLORIDE SERPL-SCNC: 101 MMOL/L (ref 94–109)
CO2 SERPL-SCNC: 29 MMOL/L (ref 20–32)
CREAT SERPL-MCNC: 0.63 MG/DL (ref 0.52–1.04)
ERYTHROCYTE [DISTWIDTH] IN BLOOD BY AUTOMATED COUNT: 13.6 % (ref 10–15)
GFR SERPL CREATININE-BSD FRML MDRD: 84 ML/MIN/{1.73_M2}
GLUCOSE BLDC GLUCOMTR-MCNC: 132 MG/DL (ref 70–99)
GLUCOSE BLDC GLUCOMTR-MCNC: 173 MG/DL (ref 70–99)
GLUCOSE SERPL-MCNC: 200 MG/DL (ref 70–99)
HCT VFR BLD AUTO: 37.2 % (ref 35–47)
HGB BLD-MCNC: 12 G/DL (ref 11.7–15.7)
MCH RBC QN AUTO: 31.7 PG (ref 26.5–33)
MCHC RBC AUTO-ENTMCNC: 32.3 G/DL (ref 31.5–36.5)
MCV RBC AUTO: 98 FL (ref 78–100)
PLATELET # BLD AUTO: 216 10E9/L (ref 150–450)
POTASSIUM SERPL-SCNC: 4.2 MMOL/L (ref 3.4–5.3)
RBC # BLD AUTO: 3.79 10E12/L (ref 3.8–5.2)
SODIUM SERPL-SCNC: 134 MMOL/L (ref 133–144)
WBC # BLD AUTO: 8.9 10E9/L (ref 4–11)

## 2020-03-22 PROCEDURE — 36415 COLL VENOUS BLD VENIPUNCTURE: CPT | Performed by: INTERNAL MEDICINE

## 2020-03-22 PROCEDURE — 25000128 H RX IP 250 OP 636: Performed by: HOSPITALIST

## 2020-03-22 PROCEDURE — 25000132 ZZH RX MED GY IP 250 OP 250 PS 637: Performed by: HOSPITALIST

## 2020-03-22 PROCEDURE — 99239 HOSP IP/OBS DSCHRG MGMT >30: CPT | Performed by: INTERNAL MEDICINE

## 2020-03-22 PROCEDURE — 00000146 ZZHCL STATISTIC GLUCOSE BY METER IP

## 2020-03-22 PROCEDURE — 80048 BASIC METABOLIC PNL TOTAL CA: CPT | Performed by: INTERNAL MEDICINE

## 2020-03-22 PROCEDURE — 99207 ZZC MOONLIGHTING INDICATOR: CPT | Performed by: INTERNAL MEDICINE

## 2020-03-22 PROCEDURE — 85027 COMPLETE CBC AUTOMATED: CPT | Performed by: INTERNAL MEDICINE

## 2020-03-22 RX ORDER — NITROFURANTOIN 25; 75 MG/1; MG/1
100 CAPSULE ORAL 2 TIMES DAILY
Qty: 8 CAPSULE | Refills: 0 | Status: ON HOLD | OUTPATIENT
Start: 2020-03-22 | End: 2020-06-17

## 2020-03-22 RX ORDER — CETIRIZINE HYDROCHLORIDE 5 MG/1
5 TABLET ORAL DAILY
Qty: 30 TABLET | Refills: 1 | Status: SHIPPED | OUTPATIENT
Start: 2020-03-23 | End: 2020-06-02

## 2020-03-22 RX ADMIN — HEPARIN SODIUM 5000 UNITS: 5000 INJECTION, SOLUTION INTRAVENOUS; SUBCUTANEOUS at 08:18

## 2020-03-22 RX ADMIN — DOXYCYCLINE 100 MG: 100 CAPSULE ORAL at 08:14

## 2020-03-22 RX ADMIN — Medication 400 MG: at 08:14

## 2020-03-22 RX ADMIN — NITROFURANTOIN MONOHYDRATE/MACROCRYSTALLINE 100 MG: 25; 75 CAPSULE ORAL at 08:14

## 2020-03-22 RX ADMIN — FLUTICASONE PROPIONATE 1 SPRAY: 50 SPRAY, METERED NASAL at 08:13

## 2020-03-22 RX ADMIN — CETIRIZINE HYDROCHLORIDE 5 MG: 5 TABLET ORAL at 08:14

## 2020-03-22 RX ADMIN — ISOSORBIDE MONONITRATE 15 MG: 30 TABLET, EXTENDED RELEASE ORAL at 08:14

## 2020-03-22 RX ADMIN — ATORVASTATIN CALCIUM 40 MG: 40 TABLET, FILM COATED ORAL at 08:14

## 2020-03-22 RX ADMIN — METOPROLOL SUCCINATE 12.5 MG: 25 TABLET, EXTENDED RELEASE ORAL at 08:14

## 2020-03-22 RX ADMIN — MULTIPLE VITAMINS W/ MINERALS TAB 1 TABLET: TAB at 08:14

## 2020-03-22 RX ADMIN — ASPIRIN 81 MG: 81 TABLET, DELAYED RELEASE ORAL at 08:14

## 2020-03-22 RX ADMIN — OMEPRAZOLE 20 MG: 20 CAPSULE, DELAYED RELEASE ORAL at 08:14

## 2020-03-22 RX ADMIN — INSULIN ASPART 1 UNITS: 100 INJECTION, SOLUTION INTRAVENOUS; SUBCUTANEOUS at 12:27

## 2020-03-22 ASSESSMENT — ACTIVITIES OF DAILY LIVING (ADL)
ADLS_ACUITY_SCORE: 25
ADLS_ACUITY_SCORE: 26

## 2020-03-22 NOTE — DISCHARGE SUMMARY
[facility]    Discharge Summary  Hospitalist    Date of Admission:  3/17/2020  Date of Discharge:  3/22/2020  Discharging Provider: Damaris Dong MD    Discharge Diagnoses   Acute COPD exacerbation  Possible pneumonia/fluid overload    History of Present Illness   Mel Castellanos is an 79 year old female with history of HTN, unstable angina, insomnia, ELS, TIA, T2DM, recurrent UTIs, and emphysema who presented to the ED via EMS due to worsening shortness of breath.     Patient is a very poor historian and states he had shortness of breath for years and cannot recall when it started getting worse. Daughter stated she had abrupt onset dyspnea and fever this morning. She reports a dry cough. Denies fever. No chest pain but reports epigastric abdominal pain. No urinary symptoms.  Denies orthopnea or PND.  Patient was not sure if she uses oxygen at home but it appears she in not on supplemental O2. She reports using nebulizer though it has not been listed in her home medication.     Daughter reports she was in the urgent care clinic yesterday, and was started on doxycycline for MRSA infection of foot, and      In ER, patient was evaluated by Dr. Ford.  Patient reportedly was hypoxic at 85% on room air and was placed on 2-3 LPM NC O2.  She had low-grade temp of 99.3.  CBC was normal.  Lactic acid negative.  Rapid influenza was negative.  COVID-19 swab has been sent. CT chest PE protocol was completed, Mild upper lobe predominant emphysematous changes. Mucus debris within the trachea, Bilateral mild interlobular septal thickening, could represent pulmonary edema. Few patchy pulmonary opacities, for example in the posterior aspect of the superior segment of the right lower lobe and lingula could be due to atelectatic or  infectious. Twelve-lead EKG showed ST depression on inferior leads and T inversion in precordial leads.  Troponin was negative.  Patient was given Solu-Medrol, DuoNeb, normal saline IV  bolus and an admission was requested.    Hospital Course   Mel Castellanos was admitted on 3/17/2020.  The following problems were addressed during her hospitalization:    Active Problems:    COPD with acute exacerbation (H)    Mel Castellanos is an 79 year old female with history of HTN, unstable angina, insomnia, ELS, TIA, T2DM, recurrent UTIs, and emphysema who presented to the ED via EMS due to worsening shortness of breath.  She is being admitted to manage presumed COPD exacerbation     Acute COPD exacerbation  Possible pneumonia/fluid overload  Patient with emphysema, underlying history of tobacco use.  Fever and sudden onset shortness of breath 30-minute PTA.  SPO2 88 on room air per EMS.  CT PE study ruled out PE but shows shows possible pulmonary edema/bronchopneumonia.  -Admit to inpatient.  -Weaned off oxygen.  Saturating well on room air.  -Received IV Solu-Medrol in ER.  Discontinued steroids given lack of significant wheezing.  Placed on scheduled DuoNebs and scheduled Pulmicort nebs.  By the next day patient was feeling much better and back to baseline.  - Stopped scheduled duo nebs today and changed to PRN nebulizers/inhalers and also change Pulmicort nebs to Pulmicort inhaler.  - Received IV Lasix 20 mg daily x2 doses. Discontinued as she does not look volume overloaded and feels back to baseline.  - Procalcitonin at 1.6- blood cultures x2 ordered and received Zosyn on hospital day 1.  Switched to oral azithromycin on 3/18, will discontinue as she is being restarted on doxycycline for skin infection as noted below.  -Serial troponins negative.  -COVID-19 test negative.      Hypokalemia  - Replaced per protocol.     Deconditioned  Concern of safety at home - unclear baseline mentation   Per chart review from discharge after recent hospitalization, daughter possibly having issues at home. Patient likely is cognitively impaired at baseline. Patient's daughter was reportedly intoxicated when EMS picked  "up patient last time and reportedly she has depression and attempted suicide recently. The patient and this daughter have been living together to \"take care\" of each other.  Significant home safety concerns.  Patient was evaluated by psychiatry as well during her last hospital stay 2 months ago and was deemed lacking decisional capacity. SW was consulted, s/w consult for follow up.  - Patient does not want to go to a TCU, wants to go home.  - Possible discharge home tomorrow with her daughter if they refuse TCU.     Skin excoriation, petechiae, small scabs and Lt foot ulcer suspect ischemic  PVD  - Followed by [facility] RN as outpatient.  - Given leg edema and pain, checked d-dimer which was elevated and so r/o DVT.  - Patient with history of lower extremity angiogram and bilateral iliac stenting.  - Continue ASA, beta-blocker and statin as above.  - Daughter reports patient had MRSA positive in her leg wound and was restarted on doxycycline prior to admission.  - Restart PTA doxycycline.  - Wound care consult.     UTI  -Per daughter, was at urgent care clinic prior to admission, diagnosed with UTI and was restarted on Macrobid.  - Repeat UA negative, however culture from the clinic grew enterococcus, sensitive for Macrobid.   - Restart Macrobid.     Hypertension  Blood pressure was soft at presentation.  Denies dizziness.  Patient was hypotensive on recent admission and multiple antihypertensives were discontinued.  - Continue PTA Imdur and metoprolol with hold parameters in place.      History of CAD/MI  - Continue PTA ASA 81 mg p.o. daily, Lipitor 40 mg p.o. daily, Imdur 15 mg p.o. daily, Toprol-XL 12.5 mg p.o. daily and PRN sublingual nitroglycerin.       T2DM  HbA1c 6.8 in January.  PTA is on pioglitazone.       Restless leg syndrome  - Continue PTA ropinirole.    # Discharge Pain Plan:   - Patient currently has NO PAIN and is not being prescribed pain medications on discharge.    Damaris Dong MD, MD    Significant " Results and Procedures   PCP    Pending Results   These results will be followed up by PCP  Unresulted Labs Ordered in the Past 30 Days of this Admission     Date and Time Order Name Status Description    3/20/2020 1219 COVID-19 Virus (Coronavirus) PCR to [place] [facility] Nasopharyngeal In process     3/17/2020 1247 Blood culture Preliminary     3/17/2020 1247 Blood culture Preliminary     3/17/2020 0544 Novel COVID-19 (Coronavirus) SARS-CoV-2 by PCR Nasopharyngeal swab In process           Code Status   Full Code       Primary Care Physician   [facility]    Physical Exam   Temp: 98.3  F (36.8  C) Temp src: Oral BP: 125/62 Pulse: 90 Heart Rate: 86 Resp: 18 SpO2: 93 % O2 Device: None (Room air)    Vitals:    03/20/20 1502 03/21/20 0151 03/22/20 0535   Weight: 60.3 kg (132 lb 14.4 oz) 62.9 kg (138 lb 9.6 oz) 62.6 kg (138 lb 1.6 oz)     Vital Signs with Ranges  Temp:  [97.7  F (36.5  C)-98.3  F (36.8  C)] 98.3  F (36.8  C)  Pulse:  [90] 90  Heart Rate:  [79-92] 86  Resp:  [16-18] 18  BP: ()/(52-62) 125/62  SpO2:  [93 %-95 %] 93 %  I/O last 3 completed shifts:  In: 840 [P.O.:840]  Out: -     GEN: NAD, pleasant  HEENT: no icterus  CV: RRR, normal s1/s2, no murmurs/rubs/s3/s4, no heave. JVP normal.  CHEST: CTAB  ABD: soft, NT/ND, NABS  : no flank/suprapubic tenderness  NEURO: AA&Ox3, fluent/appropriate, motor grossly nonfocal  PSYCH: cooperative, affect appropriate    Discharge Disposition   Discharged to home  Condition at discharge: Good    Consultations This Hospital Stay   RESPIRATORY CARE IP CONSULT  SOCIAL WORK IP CONSULT  PHYSICAL THERAPY ADULT IP CONSULT  OCCUPATIONAL THERAPY ADULT IP CONSULT  WOUND OSTOMY CONTINENCE NURSE  IP CONSULT  RESPIRATORY CARE IP CONSULT    Time Spent on this Encounter   IDamaris MD, personally saw the patient today and spent greater than 30 minutes discharging this patient.    Discharge Orders      Home care nursing referral      Home Care PT Referral for  Hospital Discharge      Home Care OT Referral for Hospital Discharge      Reason for your hospital stay    Weakness     Follow-up and recommended labs and tests     Follow up with primary care provider, (tele ok) [facility], within 7 days for hospital follow- up.  No follow up labs or test are needed.     Activity    Your activity upon discharge: activity as tolerated     MD face to face encounter    Documentation of Face to Face and Certification for Home Health Services    I certify that patient: Mel Castellanos is under my care and that I, or a nurse practitioner or physician's assistant working with me, had a face-to-face encounter that meets the physician face-to-face encounter requirements with this patient on: 3/22/2020.    This encounter with the patient was in whole, or in part, for the following medical condition, which is the primary reason for home health care: weakness.    I certify that, based on my findings, the following services are medically necessary home health services: Nursing, Occupational Therapy and Physical Therapy.    My clinical findings support the need for the above services because: Nurse is needed: For complex aftercare of surgical procedures because the patient needs instruction and cannot perform care on their own due to: weakness..    Further, I certify that my clinical findings support that this patient is homebound (i.e. absences from home require considerable and taxing effort and are for medical reasons or [employer] services or infrequently or of short duration when for other reasons) because: Leaving home is medically contraindicated for the following reason(s): Dyspnea on exertion that makes it so they cannot leave their home for needed services without clinical deterioration...    Based on the above findings. I certify that this patient is confined to the home and needs intermittent skilled nursing care, physical therapy and/or speech therapy.  The patient  is under my care, and I have initiated the establishment of the plan of care.  This patient will be followed by a physician who will periodically review the plan of care.  Physician/Provider to provide follow up care: Clinic, [facility]    Attending hospital physician (the Medicare certified [facility] provider): Damaris Dong MD  Physician Signature: See electronic signature associated with these discharge orders.  Date: 3/22/2020     Diet    Follow this diet upon discharge: Orders Placed This Encounter      Moderate Consistent CHO Diet     Discharge Medications   Current Discharge Medication List      START taking these medications    Details   cetirizine (ZYRTEC) 5 MG tablet Take 1 tablet (5 mg) by mouth daily  Qty: 30 tablet, Refills: 1    Associated Diagnoses: COPD with acute exacerbation (H)      fluticasone (ARNUITY ELLIPTA) 100 MCG/ACT inhaler Inhale 1 puff into the lungs every evening  Qty: 30 each, Refills: 1    Associated Diagnoses: COPD with acute exacerbation (H)         CONTINUE these medications which have CHANGED    Details   nitroFURantoin macrocrystal-monohydrate (MACROBID) 100 MG capsule Take 1 capsule (100 mg) by mouth 2 times daily for 4 days  Qty: 8 capsule, Refills: 0    Associated Diagnoses: COPD with acute exacerbation (H)         CONTINUE these medications which have NOT CHANGED    Details   ASPIRIN EC PO Take 81 mg by mouth daily       atorvastatin (LIPITOR) 40 MG tablet Take 40 mg by mouth daily      doxycycline hyclate (VIBRAMYCIN) 100 MG capsule Take 100 mg by mouth 2 times daily For 7days.      isosorbide mononitrate (IMDUR) 30 MG 24 hr tablet Take 0.5 tablets (15 mg) by mouth daily  Qty: 30 tablet, Refills: 0    Associated Diagnoses: Benign essential hypertension      magnesium oxide (MAG-OX) 400 MG tablet Take 400 mg by mouth 2 times daily      metoprolol succinate ER (TOPROL-XL) 25 MG 24 hr tablet Take 0.5 tablets (12.5 mg) by mouth 2 times daily  Qty: 60 tablet, Refills: 0     Associated Diagnoses: Benign essential hypertension      Multiple Vitamins-Minerals (CENTRUM SILVER) per tablet Take 1 tablet by mouth daily      nitroglycerin (NITROSTAT) 0.4 MG SL tablet Place 1 tablet (0.4 mg) under the tongue every 5 minutes as needed X 3 doses total if needed for chest pain.  Qty: 25 tablet, Refills: 3    Associated Diagnoses: Chest pain, unspecified type      OMEPRAZOLE PO Take 20 mg by mouth every morning       pioglitazone (ACTOS) 15 MG tablet Take 15 mg by mouth daily      rOPINIRole (REQUIP) 1 MG tablet Take 1 mg by mouth At Bedtime           Allergies   Allergies   Allergen Reactions     Sulfa Drugs Rash     Data   Results for orders placed or performed during the hospital encounter of 03/17/20   CT Chest Pulmonary Embolism w Contrast    Narrative    Mel Castellanos  Accession: #[id number]    CT CHEST PULMONARY EMBOLISM W CONTRAST 3/17/2020 6:46 AM    HISTORY: Shortness of breath.    TECHNIQUE: Scans obtained from the apices through the diaphragm with  IV contrast. 58mL Isovue-370 IV injected. Radiation dose for this scan  was reduced using automated exposure control, adjustment of the mA  and/or kV according to patient size, or iterative reconstruction  technique.    COMPARISON: None available.    FINDINGS:  Chest/mediastinum: No evidence of pulmonary embolism. No cardiomegaly.  Mild pericardial calcification. Moderate atherosclerotic vascular  calcification of the coronary arteries and thoracic aorta. Multiple  prominent mediastinal hilar lymph nodes, for example 1.2 cm short axis  subcarinal lymph node. The pulmonary artery is enlarged measuring 3.3  cm, this can be seen with pulmonary hypertension.    Lungs and pleura: Bibasilar atelectasis. No pleural effusion or  pneumothorax. Mild upper lobe predominant emphysematous changes. Mucus  debris within the trachea (series 7 image 42). Bilateral mild  interlobular septal thickening, could represent pulmonary edema. Few  patchy pulmonary  opacities, for example in the posterior aspect of the  superior segment of the right lower lobe (series 7 image 162) and  lingula (series 7 image 236) could be due to atelectatic or  infectious. Few scattered pulmonary nodules, for example 5 mm  posterior right upper lobe pulmonary nodule (series 7 image 116).    Upper abdomen: Limits evaluation of the upper abdomen due to lack of  coverage and timing of contrast. Few calcified splenic granulomas.  Extensive atherosclerotic vascular calcification.    Bones and soft tissue: Diffuse osteopenia with multilevel degenerative  changes of the visualized spine. Few mild compression deformities of  the thoracolumbar spine.      Impression    IMPRESSION:   1. No evidence of pulmonary embolism.  2. Bilateral interlobular septal thickening, likely represent  pulmonary edema.  3. Few scattered patchy consolidative pulmonary opacities, for example  in the right lower lobe and lingula, could be infectious or  atelectatic.  4. Multiple mildly enlarged mediastinal and hilar lymph nodes,  indeterminate, could be reactive.  5. The main pulmonary artery is enlarged measuring 3.3 cm, this can be  seen with pulmonary hypertension.  6. Few scattered pulmonary nodules for example 5 mm right upper lobe  nodule, not well seen on prior studies, an indeterminate, could be  infectious, recommend attention on follow-up.    ELVER COOK MD   US Lower Extremity Venous Duplex Bilateral    Narrative    VENOUS ULTRASOUND BILATERAL LOWER EXTREMITIES  3/17/2020 3:08 PM     HISTORY: Rule out DVT, bilateral leg pain and swelling.    COMPARISON: None.    TECHNIQUE: Color Doppler and spectral waveform analysis performed  throughout the deep veins of both lower extremities.    FINDINGS: Both common femoral, proximal greater saphenous, femoral,  and popliteal veins demonstrate normal blood flow, compression, and  augmentation. The visible posterior tibial and peroneal veins are  compressible, though  difficult to visualize as patient was not able to  well tolerate compression.      Impression    IMPRESSION: Negative for deep venous thrombosis in both lower  extremities.    CALI OVALLE MD

## 2020-03-22 NOTE — PLAN OF CARE
PT: Attempted treatment session. Patient dressed and sitting up in chair, stating she is being discharged and needs to talk to her daughter. Declines PT.

## 2020-03-22 NOTE — PLAN OF CARE
Occupational Therapy Discharge Summary    Reason for therapy discharge:    Discharged to home with home therapy.    Progress towards therapy goal(s). See goals on Care Plan in [facility] electronic health record for goal details.  Goals not met.  Barriers to achieving goals:   discharge from facility.    Therapy recommendation(s):    Continued therapy is recommended.  Rationale/Recommendations:   .    Per POC dated 3/21/20:Rec. TCU;Pt demonstrated min safety awareness during OT session, with mod cues for safety and attending to ADL's and functional mobility. Pt would benefit from continued skilled OT services for improved ind with I/ADL's. If pt refuses TCU, recommended  OT for home safety eval, in addition to 24-hour supervision from family.     Note:This writer did not see/treat pt.--discharge written from chart review.

## 2020-03-22 NOTE — CONSULTS
Care Transition Initial Assessment - SW     Met with: Patient  Active Problems:    COPD with acute exacerbation (H)       DATA  Lives With: child(mariam), adult   Living Arrangements: condominium     Identified issues/concerns regarding health management: Patient is anticipated to be ready for discharge today. A TCU stay is recommended by therapies. Met with patient, while the bedside RN was present. RN called daughterSanjuanita who states she will provide 24 hour supervision and assistance for patient and she will transport patient home.  Patient would like to continue using [employer] ([phone] and fax [phone]). Previously she was open to RN, PT, OT, HHA and SW visits.          Transportation Anticipated: family or friend will provide(Pt no longer drives. Daughter provides as needed)    ASSESSMENT  Cognitive Status:  A&O X1-2, per RN notes  Concerns to be addressed: SW or RN Care Coordinator will follow to complete the home care referral upon discharge and will be available should any other discharge needs arise.   PLAN  Financial costs for the patient includes: None known  Patient given options and choices for discharge: Yes  Patient/family is agreeable to the plan? Not to TCU  Patient/family Goals and Preferences: Home with resumption of home care    Addendum  Discharge orders which include home care orders are completed. Called Zohaib in Intake at [employer] and faxed orders, H&P and the discharge summary as requested.

## 2020-03-22 NOTE — PLAN OF CARE
Discharge    Patient discharged to home via w/c ride to door 2 to meet daughter.  Care plan note:  no SOB.  Up with SBA/walker/GB; gait steady.  Good intake, fluids encouraged, does not like to drink water.  Wound on L ankle stable, dressing intact.  Scattered scabs on bilateral lower extremities, legs washed and lotioned.  Superficial wound on coccyx, encouraged ambulation; dressing intact.  Denies pain except to L foot at times with walking.  2+ edema L/E's.  Pleasant, cooperative, disoriented to time and forgetful.  Patient's daughter Henrique states she has doxycycline and macrobid at home; clarified with Dr. Dong that he would like her to finish her doses that she has at home.  Personal items, discharge meds and instructions in 1 bag.    Listed belongings gathered and returned to patient. Yes  Care Plan and Patient education resolved: Yes  Prescriptions if needed, hard copies sent with patient  filled and given to patient at discharge - in bag with other items.   Home and hospital acquired medications returned to patient: NA  Medication Bin checked and emptied on discharge Yes  Follow up appointment made for patient: is noted that MD will call patient on 3/27.

## 2020-03-23 LAB
BACTERIA SPEC CULT: NO GROWTH
BACTERIA SPEC CULT: NO GROWTH
Lab: NORMAL
Lab: NORMAL
SARS-COV-2 RNA SPEC QL NAA+PROBE: NORMAL
SPECIMEN SOURCE: NORMAL

## 2020-03-23 NOTE — PLAN OF CARE
PT:  Physical Therapy Discharge Summary    Reason for therapy discharge:    Discharged to home with home therapy.    Progress towards therapy goal(s). See goals on Care Plan in [facility] electronic health record for goal details.  Goals not met.  Barriers to achieving goals:   discharge from facility.    Therapy recommendation(s):    Continued therapy is recommended.  Rationale/Recommendations:  TCU was recommended, pt refused and returned home with home PT.

## 2020-04-01 LAB
COVID-19 VIRUS PCR RESULT FROM MDH: NEGATIVE
LAB SCANNED RESULT: NORMAL

## 2020-05-27 ENCOUNTER — TELEPHONE (OUTPATIENT)
Dept: WOUND CARE | Facility: CLINIC | Age: 80
End: 2020-05-27

## 2020-05-27 NOTE — TELEPHONE ENCOUNTER
[facility] Wound    Who is the name of the provider?:  New        What is the location you see this provider at?: [place]    Reason for call:  Needs new appointment for her mother for a non-healing wound on left foot.  Today home care RN directed them to see a wound specialist.    Can we leave a detailed message on this number?  YES

## 2020-05-28 NOTE — TELEPHONE ENCOUNTER
"Call to daughter Minna Rodriguez to take a picture of the wound with pt identifier.   Daughter reports that she feels the wound visit should be \"urgent\" because a nurse informed her that the pt may lose her limb.   Daughter will take picture and send today.  "

## 2020-05-28 NOTE — TELEPHONE ENCOUNTER
Reviewed wound photo sent to us and  determined patient needs to be seen in clinic per Dr. Walls. Spoke with patients daughter and scheduled Mel for next available appointment for Monday 6/15/2020.

## 2020-06-02 ENCOUNTER — HOSPITAL ENCOUNTER (OUTPATIENT)
Dept: WOUND CARE | Facility: CLINIC | Age: 80
Discharge: HOME | End: 2020-06-02
Attending: SURGERY | Admitting: SURGERY
Payer: COMMERCIAL

## 2020-06-02 VITALS
WEIGHT: 140 LBS | HEART RATE: 82 BPM | RESPIRATION RATE: 16 BRPM | TEMPERATURE: 97.2 F | BODY MASS INDEX: 22.5 KG/M2 | HEIGHT: 66 IN | SYSTOLIC BLOOD PRESSURE: 118 MMHG | DIASTOLIC BLOOD PRESSURE: 54 MMHG

## 2020-06-02 DIAGNOSIS — L97.322 SKIN ULCER OF LEFT ANKLE WITH FAT LAYER EXPOSED (H): Primary | ICD-10-CM

## 2020-06-02 PROCEDURE — G0463 HOSPITAL OUTPT CLINIC VISIT: HCPCS | Mod: 25

## 2020-06-02 PROCEDURE — 97602 WOUND(S) CARE NON-SELECTIVE: CPT

## 2020-06-02 PROCEDURE — 99213 OFFICE O/P EST LOW 20 MIN: CPT | Performed by: SURGERY

## 2020-06-02 RX ORDER — AMLODIPINE BESYLATE 5 MG/1
5 TABLET ORAL DAILY
Status: ON HOLD | COMMUNITY
Start: 2019-12-17 | End: 2020-06-17

## 2020-06-02 ASSESSMENT — MIFFLIN-ST. JEOR: SCORE: 1121.79

## 2020-06-02 NOTE — PROGRESS NOTES
[facility] [place] Progress Note    Subject: Mel Castellanos, new consultation for left wound dorsum foot.  79-year-old female with progressive dementia, significant decline in the past 12 months, resides with her daughter who is present, ongoing tobacco utilization approximately 6 cigarettes on a daily basis without intent of cessation.  Known significant peripheral arterial disease, had undergone an angiogram with placement of proximal iliac artery kissing stents, severe atherosclerotic plaque noted in the distal superficial artery and above-knee popliteal arteries, not amendable to balloon angioplasty, tibial occlusive disease, primary runoff vessels posterior tibial artery, peroneal artery is incompletely patent, diffuse domination in size, chronic occlusion of the anterior tibial artery.  Pre-angiogram physiologic studies demonstrated monophasic waveforms, right brachial index 0.98 in the posterior tibial, 0.78 in the dorsalis pedis, left dorsalis pedis ALISON 0.39, posterior tibial 0.39, left digit brachial index 0.16, right digit brachial index 0.48.  Discussed previous angiogram and studies with daughter, no further interventions an option, cannot afford a arterial pump to utilize for 4 to 6 hours a day to be required based on best outcomes from peer-reviewed literature and I suspect that the patient significant dementia would not allow her to participate in regular daily use of an arterial assist device.  Discussed and reviewed with the daughter, at this time we will maximize wound cares with intent of palliative cares to maintain wound at current status and not be progressive.  History of chronic obstructive pulmonary disease, hypertension, hyperlipidemia, myocardial infarction, transient ischemic attack, adult onset diabetes.  Medications reviewed, the complete review systems otherwise negative.  PMH:   Past Medical History:   Diagnosis Date     Arthritis      Emphysema of lung (H)      High  cholesterol      HTN (hypertension)      Insomnia      MI (myocardial infarction) (H)      Restless leg syndrome      TIA (transient ischaemic attack)      Type 2 diabetes mellitus without complications (H)      UTI (urinary tract infection)      Patient Active Problem List   Diagnosis     Unstable angina (H)     Altered mental status     Benign essential hypertension     Insomnia     COPD with acute exacerbation (H)     Social Hx:   Social History     Socioeconomic History     Marital status:      Spouse name: Not on file     Number of children: Not on file     Years of education: Not on file     Highest education level: Not on file   Occupational History     Not on file   Social Needs     Financial resource strain: Not on file     Food insecurity     Worry: Not on file     Inability: Not on file     Transportation needs     Medical: Not on file     Non-medical: Not on file   Tobacco Use     Smoking status: Current Every Day Smoker     Packs/day: 0.25     Smokeless tobacco: Never Used     Tobacco comment: 1/4-1/2 pack per day    Substance and Sexual Activity     Alcohol use: No     Drug use: No     Sexual activity: Not on file   Lifestyle     Physical activity     Days per week: Not on file     Minutes per session: Not on file     Stress: Not on file   Relationships     Social connections     Talks on phone: Not on file     Gets together: Not on file     Attends Buddhist service: Not on file     Active member of club or organization: Not on file     Attends meetings of clubs or organizations: Not on file     Relationship status: Not on file     Intimate partner violence     Fear of current or ex partner: Not on file     Emotionally abused: Not on file     Physically abused: Not on file     Forced sexual activity: Not on file   Other Topics Concern     Parent/sibling w/ CABG, MI or angioplasty before 65F 55M? No   Social History Narrative     Not on file       Surgical Hx:   Past Surgical History:    Procedure Laterality Date     APPENDECTOMY       CHOLECYSTECTOMY       IR LOWER EXTREMITY ANGIOGRAM LEFT  1/30/2020       Allergies:    Allergies   Allergen Reactions     Sulfa Drugs Rash       Medications:   Current Outpatient Medications   Medication     amLODIPine (NORVASC) 5 MG tablet     ASPIRIN EC PO     atorvastatin (LIPITOR) 40 MG tablet     fluticasone (ARNUITY ELLIPTA) 100 MCG/ACT inhaler     isosorbide mononitrate (IMDUR) 30 MG 24 hr tablet     magnesium oxide (MAG-OX) 400 MG tablet     metoprolol succinate ER (TOPROL-XL) 25 MG 24 hr tablet     Multiple Vitamins-Minerals (CENTRUM SILVER) per tablet     nitroglycerin (NITROSTAT) 0.4 MG SL tablet     OMEPRAZOLE PO     order for DME     pioglitazone (ACTOS) 15 MG tablet     rOPINIRole (REQUIP) 1 MG tablet     No current facility-administered medications for this encounter.        Labs:   Recent Labs   Lab Test 03/22/20  0902  03/17/20  0450  01/30/20  1140 01/29/20  1410  01/23/20  1138   ALBUMIN  --   --  3.7  --   --   --   --  3.4   HGB 12.0   < > 13.3   < > 12.7  --    < > 12.2   INR  --   --   --   --  1.11  --   --  1.22*   WBC 8.9   < > 7.5   < > 6.0  --    < > 17.0*   A1C  --   --   --   --   --   --   --  6.8*   CRP  --   --   --   --   --  3.4  --   --     < > = values in this interval not displayed.     Creatinine   Date Value Ref Range Status   03/22/2020 0.63 0.52 - 1.04 mg/dL Final     GFR Estimate   Date Value Ref Range Status   03/22/2020 84 >60 mL/min/[1.73_m2] Final     Comment:     Non  GFR Calc  Starting 12/18/2018, serum creatinine based estimated GFR (eGFR) will be   calculated using the Chronic Kidney Disease Epidemiology Collaboration   (CKD-EPI) equation.       GFR Estimate If Black   Date Value Ref Range Status   03/22/2020 >90 >60 mL/min/[1.73_m2] Final     Comment:      GFR Calc  Starting 12/18/2018, serum creatinine based estimated GFR (eGFR) will be   calculated using the Chronic Kidney Disease  "Epidemiology Collaboration   (CKD-EPI) equation.       Lab Results   Component Value Date    WBC 8.9 03/22/2020     Lab Results   Component Value Date    RBC 3.79 03/22/2020     Lab Results   Component Value Date    HGB 12.0 03/22/2020     Lab Results   Component Value Date    HCT 37.2 03/22/2020     No components found for: MCT  Lab Results   Component Value Date    MCV 98 03/22/2020     Lab Results   Component Value Date    MCH 31.7 03/22/2020     Lab Results   Component Value Date    MCHC 32.3 03/22/2020     Lab Results   Component Value Date    RDW 13.6 03/22/2020     Lab Results   Component Value Date     03/22/2020          Nutrition requirements were discussed with patient today.  Objective:  /54   Pulse 82   Temp 97.2  F (36.2  C) (Temporal)   Resp 16   Ht 1.676 m (5' 6\")   Wt 63.5 kg (140 lb)   BMI 22.60 kg/m    Wound (used by OP [facility] only) 06/02/20 1445 Left lateral foot diabetic/neurophathic (Active)   Length (cm) 1.5 06/02/20 1400   Width (cm) 1.3 06/02/20 1400   Depth (cm) 0.2 06/02/20 1400   Wound (cm^2) 1.95 cm^2 06/02/20 1400   Wound Volume (cm^3) 0.39 cm^3 06/02/20 1400   Dressing Appearance moist drainage 06/02/20 1400   Drainage Characteristics/Odor serosanguineous 06/02/20 1400   Drainage Amount moderate 06/02/20 1400   Thickness/Stage full thickness 06/02/20 1400   Base red/granulating;slough 06/02/20 1400   Periwound intact 06/02/20 1400   Periwound Temperature warm 06/02/20 1400   Periwound Skin Turgor soft 06/02/20 1400   Edges open 06/02/20 1400   Care, Wound non-select wound debridement performed 06/02/20 1400        General:  Patient is alert and orientated, no acute distress.  Chronic dementia, non-historian, daughter present for evaluation.  Palpable right and left femoral pulses, nonpalpable popliteal or pedal pulses bilaterally.  See photo documentation, wound present dorsum lateral aspect left foot, no undermining, no cellulitis, no bone or tendon exposure.  Patient " is able to ambulate.  Chronic mild to moderate interstitial edema with pitting edema along the intertibial margin.        Impression: Extensive left lower extremity peripheral arterial disease status post kissing iliac stent placement without options for distal reconstruction, January 2020, ongoing tobacco utilization, progressive dementia  Barriers to healing include: Diabetes, Tobacco, chronic edema and Dementia    Plan:  We will dress the wounds with Fibracol, wash wound with a hypochlorous acid soak for approximately 10 minutes a day, edema wear 24 hours/day, 7 days/week, appropriate shoe wear to prevent shear forces..  Patient will return to the clinic in 6 weeks time.     Ronaldo Walls MD on 6/2/2020 at 4:19 PM

## 2020-06-02 NOTE — DISCHARGE INSTRUCTIONS
[facility]  [address] [address]    Call us at [phone] if you have any questions about your wounds, have redness or swelling around your wound, have a fever of 101 or greater or if you have any other problems or concerns. We answer the phone Monday through Friday 8 am to 4 pm, please leave a message as we check the voicemail frequently throughout the day.     Mel TAMIE Castellanos      1940    Patients wounds are not healing due to poor blood flow to her wound.     Cleanse left ankle wound with vashe, soak on wound for 15 minute   Apply Gentle lotion to entire leg and around wound, apply fibracol or Ruth to wound cover with gauze edemawear to hold dressing in place change daily.        TREE Walls M.D.. June 2, 2020  Appointments for you to make:  W[facility]aling Clinic  Follow up with Provider - 6 weeks  [phone]

## 2020-06-03 NOTE — ADDENDUM NOTE
Encounter addended by: Kika Costello RN on: 6/3/2020 7:54 AM   Actions taken: Charge Capture section accepted

## 2020-06-16 ENCOUNTER — HOSPITAL ENCOUNTER (OUTPATIENT)
Facility: CLINIC | Age: 80
Setting detail: OBSERVATION
Discharge: NURSING, REHAB OR LONG-TERM CARE | End: 2020-06-22
Attending: EMERGENCY MEDICINE | Admitting: INTERNAL MEDICINE
Payer: COMMERCIAL

## 2020-06-16 DIAGNOSIS — F03.91 DEMENTIA WITH BEHAVIORAL DISTURBANCE, UNSPECIFIED DEMENTIA TYPE: ICD-10-CM

## 2020-06-16 DIAGNOSIS — G30.9 ALZHEIMER'S DEMENTIA WITH BEHAVIORAL DISTURBANCE, UNSPECIFIED TIMING OF DEMENTIA ONSET: ICD-10-CM

## 2020-06-16 DIAGNOSIS — G25.81 RESTLESS LEG SYNDROME: Primary | ICD-10-CM

## 2020-06-16 DIAGNOSIS — F03.911 AGITATION DUE TO DEMENTIA (H): ICD-10-CM

## 2020-06-16 DIAGNOSIS — F02.81 ALZHEIMER'S DEMENTIA WITH BEHAVIORAL DISTURBANCE, UNSPECIFIED TIMING OF DEMENTIA ONSET: ICD-10-CM

## 2020-06-16 LAB
BASOPHILS # BLD AUTO: 0 10E9/L (ref 0–0.2)
BASOPHILS NFR BLD AUTO: 0.4 %
DIFFERENTIAL METHOD BLD: ABNORMAL
EOSINOPHIL # BLD AUTO: 0.2 10E9/L (ref 0–0.7)
EOSINOPHIL NFR BLD AUTO: 4 %
ERYTHROCYTE [DISTWIDTH] IN BLOOD BY AUTOMATED COUNT: 13.3 % (ref 10–15)
HCT VFR BLD AUTO: 35.4 % (ref 35–47)
HGB BLD-MCNC: 11.6 G/DL (ref 11.7–15.7)
IMM GRANULOCYTES # BLD: 0 10E9/L (ref 0–0.4)
IMM GRANULOCYTES NFR BLD: 0.2 %
LYMPHOCYTES # BLD AUTO: 2.3 10E9/L (ref 0.8–5.3)
LYMPHOCYTES NFR BLD AUTO: 44.9 %
MCH RBC QN AUTO: 31.7 PG (ref 26.5–33)
MCHC RBC AUTO-ENTMCNC: 32.8 G/DL (ref 31.5–36.5)
MCV RBC AUTO: 97 FL (ref 78–100)
MONOCYTES # BLD AUTO: 0.4 10E9/L (ref 0–1.3)
MONOCYTES NFR BLD AUTO: 7.6 %
NEUTROPHILS # BLD AUTO: 2.2 10E9/L (ref 1.6–8.3)
NEUTROPHILS NFR BLD AUTO: 42.9 %
NRBC # BLD AUTO: 0 10*3/UL
NRBC BLD AUTO-RTO: 0 /100
PLATELET # BLD AUTO: 184 10E9/L (ref 150–450)
RBC # BLD AUTO: 3.66 10E12/L (ref 3.8–5.2)
WBC # BLD AUTO: 5 10E9/L (ref 4–11)

## 2020-06-16 PROCEDURE — 85025 COMPLETE CBC W/AUTO DIFF WBC: CPT | Performed by: EMERGENCY MEDICINE

## 2020-06-16 PROCEDURE — 83036 HEMOGLOBIN GLYCOSYLATED A1C: CPT | Performed by: EMERGENCY MEDICINE

## 2020-06-16 PROCEDURE — U0003 INFECTIOUS AGENT DETECTION BY NUCLEIC ACID (DNA OR RNA); SEVERE ACUTE RESPIRATORY SYNDROME CORONAVIRUS 2 (SARS-COV-2) (CORONAVIRUS DISEASE [COVID-19]), AMPLIFIED PROBE TECHNIQUE, MAKING USE OF HIGH THROUGHPUT TECHNOLOGIES AS DESCRIBED BY CMS-2020-01-R: HCPCS | Performed by: EMERGENCY MEDICINE

## 2020-06-16 PROCEDURE — C9803 HOPD COVID-19 SPEC COLLECT: HCPCS

## 2020-06-16 PROCEDURE — 99285 EMERGENCY DEPT VISIT HI MDM: CPT | Mod: 25

## 2020-06-16 PROCEDURE — 80053 COMPREHEN METABOLIC PANEL: CPT | Performed by: EMERGENCY MEDICINE

## 2020-06-17 ENCOUNTER — APPOINTMENT (OUTPATIENT)
Dept: OCCUPATIONAL THERAPY | Facility: CLINIC | Age: 80
End: 2020-06-17
Attending: INTERNAL MEDICINE
Payer: COMMERCIAL

## 2020-06-17 PROBLEM — F03.918 DEMENTIA WITH BEHAVIORAL DISTURBANCE (H): Status: ACTIVE | Noted: 2020-06-17

## 2020-06-17 LAB
ALBUMIN SERPL-MCNC: 3 G/DL (ref 3.4–5)
ALBUMIN UR-MCNC: NEGATIVE MG/DL
ALP SERPL-CCNC: 66 U/L (ref 40–150)
ALT SERPL W P-5'-P-CCNC: 25 U/L (ref 0–50)
ANION GAP SERPL CALCULATED.3IONS-SCNC: 2 MMOL/L (ref 3–14)
APPEARANCE UR: CLEAR
AST SERPL W P-5'-P-CCNC: 28 U/L (ref 0–45)
BILIRUB SERPL-MCNC: 0.2 MG/DL (ref 0.2–1.3)
BILIRUB UR QL STRIP: NEGATIVE
BUN SERPL-MCNC: 16 MG/DL (ref 7–30)
CALCIUM SERPL-MCNC: 9.1 MG/DL (ref 8.5–10.1)
CHLORIDE SERPL-SCNC: 107 MMOL/L (ref 94–109)
CO2 SERPL-SCNC: 30 MMOL/L (ref 20–32)
COLOR UR AUTO: NORMAL
CREAT SERPL-MCNC: 0.59 MG/DL (ref 0.52–1.04)
GFR SERPL CREATININE-BSD FRML MDRD: 86 ML/MIN/{1.73_M2}
GLUCOSE BLDC GLUCOMTR-MCNC: 111 MG/DL (ref 70–99)
GLUCOSE BLDC GLUCOMTR-MCNC: 120 MG/DL (ref 70–99)
GLUCOSE BLDC GLUCOMTR-MCNC: 128 MG/DL (ref 70–99)
GLUCOSE BLDC GLUCOMTR-MCNC: 154 MG/DL (ref 70–99)
GLUCOSE BLDC GLUCOMTR-MCNC: 166 MG/DL (ref 70–99)
GLUCOSE BLDC GLUCOMTR-MCNC: 69 MG/DL (ref 70–99)
GLUCOSE SERPL-MCNC: 122 MG/DL (ref 70–99)
GLUCOSE UR STRIP-MCNC: NEGATIVE MG/DL
HBA1C MFR BLD: 6.9 % (ref 0–5.6)
HGB UR QL STRIP: NEGATIVE
KETONES UR STRIP-MCNC: NEGATIVE MG/DL
LEUKOCYTE ESTERASE UR QL STRIP: NEGATIVE
NITRATE UR QL: NEGATIVE
PH UR STRIP: 7 PH (ref 5–7)
POTASSIUM SERPL-SCNC: 3.5 MMOL/L (ref 3.4–5.3)
PROT SERPL-MCNC: 6.8 G/DL (ref 6.8–8.8)
RBC #/AREA URNS AUTO: 1 /HPF (ref 0–2)
SARS-COV-2 RNA SPEC QL NAA+PROBE: NOT DETECTED
SODIUM SERPL-SCNC: 139 MMOL/L (ref 133–144)
SOURCE: NORMAL
SP GR UR STRIP: 1.01 (ref 1–1.03)
SPECIMEN SOURCE: NORMAL
UROBILINOGEN UR STRIP-MCNC: NORMAL MG/DL (ref 0–2)
WBC #/AREA URNS AUTO: 2 /HPF (ref 0–5)

## 2020-06-17 PROCEDURE — 96372 THER/PROPH/DIAG INJ SC/IM: CPT

## 2020-06-17 PROCEDURE — 81001 URINALYSIS AUTO W/SCOPE: CPT | Performed by: EMERGENCY MEDICINE

## 2020-06-17 PROCEDURE — G0463 HOSPITAL OUTPT CLINIC VISIT: HCPCS | Mod: 25

## 2020-06-17 PROCEDURE — 81001 URINALYSIS AUTO W/SCOPE: CPT | Performed by: INTERNAL MEDICINE

## 2020-06-17 PROCEDURE — 99222 1ST HOSP IP/OBS MODERATE 55: CPT | Performed by: PSYCHIATRY & NEUROLOGY

## 2020-06-17 PROCEDURE — G0378 HOSPITAL OBSERVATION PER HR: HCPCS

## 2020-06-17 PROCEDURE — 25000132 ZZH RX MED GY IP 250 OP 250 PS 637: Performed by: INTERNAL MEDICINE

## 2020-06-17 PROCEDURE — 99219 ZZC INITIAL OBSERVATION CARE,LEVL II: CPT | Performed by: INTERNAL MEDICINE

## 2020-06-17 PROCEDURE — 97166 OT EVAL MOD COMPLEX 45 MIN: CPT | Mod: GO | Performed by: OCCUPATIONAL THERAPIST

## 2020-06-17 PROCEDURE — 25000132 ZZH RX MED GY IP 250 OP 250 PS 637: Performed by: PSYCHIATRY & NEUROLOGY

## 2020-06-17 PROCEDURE — 00000146 ZZHCL STATISTIC GLUCOSE BY METER IP

## 2020-06-17 RX ORDER — DONEPEZIL HYDROCHLORIDE 5 MG/1
5 TABLET, FILM COATED ORAL AT BEDTIME
Status: DISCONTINUED | OUTPATIENT
Start: 2020-06-17 | End: 2020-06-22 | Stop reason: HOSPADM

## 2020-06-17 RX ORDER — ASPIRIN 81 MG/1
81 TABLET ORAL DAILY
Status: DISCONTINUED | OUTPATIENT
Start: 2020-06-17 | End: 2020-06-22 | Stop reason: HOSPADM

## 2020-06-17 RX ORDER — OLANZAPINE 10 MG/2ML
5 INJECTION, POWDER, FOR SOLUTION INTRAMUSCULAR EVERY 6 HOURS PRN
Status: DISCONTINUED | OUTPATIENT
Start: 2020-06-17 | End: 2020-06-22 | Stop reason: HOSPADM

## 2020-06-17 RX ORDER — OLANZAPINE 5 MG/1
5 TABLET, ORALLY DISINTEGRATING ORAL EVERY 6 HOURS PRN
Status: DISCONTINUED | OUTPATIENT
Start: 2020-06-17 | End: 2020-06-22 | Stop reason: HOSPADM

## 2020-06-17 RX ORDER — AMLODIPINE BESYLATE 5 MG/1
5 TABLET ORAL DAILY
Status: DISCONTINUED | OUTPATIENT
Start: 2020-06-17 | End: 2020-06-22

## 2020-06-17 RX ORDER — ACETAMINOPHEN 325 MG/1
650 TABLET ORAL EVERY 4 HOURS PRN
Status: DISCONTINUED | OUTPATIENT
Start: 2020-06-17 | End: 2020-06-22 | Stop reason: HOSPADM

## 2020-06-17 RX ORDER — GAUZE BANDAGE 3"X4"
SPONGE TOPICAL DAILY
COMMUNITY
End: 2022-01-01

## 2020-06-17 RX ORDER — LIDOCAINE 40 MG/G
CREAM TOPICAL
Status: DISCONTINUED | OUTPATIENT
Start: 2020-06-17 | End: 2020-06-22 | Stop reason: HOSPADM

## 2020-06-17 RX ORDER — DEXTROSE MONOHYDRATE 25 G/50ML
25-50 INJECTION, SOLUTION INTRAVENOUS
Status: DISCONTINUED | OUTPATIENT
Start: 2020-06-17 | End: 2020-06-22 | Stop reason: HOSPADM

## 2020-06-17 RX ORDER — AMOXICILLIN 250 MG
2 CAPSULE ORAL 2 TIMES DAILY PRN
Status: DISCONTINUED | OUTPATIENT
Start: 2020-06-17 | End: 2020-06-22 | Stop reason: HOSPADM

## 2020-06-17 RX ORDER — ROPINIROLE 1 MG/1
1 TABLET, FILM COATED ORAL AT BEDTIME
Status: DISCONTINUED | OUTPATIENT
Start: 2020-06-17 | End: 2020-06-17

## 2020-06-17 RX ORDER — ROPINIROLE 0.5 MG/1
0.5 TABLET, FILM COATED ORAL AT BEDTIME
Status: DISCONTINUED | OUTPATIENT
Start: 2020-06-17 | End: 2020-06-22 | Stop reason: HOSPADM

## 2020-06-17 RX ORDER — ONDANSETRON 2 MG/ML
4 INJECTION INTRAMUSCULAR; INTRAVENOUS EVERY 6 HOURS PRN
Status: DISCONTINUED | OUTPATIENT
Start: 2020-06-17 | End: 2020-06-22 | Stop reason: HOSPADM

## 2020-06-17 RX ORDER — PROCHLORPERAZINE 25 MG
12.5 SUPPOSITORY, RECTAL RECTAL EVERY 12 HOURS PRN
Status: DISCONTINUED | OUTPATIENT
Start: 2020-06-17 | End: 2020-06-22 | Stop reason: HOSPADM

## 2020-06-17 RX ORDER — AMOXICILLIN 250 MG
1 CAPSULE ORAL 2 TIMES DAILY PRN
Status: DISCONTINUED | OUTPATIENT
Start: 2020-06-17 | End: 2020-06-22 | Stop reason: HOSPADM

## 2020-06-17 RX ORDER — NITROGLYCERIN 0.4 MG/1
0.4 TABLET SUBLINGUAL EVERY 5 MIN PRN
Status: DISCONTINUED | OUTPATIENT
Start: 2020-06-17 | End: 2020-06-22 | Stop reason: HOSPADM

## 2020-06-17 RX ORDER — NICOTINE POLACRILEX 4 MG
15-30 LOZENGE BUCCAL
Status: DISCONTINUED | OUTPATIENT
Start: 2020-06-17 | End: 2020-06-22 | Stop reason: HOSPADM

## 2020-06-17 RX ORDER — PROCHLORPERAZINE MALEATE 5 MG
5 TABLET ORAL EVERY 6 HOURS PRN
Status: DISCONTINUED | OUTPATIENT
Start: 2020-06-17 | End: 2020-06-22 | Stop reason: HOSPADM

## 2020-06-17 RX ORDER — BISACODYL 10 MG
10 SUPPOSITORY, RECTAL RECTAL DAILY PRN
Status: DISCONTINUED | OUTPATIENT
Start: 2020-06-17 | End: 2020-06-22 | Stop reason: HOSPADM

## 2020-06-17 RX ORDER — DONEPEZIL HYDROCHLORIDE 10 MG/1
10 TABLET, FILM COATED ORAL AT BEDTIME
COMMUNITY

## 2020-06-17 RX ORDER — ATORVASTATIN CALCIUM 40 MG/1
40 TABLET, FILM COATED ORAL DAILY
Status: DISCONTINUED | OUTPATIENT
Start: 2020-06-17 | End: 2020-06-22 | Stop reason: HOSPADM

## 2020-06-17 RX ORDER — OLANZAPINE 5 MG/1
5 TABLET, ORALLY DISINTEGRATING ORAL AT BEDTIME
Status: DISCONTINUED | OUTPATIENT
Start: 2020-06-17 | End: 2020-06-22 | Stop reason: HOSPADM

## 2020-06-17 RX ORDER — CHOLECALCIFEROL (VITAMIN D3) 50 MCG
2000 TABLET ORAL DAILY
COMMUNITY

## 2020-06-17 RX ORDER — ONDANSETRON 4 MG/1
4 TABLET, ORALLY DISINTEGRATING ORAL EVERY 6 HOURS PRN
Status: DISCONTINUED | OUTPATIENT
Start: 2020-06-17 | End: 2020-06-22 | Stop reason: HOSPADM

## 2020-06-17 RX ORDER — IPRATROPIUM BROMIDE AND ALBUTEROL SULFATE 2.5; .5 MG/3ML; MG/3ML
1 SOLUTION RESPIRATORY (INHALATION) EVERY 6 HOURS PRN
COMMUNITY

## 2020-06-17 RX ORDER — ACETAMINOPHEN 650 MG/1
650 SUPPOSITORY RECTAL EVERY 4 HOURS PRN
Status: DISCONTINUED | OUTPATIENT
Start: 2020-06-17 | End: 2020-06-22 | Stop reason: HOSPADM

## 2020-06-17 RX ORDER — POLYETHYLENE GLYCOL 3350 17 G/17G
17 POWDER, FOR SOLUTION ORAL DAILY PRN
Status: DISCONTINUED | OUTPATIENT
Start: 2020-06-17 | End: 2020-06-22 | Stop reason: HOSPADM

## 2020-06-17 RX ORDER — NALOXONE HYDROCHLORIDE 0.4 MG/ML
.1-.4 INJECTION, SOLUTION INTRAMUSCULAR; INTRAVENOUS; SUBCUTANEOUS
Status: DISCONTINUED | OUTPATIENT
Start: 2020-06-17 | End: 2020-06-22 | Stop reason: HOSPADM

## 2020-06-17 RX ADMIN — ROPINIROLE HYDROCHLORIDE 0.5 MG: 0.5 TABLET, FILM COATED ORAL at 22:11

## 2020-06-17 RX ADMIN — ASPIRIN 81 MG: 81 TABLET ORAL at 08:28

## 2020-06-17 RX ADMIN — ROPINIROLE HYDROCHLORIDE 1 MG: 1 TABLET, FILM COATED ORAL at 05:10

## 2020-06-17 RX ADMIN — OLANZAPINE 5 MG: 5 TABLET, ORALLY DISINTEGRATING ORAL at 22:11

## 2020-06-17 RX ADMIN — DONEPEZIL HYDROCHLORIDE 5 MG: 5 TABLET ORAL at 22:11

## 2020-06-17 RX ADMIN — ATORVASTATIN CALCIUM 40 MG: 40 TABLET, FILM COATED ORAL at 08:28

## 2020-06-17 RX ADMIN — OLANZAPINE 5 MG: 5 TABLET, ORALLY DISINTEGRATING ORAL at 16:36

## 2020-06-17 NOTE — PHARMACY-ADMISSION MEDICATION HISTORY
Pharmacy Medication History  Admission medication history interview status for the 6/16/2020  admission is complete. See EPIC admission navigator for prior to admission medications     Medication history sources: Patient's family/friend (daughter Maggy [phone])  Medication history source reliability: Good  Adherence assessment: Moderate    Significant changes made to the medication list:  Not taking amlodipine, Arnuity ellipta,     New rx for aricept, fibracol gel, vit d       Additional medication history information:       Medication reconciliation completed by provider prior to medication history? Yes    Time spent in this activity: 25min       Prior to Admission medications    Medication Sig Last Dose Taking? Auth Provider   ASPIRIN EC PO Take 81 mg by mouth daily  6/16/2020 at Unknown time Yes Unknown, Entered By History   atorvastatin (LIPITOR) 40 MG tablet Take 40 mg by mouth daily 6/16/2020 at Unknown time Yes Unknown, Entered By History   cholecalciferol (VITAMIN D3) 5000 units (125 mcg) capsule Take 5,000 Units by mouth daily 6/16/2020 at a Yes Unknown, Entered By History   donepezil (ARICEPT) 5 MG tablet Take 5 mg by mouth At Bedtime  Yes Unknown, Entered By History   ipratropium - albuterol 0.5 mg/2.5 mg/3 mL (DUONEB) 0.5-2.5 (3) MG/3ML neb solution Take 1 vial by nebulization every 6 hours as needed for shortness of breath / dyspnea or wheezing  Yes Unknown, Entered By History   isosorbide mononitrate (IMDUR) 30 MG 24 hr tablet Take 0.5 tablets (15 mg) by mouth daily 6/16/2020 at Unknown time Yes Mohan Odonnell MD   magnesium oxide (MAG-OX) 400 MG tablet Take 400 mg by mouth 2 times daily 6/16/2020 at Unknown time Yes Unknown, Entered By History   metoprolol succinate ER (TOPROL-XL) 25 MG 24 hr tablet Take 0.5 tablets (12.5 mg) by mouth 2 times daily 6/16/2020 at Unknown time Yes Mohan Odonnell MD   Multiple Vitamins-Minerals (CENTRUM SILVER) per tablet Take 1 tablet by mouth daily  6/16/2020 at Unknown time Yes Reported, Patient   OMEPRAZOLE PO Take 20 mg by mouth every morning  6/16/2020 at Unknown time Yes Reported, Patient   pioglitazone (ACTOS) 15 MG tablet Take 15 mg by mouth daily 6/16/2020 at Unknown time Yes Unknown, Entered By History   rOPINIRole (REQUIP) 1 MG tablet Take 1 mg by mouth At Bedtime 6/16/2020 at Unknown time Yes Unknown, Entered By History   Wound Dressings (FIBRACOL) MISC Externally apply topically daily Foot wood  Yes Unknown, Entered By History   nitroglycerin (NITROSTAT) 0.4 MG SL tablet Place 1 tablet (0.4 mg) under the tongue every 5 minutes as needed X 3 doses total if needed for chest pain.   Briseida Nicolas,    order for [facility] Phone [phone] Fax [phone]  Edemawear Size small Qty 2 sleeves  Primary Dressing fibracol or sweta   Qty 6 sheets  Secondary Dressing 4x4 gauze  Qty 60   Secondary Dressing Vashe Qty 1 bottle  Length of Need: 1 month  Frequency of dressing change: daily   Ronaldo Walls MD

## 2020-06-17 NOTE — PLAN OF CARE
Discharge Planner PT   Patient plan for discharge: Did not discuss with pt  Current status: PT orders received, chart reviewed, communicated with OT. Per communication with OT, pt requires Kristin-HHA for bed mobility and to stand & refused further mobility with OT. Pt would not benefit from PT due to dementia and pt unwilling/unable to follow many commands.   Barriers to return to prior living situation: defer to OT  Recommendations for discharge: defer to OT  Rationale for recommendations: In communication with OT, pt needing HHA-Kristin for bed mobility and transfers, pt would not benefit from PT due to advanced dementia and unwillingness/inability to follow many commands. Pt will need 24/7 care upon discharge.        Entered by: Radha Juarez 06/17/2020 11:15 AM

## 2020-06-17 NOTE — PROGRESS NOTES
06/17/20 1021   Quick Adds   Type of Visit Initial Occupational Therapy Evaluation   Living Environment   Lives With child(mariam), adult   Living Arrangements condominium   Home Accessibility no concerns   Self-Care   Equipment Currently Used at Home cane, straight   Activity/Exercise/Self-Care Comment Has lived with daughter since 2007, daughter reports much worsening cognition for the last 9-12 mo.   Functional Level   Ambulation 0-->independent   Transferring 0-->independent   Toileting 2-->assistive person  (cues)   Bathing 3-->assistive equipment and person   Dressing 2-->assistive person   Eating 1-->assistive equipment   Cognition 1 - attention or memory deficits   Prior Functional Level Comment Daughter reports she has a cane at home but mostly walks on her own   General Information   Onset of Illness/Injury or Date of Surgery - Date 06/16/20   Referring Physician Castro, Delon Oconnor MD   Patient/Family Goals Statement does not state   Additional Occupational Profile Info/Pertinent History of Current Problem Pt admitted under observation status for worsening dementia with behavioral disturbance   Precautions/Limitations fall precautions   Cognitive Status Examination   Orientation person   Level of Consciousness alert;confused   Follows Commands (Cognition) follows one step commands;0-24% accuracy   Sensory Examination   Sensory Comments denies deficits   Pain Assessment   Patient Currently in Pain Yes, see Vital Sign flowsheet  (chronic back pain)   Range of Motion (ROM)   ROM Comment BUE AROM WFL-did not follow commands well for full assessment   Strength   Strength Comments did not follow cues for testing   Muscle Tone Assessment   Muscle Tone Quick Adds No deficits were identified   Mobility   Bed Mobility Comments Min A supine to EOB, SBA back to supine later   Transfer Skills   Transfer Comments Pt would only stand with Min A at bedside, refused to walk anywhere, use the bathroom or sit in a chair. Per  "nursing staff, pt up to the bathroom with hand hold assist of 1   Instrumental Activities of Daily Living (IADL)   IADL Comments Pt has assist with all IADL from her daughter   Activities of Daily Living Analysis   Impairments Contributing to Impaired Activities of Daily Living balance impaired;cognition impaired;pain   Clinical Impression   Criteria for Skilled Therapeutic Interventions Met evaluation only;no problems identified which require skilled intervention;current level of function same as previous level of function;no significant expected improvement in functional status   OT Diagnosis Impaired ADL and mobility due to severe dementia   Influenced by the following impairments severe dementia with behavioral disturbance   Assessment of Occupational Performance 5 or more Performance Deficits   Identified Performance Deficits needs assist with all ADL, IADL and mobility   Clinical Decision Making (Complexity) Moderate complexity   Anticipated Equipment Needs at Discharge shower chair;wheelchair   Anticipated Discharge Disposition Long Term Care Facility;Home with Assist   Risks and Benefits of Treatment have been explained. Yes   Patient, Family & other staff in agreement with plan of care Yes   Clinical Impression Comments Pt needs 24 hour care due to severe dementia, whether at home or in a care facility   [facility] TM \"6 Clicks\"   2016, Trustees of [facility], under license to "[employer]".  All rights reserved.   6 Clicks Short Forms Daily Activity Inpatient Short Form   [facility] AM-PAC  \"6 Clicks\" Daily Activity Inpatient Short Form   1. Putting on and taking off regular lower body clothing? 3 - A Little   2. Bathing (including washing, rinsing, drying)? 2 - A Lot   3. Toileting, which includes using toilet, bedpan or urinal? 3 - A Little   4. Putting on and taking off regular upper body clothing? 3 - A Little   5. Taking care of personal grooming such as brushing teeth? 3 - " A Little   6. Eating meals? 3 - A Little   Daily Activity Raw Score (Score out of 24.Lower scores equate to lower levels of function) 17   Total Evaluation Time   Total Evaluation Time (Minutes) 30

## 2020-06-17 NOTE — ED PROVIDER NOTES
History     Chief Complaint:    Psychiatric Evaluation      HPI   Mel Castellanos is a 80 year old female who presents for a psychiatric evaluation. Per triage note, patient's daughter called EMS due to her increased agitation. The patient states that she is upset with her living situation, and reports the person she lives with tries to tell her what to do, and she does not like this.  The patient denies recent illness.  The patient's daughter reported by phone that the patient has been expressing suicidal thoughts, and threatening to jump off their third story balcony.  She reports her mother seems depressed.  She also reports that for the last couple of weeks she has been talking increasingly about going to visit her long  father.  Tonight the daughter called 911, because the patient was trying to leave the house, and could not be redirected.  She was reportedly agitated and aggressive, which resulted in her daughter calling for help.  The daughter does not think she can manage her dementia any longer.  The patient's daughter denies that her mother has had any recent illness or injury.    Allergies:  Sulfa Drugs    Medications:    Norvasc  Lipitor  Imdur  Toprol  Nitrostat  Omeprazole  Actos  Requip    Past Medical History:    Arthritis  Emphysema  High cholesterol  Hypertension  Myocardial infarction  RLS  TIA  Type 2 diabetes mellitus  UTI  COPD    Past Surgical History:    Appendectomy  Cholecystectomy  IR lower extremity angiogram    Family History:    Father: CAD    Social History:  Smoking Status: Current Smoker   Packs: 0.25  Smokeless Tobacco: Never Used  Alcohol Use: Negative  Drug Use: Negative  PCP: Monroe Chengssen    Marital Status:         Review of Systems   Unable to perform ROS: Dementia     ROS is positive as stated in HPI, otherwise unreliable, due to the patient's dementia    Physical Exam     Patient Vitals for the past 24 hrs:   BP Temp Temp src Heart Rate Resp  "SpO2 Height   06/16/20 2248 (!) 154/67 98  F (36.7  C) Oral 73 16 94 % 1.651 m (5' 5\")       Physical Exam  Constitutional:  Appears well-developed and well-nourished. Cooperative. Awake and alert. Makes good eye contact. Seems pleasantly confused.   HENT:   Head:    Atraumatic.   Mouth/Throat:   Oropharynx is without erythema or exudate and mucous membranes are moist.   Eyes:    Conjunctivae normal and EOM are normal.      Pupils are equal, round, and reactive to light.   Neck:    Normal range of motion. Neck supple.   Cardiovascular:  Normal rate, regular rhythm, normal heart sounds and radial and dorsalis pedis pulses are 2+ and symmetric.    Pulmonary/Chest:  Effort normal and breath sounds normal.   Abdominal:   Soft. Bowel sounds are normal.      No splenomegaly or hepatomegaly. No tenderness. No rebound.   Musculoskeletal:  Normal range of motion. Trace edema of right lower extremity and 1+ edema in left lower extremity. and no tenderness. Chronic appearing ulcerated wound on dorsa lateral left foot without surrounding erythema drainage or fluctuance.  Neurological:  Alert. Normal strength. Doesn't follow commands for cranial nerve or strength testing but she has no facial asymmetry. Normal speech. Moving all extremities with equal strength.    Skin:    Skin is warm and dry.   Psychiatric:   Normal mood and affect. Rambling speech.     Emergency Department Course     Laboratory:  Laboratory findings were communicated with the patient and Admitting MD who voiced understanding of the findings.    CMP: Glucose 122 (H), Anion Gap: 2 (L), Albumin: 3.0 (L) o/w WNL (Creatinine: 0.59)    CBC: WBC: 5.0, HGB: 11.6 (L), PLT: 184    COVID-19 Virus (Coronavirus), PCR NP Swab: Pending       Emergency Department Course:  Past medical records, nursing notes, and vitals reviewed.    2334 I performed an exam of the patient as documented above.     IV was inserted and blood was drawn for laboratory testing, results " above.    0005 I consulted with her daughter Renetta regarding the patient's history and presentation here in the emergency department.    0110 I rechecked the patient and discussed the results of her workup thus far.     Findings and plan explained to the Patient who consents to admission. Discussed the patient with Dr. Castro, who will admit the patient to a Observation bed for further monitoring, evaluation, and treatment.    I personally reviewed the laboratory results with the Patient and answered all related questions prior to admission.     Impression & Plan     Covid-19  Mel Castellanos was evaluated during a global COVID-19 pandemic, which necessitated consideration that the patient might be at risk for infection with the SARS-CoV-2 virus that causes COVID-19.   Applicable protocols for evaluation were followed during the patient's care.   COVID-19 was considered as part of the patient's evaluation. The plan for testing is:  a test was obtained during this visit.    Medical Decision Making:  Mel Castellanos is a 80 year old female who presents for a psychiatric evaluation. Patient's daughter called 911 when the patient was trying to leave the house to go see her long  father.The daughter said she packed a bag of dirty clothes and when her daughter, who has been her caregiver tried to prevent her leaving, the patient became aggressive, agitated and screaming for help. The daughter reported she no longer wants to take care for her mother anymore given for what she thinks is progression with dementia, combined with some depression. The daughter also reported the patient had some suicidal thoughts, has been threatening to throw herself off the 3rd floor balcony.     Here in the ED, the patient is pleasant and cooperative. She does seem quite confused. Her physical exam is benign aside from the healing wound on her right foot. Laboratory testing including metabolic panel and CBC look unremarkable. A  urinalysis was ordered but the patient did not produce a specimen while in the ED. She had an IV in her for her symptoms. I sent a Covid swab as the plan for the patient is hospitalization.     In taking to the daughter I don't think it is safe for the patient to return home. She will be brought into the observation unit for further evaluation and likely placement.     Of note, I don't appreciate any focal neurological findings. I don't think acute CNS process is cause for tonight's symptoms.       Diagnosis:    ICD-10-CM    1. Dementia with behavioral disturbance, unspecified dementia type (H)  F03.91 Comprehensive metabolic panel   2. Agitation due to dementia (H)  F03.91        Disposition:  Admitted to Dr. Castro.    Scribe Disclosure:  I, Zohaib Quevedo, am serving as a scribe at 10:51 PM on 6/16/2020 to document services personally performed by Uday Ackerman MD based on my observations and the provider's statements to me.        Uday Ackerman MD  06/17/20 0436

## 2020-06-17 NOTE — PROGRESS NOTES
RECEIVING UNIT ED HANDOFF REVIEW    ED Nurse Handoff Report was reviewed by: Bulmaro Rivas RN on June 17, 2020 at 1:48 AM

## 2020-06-17 NOTE — PROGRESS NOTES
[facility] Nurse Inpatient Wound Assessment     Reason for consultation: Evaluate and treat left foot, coccyx      Assessment  Left foot: chronic wound, likely related to PAD/diabetes, followed recently at [facility] by Dr. Walls.  No acute s/s infection, will continue clinic plan of collagen dressing and EdemaWear.    Coccyx: hx superficial breakdown, now lightly intact but macerated and at risk for PI.  Pt able to move well but needs reminders and assist, due to declining cognitive status/dementia.      Treatment Plan  Left foot wound: every other day:  1.  Cleanse with MicroKlenz  2.  Skin prep to periwound  3.  Apply piece of Ruth collagen dressing (# [id number]) to wound bed, cut to fit.  (This may partially or fully dissolve into wound)  4.  Cover with Mepilex or small gauze  5.  Apply pt's EdemaWear (mesh stockinette with black stripe) from toes to knee.  Handwash and lay flat to dry as needed, DO NOT THROW AWAY  6.  Elevate legs when possible, float heels, keep legs/feet moisturized    Pressure Injury Prevention (PIP) Plan:  If patient is declining pressure injury prevention interventions: Explore reason why and address patient's concerns and Educate on pressure injury risk and prevention intervention(s)  Mattress: Follow bed algorithm, reassess daily and order specialty mattress, if indicated.  HOB: Maintain at or below 30 degrees, unless contraindicated  Repositioning in bed: Every 1-2 hours  and Left/right positioning; avoid supine  Heels: Keep elevated off mattress and Pillows under calves  Protective Dressing: Sacral Mepilex for prevention (#[id number]),  especially for the agitated patient   Chair positioning: Chair cushion (#[id number]) or pillow under pt, assist pt to stand every 1-2hrs for a few minutes  Moisture Management: Perineal cleansing /protection: Follow Incontinence Protocol  Under Devices: Inspect skin under all medical devices during skin inspection     Follow [facility] orders  on discharge - see discharge orders from last clinic visit 6-2-20    Orders Written  WOC Nurse follow-up plan: weekly  Nursing to notify the Provider(s) and re-consult the WOC Nurse if wound(s) deteriorates or new skin concern.    Patient History  According to provider note(s):  Mel Castellanos is a 80 year old female admitted on 6/16/2020. She presents after EMS was contacted with daughters concern for ability to care for her safely at home in the setting of dementia with behavioral disturbance.    Objective Data  Containment of urine/stool: Incontinence Protocol prn    Active Diet Order:  Orders Placed This Encounter      Regular Diet Adult    Output:   No intake/output data recorded.    Risk Assessment:   Sensory Perception: 3-->slightly limited  Moisture: 3-->occasionally moist  Activity: 3-->walks occasionally  Mobility: 3-->slightly limited  Nutrition: 3-->adequate  Friction and Shear: 2-->potential problem  Bruno Score: 17                          Labs:   Recent Labs   Lab 06/16/20  2346   ALBUMIN 3.0*   HGB 11.6*   WBC 5.0   A1C 6.9*       Physical Exam  Skin inspection: focused BLE    Wound Location:  Left dorsal foot  Date of last photo:  6-17-20      Wound History: chronic wound  Measurements (length x width x depth, in cm):  1.5 x 1.2 x 0.3cm   Wound Base: pink moist tissue  Tunneling: N/A  Undermining: N/A  Palpation of the wound bed: normal   Periwound skin: macerated  Color: pink  Temperature: normal   Drainage: moderate  Description of drainage: serosanguinous  Odor: none  Pain: mild    Coccyx: distal/right coccyx looks macerated and scuffed but basically intact.  Blanchable erythema.  Denies pain with palpation.  Able to stand with minimal assist and walker today.    6-17-20          Interventions  Current support surface: Standard  Atmos Air mattress  Current off-loading measures: Pillows  Visual inspection of wound(s) completed  Wound Care: done per plan of care  Supplies: reviewed  Education  provided: plan of care, wound progress and Off-loading pressure  Discussed plan of care with Patient and Nurse    Sapna Watt RN, CWOCN

## 2020-06-17 NOTE — UTILIZATION REVIEW
Concurrent stay review; Secondary Review Determination    Under the authority of the Utilization Management Committee, the utilization review process indicated a secondary review on the above patient. The review outcome is based on review of the medical records, discussions with staff, and applying clinical experience noted on the date of the review.    (x) Observation Status Appropriate - Concurrent stay review        RATIONALE FOR DETERMINATION: 80-year-old female presented to the hospital due to difficulties with cognitive impairment/dementia and agitation at her house where her daughter supervises her care.  Observation care appropriate to assist in new disposition for safety regarding patient's dementia and agitation.    Patient is clinically improving and there is no clear indication to change patient's status to inpatient. The severity of illness, intensity of service provided, expected LOS and risk for adverse outcome make the care appropriate for observation.    This document was produced using voice recognition software    The information on this document is developed by the utilization review team in order for the business office to ensure compliance. This only denotes the appropriateness of proper admission status and does not reflect the quality of care rendered.    The definitions of Inpatient Status and Observation Status used in making the determination above are those provided in the CMS Coverage Manual, Chapter 1 and Chapter 6, section 70.4.    Sincerely,    Nathan Faulkner MD  Utilization Review  Physician Advisor  [facility].

## 2020-06-17 NOTE — PLAN OF CARE
DATE & TIME: 6/17 [id number]   Cognitive Concerns/ Orientation : A&Ox1, self only; forgetful, needs redirecting   BEHAVIOR & AGGRESSION TOOL COLOR: green  ABNL VS/O2: VSS on RA, ex soft BP 93/50, amlodipine held, MD aware  MOBILITY: Up with 1 assist, GB, pt unable to use walker purposefully.   PAIN MANAGMENT: Denies pain  DIET: Regular diet, good appetite  BOWEL/BLADDER: continent of b/b  ABNL LAB/BG: , 154  DRAIN/DEVICES: PIV SL  TELEMETRY RHYTHM: n/a  SKIN: Blanchable redness to coccyx, dressing CDI; L foot wound, dressing CDI, seen by WOC  TESTS/PROCEDURES: n/a  D/C DAY/GOALS/PLACE: pending  OTHER IMPORTANT INFO: Asymptomatic COVID swab pending. PT, OT, SW, WOC, Psych following. PRN IV & PO zyprexa available if needed for agitation

## 2020-06-17 NOTE — PLAN OF CARE
DATE & TIME: 6/17 from 0240 to 0730    Cognitive Concerns/ Orientation : Confused, alert to self only. Baseline dementia  BEHAVIOR & AGGRESSION TOOL COLOR: Green most of the shift  CIWA SCORE: N/A   ABNL VS/O2: VSS on RA except soft B/P  MOBILITY: Up x 1 assist to bathroom with walker/GB  PAIN MANAGMENT: Denied  DIET: Regular  BOWEL/BLADDER: Incontinent to bladder at times  ABNL LAB/BG: Hgb 11.6,   DRAIN/DEVICES: PIV saline lock  TELEMETRY RHYTHM: N/A  SKIN: wound on left lateral foot, non-blanchable redness on coccyx area. Scab, Wound consult pending.  TESTS/PROCEDURES: Wound consult pending  D/C DAY/GOALS/PLACE: Discharge 2-3 pending in progress  OTHER IMPORTANT INFO: Pt has baseline dementia, Pt is hold able if pt tries to leave. Sada MORILLO, PT/WOC consult pending   MD/RN ROUNDING SIGNED OFF D/E SHIFT: N/A  COMMIT TO SIT DONE AND SIGNED OFF completed

## 2020-06-17 NOTE — H&P
[facility]    History and Physical - Hospitalist Service       Date of Admission:  2020    Assessment & Plan   Mel Castellanos is a 80 year old female admitted on 2020. She presents after EMS was contacted with daughters concern for ability to care for her safely at home in the setting of dementia with behavioral disturbance.    Dementia with behavioral disturbance: Apparently was packing a suitcase of dirty close in an attempt to leave home to see her , who is .  Daughter, with whom patient lives and cares for patient, attempted to keep patient from leaving, and this prompted to behavioral outburst from patient and subsequent 911 call.  Patient believes that it is January, does not know the year.  She tells me that she does not have an address.   -If patient attempts to leave, she is currently holdable.  Patient is amenable to hospitalization currently  -Social work consult for disposition planning  -Occupational Therapy consulted for cognitive eval assessment  -Initiating Zyprexa 5 mg at bedtime  -Low threshold for psychiatry evaluation, though this appears to be worsening of dementia rather than acute psychiatric illness.    Coronary artery disease: RCA stenting in .  With episodes of chest discomfort in 2017, declined aggressive work-up or angiography with continued medical treatments recommended by cardiology through [facility].  -Resume prior to admission isosorbide pending pharmacy reconciliation  -Continue aspirin 81 mg daily  -Continue atorvastatin 40 mg daily  -Continue amlodipine 5 mg daily  -Resume prior to admission metoprolol XL 12.5 mg twice daily when reconciled by pharmacy    Urinary frequency, urinary incontinence:  -UA with micro, reflex culture pending    Type 2 diabetes:  -Hemoglobin A1c added on  -Medium dose sliding scale insulin available as needed  -Prior to admission Actos currently on hold pending pharmacy  reconciliation    Left ankle wound, consistent with diabetic foot ulcer: Patient with an ulceration of her left dorsal lateral foot, slightly anterior to lateral malleolus.  Follows with Dr. Walls of wound clinic and appears to been seen as recently as .  No apparent cellulitis or active infection.  -Wound nurse consulted    Restless leg syndrome:  -Requip 1 mg at bedtime    COPD: Not currently in exacerbation.  -Prior to admission Arnuity Ellipta inhaler currently on hold given observation admission           Diet: Regular.diet as tolerated  DVT Prophylaxis: Ambulate  Tavera Catheter: not present  Code Status: Full code.  Continuing previously ordered CODE STATUS, though pending occupational therapy assessment, anticipate need for guardian and decision maker.         Disposition Plan   Expected discharge: 1 to 2 days, recommended to Memory care facility versus home with 24-hour assistance from family once safe disposition plan/ TCU bed available.  Entered: Delon Castro MD 2020, 1:40 AM     The patient's care was discussed with the Patient and Dr. Sharp in the emergency department.    Delon Castro MD  [facility]    ______________________________________________________________________    Chief Complaint   No complaints, pleasantly confused    History is obtained from chart review, discussion with Dr. Sharp in the emergency department.  Patient is unable to provide any meaningful history at this juncture in the setting of dementia    History of Present Illness   Mel Castellanos is a 80 year old female who presents to the emergency department after a 911 call when patient became agitated after her daughter refused to let her leave the home.  Patient apparently has been requesting to go visit her , who is , and tonight packed a suitcase of dirty close and attempted to leave the home.  She lives with her daughter, who provides 24-hour supervision, and daughter refused  "to let her leave.  This resulted in increased agitation of patient and prompted a 911 call as patient's daughter recognized that she was no longer able to safely care for patient.    Note that patient has had cognitive impairment and general decline.  License was taken away approximately 2017, and patient has previously been recommended for TCU by OT and PT during prior hospitalizations.  At last hospitalization in March, family declined TCU and opted for 24-hour supervision, though it appears that this is no longer feasible in the setting of behavioral disturbance.    Patient lives with her daughter Henrique.  When asked about her living situation, patient tells me that she does not live with her daughter, that \"that person\" is too young to be her daughter.  Patient tells me that she would have been too old to have \"the boss\" as her daughter.  Patient later is able to tell me that her oldest daughter is Henrique, her second daughter was Lanny.  Patient tells me that she had a son as well who  of throat cancer.  A third daughter is listed in demographics, though when asked specifically, patient does not report a third daughter, only the son who  of cancer.  Apparently, there has been report by Dr. Sharp that patient is not recognizing her daughter at home.  Patient declines contact with Henrique currently, though Dr. Sharp did have the opportunity to speak with her early on presentation to the emergency department.  When asked what her address is, patient tells me that she has no address.  When asked what year it is she tells me \"that depends.\"  She recognizes that school is out, and it when asked what season it is tells me it is January or February, using school being over as part of her reasoning for this.    Then patient is currently calm and cooperative, though pleasantly confused.  She reports no pain.      Review of Systems    The 10 point Review of Systems is negative other than noted in the HPI or " here.  Note that review of systems is somewhat questionable given patient's dementia.  Patient reports no pain, no cough, no shortness of breath  Urinary incontinence which patient reports has been longstanding  Left lateral ankle ulcer is nonpainful, she is unaware as to how this occurred.    Past Medical History    I have reviewed this patient's medical history and updated it with pertinent information if needed.   Past Medical History:   Diagnosis Date     Arthritis      Emphysema of lung (H)      High cholesterol      HTN (hypertension)      Insomnia      MI (myocardial infarction) (H)      Restless leg syndrome      TIA (transient ischaemic attack)      Type 2 diabetes mellitus without complications (H)      UTI (urinary tract infection)        Past Surgical History   I have reviewed this patient's surgical history and updated it with pertinent information if needed.  Past Surgical History:   Procedure Laterality Date     APPENDECTOMY       CHOLECYSTECTOMY       IR LOWER EXTREMITY ANGIOGRAM LEFT  2020       Social History   I have reviewed this patient's social history and updated it with pertinent information if needed.  Social History     Tobacco Use     Smoking status: Current Every Day Smoker     Packs/day: 0.25     Smokeless tobacco: Never Used     Tobacco comment: -1/2 pack per day    Substance Use Topics     Alcohol use: No     Drug use: No       Family History   I have reviewed this patient's family history and updated it with pertinent information if needed.   Family History   Problem Relation Age of Onset     Coronary Artery Disease Father    Son with throat cancer, now     Prior to Admission Medications   Prior to Admission Medications   Prescriptions Last Dose Informant Patient Reported? Taking?   ASPIRIN EC PO  Daughter Yes No   Sig: Take 81 mg by mouth daily    Multiple Vitamins-Minerals (CENTRUM SILVER) per tablet  Daughter Yes No   Sig: Take 1 tablet by mouth daily   OMEPRAZOLE  PO  Daughter Yes No   Sig: Take 20 mg by mouth every morning    amLODIPine (NORVASC) 5 MG tablet   Yes No   Sig: Take 5 mg by mouth daily   atorvastatin (LIPITOR) 40 MG tablet  Daughter Yes No   Sig: Take 40 mg by mouth daily   fluticasone (ARNUITY ELLIPTA) 100 MCG/ACT inhaler   No No   Sig: Inhale 1 puff into the lungs every evening   isosorbide mononitrate (IMDUR) 30 MG 24 hr tablet   No No   Sig: Take 0.5 tablets (15 mg) by mouth daily   magnesium oxide (MAG-OX) 400 MG tablet  Daughter Yes No   Sig: Take 400 mg by mouth 2 times daily   metoprolol succinate ER (TOPROL-XL) 25 MG 24 hr tablet   No No   Sig: Take 0.5 tablets (12.5 mg) by mouth 2 times daily   nitroFURantoin macrocrystal-monohydrate (MACROBID) 100 MG capsule   No No   Sig: Take 1 capsule (100 mg) by mouth 2 times daily for 4 days   nitroglycerin (NITROSTAT) 0.4 MG SL tablet  Daughter No No   Sig: Place 1 tablet (0.4 mg) under the tongue every 5 minutes as needed X 3 doses total if needed for chest pain.   order for DME   No No   Sig: [facility] Phone [phone] Fax [phone]  [place] Size small Qty 2 sleeves  Primary Dressing fibracol or sweta   Qty 6 sheets  Secondary Dressing 4x4 gauze  Qty 60   Secondary Dressing Vashe Qty 1 bottle  Length of Need: 1 month  Frequency of dressing change: daily   pioglitazone (ACTOS) 15 MG tablet  Daughter Yes No   Sig: Take 15 mg by mouth daily   rOPINIRole (REQUIP) 1 MG tablet  Daughter Yes No   Sig: Take 1 mg by mouth At Bedtime      Facility-Administered Medications: None     Allergies   Allergies   Allergen Reactions     Sulfa Drugs Rash       Physical Exam   Vital Signs: Temp: 98  F (36.7  C) Temp src: Oral BP: (!) 154/67   Heart Rate: 73 Resp: 16 SpO2: 94 % O2 Device: None (Room air)    Weight: 0 lbs 0 oz    General Appearance: Well-appearing 80-year-old female laying comfortably in bed in no distress.    Eyes: No scleral icterus or injection  HEENT: Rhinophyma present.  Normocephalic    Respiratory: breath sounds are clear bilaterally to auscultation with no wheezes or crackles.  Cardiovascular: Regular rate and rhythm.  Heart rate currently in the 70s range.    GI: Abdomen soft, no palpable mass.  Patient has some mild suprapubic tenderness to palpation.  Lymph/Hematologic: 1+ pitting lower extremity edema to distal tibia bilaterally, though slightly more evident on left.  Genitourinary: Incontinent of urine during interview  Skin: Patient with a well-circumscribed healing ulcer of her left dorsal lateral foot, anterior to malleolus.  No significant surrounding erythema, not malodorous.  No other rashes appreciated.  Musculoskeletal: Muscular tone intact in all extremities alert, conversant,  Neurologic: No word finding difficulty.  Moves all extremities with ease, though does need some assistance in sitting up in bed.  Psychiatric: Pleasantly confused.  Normal affect.  Overt cognitive impairment.    Data   Data reviewed today: I reviewed all medications, new labs and imaging results over the last 24 hours. I personally reviewed no images or EKG's today.    Recent Labs   Lab 06/16/20  2346   WBC 5.0   HGB 11.6*   MCV 97         POTASSIUM 3.5   CHLORIDE 107   CO2 30   BUN 16   CR 0.59   ANIONGAP 2*   LAUREN 9.1   *   ALBUMIN 3.0*   PROTTOTAL 6.8   BILITOTAL 0.2   ALKPHOS 66   ALT 25   AST 28

## 2020-06-17 NOTE — PLAN OF CARE
"Discharge Planner OT   Patient plan for discharge: Pt is unable to state, she does not know where she lives and denies that she lives with her daughter. Pt's daughter said she wants to continue to care for patient at home at this time, but would like to talk with a  about potential future placement.  Current status: OT rufino completed. Pt with severe dementia, scoring 6/30 on SLUMS. She was unable/unwilling to follow many commands. She completed bed mobility and stood with Min A, but refused any further activity such as sitting in a chair, using the bathroom, taking a walk. Pt reports back pain bothering her; daughter reports this is common and chronic for her. Nursing staff has been helping her with Min A of 1. Pt is alert but very confused, knows only her first name. She talks frequently about the distant past. She did state that she was recently at a facility that she liked, as she liked the routine there. (Pt was recently at a TCU).  Barriers to return to prior living situation: Severely impaired cognition.  Recommendations for discharge: 24/7 care, whether at a long term care facility or at home  Rationale for recommendations: Pt's dementia has worsened to a point where she requires 24 hour care. Her daughter reports that she would like to still care for patient in her home, but also reports she \"is a prisoner in her home\" and has no one else for respite care and cannot afford to hire any help. Memory care at long term care facility would be appropriate if pt is unable to have 24 hour care at home. No further skilled OT or PT needed at this time, as her progressively worsening cognition limits her ability to progress with skilled therapy; pt does not seem capable of new learning and carryover of learning at this time.       Entered by: Poppy Perez 06/17/2020 10:41 AM       "

## 2020-06-17 NOTE — ED NOTES
Bed: ED17  Expected date:   Expected time:   Means of arrival:   Comments:  446  80F Agitation  Negative covid screening  6034

## 2020-06-17 NOTE — ED TRIAGE NOTES
Patient daughter call EMS due to patient being aggressive and yelling.         Daughter told EMS she cannot take care of her.patient stated she just wanted to go see her father.She has hx of dementia .patient is a poor historian ,confusion noted

## 2020-06-17 NOTE — CONSULTS
Care Transition Initial Assessment - RN  Pt was admitted with behavioral disturbance with baseline dementia.  Pt lives with her Daughter Henrique.  Admission status is Observation.  Writer did call Henrique concerning Observation and went over meaning of this.  Psychiatry has started Zyprexa.  Psychiatry also noted he would start pt on Aricept.  Henrique reports that pt was started on Aricept one week ago.  Discussed therapies have assessed and there currently is no skilled need.  Henrique noted pt does not have the resources for a nursing home.  Discussed having a financial counselor help her to assess if she would be eligible for assistance.  Presently, Hannyestefania does not know what she wants for her mother and felt she needs time to think about this.  She asked to talk with a SW and is aware the SW will be contacting her, but most likely not until tomorrow due to volume of cases for today.  If she makes a decision on home vs placement she will call the floor and ask to leave the SW a message this deidre.  Asked if pt has a HCA.  Henrique noted she did but she could not find this.   Asked if she was also communicating with her sisters.  She noted she had only one sister, (Lanny) while the Demographics listed three daughters.  After reviewing this with her, she asked to remove Shruthi as she had been  to Henrique and they have parted ways.  Pt has a visiting nurse through [facility], that sees her weekly.  This is her through her [employer]vant[employer] plan.    Laurita Hsieh RN  Inpatient Care Coordination  [phone]

## 2020-06-17 NOTE — CONSULTS
"Consult Date:  2020      PSYCHIATRY CONSULTATION      REQUESTING PHYSICIAN:  Dr. Reis.      REASON FOR CONSULTATION:  Dementia with behavioral disturbance.      IDENTIFYING DATA:  The patient is an 80-year-old  female with known dementia, brought to the emergency room after she was agitated and making comments about wanting to jump out a window.      CHIEF COMPLAINT:  \"What do you call that when you have a plan to leave and you need to pack a bag?\"      HISTORY OF PRESENT ILLNESS:  The patient is an 80-year-old  female with a known history of dementia.  She is known to Psychiatry, as she was here a few months ago under similar circumstances, but at that time had a UTI.  She was discharged to a TCU.  Apparently, she lives with her daughter and the daughter is unable to care for her.  She attempted to leave the house to see her , who is actually .  Today, she is completely confused, though she is pleasant and lying in bed in a cooperative manner this morning.  Her speech is at times somewhat nonsensical and she cannot give me a good account of what brought her here.  She does not know what day it is.  She did not even know that she was in the hospital this morning.  They did start her on some Zyprexa last night, 5 mg at bedtime.  I note she is also taking Requip.  Apparently, this has been prescribed in the past for restless legs.      On further questioning, the patient does not endorse being overtly depressed or suicidal.  She does not remember making comments about wanting to jump out a window.  She does not describe any overt anxiety, depression, panic symptoms, trauma history, eating disorder history or previous treatment for depression.      PAST PSYCHIATRIC HISTORY:  Notable for known dementia and previous admission with delirium secondary to UTI.      PAST CHEMICAL DEPENDENCY HISTORY:  Negative.      PAST MEDICAL HISTORY:  Includes arthritis, emphysema, " "hypercholesterolemia, hypertension, chronic insomnia, myocardial infarction, restless leg syndrome, TIA, type 2 diabetes, recurrent UTIs.      PRIOR TO ADMISSION MEDICATIONS:  Aspirin, multivitamin, omeprazole, amlodipine, atorvastatin, fluticasone inhaler, Imdur, magnesium oxide, nitrofurantoin, Nitrostat, Requip 1 mg at bedtime, Actos.      FAMILY HISTORY:  Unknown.      SOCIAL HISTORY:  The patient is , lives with her daughter.  Her daughter has been increasingly unable to deal with her at home because of her intermittent agitation.      A 10-point review of systems is currently negative.        MOST RECENT VITAL SIGNS:  Blood pressure 109/70, temp 97.4, pulse 71, respiratory rate 18, oxygen saturation 95%.      MENTAL STATUS EXAMINATION:  Appearance:  The patient is a slightly disheveled woman lying in bed.  She is pleasant with me, though she is a very poor historian.  Speech is somewhat rambling and nonsensical.  Use of language poor.  Motor exam:  Fidgety.  Affect is pleasant.  Mood:  \"Okay.\"  Thought process is somewhat disorganized and tangential, as she rambles on about suitcases and other matters that are difficult to follow.  Thought content negative for current hallucinations, delusions, paranoia, suicidal or homicidal ideation.  Apparently, she made a comment about jumping out of a window prior to coming into the hospital, but does not remember saying this.  Insight and judgment markedly impaired.  Cognitive exam:  The patient is alert and oriented to person only.  Recent memory:  Poor.  Remote memory:  Poor.  General fund of knowledge:  Poor.  Concentration:  Poor.      IMPRESSION:  The patient is an 80-year-old woman presenting with a major neurocognitive disorder with behavioral disturbance.  At this point, starting Zyprexa 5 mg at bedtime to target sleep and agitation is reasonable.  Assuming she maintains cooperation and behavioral stability, moving her to memory care is probably in her " best interest.  I do not think she can manage in assisted living and her situation is increasingly difficult to deal with at home.  She is not decisional and her family should act as substitute decision maker.      DIAGNOSES:   1.  Major neurocognitive disorder with behavioral disturbance.   2.  Multiple medical comorbidities.      PLAN:   1.  Agree with initiation of Zyprexa 5 mg at bedtime.   2.  At the present time, if she maintains behavioral stability, then moving her to memory care would be most appropriate.  She obviously has significant dementia and she is not decisional. Family should act as surrogate decision maker   3.  I am going to start a small dose of Aricept 5 mg at bedtime to address her dementia, I will also lower requip to 0.5mg at bedtime as this poses some risk of increasing agitation given it's dopaminergic properties         HIMANSHU CRAIG MD             D: 2020   T: 2020   MT: FRANC      Name:     REYMUNDO LUCERO   MRN:      [id number]        Account:       [id number]   :      1940           Consult Date:  2020      Document: [id number]       cc: [facility]

## 2020-06-17 NOTE — ED NOTES
"[facility]  ED Nurse Handoff Report    ED Chief complaint: Psychiatric Evaluation      ED Diagnosis:   Final diagnoses:   Dementia with behavioral disturbance, unspecified dementia type (H)   Agitation due to dementia (H)       Code Status: See prior    Allergies:   Allergies   Allergen Reactions     Sulfa Drugs Rash       Patient Story: Pt lives with daughter. Daughter called EMS tonight because pt was yelling and appeared agitated. Pt was telling daughter that she was going to go to her dads house.   Focused Assessment:  Pt alert, follows commands. Calm and cooperative. VSS. labs look good. Still need a UA. Walks independently. Hx of dementia.       Treatments and/or interventions provided: n/a  Patient's response to treatments and/or interventions: n/a    To be done/followed up on inpatient unit:  n/a    Does this patient have any cognitive concerns?: Baseline dementia    Activity level - Baseline/Home:  Independent  Activity Level - Current:   Independent    Patient's Preferred language: English   Needed?: No    Isolation: None  Infection: Not Applicable  Bariatric?: No    Vital Signs:   Vitals:    06/16/20 2248   BP: (!) 154/67   Resp: 16   Temp: 98  F (36.7  C)   TempSrc: Oral   SpO2: 94%   Height: 1.651 m (5' 5\")       Cardiac Rhythm:     Was the PSS-3 completed:   Yes  What interventions are required if any?               Family Comments: n/a  OBS brochure/video discussed/provided to patient/family: N/A              Name of person given brochure if not patient: n/a              Relationship to patient: n/a    For the majority of the shift this patient's behavior was Green.   Behavioral interventions performed were n/a.    ED NURSE PHONE NUMBER: [id number]         "

## 2020-06-18 LAB
GLUCOSE BLDC GLUCOMTR-MCNC: 100 MG/DL (ref 70–99)
GLUCOSE BLDC GLUCOMTR-MCNC: 105 MG/DL (ref 70–99)
GLUCOSE BLDC GLUCOMTR-MCNC: 106 MG/DL (ref 70–99)
GLUCOSE BLDC GLUCOMTR-MCNC: 131 MG/DL (ref 70–99)
GLUCOSE BLDC GLUCOMTR-MCNC: 133 MG/DL (ref 70–99)

## 2020-06-18 PROCEDURE — 96372 THER/PROPH/DIAG INJ SC/IM: CPT

## 2020-06-18 PROCEDURE — 25000132 ZZH RX MED GY IP 250 OP 250 PS 637: Performed by: INTERNAL MEDICINE

## 2020-06-18 PROCEDURE — 99225 ZZC SUBSEQUENT OBSERVATION CARE,LEVEL II: CPT | Performed by: HOSPITALIST

## 2020-06-18 PROCEDURE — 25000132 ZZH RX MED GY IP 250 OP 250 PS 637: Performed by: HOSPITALIST

## 2020-06-18 PROCEDURE — 00000146 ZZHCL STATISTIC GLUCOSE BY METER IP

## 2020-06-18 PROCEDURE — 25000132 ZZH RX MED GY IP 250 OP 250 PS 637: Performed by: PSYCHIATRY & NEUROLOGY

## 2020-06-18 PROCEDURE — 99207 ZZC CDG-CODE CATEGORY CHANGED: CPT | Performed by: HOSPITALIST

## 2020-06-18 PROCEDURE — G0378 HOSPITAL OBSERVATION PER HR: HCPCS

## 2020-06-18 RX ORDER — GAUZE BANDAGE 3"X4"
SPONGE TOPICAL DAILY
Status: DISCONTINUED | OUTPATIENT
Start: 2020-06-18 | End: 2020-06-18

## 2020-06-18 RX ORDER — MULTIPLE VITAMINS W/ MINERALS TAB 9MG-400MCG
1 TAB ORAL DAILY
Status: DISCONTINUED | OUTPATIENT
Start: 2020-06-18 | End: 2020-06-22 | Stop reason: HOSPADM

## 2020-06-18 RX ORDER — IPRATROPIUM BROMIDE AND ALBUTEROL SULFATE 2.5; .5 MG/3ML; MG/3ML
1 SOLUTION RESPIRATORY (INHALATION) EVERY 6 HOURS PRN
Status: DISCONTINUED | OUTPATIENT
Start: 2020-06-18 | End: 2020-06-22 | Stop reason: HOSPADM

## 2020-06-18 RX ORDER — MAGNESIUM OXIDE 400 MG/1
400 TABLET ORAL 2 TIMES DAILY
Status: DISCONTINUED | OUTPATIENT
Start: 2020-06-18 | End: 2020-06-22 | Stop reason: HOSPADM

## 2020-06-18 RX ORDER — PIOGLITAZONEHYDROCHLORIDE 15 MG/1
15 TABLET ORAL DAILY
Status: DISCONTINUED | OUTPATIENT
Start: 2020-06-18 | End: 2020-06-22 | Stop reason: HOSPADM

## 2020-06-18 RX ADMIN — Medication 400 MG: at 21:27

## 2020-06-18 RX ADMIN — METOPROLOL SUCCINATE 12.5 MG: 25 TABLET, EXTENDED RELEASE ORAL at 21:27

## 2020-06-18 RX ADMIN — PIOGLITAZONE 15 MG: 15 TABLET ORAL at 12:17

## 2020-06-18 RX ADMIN — AMLODIPINE BESYLATE 5 MG: 5 TABLET ORAL at 08:59

## 2020-06-18 RX ADMIN — ASPIRIN 81 MG: 81 TABLET ORAL at 08:59

## 2020-06-18 RX ADMIN — CHOLECALCIFEROL TAB 125 MCG (5000 UNIT) 125 MCG: 125 TAB at 12:17

## 2020-06-18 RX ADMIN — Medication 400 MG: at 12:12

## 2020-06-18 RX ADMIN — ISOSORBIDE MONONITRATE 15 MG: 30 TABLET, EXTENDED RELEASE ORAL at 12:12

## 2020-06-18 RX ADMIN — DONEPEZIL HYDROCHLORIDE 5 MG: 5 TABLET ORAL at 21:27

## 2020-06-18 RX ADMIN — OMEPRAZOLE 20 MG: 20 CAPSULE, DELAYED RELEASE ORAL at 12:12

## 2020-06-18 RX ADMIN — ROPINIROLE HYDROCHLORIDE 0.5 MG: 0.5 TABLET, FILM COATED ORAL at 21:27

## 2020-06-18 RX ADMIN — OLANZAPINE 5 MG: 5 TABLET, ORALLY DISINTEGRATING ORAL at 13:49

## 2020-06-18 RX ADMIN — METOPROLOL SUCCINATE 12.5 MG: 25 TABLET, EXTENDED RELEASE ORAL at 12:12

## 2020-06-18 RX ADMIN — MULTIPLE VITAMINS W/ MINERALS TAB 1 TABLET: TAB at 12:12

## 2020-06-18 RX ADMIN — ATORVASTATIN CALCIUM 40 MG: 40 TABLET, FILM COATED ORAL at 08:59

## 2020-06-18 RX ADMIN — OLANZAPINE 5 MG: 5 TABLET, ORALLY DISINTEGRATING ORAL at 21:27

## 2020-06-18 NOTE — PLAN OF CARE
DATE & TIME: 6/18   Cognitive Concerns/ Orientation : A&Ox1, self only; forgetful, restless at times, tearful  BEHAVIOR & AGGRESSION TOOL COLOR: green  ABNL VS/O2: VSS on RA  MOBILITY: Up with 1 assist, GB, pt unable to use walker purposefully.   PAIN MANAGMENT: Denies pain  DIET: Regular diet, good appetite  BOWEL/BLADDER: continent of b/b  ABNL LAB/BG:  and 105  DRAIN/DEVICES: PIV SL  TELEMETRY RHYTHM: n/a  SKIN: Blanchable redness to coccyx, dressing CDI; L foot wound, dressing changed by WOC   TESTS/PROCEDURES: n/a  D/C DAY/GOALS/PLACE: pending safe placement   OTHER IMPORTANT INFO: Zyprexa given for anxiety/crying, COVID negative. PT, OT, SW, WOC, Psych following.

## 2020-06-18 NOTE — PROGRESS NOTES
[facility]    Medicine Progress Note - Hospitalist Service       Date of Admission:  2020  Assessment & Plan   Mel Castellanos is a 80 year old female admitted on 2020. She presents after EMS was contacted with daughters concern for ability to care for her safely at home in the setting of dementia with behavioral disturbance.    Dementia with behavioral disturbance  Apparently was packing a suitcase of dirty close in an attempt to leave home to see her , who is .  Daughter, with whom patient lives and cares for patient, attempted to keep patient from leaving, and this prompted to behavioral outburst from patient and subsequent 911 call.   The patient has been more calm here and she is not requiring one-to-one observation.  She has been seen by the psychiatrist.  Today she is calmer and more cooperative.    Continue Aricept and Zyprexa as ordered    Continue to look for an appropriate discharge facility    Coronary artery disease  RCA stenting in .  With episodes of chest discomfort in , declined aggressive work-up or angiography with continued medical treatments recommended by cardiology through [facility].    Continue her outpatient cardiac medications    Urinary frequency, urinary incontinence  UA normal    Type 2 diabetes  Hemoglobin A1C   Date Value Ref Range Status   2020 6.9 (H) 0 - 5.6 % Final     Comment:     Normal <5.7% Prediabetes 5.7-6.4%  Diabetes 6.5% or higher - adopted from ADA   consensus guidelines.       Medium dose sliding scale insulin available as needed    Continue Actos    Left ankle wound, consistent with diabetic foot ulcer  Patient with an ulceration of her left dorsal lateral foot, slightly anterior to lateral malleolus.  Follows with Dr. Walls of wound clinic and appears to been seen as recently as .  No apparent cellulitis or active infection.    Wound nurse consulted-recommendations noted    Restless leg  syndrome    Requip dose lowered by the psychiatrist    Chronic obstructive lung disease   Not currently in exacerbation    Continue prn inhaled bronchodilators      Diet: Regular Diet Adult    DVT Prophylaxis: Low Risk/Ambulatory with no VTE prophylaxis indicated  Tavera Catheter: not present  Code Status: Full Code         Disposition Plan   Expected discharge: [place] stable for discharge once an appropriate discharge facility is located.  Entered: Velasquez Reis MD 06/18/2020, 11:18 AM       The patient's care was discussed with the Bedside Nurse and Patient.    Velasquez Reis MD  Hospitalist Service  [facility]    ______________________________________________________________________    Interval History   No complaints     Data reviewed today: I reviewed all medications, new labs and imaging results over the last 24 hours. I personally reviewed no images or EKG's today.    Physical Exam   Vital Signs: Temp: 98.5  F (36.9  C) Temp src: Oral BP: 106/66 Pulse: 64 Heart Rate: 65 Resp: 18 SpO2: 91 % O2 Device: None (Room air)    Weight: 0 lbs 0 oz  Constitutional: awake, alert, cooperative, no apparent distress  Respiratory: No increased work of breathing, good air exchange, clear to auscultation bilaterally, no crackles or wheezing  Cardiovascular:  regular rate and rhythm, normal S1 and S2, no S3 or S4, and no murmur noted  GI: normal bowel sounds, soft, non-distended, non-tender  Neuropsychiatric: General: normal, calm and normal eye contact-not oriented to place or time    Data   Recent Labs   Lab 06/16/20  2346   WBC 5.0   HGB 11.6*   MCV 97         POTASSIUM 3.5   CHLORIDE 107   CO2 30   BUN 16   CR 0.59   ANIONGAP 2*   LAUREN 9.1   *   ALBUMIN 3.0*   PROTTOTAL 6.8   BILITOTAL 0.2   ALKPHOS 66   ALT 25   AST 28     No results found for this or any previous visit (from the past 24 hour(s)).  Medications       amLODIPine  5 mg Oral Daily     aspirin  81 mg Oral  Daily     atorvastatin  40 mg Oral Daily     donepezil  5 mg Oral At Bedtime     insulin aspart   Subcutaneous TID w/meals     insulin aspart  1-7 Units Subcutaneous TID AC     insulin aspart  1-5 Units Subcutaneous At Bedtime     OLANZapine zydis  5 mg Oral At Bedtime     rOPINIRole  0.5 mg Oral At Bedtime     sodium chloride (PF)  3 mL Intracatheter Q8H

## 2020-06-18 NOTE — PLAN OF CARE
DATE & TIME: 6/18/20 0700-1930  Cognitive Concerns/ Orientation : A&O to self only, anxious and agitated at times  BEHAVIOR & AGGRESSION TOOL COLOR: green  ABNL VS/O2: VSS on RA  MOBILITY: Up with 1 assist, GB, pt unable to use walker purposefully.   PAIN MANAGMENT: Denies pain  DIET: Regular diet, good appetite  BOWEL/BLADDER: continent of b/b  ABNL LAB/BG: , 133.   DRAIN/DEVICES: PIV SL  TELEMETRY RHYTHM: n/a  SKIN: Blanchable redness to coccyx, dressing CDI; L foot wound, dressing changed by WOC yesterday 6/17/20, orders in place to change it every other day (odd days).    TESTS/PROCEDURES: n/a  D/C DAY/GOALS/PLACE: pending safe placement, SW following.   OTHER IMPORTANT INFO: Prn Zyprexa given for anxiety/crying. SW, WOC, Psych following.     Update 0196-4573  BP on Lt arm 77/46, 79/54, Rt arm at the same time is 115/58, 121/65, other VSS, HR 70s, Asymptomatic. Denies light headedness/dizziness. Remained confused, anxious at times but redirectable.

## 2020-06-18 NOTE — CONSULTS
Care Transition Initial Assessment - SW     Met with: Spoke with Henrique loomis    Active Problems:    Dementia with behavioral disturbance (H)       DATA  Lives With: child(mariam), adult   Living Arrangements: condominium  Quality of Family Relationships: involved, supportive  Description of Support System: Involved, Supportive  Who is your support system?: Children  Support Assessment: Adequate family and caregiver support.   Identified issues/concerns regarding health management: Called daughterHenrique. She states she can no longer care for her mother at home and wishes to place her in a LTC facility. Pt has only been to [facility] for TCU in the past. Explained there is a shortage of LTC beds, but SW will send out referrals to facilities in the area to find out what is open.  Will also explore options for coverage through [facility], however, explained LTC is typically private pay. Henrique states her mother does not have the funds to pay privately. Explained the MA process. Henrique will need assistance with this, when the time comes.       Quality of Family Relationships: involved, supportive     ASSESSMENT  Cognitive Status: Oriented to self only, per nursing. Family appears involved and supportive.   Concerns to be addressed: On-going discharge planning.     PLAN  Financial costs for the patient includes: Room and board.  Patient given options and choices for discharge: Yes.  Patient/family is agreeable to the plan? YES  Patient Goals and Preferences: LTC.  Patient anticipates discharging to: LTC.    KELLY Garzon, LGSW  [phone]  [facility]

## 2020-06-18 NOTE — PLAN OF CARE
DATE & TIME: 6/17 0700-1930   Cognitive Concerns/ Orientation : A&Ox1, self only; forgetful, restless at times, tearful  BEHAVIOR & AGGRESSION TOOL COLOR: green  ABNL VS/O2: VSS on RA  MOBILITY: Up with 1 assist, GB, pt unable to use walker purposefully.   PAIN MANAGMENT: Denies pain  DIET: Regular diet, good appetite  BOWEL/BLADDER: continent of b/b  ABNL LAB/BG: BG 69, OJ  given, pt is eating supper, re-check Bg 128  DRAIN/DEVICES: PIV SL  TELEMETRY RHYTHM: n/a  SKIN: Blanchable redness to coccyx, dressing CDI; L foot wound, dressing changed by WOC -D/I  TESTS/PROCEDURES: n/a  D/C DAY/GOALS/PLACE: pending  OTHER IMPORTANT INFO: Zyprexa given for anxiety/crying, COVID negative. PT, OT, SW, WOC, Psych following.

## 2020-06-18 NOTE — PROGRESS NOTES
: -diagnostic tests and consults completed and resulted No  -vital signs normal or at patient baseline Yes  -safe disposition plan has been identified   Nurse to notify provider when observation goals have been met and patient is ready for discharge. No

## 2020-06-19 LAB
GLUCOSE BLDC GLUCOMTR-MCNC: 108 MG/DL (ref 70–99)
GLUCOSE BLDC GLUCOMTR-MCNC: 109 MG/DL (ref 70–99)
GLUCOSE BLDC GLUCOMTR-MCNC: 137 MG/DL (ref 70–99)
GLUCOSE BLDC GLUCOMTR-MCNC: 153 MG/DL (ref 70–99)
GLUCOSE BLDC GLUCOMTR-MCNC: 167 MG/DL (ref 70–99)

## 2020-06-19 PROCEDURE — 99225 ZZC SUBSEQUENT OBSERVATION CARE,LEVEL II: CPT | Performed by: HOSPITALIST

## 2020-06-19 PROCEDURE — 96372 THER/PROPH/DIAG INJ SC/IM: CPT

## 2020-06-19 PROCEDURE — 25000132 ZZH RX MED GY IP 250 OP 250 PS 637: Performed by: INTERNAL MEDICINE

## 2020-06-19 PROCEDURE — 00000146 ZZHCL STATISTIC GLUCOSE BY METER IP

## 2020-06-19 PROCEDURE — 25000132 ZZH RX MED GY IP 250 OP 250 PS 637: Performed by: PSYCHIATRY & NEUROLOGY

## 2020-06-19 PROCEDURE — 25000132 ZZH RX MED GY IP 250 OP 250 PS 637: Performed by: HOSPITALIST

## 2020-06-19 PROCEDURE — 99207 ZZC CDG-CODE CATEGORY CHANGED: CPT | Performed by: HOSPITALIST

## 2020-06-19 PROCEDURE — G0378 HOSPITAL OBSERVATION PER HR: HCPCS

## 2020-06-19 RX ADMIN — MULTIPLE VITAMINS W/ MINERALS TAB 1 TABLET: TAB at 08:52

## 2020-06-19 RX ADMIN — OLANZAPINE 5 MG: 5 TABLET, ORALLY DISINTEGRATING ORAL at 22:15

## 2020-06-19 RX ADMIN — METOPROLOL SUCCINATE 12.5 MG: 25 TABLET, EXTENDED RELEASE ORAL at 08:48

## 2020-06-19 RX ADMIN — ATORVASTATIN CALCIUM 40 MG: 40 TABLET, FILM COATED ORAL at 08:47

## 2020-06-19 RX ADMIN — AMLODIPINE BESYLATE 5 MG: 5 TABLET ORAL at 08:52

## 2020-06-19 RX ADMIN — ROPINIROLE HYDROCHLORIDE 0.5 MG: 0.5 TABLET, FILM COATED ORAL at 22:05

## 2020-06-19 RX ADMIN — Medication 1 MG: at 23:34

## 2020-06-19 RX ADMIN — OLANZAPINE 5 MG: 5 TABLET, ORALLY DISINTEGRATING ORAL at 19:05

## 2020-06-19 RX ADMIN — DONEPEZIL HYDROCHLORIDE 5 MG: 5 TABLET ORAL at 22:05

## 2020-06-19 RX ADMIN — Medication 400 MG: at 08:48

## 2020-06-19 RX ADMIN — ASPIRIN 81 MG: 81 TABLET ORAL at 08:48

## 2020-06-19 RX ADMIN — ISOSORBIDE MONONITRATE 15 MG: 30 TABLET, EXTENDED RELEASE ORAL at 08:51

## 2020-06-19 RX ADMIN — Medication 400 MG: at 22:05

## 2020-06-19 RX ADMIN — CHOLECALCIFEROL TAB 125 MCG (5000 UNIT) 125 MCG: 125 TAB at 08:52

## 2020-06-19 RX ADMIN — METOPROLOL SUCCINATE 12.5 MG: 25 TABLET, EXTENDED RELEASE ORAL at 22:04

## 2020-06-19 RX ADMIN — PIOGLITAZONE 15 MG: 15 TABLET ORAL at 08:48

## 2020-06-19 RX ADMIN — OMEPRAZOLE 20 MG: 20 CAPSULE, DELAYED RELEASE ORAL at 08:47

## 2020-06-19 NOTE — PLAN OF CARE
DATE & TIME: 6/19/2020 3614-3278  Cognitive Concerns/ Orientation : A&O to self only, baseline dementia. Calm and cooperative with cares.  BEHAVIOR & AGGRESSION TOOL COLOR: Green  ABNL VS/O2: Q4 VSS ex soft BPs 109/46, R arm lower BP. On RA.  MOBILITY: Up with 1 assist, GB & walker. Prefers to sit in chair.  PAIN MANAGMENT: Denies.  DIET: Regular diet, good appetite. Carb count.  BOWEL/BLADDER: Incontinent of bladder at times, needs frequent reminders to use bathroom.   ABNL LAB/BG: , 152.    DRAIN/DEVICES: PIV SL  TELEMETRY RHYTHM: N/A  SKIN: Blanchable redness to coccyx, dressing changed, CDI; L foot wound, foam dressing changed.    TESTS/PROCEDURES: N/A  D/C DAY/GOALS/PLACE: Pending placement, SW following.   OTHER IMPORTANT INFO: OBS patient. SW, WOC, Psych following.     OBS GOALS:  -diagnostic tests and consults completed and resulted - NOT MET  -vital signs normal or at patient baseline - MET  -safe disposition plan has been identified - NOT MET

## 2020-06-19 NOTE — PROGRESS NOTES
[facility]    Medicine Progress Note - Hospitalist Service       Date of Admission:  2020  Assessment & Plan   Mel Castellanos is a 80 year old female admitted on 2020. She presents after EMS was contacted with daughters concern for ability to care for her safely at home in the setting of dementia with behavioral disturbance.    Dementia with behavioral disturbance  Apparently was packing a suitcase of dirty close in an attempt to leave home to see her , who is .  Daughter, with whom patient lives and cares for patient, attempted to keep patient from leaving, and this prompted to behavioral outburst from patient and subsequent 911 call.   The patient has been more calm here and she is not requiring one-to-one observation.  She has been seen by the psychiatrist.  She has been calmer and more cooperative.    Continue Aricept and Zyprexa as ordered    Continue to look for an appropriate discharge facility    Coronary artery disease  RCA stenting in .  With episodes of chest discomfort in , declined aggressive work-up or angiography with continued medical treatments recommended by cardiology through [facility].    Continue her outpatient cardiac medications    Urinary frequency, urinary incontinence  UA normal    Type 2 diabetes  Hemoglobin A1C   Date Value Ref Range Status   2020 6.9 (H) 0 - 5.6 % Final     Comment:     Normal <5.7% Prediabetes 5.7-6.4%  Diabetes 6.5% or higher - adopted from ADA   consensus guidelines.       Medium dose sliding scale insulin available as needed    Continue Actos    Left ankle wound, consistent with diabetic foot ulcer  Patient with an ulceration of her left dorsal lateral foot, slightly anterior to lateral malleolus.  Follows with Dr. Walls of wound clinic and appears to been seen as recently as .  No apparent cellulitis or active infection.    Wound nurse consulted-recommendations noted    Restless leg  syndrome    Requip dose lowered by the psychiatrist    Chronic obstructive lung disease   Not currently in exacerbation    Continue prn inhaled bronchodilators      Diet: Regular Diet Adult    DVT Prophylaxis: Low Risk/Ambulatory with no VTE prophylaxis indicated  Tavera Catheter: not present  Code Status: Full Code         Disposition Plan   Expected discharge: stable for discharge once an appropriate discharge facility is located.  Entered: Velasquez Reis MD 06/19/2020, 12:09 PM         Velasquez Reis MD  Hospitalist Service  [facility]    ______________________________________________________________________    Interval History   No complaints     Data reviewed today: I reviewed all medications, new labs and imaging results over the last 24 hours. I personally reviewed no images or EKG's today.    Physical Exam   Vital Signs: Temp: 97.4  F (36.3  C) Temp src: Oral BP: 109/46   Heart Rate: 107 Resp: 18 SpO2: 95 % O2 Device: None (Room air)    Weight: 0 lbs 0 oz  Constitutional: asleep but easily awakens, not in any distress  Neuropsychiatric: General: normal, calm and normal eye contact-not oriented to place or time    Data   Recent Labs   Lab 06/16/20  2346   WBC 5.0   HGB 11.6*   MCV 97         POTASSIUM 3.5   CHLORIDE 107   CO2 30   BUN 16   CR 0.59   ANIONGAP 2*   LAUREN 9.1   *   ALBUMIN 3.0*   PROTTOTAL 6.8   BILITOTAL 0.2   ALKPHOS 66   ALT 25   AST 28     No results found for this or any previous visit (from the past 24 hour(s)).  Medications       amLODIPine  5 mg Oral Daily     aspirin  81 mg Oral Daily     atorvastatin  40 mg Oral Daily     donepezil  5 mg Oral At Bedtime     insulin aspart   Subcutaneous TID w/meals     insulin aspart  1-7 Units Subcutaneous TID AC     insulin aspart  1-5 Units Subcutaneous At Bedtime     isosorbide mononitrate  15 mg Oral Daily     magnesium oxide  400 mg Oral BID     metoprolol succinate ER  12.5 mg Oral BID      multivitamin w/minerals  1 tablet Oral Daily     OLANZapine zydis  5 mg Oral At Bedtime     omeprazole  20 mg Oral QAM     pioglitazone  15 mg Oral Daily     rOPINIRole  0.5 mg Oral At Bedtime     sodium chloride (PF)  3 mL Intracatheter Q8H     Vitamin D3  125 mcg Oral Daily

## 2020-06-19 NOTE — PROGRESS NOTES
D: ILIA following for discharge planning.   I: More referrals sent. Per Caitlyn, [facility] do not have any beds open on their secure units at this time. Per Ирина with [facility], they would require pt have a HCA to sign her in to be placed on a secure unit. Also, pt's MA application needs to be started. ILIA updated daughter, Henrique. She can not locate pt's health care directive. ILIA emailed with Jose De Jesus in the business office. He was going to contact Henrique today to start the MA process.   P: ILIA following.     KELLY Garzon, [facility]  [phone]  [facility]

## 2020-06-19 NOTE — PROGRESS NOTES
OBS GOALS:  -diagnostic tests and consults completed and resulted - NOT MET  -vital signs normal or at patient baseline - MET  -safe disposition plan has been identified - NOT MET

## 2020-06-19 NOTE — PLAN OF CARE
DATE & TIME: 6/19/2020   Cognitive Concerns/ Orientation : A&O to self only, anxious and agitated at times  BEHAVIOR & AGGRESSION TOOL COLOR: Green  ABNL VS/O2: VSS on RA  MOBILITY: Up with 1 assist, GB, pt unable to use walker purposefully.   PAIN MANAGMENT: Denies pain  DIET: Regular diet, good appetite  BOWEL/BLADDER: continent of bowel and bladder.   ABNL LAB/BG: BG .   DRAIN/DEVICES: PIV SL  TELEMETRY RHYTHM: N/A  SKIN: Blanchable redness to coccyx, dressing CDI; L foot wound, dressing changed by WOC yesterday 6/17/20, orders in place to change it every other day (odd days).    TESTS/PROCEDURES: n/a  D/C DAY/GOALS/PLACE: pending safe placement, SW following.   OTHER IMPORTANT INFO: SW, WOC, Psych following.

## 2020-06-20 LAB
GLUCOSE BLDC GLUCOMTR-MCNC: 104 MG/DL (ref 70–99)
GLUCOSE BLDC GLUCOMTR-MCNC: 131 MG/DL (ref 70–99)
GLUCOSE BLDC GLUCOMTR-MCNC: 148 MG/DL (ref 70–99)
GLUCOSE BLDC GLUCOMTR-MCNC: 150 MG/DL (ref 70–99)
GLUCOSE BLDC GLUCOMTR-MCNC: 225 MG/DL (ref 70–99)

## 2020-06-20 PROCEDURE — 96372 THER/PROPH/DIAG INJ SC/IM: CPT

## 2020-06-20 PROCEDURE — 99207 ZZC CDG-CODE CATEGORY CHANGED: CPT | Performed by: INTERNAL MEDICINE

## 2020-06-20 PROCEDURE — 25000132 ZZH RX MED GY IP 250 OP 250 PS 637: Performed by: HOSPITALIST

## 2020-06-20 PROCEDURE — 00000146 ZZHCL STATISTIC GLUCOSE BY METER IP

## 2020-06-20 PROCEDURE — G0378 HOSPITAL OBSERVATION PER HR: HCPCS

## 2020-06-20 PROCEDURE — 99225 ZZC SUBSEQUENT OBSERVATION CARE,LEVEL II: CPT | Performed by: INTERNAL MEDICINE

## 2020-06-20 PROCEDURE — 25000132 ZZH RX MED GY IP 250 OP 250 PS 637: Performed by: INTERNAL MEDICINE

## 2020-06-20 PROCEDURE — 25000132 ZZH RX MED GY IP 250 OP 250 PS 637: Performed by: PSYCHIATRY & NEUROLOGY

## 2020-06-20 RX ADMIN — MULTIPLE VITAMINS W/ MINERALS TAB 1 TABLET: TAB at 08:37

## 2020-06-20 RX ADMIN — ASPIRIN 81 MG: 81 TABLET ORAL at 08:36

## 2020-06-20 RX ADMIN — Medication 1 MG: at 21:52

## 2020-06-20 RX ADMIN — OLANZAPINE 5 MG: 5 TABLET, ORALLY DISINTEGRATING ORAL at 21:53

## 2020-06-20 RX ADMIN — OMEPRAZOLE 20 MG: 20 CAPSULE, DELAYED RELEASE ORAL at 08:36

## 2020-06-20 RX ADMIN — ATORVASTATIN CALCIUM 40 MG: 40 TABLET, FILM COATED ORAL at 08:36

## 2020-06-20 RX ADMIN — ROPINIROLE HYDROCHLORIDE 0.5 MG: 0.5 TABLET, FILM COATED ORAL at 21:52

## 2020-06-20 RX ADMIN — CHOLECALCIFEROL TAB 125 MCG (5000 UNIT) 125 MCG: 125 TAB at 08:36

## 2020-06-20 RX ADMIN — METOPROLOL SUCCINATE 12.5 MG: 25 TABLET, EXTENDED RELEASE ORAL at 08:35

## 2020-06-20 RX ADMIN — Medication 400 MG: at 21:52

## 2020-06-20 RX ADMIN — Medication 400 MG: at 08:37

## 2020-06-20 RX ADMIN — PIOGLITAZONE 15 MG: 15 TABLET ORAL at 08:36

## 2020-06-20 RX ADMIN — METOPROLOL SUCCINATE 12.5 MG: 25 TABLET, EXTENDED RELEASE ORAL at 21:52

## 2020-06-20 RX ADMIN — ISOSORBIDE MONONITRATE 15 MG: 30 TABLET, EXTENDED RELEASE ORAL at 08:36

## 2020-06-20 RX ADMIN — DONEPEZIL HYDROCHLORIDE 5 MG: 5 TABLET ORAL at 21:53

## 2020-06-20 RX ADMIN — AMLODIPINE BESYLATE 5 MG: 5 TABLET ORAL at 08:36

## 2020-06-20 NOTE — PROGRESS NOTES
OBS GOALS:  -diagnostic tests and consults completed and resulted - MET  -vital signs normal or at patient baseline - MET  -safe disposition plan has been identified - NOT MET

## 2020-06-20 NOTE — PROGRESS NOTES
[facility]    Medicine Progress Note - Hospitalist Service        Date of Admission:  2020 10:30 PM    Assessment & Plan:   Mel Castellanos is a 80 year old female admitted on 2020. She presents after EMS was contacted with daughters concern for ability to care for her safely at home in the setting of dementia with behavioral disturbance.     Dementia with behavioral disturbance  Apparently was packing a suitcase of dirty close in an attempt to leave home to see her , who is .  Daughter, with whom patient lives and cares for patient, attempted to keep patient from leaving, and this prompted to behavioral outburst from patient and subsequent 911 call.   The patient has been more calm here and she is not requiring one-to-one observation.  She has been seen by the psychiatrist.    - Continue Aricept and Zyprexa as ordered  -Continue to look for an appropriate discharge facility     Coronary artery disease  RCA stenting in .  With episodes of chest discomfort in , declined aggressive work-up or angiography with continued medical treatments recommended by cardiology through [facility].  - Continue aspirin 81 mg daily, Lipitor 40 mg daily, isosorbide mononitrate 50 mg daily, Toprol-XL 12.5 mg twice a day     Urinary frequency, urinary incontinence  - UA normal     Type 2 diabetes  - Medium dose sliding scale insulin available as needed  - Continue Actos     Left ankle wound, consistent with diabetic foot ulcer  Patient with an ulceration of her left dorsal lateral foot, slightly anterior to lateral malleolus.  Follows with Dr. Walls of wound clinic and appears to been seen as recently as .  No apparent cellulitis or active infection.  - Wound nurse consulted     Restless leg syndrome  - Continue Requip; dose lowered by the psychiatrist     Chronic obstructive lung disease   Not currently in exacerbation  - Continue prn inhaled bronchodilators  "    Diet: Regular Diet Adult     DVT Prophylaxis: Pneumatic Compression Devices   Tavera Catheter: not present  Code Status: Full Code     Disposition Plan    Expected discharge: Awaiting placement.    Entered: Hu Cash MD 06/20/2020, 1:00 PM        The patient's care was discussed with the Bedside Nurse and Patient.    Hu Cash MD  Hospitalist Service  [facility]    ______________________________________________________________________    Interval History   No new complaints.  Appears to be calm and cooperative    Data reviewed today: I reviewed all medications, new labs and imaging results over the last 24 hours. I personally reviewed no images or EKG's today.    Physical Exam   Vital signs:  Temp: 97.4  F (36.3  C) Temp src: Oral BP: 102/53 Pulse: 110 Heart Rate: 76 Resp: 16 SpO2: 96 % O2 Device: None (Room air)   Height: 165.1 cm (5' 5\")    Estimated body mass index is 23.3 kg/m  as calculated from the following:    Height as of this encounter: 1.651 m (5' 5\").    Weight as of 6/2/20: 63.5 kg (140 lb).      Wt Readings from Last 2 Encounters:   06/02/20 63.5 kg (140 lb)   03/22/20 62.6 kg (138 lb 1.6 oz)       Gen: AAOX1, NAD,   Resp: CTA B/L, normal WOB  CVS: RRR, no murmur  Abd/GI: Soft, non-tender. BS- normoactive.    Neuro- CN- intact. No focal deficits.        Data   Recent Labs   Lab 06/16/20  2346   WBC 5.0   HGB 11.6*   MCV 97         POTASSIUM 3.5   CHLORIDE 107   CO2 30   BUN 16   CR 0.59   ANIONGAP 2*   LAUREN 9.1   *   ALBUMIN 3.0*   PROTTOTAL 6.8   BILITOTAL 0.2   ALKPHOS 66   ALT 25   AST 28       No results found for this or any previous visit (from the past 24 hour(s)).  Medications       amLODIPine  5 mg Oral Daily     aspirin  81 mg Oral Daily     atorvastatin  40 mg Oral Daily     donepezil  5 mg Oral At Bedtime     insulin aspart   Subcutaneous TID w/meals     insulin aspart  1-7 Units Subcutaneous TID AC     insulin aspart  1-5 Units " Subcutaneous At Bedtime     isosorbide mononitrate  15 mg Oral Daily     magnesium oxide  400 mg Oral BID     metoprolol succinate ER  12.5 mg Oral BID     multivitamin w/minerals  1 tablet Oral Daily     OLANZapine zydis  5 mg Oral At Bedtime     omeprazole  20 mg Oral QAM     pioglitazone  15 mg Oral Daily     rOPINIRole  0.5 mg Oral At Bedtime     sodium chloride (PF)  3 mL Intracatheter Q8H     Vitamin D3  125 mcg Oral Daily

## 2020-06-20 NOTE — PLAN OF CARE
DATE & TIME: 6/20/2020 5597-9709   Cognitive Concerns/ Orientation : Alert and oriented to self, baseline dementia. Pleasantly confused.   BEHAVIOR & AGGRESSION TOOL COLOR: Green   CIWA SCORE: N/A   ABNL VS/O2: VSS on RA  MOBILITY: Assist x1 with walker/gait belt. Prefers to remain in chair, encourage frequent weight shifting.  PAIN MANAGMENT: Denies   DIET: Regular, carb count.  BOWEL/BLADDER: Incontinent at times, needs frequent reminders to use bathroom.   ABNL LAB/BG: Blood glucose 131, 225  DRAIN/DEVICES: PIV SL right arm   TELEMETRY RHYTHM: N/A  SKIN: Scattered bruising; wound on L foot (CDI) dressing change due 6/21; stockinette L leg, blanchable redness wound on coccyx (foam CDI)  TESTS/PROCEDURES: N/A  D/C DAY/GOALS/PLACE: discharge pending placement.  OTHER IMPORTANT INFO: Psych, SW,and WOC following.    MD/RN ROUNDING SIGNED OFF D/E SHIFT: Completed  COMMIT TO SIT: Completed    OBS GOALS:  -diagnostic tests and consults completed and resulted - MET  -vital signs normal or at patient baseline - MET  -safe disposition plan has been identified - NOT MET

## 2020-06-20 NOTE — PLAN OF CARE
Cognitive Concerns/ Orientation : Alert and oriented to self, pleasantly confused   BEHAVIOR & AGGRESSION TOOL COLOR: Green   CIWA SCORE: N/A   ABNL VS/O2: VSS 94% room air   MOBILITY: Assist of one with walker/gait belt   PAIN MANAGMENT: Denies   DIET: Regular  BOWEL/BLADDER: BS active x 4; incontinent at times  ABNL LAB/BG: Blood glucose 148  DRAIN/DEVICES: Peripheral IV SL right arm   TELEMETRY RHYTHM: N/A  SKIN: scattered bruising; wound on L. Foot (CDI); wound on coccyx (foam CDI)  TESTS/PROCEDURES: N/A  D/C DAY/GOALS/PLACE: discharge pending placement  OTHER IMPORTANT INFO: Psych, SW,and WOC following

## 2020-06-20 NOTE — PLAN OF CARE
DATE & TIME: 6/19/2020; 5335-5861   Cognitive Concerns/ Orientation : A & O x 1; self only; baseline dementia; easily re-direct able     BEHAVIOR & AGGRESSION TOOL COLOR: Green   CIWA SCORE: N/A   ABNL VS/O2: VSS on room air   MOBILITY: x 1 assist with gait belt and walker   PAIN MANAGMENT: Denies   DIET: Regular; tolerating well  BOWEL/BLADDER: BS active x 4; incontinent at times; needs frequent reminders   ABNL LAB/BG: BG= 109, 167   DRAIN/DEVICES: PIV/SL   TELEMETRY RHYTHM: N/A  SKIN: scattered bruising; wound on L. Foot (CDI); wound on coccyx (foam CDI)  TESTS/PROCEDURES: N/A  D/C DAY/GOALS/PLACE: discharge pending placement  OTHER IMPORTANT INFO: pt. Tried to leave the unit; PRN zyprexa given; Psych, SW,and WOC following    MD/RN ROUNDING SIGNED OFF D/E SHIFT: n/a  COMMIT TO SIT DONE AND SIGNED OFF: complete        Observation goals  PRIOR TO DISCHARGE      Comments:   -diagnostic tests and consults completed and resulted: NOT MET   -vital signs normal or at patient baseline: MET   -safe disposition plan has been identified: NOT MET     Nurse to notify provider when observation goals have been met and patient is ready for discharge.

## 2020-06-20 NOTE — PROGRESS NOTES
"SW:  D:  Daughter is requesting placement into a long term care facility for her mother.      I:  Referrals have been made to the memory care LTC facilities:   [facility]  No bed available  [facility]  They will only admit patient into a memory care unit if there is a legal decision maker to sign patient in.    [facility]  [facility] no bed available  [facility]  No bed available  [facility] no bed available  [facility]  Cannot determine availability till Monday 6/22    A:  Current barrier to discharge are  Do not have a receiving facility at this time.  There  limited options of facilities which have secured memory LTC units.  Payor source for LTC.   Patient does not appear to have a skillable need.   Based on therapy notes, no therapy is recommended.  Per OT note \"due to patient's progressively worsening cognition limits her ability to progress with skilled therapy; pt does not seem capable of new learning and carryover of learning at this time\".  Wound Care is every other day.  Based upon no skillable need, patient does not qualify for Medicare SNF benefits in the care center.  Patient does not have private funds to pay for long term care.    Our hospital financial counselor has been asked to assist daughter in completing the LTC application on her mother's behalf.   The LTC facilities will accept patient with MA application pending but  will want to review the application and verification documents  before accepting patient.      PLAN  Will continue to make referrals.   On Monday, will check with financial counselor to determine if the MA LTC application has been completed.       "

## 2020-06-21 LAB
GLUCOSE BLDC GLUCOMTR-MCNC: 110 MG/DL (ref 70–99)
GLUCOSE BLDC GLUCOMTR-MCNC: 132 MG/DL (ref 70–99)
GLUCOSE BLDC GLUCOMTR-MCNC: 161 MG/DL (ref 70–99)
GLUCOSE BLDC GLUCOMTR-MCNC: 91 MG/DL (ref 70–99)

## 2020-06-21 PROCEDURE — G0378 HOSPITAL OBSERVATION PER HR: HCPCS

## 2020-06-21 PROCEDURE — 99225 ZZC SUBSEQUENT OBSERVATION CARE,LEVEL II: CPT | Performed by: INTERNAL MEDICINE

## 2020-06-21 PROCEDURE — 25000132 ZZH RX MED GY IP 250 OP 250 PS 637: Performed by: PSYCHIATRY & NEUROLOGY

## 2020-06-21 PROCEDURE — 25000132 ZZH RX MED GY IP 250 OP 250 PS 637: Performed by: HOSPITALIST

## 2020-06-21 PROCEDURE — 00000146 ZZHCL STATISTIC GLUCOSE BY METER IP

## 2020-06-21 PROCEDURE — 96372 THER/PROPH/DIAG INJ SC/IM: CPT

## 2020-06-21 PROCEDURE — 99207 ZZC CDG-CODE CATEGORY CHANGED: CPT | Performed by: INTERNAL MEDICINE

## 2020-06-21 PROCEDURE — 25000132 ZZH RX MED GY IP 250 OP 250 PS 637: Performed by: INTERNAL MEDICINE

## 2020-06-21 RX ADMIN — ISOSORBIDE MONONITRATE 15 MG: 30 TABLET, EXTENDED RELEASE ORAL at 13:07

## 2020-06-21 RX ADMIN — Medication 1 MG: at 23:34

## 2020-06-21 RX ADMIN — METOPROLOL SUCCINATE 12.5 MG: 25 TABLET, EXTENDED RELEASE ORAL at 13:13

## 2020-06-21 RX ADMIN — OLANZAPINE 5 MG: 5 TABLET, ORALLY DISINTEGRATING ORAL at 21:50

## 2020-06-21 RX ADMIN — AMLODIPINE BESYLATE 5 MG: 5 TABLET ORAL at 13:13

## 2020-06-21 RX ADMIN — ATORVASTATIN CALCIUM 40 MG: 40 TABLET, FILM COATED ORAL at 13:06

## 2020-06-21 RX ADMIN — ROPINIROLE HYDROCHLORIDE 0.5 MG: 0.5 TABLET, FILM COATED ORAL at 21:49

## 2020-06-21 RX ADMIN — Medication 400 MG: at 21:51

## 2020-06-21 RX ADMIN — ASPIRIN 81 MG: 81 TABLET ORAL at 13:03

## 2020-06-21 RX ADMIN — DONEPEZIL HYDROCHLORIDE 5 MG: 5 TABLET ORAL at 21:49

## 2020-06-21 RX ADMIN — CHOLECALCIFEROL TAB 125 MCG (5000 UNIT) 125 MCG: 125 TAB at 13:05

## 2020-06-21 RX ADMIN — OMEPRAZOLE 20 MG: 20 CAPSULE, DELAYED RELEASE ORAL at 13:05

## 2020-06-21 RX ADMIN — MULTIPLE VITAMINS W/ MINERALS TAB 1 TABLET: TAB at 13:07

## 2020-06-21 RX ADMIN — PIOGLITAZONE 15 MG: 15 TABLET ORAL at 13:04

## 2020-06-21 RX ADMIN — Medication 400 MG: at 13:05

## 2020-06-21 RX ADMIN — OLANZAPINE 5 MG: 5 TABLET, ORALLY DISINTEGRATING ORAL at 23:34

## 2020-06-21 NOTE — PROGRESS NOTES
[facility]    Medicine Progress Note - Hospitalist Service        Date of Admission:  2020 10:30 PM    Assessment & Plan:   Mel Castellanos is a 80 year old female admitted on 2020. She presents after EMS was contacted with daughters concern for ability to care for her safely at home in the setting of dementia with behavioral disturbance.     Dementia with behavioral disturbance  Apparently was packing a suitcase of dirty close in an attempt to leave home to see her , who is .  Daughter, with whom patient lives and cares for patient, attempted to keep patient from leaving, and this prompted to behavioral outburst from patient and subsequent 911 call.   The patient has been more calm here and she is not requiring one-to-one observation.  She has been seen by the psychiatrist.    - Continue Aricept and Zyprexa   - Continue to look for an appropriate discharge facility     Coronary artery disease  RCA stenting in .  With episodes of chest discomfort in , declined aggressive work-up or angiography with continued medical treatments recommended by cardiology through [facility].  - Continue aspirin 81 mg daily, Lipitor 40 mg daily, isosorbide mononitrate 50 mg daily, Toprol-XL 12.5 mg twice a day     Urinary frequency, urinary incontinence  - UA normal     Type 2 diabetes  - Medium dose sliding scale insulin available as needed  - Continue Actos     Left ankle wound, consistent with diabetic foot ulcer  Patient with an ulceration of her left dorsal lateral foot, slightly anterior to lateral malleolus.  Follows with Dr. Walls of wound clinic and appears to been seen as recently as .  No apparent cellulitis or active infection.  - Wound nurse consulted     Restless leg syndrome  - Continue Requip; dose lowered by the psychiatrist     Chronic obstructive lung disease   Not currently in exacerbation  - Continue prn inhaled bronchodilators     Diet:  "Regular Diet Adult     DVT Prophylaxis: Pneumatic Compression Devices   Tavera Catheter: not present  Code Status: Full Code     Disposition Plan    Expected discharge: Awaiting placement.    Entered: Hu Cash MD 06/21/2020, 11:15 AM        The patient's care was discussed with the Bedside Nurse and Patient.    Hu Cash MD  Hospitalist Service  [facility]    ______________________________________________________________________    Interval History   No acute events overnight.  Relatively calm for the most part.    Data reviewed today: I reviewed all medications, new labs and imaging results over the last 24 hours. I personally reviewed no images or EKG's today.    Physical Exam   Vital signs:  Temp: 97.4  F (36.3  C) Temp src: Oral BP: 116/48 Pulse: 62 Heart Rate: 63 Resp: 16 SpO2: 96 % O2 Device: None (Room air)   Height: 165.1 cm (5' 5\")    Estimated body mass index is 23.3 kg/m  as calculated from the following:    Height as of this encounter: 1.651 m (5' 5\").    Weight as of 6/2/20: 63.5 kg (140 lb).      Wt Readings from Last 2 Encounters:   06/02/20 63.5 kg (140 lb)   03/22/20 62.6 kg (138 lb 1.6 oz)       Gen: AAOX1, NAD,   Resp: CTA B/L, normal WOB  CVS: RRR, no murmur  Abd/GI: Soft, non-tender. BS- normoactive.    Neuro- CN- intact. No focal deficits.        Data   Recent Labs   Lab 06/16/20  2346   WBC 5.0   HGB 11.6*   MCV 97         POTASSIUM 3.5   CHLORIDE 107   CO2 30   BUN 16   CR 0.59   ANIONGAP 2*   LAUREN 9.1   *   ALBUMIN 3.0*   PROTTOTAL 6.8   BILITOTAL 0.2   ALKPHOS 66   ALT 25   AST 28       No results found for this or any previous visit (from the past 24 hour(s)).  Medications       amLODIPine  5 mg Oral Daily     aspirin  81 mg Oral Daily     atorvastatin  40 mg Oral Daily     donepezil  5 mg Oral At Bedtime     insulin aspart   Subcutaneous TID w/meals     insulin aspart  1-7 Units Subcutaneous TID AC     insulin aspart  1-5 Units " Subcutaneous At Bedtime     isosorbide mononitrate  15 mg Oral Daily     magnesium oxide  400 mg Oral BID     metoprolol succinate ER  12.5 mg Oral BID     multivitamin w/minerals  1 tablet Oral Daily     OLANZapine zydis  5 mg Oral At Bedtime     omeprazole  20 mg Oral QAM     pioglitazone  15 mg Oral Daily     rOPINIRole  0.5 mg Oral At Bedtime     sodium chloride (PF)  3 mL Intracatheter Q8H     Vitamin D3  125 mcg Oral Daily

## 2020-06-21 NOTE — PLAN OF CARE
Cognitive Concerns/ Orientation : Alert and oriented to self, baseline dementia. Pleasantly confused. Anxious at times.  BEHAVIOR & AGGRESSION TOOL COLOR: Green   CIWA SCORE: N/A   ABNL VS/O2: VSS on RA  MOBILITY: Assist x1 with walker/gait belt. Prefers to remain in chair, encourage frequent weight shifting.  PAIN MANAGMENT: Denies   DIET: Regular, carb count.  BOWEL/BLADDER: Incontinent at times, needs frequent reminders to use bathroom.   ABNL LAB/BG: Blood glucose   DRAIN/DEVICES: R PIV SL    TELEMETRY RHYTHM: N/A  SKIN: Scattered bruising; wound on L foot (CDI) dressing change due 6/21; stockinette L leg, blanchable redness wound on coccyx (foam CDI). 1+ dependent edema.  TESTS/PROCEDURES: N/A  D/C DAY/GOALS/PLACE: discharge pending placement.  OTHER IMPORTANT INFO: Psych, SW,and WOC following.  Did lay down to sleep between 2 and 3.  Very talkative, redirectable for short periods, pleasantly confused.    MD/RN ROUNDING SIGNED OFF D/E SHIFT: Yes  COMMIT TO SIT DONE AND SIGNED OFF: Yes

## 2020-06-21 NOTE — PROGRESS NOTES
SW:  D:  Based on [facility] listing of memory care SNF's,  writer made referrals to 7 additional care centers.  These facilities all accept MA.  Also ruled out [facility] -they are not accepting into LTC for likely 2 more weeks due to COVID out break.  [facility].   PLAN:  Will be able to follow up with their admission staff on Monday.   Will also ask financial counselor if with daughter's assistance has he  been able to complete a LTC MA application for patient.

## 2020-06-21 NOTE — PROGRESS NOTES
ILIA;  D:  Both [facility] and [facility] called stating they likely can clinically accept for long term memory care.  They understand we need to complete the MA application for their review. The weekend staff requested we contact their admission staff on Monday.

## 2020-06-21 NOTE — PLAN OF CARE
DATE & TIME: 6/21/2020 8967-2818  Cognitive Concerns/ Orientation : Alert and oriented to self, baseline dementia. Pleasantly confused. Anxious at times.  BEHAVIOR & AGGRESSION TOOL COLOR: Green   CIWA SCORE: N/A   ABNL VS/O2: VSS on RA  MOBILITY: Assist x1 with walker/gait belt. Prefers to remain in chair, encourage frequent weight shifting.  PAIN MANAGMENT: Denies   DIET: Regular, carb count.  BOWEL/BLADDER: Incontinent at times, needs frequent reminders to use bathroom.   ABNL LAB/BG: , 110  DRAIN/DEVICES: R PIV SL    TELEMETRY RHYTHM: N/A  SKIN: Scattered bruising; wound on L foot (CDI) dressing changed; stockinette L leg, blanchable redness wound on coccyx (foam CDI). 1+ dependent edema.  TESTS/PROCEDURES: N/A  D/C DAY/GOALS/PLACE: Discharge pending placement to LT.  OTHER IMPORTANT INFO: Psych, SW,and WOC following. Very talkative, redirectable for short periods, pleasantly confused. Medications on in pyxis due to recent systemwide update, pharmacy has been sending up scheduled meds individually into cabinet. Pharmacy aware.    MD/RN ROUNDING SIGNED OFF D/E SHIFT: Yes  COMMIT TO SIT DONE AND SIGNED OFF: Yes    OBS GOALS:  -diagnostic tests and consults completed and resulted - MET  -vital signs normal or at patient baseline - MET  -safe disposition plan has been identified - NOT MET

## 2020-06-21 NOTE — PLAN OF CARE
DATE & TIME: 6/20/2020 5234-8408  Cognitive Concerns/ Orientation : Alert and oriented to self, baseline dementia. Pleasantly confused. Anxious at times.  BEHAVIOR & AGGRESSION TOOL COLOR: Green   CIWA SCORE: N/A   ABNL VS/O2: VSS on RA  MOBILITY: Assist x1 with walker/gait belt. Prefers to remain in chair, encourage frequent weight shifting.  PAIN MANAGMENT: Denies   DIET: Regular, carb count.  BOWEL/BLADDER: Incontinent at times, needs frequent reminders to use bathroom.   ABNL LAB/BG: Blood glucose 104, 150  DRAIN/DEVICES: R PIV SL    TELEMETRY RHYTHM: N/A  SKIN: Scattered bruising; wound on L foot (CDI) dressing change due 6/21; stockinette L leg, blanchable redness wound on coccyx (foam CDI). 1+ dependent edema.  TESTS/PROCEDURES: N/A  D/C DAY/GOALS/PLACE: discharge pending placement.  OTHER IMPORTANT INFO: Psych, SW,and WOC following.    MD/RN ROUNDING SIGNED OFF D/E SHIFT: Yes  COMMIT TO SIT DONE AND SIGNED OFF: Yes

## 2020-06-22 VITALS
HEART RATE: 69 BPM | SYSTOLIC BLOOD PRESSURE: 102 MMHG | BODY MASS INDEX: 23.3 KG/M2 | TEMPERATURE: 98 F | DIASTOLIC BLOOD PRESSURE: 55 MMHG | OXYGEN SATURATION: 95 % | HEIGHT: 65 IN | RESPIRATION RATE: 18 BRPM

## 2020-06-22 LAB
GLUCOSE BLDC GLUCOMTR-MCNC: 124 MG/DL (ref 70–99)
GLUCOSE BLDC GLUCOMTR-MCNC: 149 MG/DL (ref 70–99)
GLUCOSE BLDC GLUCOMTR-MCNC: 156 MG/DL (ref 70–99)
GLUCOSE BLDC GLUCOMTR-MCNC: 158 MG/DL (ref 70–99)

## 2020-06-22 PROCEDURE — 96372 THER/PROPH/DIAG INJ SC/IM: CPT

## 2020-06-22 PROCEDURE — 00000146 ZZHCL STATISTIC GLUCOSE BY METER IP

## 2020-06-22 PROCEDURE — 25000132 ZZH RX MED GY IP 250 OP 250 PS 637: Performed by: INTERNAL MEDICINE

## 2020-06-22 PROCEDURE — G0378 HOSPITAL OBSERVATION PER HR: HCPCS

## 2020-06-22 PROCEDURE — 25000132 ZZH RX MED GY IP 250 OP 250 PS 637: Performed by: HOSPITALIST

## 2020-06-22 PROCEDURE — 99217 ZZC OBSERVATION CARE DISCHARGE: CPT | Performed by: INTERNAL MEDICINE

## 2020-06-22 RX ORDER — ROPINIROLE 0.5 MG/1
0.5 TABLET, FILM COATED ORAL AT BEDTIME
DISCHARGE
Start: 2020-06-22 | End: 2020-07-13

## 2020-06-22 RX ORDER — OLANZAPINE 5 MG/1
5 TABLET, ORALLY DISINTEGRATING ORAL AT BEDTIME
DISCHARGE
Start: 2020-06-22 | End: 2022-01-01

## 2020-06-22 RX ORDER — AMOXICILLIN 250 MG
1 CAPSULE ORAL 2 TIMES DAILY PRN
DISCHARGE
Start: 2020-06-22 | End: 2022-01-01

## 2020-06-22 RX ADMIN — ISOSORBIDE MONONITRATE 15 MG: 30 TABLET, EXTENDED RELEASE ORAL at 09:04

## 2020-06-22 RX ADMIN — PIOGLITAZONE 15 MG: 15 TABLET ORAL at 09:05

## 2020-06-22 RX ADMIN — ATORVASTATIN CALCIUM 40 MG: 40 TABLET, FILM COATED ORAL at 09:00

## 2020-06-22 RX ADMIN — AMLODIPINE BESYLATE 5 MG: 5 TABLET ORAL at 09:02

## 2020-06-22 RX ADMIN — OMEPRAZOLE 20 MG: 20 CAPSULE, DELAYED RELEASE ORAL at 09:05

## 2020-06-22 RX ADMIN — DOCUSATE SODIUM AND SENNOSIDES 1 TABLET: 8.6; 5 TABLET, FILM COATED ORAL at 12:25

## 2020-06-22 RX ADMIN — CHOLECALCIFEROL TAB 125 MCG (5000 UNIT) 125 MCG: 125 TAB at 09:01

## 2020-06-22 RX ADMIN — METOPROLOL SUCCINATE 12.5 MG: 25 TABLET, EXTENDED RELEASE ORAL at 09:01

## 2020-06-22 RX ADMIN — ASPIRIN 81 MG: 81 TABLET ORAL at 09:00

## 2020-06-22 RX ADMIN — MULTIPLE VITAMINS W/ MINERALS TAB 1 TABLET: TAB at 09:03

## 2020-06-22 RX ADMIN — Medication 400 MG: at 09:03

## 2020-06-22 NOTE — PLAN OF CARE
Discharge    Patient discharged to LT via wheelchair with daughter.  Care plan note:  Patient is alert and oriented to self. Baseline dementia and pleasantly confused. Patient is assist x1 with gait belt and walker. Uses the bathroom, and is incontinent of bladder at times. Patient has a wound on the left foot, mepilex CDI; blanchable redness on coccyx, mepilex changed, CDI. Lung sounds clear and equal bilaterally. Blood glucose at 1632 was 124. Belongings were sent home with patient. AVS reviewed and sent with patient.     Listed belongings gathered and returned to patient. Yes  Care Plan and Patient education resolved: Yes  Prescriptions if needed, hard copies sent with patient  NA  Home and hospital acquired medications returned to patient: NA  Medication Bin checked and emptied on discharge Yes  Follow up appointment made for patient: Yes

## 2020-06-22 NOTE — PLAN OF CARE
Cognitive Concerns/ Orientation : Alert and oriented to self, baseline dementia. Pleasantly confused. Anxious/restless at times.  BEHAVIOR & AGGRESSION TOOL COLOR: Green   CIWA SCORE: N/A   ABNL VS/O2: VSS   MOBILITY: Assist x1 with walker/gait belt. Prefers to remain in chair, encourage frequent weight shifting.  PAIN MANAGMENT: Denies   DIET: Regular  BOWEL/BLADDER: Incontinent at times   ABNL LAB/BG: Blood glucose 156  DRAIN/DEVICES: Peripheral IV SL right arm    TELEMETRY RHYTHM: N/A  SKIN: Scattered bruising; wound on L foot (CDI); stockinette L leg, blanchable redness wound on coccyx (foam CDI) dressing changed. 1+ dependent edema. Phil BLE. Encourage pt to elevate feet.   TESTS/PROCEDURES: N/A  D/C DAY/GOALS/PLACE: Discharge pending [facility] to [facility].  OTHER IMPORTANT INFO: Psych, SW,and WOC following. Very talkative, enjoys conversation, redirectable for short periods.

## 2020-06-22 NOTE — PROVIDER NOTIFICATION
MD Notification    Notified Person: MD    Notified Person Name: on call answering service    Notification Date/Time: 6/21/2020 1010    Notification Interaction: on call answering service    Purpose of Notification: Patient's BP's 87/40, 89/43, 112/37, HR 70, 71, 71. No parameters for the scheduled metoprolol. Would you like me to give the metoprolol or hold it? Is there anything else you would like me to do?     Orders Received: hold metoprolol tonight. Continue to monitor.     Comments: patient is asymptomatic.

## 2020-06-22 NOTE — DISCHARGE INSTRUCTIONS
Wound care  EVERY OTHER DAY      Comments: Left foot wound: every other day (odd days) and prn:   1.  Cleanse with MicroKlenz   2.  Skin prep to periwound   3.  Apply piece of Ruth collagen dressing (# [id number]) to wound bed, cut to fit.  (This may partially or fully dissolve into wound)   4.  Cover with Mepilex or small gauze   5.  Apply pt's EdemaWear (mesh stockinette with black stripe) from toes to knee.  Handwash and lay flat to dry as needed, DO NOT THROW AWAY   6.  Elevate legs when possible, float heels, keep legs/feet moisturized

## 2020-06-22 NOTE — PLAN OF CARE
DATE & TIME: 6/21/2020 0364-2039  Cognitive Concerns/ Orientation : Alert and oriented to self, baseline dementia. Pleasantly confused. Anxious at times.  BEHAVIOR & AGGRESSION TOOL COLOR: Green   CIWA SCORE: N/A   ABNL VS/O2: VSS on RA. Soft BPs towards end of shift, both arms; MD notified, held metoprolol.   MOBILITY: Assist x1 with walker/gait belt. Prefers to remain in chair, encourage frequent weight shifting.  PAIN MANAGMENT: Denies   DIET: Regular, carb count.  BOWEL/BLADDER: Incontinent at times, needs frequent reminders to use bathroom. x2 BM this shift.   ABNL LAB/BG: , 91  DRAIN/DEVICES: R PIV SL    TELEMETRY RHYTHM: N/A  SKIN: Scattered bruising; wound on L foot (CDI); stockinette L leg, blanchable redness wound on coccyx (foam CDI) dressing changed. 1+ dependent edema. Phil BLE. Encourage pt to elevate feet.   TESTS/PROCEDURES: N/A  D/C DAY/GOALS/PLACE: Discharge pending placement to LT.  OTHER IMPORTANT INFO: Psych, SW,and WOC following. Very talkative, enjoys conversation, redirectable for short periods. Long arm sitter at bedside. Obs patient, pt no longer listed in [facility] [facility] d/t update, pharmacy sent up tomorrow's morning medications, placed in pt's bin (held metoprolol from tonight and PRN melatonin also in bin).    MD/RN ROUNDING SIGNED OFF D/E SHIFT: Yes  COMMIT TO SIT DONE AND SIGNED OFF: Yes

## 2020-06-22 NOTE — PLAN OF CARE
DATE & TIME: 6-22-20 6267-3789   Cognitive Concerns/ Orientation : Alert and oriented to self, baseline dementia. Calm and cooperative, pleasantly confused.   BEHAVIOR & AGGRESSION TOOL COLOR: Green   CIWA SCORE: N/A   ABNL VS/O2: VSS ex L arm BP hypotensive, R arm at baseline. On RA.  MOBILITY: Assist x1 with walker/gait belt. Prefers to remain in chair, encourage frequent weight shifting.  PAIN MANAGMENT: Denies   DIET: Regular, carb count  BOWEL/BLADDER: Incontinent at times, gave PRN Senna 1 tab  ABNL LAB/BG: Blood glucose 149, 158  DRAIN/DEVICES: Peripheral IV SL right arm    TELEMETRY RHYTHM: N/A  SKIN: Scattered bruising; wound on L foot (CDI), blanchable redness wound on coccyx (foam CDI) dressing changed. 1+ generalized edema. Phil BLE. Encourage pt to elevate feet.   TESTS/PROCEDURES: N/A  D/C DAY/GOALS/PLACE: Discharge pending placement to LT.  OTHER IMPORTANT INFO: Psych, SW,and WOC following.     MD/RN ROUNDING SIGNED OFF D/E SHIFT: Yes  COMMIT TO SIT DONE AND SIGNED OFF: Yes

## 2020-06-22 NOTE — PROGRESS NOTES
[facility]    Medicine Progress Note - Hospitalist Service        Date of Admission:  2020 10:30 PM    Assessment & Plan:   Mel Castellanos is a 80 year old female admitted on 2020. She presents after EMS was contacted with daughters concern for ability to care for her safely at home in the setting of dementia with behavioral disturbance.     Dementia with behavioral disturbance  Apparently was packing a suitcase of dirty close in an attempt to leave home to see her , who is .  Daughter, with whom patient lives and cares for patient, attempted to keep patient from leaving, and this prompted to behavioral outburst from patient and subsequent 911 call.   The patient has been more calm here and she is not requiring one-to-one observation.  She has been seen by the psychiatrist.    - Continue Aricept and Zyprexa   - Continue to look for an appropriate discharge facility     Coronary artery disease  RCA stenting in .  With episodes of chest discomfort in , declined aggressive work-up or angiography with continued medical treatments recommended by cardiology through [facility].  - Continue aspirin 81 mg daily, Lipitor 40 mg daily, isosorbide mononitrate 15 mg daily, Toprol-XL 12.5 mg twice a day     Urinary frequency, urinary incontinence  - UA normal     Type 2 diabetes  - Medium dose sliding scale insulin available as needed  - Continue Actos     Left ankle wound, consistent with diabetic foot ulcer  Patient with an ulceration of her left dorsal lateral foot, slightly anterior to lateral malleolus.  Follows with Dr. Walls of wound clinic and appears to been seen as recently as .  No apparent cellulitis or active infection.  - Wound nurse following     Restless leg syndrome  - Continue Requip; dose lowered by the psychiatrist     Chronic obstructive lung disease   Not currently in exacerbation  - Continue prn inhaled bronchodilators     Diet:  "Regular Diet Adult     DVT Prophylaxis: Pneumatic Compression Devices   Tavera Catheter: not present  Code Status: Full Code     Disposition Plan    Expected discharge: Awaiting placement.    Entered: Hu Cash MD 06/22/2020, 12:28 PM        The patient's care was discussed with the Bedside Nurse and Patient.    Hu Cash MD  Hospitalist Service  [facility]    ______________________________________________________________________    Interval History   No acute events overnight.  Calm for the most part.  Denies pain    Data reviewed today: I reviewed all medications, new labs and imaging results over the last 24 hours. I personally reviewed no images or EKG's today.    Physical Exam   Vital signs:  Temp: 97.5  F (36.4  C) Temp src: Axillary BP: 124/64 Pulse: 108 Heart Rate: 71 Resp: 18 SpO2: 95 % O2 Device: None (Room air)   Height: 165.1 cm (5' 5\")    Estimated body mass index is 23.3 kg/m  as calculated from the following:    Height as of this encounter: 1.651 m (5' 5\").    Weight as of 6/2/20: 63.5 kg (140 lb).      Wt Readings from Last 2 Encounters:   06/02/20 63.5 kg (140 lb)   03/22/20 62.6 kg (138 lb 1.6 oz)       Gen: AAOX1, NAD,   Resp: CTA B/L, normal WOB  CVS: RRR, no murmur  Abd/GI: Soft, non-tender. BS- normoactive.    Neuro- CN- intact. No focal deficits.        Data   Recent Labs   Lab 06/16/20  2346   WBC 5.0   HGB 11.6*   MCV 97         POTASSIUM 3.5   CHLORIDE 107   CO2 30   BUN 16   CR 0.59   ANIONGAP 2*   LAUREN 9.1   *   ALBUMIN 3.0*   PROTTOTAL 6.8   BILITOTAL 0.2   ALKPHOS 66   ALT 25   AST 28       No results found for this or any previous visit (from the past 24 hour(s)).  Medications       amLODIPine  5 mg Oral Daily     aspirin  81 mg Oral Daily     atorvastatin  40 mg Oral Daily     donepezil  5 mg Oral At Bedtime     insulin aspart   Subcutaneous TID w/meals     insulin aspart  1-7 Units Subcutaneous TID AC     insulin aspart  1-5 Units " Subcutaneous At Bedtime     isosorbide mononitrate  15 mg Oral Daily     magnesium oxide  400 mg Oral BID     metoprolol succinate ER  12.5 mg Oral BID     multivitamin w/minerals  1 tablet Oral Daily     OLANZapine zydis  5 mg Oral At Bedtime     omeprazole  20 mg Oral QAM     pioglitazone  15 mg Oral Daily     rOPINIRole  0.5 mg Oral At Bedtime     sodium chloride (PF)  3 mL Intracatheter Q8H     Vitamin D3  125 mcg Oral Daily

## 2020-06-23 NOTE — PROGRESS NOTES
ILIA Late entry.  PA approved.  Effective date: 6/22/20  PA reference #: [id number]  Pt. notified:  Dtr/ Henrique  Patient was offered a LTC semi-private room on the locked memory care unit at [facility] in [place].  Molly, admissions director, said the business office is aware patient is MA pending.  Writer called daughter to update her.  She initially was concerned with the distance of the facility from her home in [place].  Writer explained once her mother's MA application is approved and opened, she can work with the [facility] SW to move her mother to a facility closer to her.  Did explain there are limited facilities with the memory care locked unit and many have waiting lists so it will take awhile.  Daughter appreciated knowing she can move her mother in the future and she accepted the vacancy.  She requested to transport her mother.  Daughter given address of facility and phone number of the nursing unit.  Writer was updated by [facility] that beginning tomorrow, the facility is scheduling family visits. Writer updated daughter who was very happy with the news.  The orders were faxed and time arranged for 1800 admission time.

## 2020-07-13 ENCOUNTER — HOSPITAL ENCOUNTER (OUTPATIENT)
Dept: WOUND CARE | Facility: CLINIC | Age: 80
Discharge: HOME | End: 2020-07-13
Attending: SURGERY | Admitting: SURGERY
Payer: COMMERCIAL

## 2020-07-13 VITALS
DIASTOLIC BLOOD PRESSURE: 67 MMHG | TEMPERATURE: 98.3 F | HEART RATE: 79 BPM | SYSTOLIC BLOOD PRESSURE: 159 MMHG | RESPIRATION RATE: 18 BRPM

## 2020-07-13 DIAGNOSIS — E08.00 DIABETES MELLITUS DUE TO UNDERLYING CONDITION WITH HYPEROSMOLARITY WITHOUT COMA, WITHOUT LONG-TERM CURRENT USE OF INSULIN (H): ICD-10-CM

## 2020-07-13 PROBLEM — M54.9 BACKACHE: Status: ACTIVE | Noted: 2020-07-13

## 2020-07-13 PROBLEM — R09.89 ABSENT RADIAL PULSE: Status: ACTIVE | Noted: 2020-07-13

## 2020-07-13 PROBLEM — I73.9: Status: ACTIVE | Noted: 2020-07-13

## 2020-07-13 PROBLEM — J42 ACUTE EXACERBATION OF CHRONIC BRONCHITIS (H): Status: ACTIVE | Noted: 2020-07-13

## 2020-07-13 PROBLEM — G47.33 OSA (OBSTRUCTIVE SLEEP APNEA): Status: ACTIVE | Noted: 2020-07-13

## 2020-07-13 PROBLEM — R07.9 CHEST PAIN: Status: ACTIVE | Noted: 2020-07-13

## 2020-07-13 PROBLEM — I10 HYPERTENSION: Status: ACTIVE | Noted: 2020-07-13

## 2020-07-13 PROBLEM — E11.9 DIABETES MELLITUS (H): Status: ACTIVE | Noted: 2020-07-13

## 2020-07-13 PROBLEM — R06.01 ORTHOPNEA: Status: ACTIVE | Noted: 2020-07-13

## 2020-07-13 PROBLEM — G25.81 RLS (RESTLESS LEGS SYNDROME): Status: ACTIVE | Noted: 2020-07-13

## 2020-07-13 PROBLEM — J20.9 ACUTE EXACERBATION OF CHRONIC BRONCHITIS (H): Status: ACTIVE | Noted: 2020-07-13

## 2020-07-13 PROBLEM — E78.5 HYPERLIPIDEMIA: Status: ACTIVE | Noted: 2020-07-13

## 2020-07-13 PROBLEM — F17.200 NICOTINE DEPENDENCE: Status: ACTIVE | Noted: 2020-07-13

## 2020-07-13 PROBLEM — K21.9 GERD (GASTROESOPHAGEAL REFLUX DISEASE): Status: ACTIVE | Noted: 2020-07-13

## 2020-07-13 PROCEDURE — 97602 WOUND(S) CARE NON-SELECTIVE: CPT

## 2020-07-13 PROCEDURE — 99213 OFFICE O/P EST LOW 20 MIN: CPT | Performed by: SURGERY

## 2020-07-13 RX ORDER — MULTIPLE VITAMINS W/ MINERALS TAB 9MG-400MCG
1 TAB ORAL DAILY
COMMUNITY
End: 2022-01-01

## 2020-07-13 RX ORDER — ROPINIROLE 0.5 MG/1
0.5 TABLET, FILM COATED ORAL AT BEDTIME
COMMUNITY
Start: 2019-09-25 | End: 2022-01-01

## 2020-07-13 NOTE — PROGRESS NOTES
[facility] [place] Progress Note    Subject: Mel Castellanos, non-reconstructable peripheral arterial disease left and right lower extremities, dementia, cannot utilize an arterial assist device due to her dementia, currently residing at [facility], 6[address], [address].  I spoke with the director of nursing today, the patient was here with Edmund socks on, this is an absolute contraindication for utilization given the extent of her peripheral arterial disease, we remove the tight socks, we discussed utilization of edema wear as the 8 mm of compression cannot compromise her lower extremities given the presence of peripheral arterial disease.  Patient continues to smoke close down to approximately 1 cigarette a day of a non-smoking rules within the lockdown aspect of [facility].  Daughters well engaged and visits her mom several times a week.  Present for today's evaluation.    Patient Active Problem List   Diagnosis     Unstable angina (H)     Altered mental status     Benign essential hypertension     Insomnia     COPD with acute exacerbation (H)     Dementia with behavioral disturbance (H)     Past Medical History:   Diagnosis Date     Arthritis      Emphysema of lung (H)      High cholesterol      HTN (hypertension)      Insomnia      MI (myocardial infarction) (H)      Restless leg syndrome      TIA (transient ischaemic attack)      Type 2 diabetes mellitus without complications (H)      UTI (urinary tract infection)      Exam:  There were no vitals taken for this visit.     80-year-old female, chronic dementia, nonpalpable pedal or popliteal pulses.  Moderate by lower extremity edema.  Edmund socks removed.  See wounds or meds and photodocumentation  2+ interstitial edema bilaterally.    Impression: Chronic bilateral extremity peripheral arterial disease, progressive dementia, not arterial assist pneumatic compression candidate due to dementia    Plan: We will dress the wounds with  collagen dressing, Mepilex, EdemaWear to be worn 24 hours/day, 7 days/week to decrease edema.  Patient will return to the clinic in 8 weeks time    Ronaldo Walls MD on 7/13/2020 at 2:40 PM

## 2020-07-13 NOTE — ADDENDUM NOTE
Encounter addended by: Kika Costello RN on: 7/13/2020 3:11 PM   Actions taken: Flowsheet accepted, Charge Capture section accepted

## 2020-07-13 NOTE — DISCHARGE INSTRUCTIONS
[facility]  [address] [address]    Call us at [phone] if you have any questions about your wounds, have redness or swelling around your wound, have a fever of 101 or greater or if you have any other problems or concerns. We answer the phone Monday through Friday 8 am to 4 pm, please leave a message as we check the voicemail frequently throughout the day.     Mel WILLETT Emanuel      1940    Patients wounds have delayed healing due to poor blood flow to her wound.  EVERY OTHER DAY      Comments: Left foot wound: every other day (odd days) and prn:   1.  Cleanse with MicroKlenz   2.  Skin prep to periwound   3.  Apply piece of Ruth collagen dressing to wound bed, cut to fit.  (This may partially or fully dissolve into wound)   4.  Cover with Mepilex or small gauze   5.  Apply pt's EdemaWear (mesh stockinette with black stripe) from toes to knee.  Handwash and lay flat to dry as needed, DO NOT THROW AWAY   6.  Elevate legs when possible, float heels, keep legs/feet moisturized        Do not wear PATRICE Socks     TREE Walls M.D.. July 13, 2020

## 2020-07-21 ENCOUNTER — RECORDS - HEALTHEAST (OUTPATIENT)
Dept: LAB | Facility: CLINIC | Age: 80
End: 2020-07-21

## 2020-07-22 LAB
MAGNESIUM SERPL-MCNC: 1.6 MG/DL (ref 1.8–2.6)
TSH SERPL DL<=0.005 MIU/L-ACNC: 2.36 UIU/ML (ref 0.3–5)

## 2020-09-09 ENCOUNTER — HOSPITAL ENCOUNTER (OUTPATIENT)
Dept: WOUND CARE | Facility: CLINIC | Age: 80
Discharge: HOME | End: 2020-09-09
Attending: PHYSICIAN ASSISTANT | Admitting: PHYSICIAN ASSISTANT
Payer: COMMERCIAL

## 2020-09-09 VITALS
TEMPERATURE: 96.9 F | HEART RATE: 77 BPM | SYSTOLIC BLOOD PRESSURE: 157 MMHG | DIASTOLIC BLOOD PRESSURE: 76 MMHG | RESPIRATION RATE: 18 BRPM

## 2020-09-09 DIAGNOSIS — L97.929 ULCER OF LOWER LIMB, LEFT, WITH UNSPECIFIED SEVERITY (H): ICD-10-CM

## 2020-09-09 DIAGNOSIS — L97.909 ARTERIAL LEG ULCER (H): ICD-10-CM

## 2020-09-09 DIAGNOSIS — L03.116 CELLULITIS OF LEFT LOWER EXTREMITY: Primary | ICD-10-CM

## 2020-09-09 PROCEDURE — 99213 OFFICE O/P EST LOW 20 MIN: CPT | Performed by: PHYSICIAN ASSISTANT

## 2020-09-09 PROCEDURE — 97602 WOUND(S) CARE NON-SELECTIVE: CPT

## 2020-09-09 RX ORDER — COLLAGENASE SANTYL 250 [ARB'U]/G
OINTMENT TOPICAL
COMMUNITY
Start: 2020-06-26 | End: 2022-01-01

## 2020-09-09 RX ORDER — CEPHALEXIN 500 MG/1
500 CAPSULE ORAL 3 TIMES DAILY
Qty: 30 CAPSULE | Refills: 0 | Status: SHIPPED | OUTPATIENT
Start: 2020-09-09 | End: 2020-09-19

## 2020-09-09 NOTE — DISCHARGE INSTRUCTIONS
[facility]  [address] [address]    Call us at [phone] if you have any questions about your wounds, have redness or swelling around your wound, have a fever of 101 or greater or if you have any other problems or concerns. We answer the phone Monday through Friday 8 am to 4 pm, please leave a message as we check the voicemail frequently throughout the day.     Mel Castellanos      1940    Wound care recommendations to left lateral lower leg and left lateral foot ulcer  Clean wounds with wound cleanser, apply Xerform to open areas followed by EdemaWear size purple lite followed by gauze roll and change daily    Please raise your legs above your heart for 30 mins 3 times a day to promote wound healing.     Prescription included for Keflex (antibiotic) to obtain from your pharmacy and a new prescription for EdemaWear for you to obtain from [facility].     Sarai Bowling PA-C. September 9, 2020    Wound Clinc follow up as scheduled

## 2020-09-09 NOTE — PROGRESS NOTES
[place] [facility]    ASSESSMENT:   1. Full-thickness ulcers of left lower leg with fat-layer exposed secondary to PAD and lymphedema  2. Cellulitis of LLE  3. Adherence to plan of care - poor, refusing to wear EdemaWear     PLAN/DISCUSSION:   1. Wound care plan: dress open areas with Xeroform, EdemaWear, gauze over top  2. Recommend EdemaWear Small Lite to see if she'll tolerate it better  3. Keflex 500mg TID x 10d    HISTORY OF PRESENT ILLNESS:   Mel Castellanos is an 80 year old female with dementia who returns for an arterial ulcer of her left foot and scattered ulcerations of her left lower leg. Initially seen by my colleague, Dr. Walls in June and July. Unfortunately she is not a candidate for revascularization nor an arterial boot.     6/2/20: Started on care plan of collagen dressing and EdemaWear.   7/13/20: Does not appear that EdemaWear is being utilized as patient presented in PATRICE hose. Wound slightly improved.  9/9/20: Foot wound essentially healed. Not wearing EdemaWear as she reports it hurts too much. Leg red and warm today. More scattered ulcerations and blistering on lateral leg.     REVIEW OF SYSTEMS:  CONSTITUTIONAL: Denies fevers or acute illness  ENDOCRINE: diabetes is well-managed    PAST MEDICAL HISTORY:  has a past medical history of Arthritis, Emphysema of lung (H), High cholesterol, HTN (hypertension), Insomnia, MI (myocardial infarction) (H), Restless leg syndrome, TIA (transient ischaemic attack), Type 2 diabetes mellitus without complications (H), and UTI (urinary tract infection).    SOCIAL HISTORY: resides in nursing home  TOBACCO STATUS:  reports that she has been smoking. She has been smoking about 0.25 packs per day. She has never used smokeless tobacco.    MEDICATIONS:   Current Outpatient Medications   Medication     cephALEXin (KEFLEX) 500 MG capsule     ASPIRIN EC PO     atorvastatin (LIPITOR) 40 MG tablet     cholecalciferol (VITAMIN D3) 5000 units (125 mcg)  capsule     donepezil (ARICEPT) 5 MG tablet     ipratropium - albuterol 0.5 mg/2.5 mg/3 mL (DUONEB) 0.5-2.5 (3) MG/3ML neb solution     isosorbide mononitrate (IMDUR) 30 MG 24 hr tablet     magnesium oxide (MAG-OX) 400 MG tablet     metoprolol succinate ER (TOPROL-XL) 25 MG 24 hr tablet     Multiple Vitamins-Minerals (CENTRUM SILVER) per tablet     multivitamin w/minerals (MULTI-VITAMIN) tablet     nitroglycerin (NITROSTAT) 0.4 MG SL tablet     OLANZapine zydis (ZYPREXA) 5 MG ODT     omeprazole (PRILOSEC) 20 MG DR capsule     OMEPRAZOLE PO     order for DME     pioglitazone (ACTOS) 15 MG tablet     rOPINIRole (REQUIP) 1 MG tablet     SANTYL 250 UNIT/GM external ointment     senna-docusate (SENOKOT-S/PERICOLACE) 8.6-50 MG tablet     Wound Dressings (FIBRACOL) MISC     No current facility-administered medications for this encounter.      VITALS: BP (!) 157/76 (BP Location: Right arm)   Pulse 77   Temp 96.9  F (36.1  C) (Temporal)   Resp 18      PHYSICAL EXAM:  GENERAL: Patient is alert and oriented and in no acute distress  CV: absent pedal pulses  INTEGUMENTARY:  Well-demarcated erythema of left lateral leg with scattered partial-thickness ulcerations  Wound (used by OP WHI only) 06/02/20 1445 Left lateral foot diabetic/neurophathic (Active)   Length (cm) 0.5 09/09/20 1536   Width (cm) 0.5 09/09/20 1536   Depth (cm) 0.2 09/09/20 1536   Wound (cm^2) 0.25 cm^2 09/09/20 1536   Wound Volume (cm^3) 0.05 cm^3 09/09/20 1536   Wound healing % 87.18 09/09/20 1536   Dressing Appearance moist drainage 09/09/20 1536   Drainage Characteristics/Odor serosanguineous 09/09/20 1536   Drainage Amount copious 09/09/20 1536   Thickness/Stage full thickness 09/09/20 1536   Base red/granulating;slough 09/09/20 1536   Periwound intact 09/09/20 1536   Periwound Temperature warm 09/09/20 1536   Care, Wound non-select wound debridement performed 09/09/20 1536            FOLLOW-UP: 2-4 weeks    MARCO ANTONIO GUZMAN PA-C

## 2020-09-23 ENCOUNTER — HOSPITAL ENCOUNTER (OUTPATIENT)
Dept: WOUND CARE | Facility: CLINIC | Age: 80
Discharge: HOME | End: 2020-09-23
Attending: PHYSICIAN ASSISTANT | Admitting: PHYSICIAN ASSISTANT
Payer: COMMERCIAL

## 2020-09-23 VITALS
TEMPERATURE: 97.6 F | DIASTOLIC BLOOD PRESSURE: 61 MMHG | HEART RATE: 86 BPM | RESPIRATION RATE: 18 BRPM | SYSTOLIC BLOOD PRESSURE: 120 MMHG

## 2020-09-23 DIAGNOSIS — L97.909 ARTERIAL LEG ULCER (H): ICD-10-CM

## 2020-09-23 DIAGNOSIS — L30.9 DERMATITIS: Primary | ICD-10-CM

## 2020-09-23 PROCEDURE — G0463 HOSPITAL OUTPT CLINIC VISIT: HCPCS

## 2020-09-23 PROCEDURE — 99213 OFFICE O/P EST LOW 20 MIN: CPT | Performed by: PHYSICIAN ASSISTANT

## 2020-09-23 RX ORDER — TRIAMCINOLONE ACETONIDE 1 MG/G
CREAM TOPICAL DAILY
Qty: 30 G | Refills: 3 | Status: SHIPPED | OUTPATIENT
Start: 2020-09-23 | End: 2022-01-01

## 2020-09-23 NOTE — PROGRESS NOTES
[place] [facility]    ASSESSMENT:   1. Full-thickness ulcers of left lower leg with fat-layer exposed secondary to PAD and lymphedema  2. Cellulitis of LLE    PLAN/DISCUSSION:   1. Wound care plan: dress open areas with Xeroform, EdemaWear, gauze over top  2. Recommend EdemaWear Small Lite to see if she'll tolerate it better  3. Keflex 500mg TID x 10d    HISTORY OF PRESENT ILLNESS:   Mel Castellanos is an 80 year old female with dementia who returns for an arterial ulcer of her left foot and scattered ulcerations of her left lower leg. Initially seen by my colleague, Dr. Walls in June and July. Unfortunately she is not a candidate for revascularization nor an arterial boot.     6/2/20: Started on care plan of collagen dressing and EdemaWear.   7/13/20: Does not appear that EdemaWear is being utilized as patient presented in PATRICE hose. Wound slightly improved.  9/9/20: Foot wound essentially healed. Not wearing EdemaWear as she reports it hurts too much. Leg red and warm today. More scattered ulcerations and blistering on lateral leg. Started on antibiotics due to suspected cellulitis.   09/23/20: Wounds essentially resolve. Some scattered excoriations. Redness and edema improved. Is now complying with EdemaWear.     REVIEW OF SYSTEMS:  CONSTITUTIONAL: Denies fevers or acute illness  ENDOCRINE: diabetes is well-managed    PAST MEDICAL HISTORY:  has a past medical history of Arthritis, Emphysema of lung (H), High cholesterol, HTN (hypertension), Insomnia, MI (myocardial infarction) (H), Restless leg syndrome, TIA (transient ischaemic attack), Type 2 diabetes mellitus without complications (H), and UTI (urinary tract infection).    SOCIAL HISTORY: resides in nursing home  TOBACCO STATUS:  reports that she has been smoking. She has been smoking about 0.25 packs per day. She has never used smokeless tobacco.    MEDICATIONS:   Current Outpatient Medications   Medication     ASPIRIN EC PO     atorvastatin  (LIPITOR) 40 MG tablet     cholecalciferol (VITAMIN D3) 5000 units (125 mcg) capsule     donepezil (ARICEPT) 5 MG tablet     ipratropium - albuterol 0.5 mg/2.5 mg/3 mL (DUONEB) 0.5-2.5 (3) MG/3ML neb solution     isosorbide mononitrate (IMDUR) 30 MG 24 hr tablet     magnesium oxide (MAG-OX) 400 MG tablet     metoprolol succinate ER (TOPROL-XL) 25 MG 24 hr tablet     Multiple Vitamins-Minerals (CENTRUM SILVER) per tablet     multivitamin w/minerals (MULTI-VITAMIN) tablet     nitroglycerin (NITROSTAT) 0.4 MG SL tablet     OLANZapine zydis (ZYPREXA) 5 MG ODT     omeprazole (PRILOSEC) 20 MG DR capsule     OMEPRAZOLE PO     order for DME     pioglitazone (ACTOS) 15 MG tablet     rOPINIRole (REQUIP) 1 MG tablet     SANTYL 250 UNIT/GM external ointment     senna-docusate (SENOKOT-S/PERICOLACE) 8.6-50 MG tablet     triamcinolone (KENALOG) 0.1 % external cream     Wound Dressings (FIBRACOL) [facility] [place] [facility]     No current facility-administered medications for this encounter.      VITALS: /61 (BP Location: Left arm)   Pulse 86   Temp 97.6  F (36.4  C) (Temporal)   Resp 18      PHYSICAL EXAM:  GENERAL: Patient is alert and oriented and in no acute distress  CV: absent pedal pulses  INTEGUMENTARY:       FOLLOW-UP: 6 weeks    MARCO ANTONIO GUZMAN PA-C

## 2020-09-23 NOTE — DISCHARGE INSTRUCTIONS
[facility]  [address] [place] [address] [address]    Call us at [phone] if you have any questions about your wounds, have redness  or swelling around your wound, have a fever of 101 or greater or if you have any  other problems or concerns. We answer the phone Monday through Friday 8 am to 4  pm, please leave a message as we check the voicemail frequently throughout the day.    Mel Castellanos 1940    FAX: [phone]    Wound care recommendations to left lateral lower leg   Clean wounds with wound cleanser. Use Triamcinolone cream to any intact skin that patient states it itching.  Apply Xerform to open areas followed by  EdemaWear size Purple Lite followed by gauze roll and change daily.    MAY DISCONTINUE ORDERS WHEN WOUNDS ARE HEALED AND THERE IS NO DRAINAGE. WHEN THERE IS NO MORE DRAINAGE MAY USE HYDRATING LOTION TO THE ENTIRE LOWER LEG AND APPLY EDEMAWEAR DAILY FOR THE REST OF HER LIFE.    WOUND IS IMPROVING GREATLY, CONTINUE THE GOOD JOB ON DRESSINGS    Please raise your legs above your heart for 30 mins 3 times a day to promote  wound healing.    Sarai Bowling PA-C. September 23, 2020    Wound Clinc follow up as scheduled

## 2020-11-04 ENCOUNTER — HOSPITAL ENCOUNTER (OUTPATIENT)
Dept: WOUND CARE | Facility: CLINIC | Age: 80
Discharge: HOME | End: 2020-11-04
Attending: PHYSICIAN ASSISTANT | Admitting: PHYSICIAN ASSISTANT
Payer: COMMERCIAL

## 2020-11-04 VITALS
TEMPERATURE: 96.6 F | HEART RATE: 76 BPM | DIASTOLIC BLOOD PRESSURE: 58 MMHG | RESPIRATION RATE: 18 BRPM | SYSTOLIC BLOOD PRESSURE: 111 MMHG

## 2020-11-04 DIAGNOSIS — L97.909 ARTERIAL LEG ULCER (H): ICD-10-CM

## 2020-11-04 DIAGNOSIS — L89.323 PRESSURE INJURY OF LEFT BUTTOCK, STAGE 3 (H): ICD-10-CM

## 2020-11-04 PROCEDURE — 99213 OFFICE O/P EST LOW 20 MIN: CPT | Performed by: PHYSICIAN ASSISTANT

## 2020-11-04 PROCEDURE — 97602 WOUND(S) CARE NON-SELECTIVE: CPT

## 2020-11-04 NOTE — DISCHARGE INSTRUCTIONS
[facility]  [address] [address]    Call us at [phone] if you have any questions about your wounds, have redness or swelling around your wound, have a fever of 101 or greater or if you have any other problems or concerns. We answer the phone Monday through Friday 8 am to 4 pm, please leave a message as we check the voicemail frequently throughout the day.     Mel WILLETT Emanuel      1940    Wound care recommendations to pressure ulcer on left buttock. This is caused from staying in her wheelchair for too long.  Repositioning:    Bed:  Reposition pt MINIMALLY every 1-2 hours in bed to relieve pressure and promote perfusion to tissue.     Chair:  When up to chair pt should not sit for longer than one hour total before either standing or returning to bed for at least 10 minutes, again to relieve pressure and promote perfusion to tissue. Pt should also sit on a chair cushion when up to the chair.   Recommend cleansing area with wound cleanser. Apply skin prep to periwound skin followed by an extra thin hydrocolloid such as Duoderm and change every 2-3 days.      Wounds on left leg are healed, no further dressings are needed to this area but recommend knee high socks. Compression socks are not necessary but patient needs her legs protected with over the counter knee high sock.      Sarai Bowling PA-C. November 4, 2020    Wound Clinc follow up ***

## 2020-11-04 NOTE — PROGRESS NOTES
[place] [facility]    ASSESSMENT:   1. Stage 3 pressure ulcer of sacrum  2. PAD with lymphedema    PLAN/DISCUSSION:   1. Wound care plan: dress sacrum with hydrocolloid  2. Recommend she wear knee high socks for minimal compression, she has refused EdemaWear    HISTORY OF PRESENT ILLNESS:   Mel Castellanos is an 80 year old female with dementia who returns for an arterial ulcer of her left foot and scattered ulcerations of her left lower leg. Initially seen by my colleague, Dr. Walls in June and July. Unfortunately she is not a candidate for revascularization nor an arterial boot.     6/2/20: Started on care plan of collagen dressing and EdemaWear.   7/13/20: Does not appear that EdemaWear is being utilized as patient presented in PATRICE hose. Wound slightly improved.  9/9/20: Foot wound essentially healed. Not wearing EdemaWear as she reports it hurts too much. Leg red and warm today. More scattered ulcerations and blistering on lateral leg. Started on antibiotics due to suspected cellulitis.   09/23/20: Wounds essentially resolve. Some scattered excoriations. Redness and edema improved. Is now complying with EdemaWear.   11/04/20: Leg wounds remain resolved. Has been refusing to wear EdemaWear on legs. Has pressure ulcer of sacrum. Sleeps in chair, will not sleep in bed. Has some incontinence. Daughter reports this has been off and on for years.     REVIEW OF SYSTEMS:  CONSTITUTIONAL: Denies fevers or acute illness  ENDOCRINE: diabetes is well-managed    PAST MEDICAL HISTORY:  has a past medical history of Arthritis, Emphysema of lung (H), High cholesterol, HTN (hypertension), Insomnia, MI (myocardial infarction) (H), Restless leg syndrome, TIA (transient ischaemic attack), Type 2 diabetes mellitus without complications (H), and UTI (urinary tract infection).    SOCIAL HISTORY: resides in nursing home  TOBACCO STATUS:  reports that she has been smoking. She has been smoking about 0.25 packs per day. She  has never used smokeless tobacco.    MEDICATIONS:   Current Outpatient Medications   Medication     ASPIRIN EC PO     atorvastatin (LIPITOR) 40 MG tablet     cholecalciferol (VITAMIN D3) 5000 units (125 mcg) capsule     donepezil (ARICEPT) 5 MG tablet     ipratropium - albuterol 0.5 mg/2.5 mg/3 mL (DUONEB) 0.5-2.5 (3) MG/3ML neb solution     isosorbide mononitrate (IMDUR) 30 MG 24 hr tablet     magnesium oxide (MAG-OX) 400 MG tablet     metoprolol succinate ER (TOPROL-XL) 25 MG 24 hr tablet     Multiple Vitamins-Minerals (CENTRUM SILVER) per tablet     multivitamin w/minerals (MULTI-VITAMIN) tablet     nitroglycerin (NITROSTAT) 0.4 MG SL tablet     OLANZapine zydis (ZYPREXA) 5 MG ODT     omeprazole (PRILOSEC) 20 MG DR capsule     OMEPRAZOLE PO     order for DME     pioglitazone (ACTOS) 15 MG tablet     rOPINIRole (REQUIP) 1 MG tablet     SANTYL 250 UNIT/GM external ointment     senna-docusate (SENOKOT-S/PERICOLACE) 8.6-50 MG tablet     triamcinolone (KENALOG) 0.1 % external cream     Wound Dressings (FIBRACOL) [facility]     No current facility-administered medications for this encounter.      VITALS: /58   Pulse 76   Temp 96.6  F (35.9  C) (Temporal)   Resp 18      PHYSICAL EXAM:  GENERAL: Patient is alert and oriented and in no acute distress  CV: absent pedal pulses  INTEGUMENTARY:         FOLLOW-UP: 4 weeks    MARCO ANTONIO GUZMAN PA-C

## 2020-11-18 ENCOUNTER — RECORDS - HEALTHEAST (OUTPATIENT)
Dept: LAB | Facility: CLINIC | Age: 80
End: 2020-11-18

## 2020-11-19 LAB
ALBUMIN SERPL-MCNC: 3.3 G/DL (ref 3.5–5)
ALP SERPL-CCNC: 93 U/L (ref 45–120)
ALT SERPL W P-5'-P-CCNC: 17 U/L (ref 0–45)
ANION GAP SERPL CALCULATED.3IONS-SCNC: 7 MMOL/L (ref 5–18)
AST SERPL W P-5'-P-CCNC: 25 U/L (ref 0–40)
BILIRUB SERPL-MCNC: 0.5 MG/DL (ref 0–1)
BUN SERPL-MCNC: 16 MG/DL (ref 8–28)
CALCIUM SERPL-MCNC: 9.5 MG/DL (ref 8.5–10.5)
CHLORIDE BLD-SCNC: 103 MMOL/L (ref 98–107)
CO2 SERPL-SCNC: 29 MMOL/L (ref 22–31)
CREAT SERPL-MCNC: 0.76 MG/DL (ref 0.6–1.1)
ERYTHROCYTE [DISTWIDTH] IN BLOOD BY AUTOMATED COUNT: 13.2 % (ref 11–14.5)
GFR SERPL CREATININE-BSD FRML MDRD: >60 ML/MIN/1.73M2
GLUCOSE BLD-MCNC: 133 MG/DL (ref 70–125)
HBA1C MFR BLD: 7.3 %
HCT VFR BLD AUTO: 36.6 % (ref 35–47)
HGB BLD-MCNC: 11.8 G/DL (ref 12–16)
MCH RBC QN AUTO: 31.6 PG (ref 27–34)
MCHC RBC AUTO-ENTMCNC: 32.2 G/DL (ref 32–36)
MCV RBC AUTO: 98 FL (ref 80–100)
PLATELET # BLD AUTO: 184 THOU/UL (ref 140–440)
PMV BLD AUTO: 10.6 FL (ref 8.5–12.5)
POTASSIUM BLD-SCNC: 4.3 MMOL/L (ref 3.5–5)
PROT SERPL-MCNC: 6.8 G/DL (ref 6–8)
RBC # BLD AUTO: 3.73 MILL/UL (ref 3.8–5.4)
SODIUM SERPL-SCNC: 139 MMOL/L (ref 136–145)
WBC: 8.9 THOU/UL (ref 4–11)

## 2020-11-20 LAB — 25(OH)D3 SERPL-MCNC: 57.7 NG/ML (ref 30–80)

## 2020-11-21 ENCOUNTER — RECORDS - HEALTHEAST (OUTPATIENT)
Dept: LAB | Facility: CLINIC | Age: 80
End: 2020-11-21

## 2020-11-21 LAB
ALBUMIN UR-MCNC: ABNORMAL MG/DL
APPEARANCE UR: ABNORMAL
BACTERIA #/AREA URNS HPF: ABNORMAL HPF
BILIRUB UR QL STRIP: NEGATIVE
COLOR UR AUTO: YELLOW
GLUCOSE UR STRIP-MCNC: NEGATIVE MG/DL
HGB UR QL STRIP: ABNORMAL
KETONES UR STRIP-MCNC: NEGATIVE MG/DL
LEUKOCYTE ESTERASE UR QL STRIP: ABNORMAL
MUCOUS THREADS #/AREA URNS LPF: ABNORMAL LPF
NITRATE UR QL: POSITIVE
PH UR STRIP: 6.5 [PH] (ref 4.5–8)
RBC #/AREA URNS AUTO: ABNORMAL HPF
SP GR UR STRIP: 1.03 (ref 1–1.03)
SQUAMOUS #/AREA URNS AUTO: ABNORMAL LPF
UROBILINOGEN UR STRIP-ACNC: ABNORMAL
WBC #/AREA URNS AUTO: >100 HPF
WBC CLUMPS #/AREA URNS HPF: PRESENT /[HPF]

## 2020-11-25 LAB
BACTERIA SPEC CULT: ABNORMAL
BACTERIA SPEC CULT: ABNORMAL

## 2020-12-01 ENCOUNTER — HOSPITAL ENCOUNTER (OUTPATIENT)
Dept: WOUND CARE | Facility: CLINIC | Age: 80
Discharge: HOME | End: 2020-12-01
Attending: PHYSICIAN ASSISTANT | Admitting: PHYSICIAN ASSISTANT
Payer: COMMERCIAL

## 2020-12-01 VITALS
HEART RATE: 87 BPM | TEMPERATURE: 97.4 F | RESPIRATION RATE: 18 BRPM | SYSTOLIC BLOOD PRESSURE: 119 MMHG | DIASTOLIC BLOOD PRESSURE: 55 MMHG

## 2020-12-01 DIAGNOSIS — L89.323 PRESSURE INJURY OF LEFT BUTTOCK, STAGE 3 (H): ICD-10-CM

## 2020-12-01 DIAGNOSIS — L97.922 SKIN ULCER OF LEFT LOWER LEG WITH FAT LAYER EXPOSED (H): ICD-10-CM

## 2020-12-01 PROCEDURE — 97602 WOUND(S) CARE NON-SELECTIVE: CPT

## 2020-12-01 PROCEDURE — 99214 OFFICE O/P EST MOD 30 MIN: CPT | Performed by: PHYSICIAN ASSISTANT

## 2020-12-01 RX ORDER — ALBUTEROL SULFATE 90 UG/1
2 AEROSOL, METERED RESPIRATORY (INHALATION) EVERY 6 HOURS PRN
COMMUNITY
Start: 2020-10-09

## 2020-12-01 RX ORDER — DOXYCYCLINE 100 MG/1
CAPSULE ORAL
COMMUNITY
Start: 2020-11-25 | End: 2022-01-01

## 2020-12-01 RX ORDER — METOPROLOL SUCCINATE 25 MG/1
12.5 TABLET, EXTENDED RELEASE ORAL 2 TIMES DAILY
COMMUNITY
End: 2022-01-01

## 2020-12-01 NOTE — DISCHARGE INSTRUCTIONS
[facility]  [address] [address]    Call us at [phone] if you have any questions about your wounds, have redness or swelling around your wound, have a fever of 101 or greater or if you have any other problems or concerns. We answer the phone Monday through Friday 8 am to 4 pm, please leave a message as we check the voicemail frequently throughout the day.     Mel Castellanos      1940    Wound care recommendations to left lateral lower leg ulcer  Cleanse with wound cleanser, apply Xeroform to wound bed followed by 4x4 gauze, secure with roll gauze followed by Spandagrip F and change every other day. May discontinue when there is no more drainage.    Remove compression dressing if toes turn blue and/or start to feel numb and is not relieved by elevating the leg for one hour.   Please raise your legs above your heart for 30 mins 3 times a day to promote wound healing.     May use a hydrocolloid as needed any time the sacral wound reopens.     Sarai Bowling PA-C. December 1, 2020    Wound Clinc follow up in 8 weeks

## 2020-12-01 NOTE — PROGRESS NOTES
[place] [facility]    ASSESSMENT:   1. Healed, stage 3 pressure ulcer of sacrum  2. PAD with lymphedema    PLAN/DISCUSSION:   1. We expect these wounds to be recurrent due to her incontinence, arterial disease, and most significantly behaviors (unwilling to sleep in bed, unwilling to wear low pressure support stockings, unwilling to elevate legs). We will give PRN orders so that her home nursing staff can manage. Offered PRN follow-up but daughter prefers to schedule, we will see her in 8 weeks.   2. Recommended treatment for leg wounds: Xeroform, EdemaWear (8mmHg compression), roll gauze. If refuses to wear EdemaWear then she should wear a knee sock (regular OTC), or very low strength tubi/spandigrip (<20mmHg)  3. Recommended treatment for buttocks wounds: Reposition Q2 hours, avoid sleeping in wheelchair, utilize barrier cream or hydrocolloid bandages when skin open    HISTORY OF PRESENT ILLNESS:   Mel Castellanos is an 80 year old female with dementia who returns for an arterial ulcer of her left foot and scattered ulcerations of her left lower leg. Initially seen by my colleague, Dr. Walls in June and July. Unfortunately she is not a candidate for revascularization nor an arterial boot.     6/2/20: Started on care plan of collagen dressing and EdemaWear.   7/13/20: Does not appear that EdemaWear is being utilized as patient presented in PATRICE hose. Wound slightly improved.  9/9/20: Foot wound essentially healed. Not wearing EdemaWear as she reports it hurts too much. Leg red and warm today. More scattered ulcerations and blistering on lateral leg. Started on antibiotics due to suspected cellulitis.   09/23/20: Wounds essentially resolved. Some scattered excoriations. Redness and edema improved. Is now complying with EdemaWear.   11/04/20: Leg wounds remain resolved. Has been refusing to wear EdemaWear on legs. Has pressure ulcer of sacrum. Sleeps in chair, will not sleep in bed. Has some  incontinence. Daughter reports this has been off and on for years.   12/01/20: Unsurprisingly, leg wounds have recurred as patient continues to refuse to wear stockings, and sleeps in wheelchair with leg dependent. Fortunately sacral wounds have improved.     REVIEW OF SYSTEMS:  CONSTITUTIONAL: Denies fevers or acute illness  ENDOCRINE: diabetes is well-managed    PAST MEDICAL HISTORY:  has a past medical history of Arthritis, Emphysema of lung (H), High cholesterol, HTN (hypertension), Insomnia, MI (myocardial infarction) (H), Restless leg syndrome, TIA (transient ischaemic attack), Type 2 diabetes mellitus without complications (H), and UTI (urinary tract infection).    SOCIAL HISTORY: resides in nursing home  TOBACCO STATUS:  reports that she has been smoking. She has been smoking about 0.25 packs per day. She has never used smokeless tobacco.    MEDICATIONS:   Current Outpatient Medications   Medication     albuterol (PROAIR HFA/PROVENTIL HFA/VENTOLIN HFA) 108 (90 Base) MCG/ACT inhaler     ASPIRIN EC PO     atorvastatin (LIPITOR) 40 MG tablet     cholecalciferol (VITAMIN D3) 5000 units (125 mcg) capsule     donepezil (ARICEPT) 5 MG tablet     doxycycline hyclate (VIBRAMYCIN) 100 MG capsule     ipratropium - albuterol 0.5 mg/2.5 mg/3 mL (DUONEB) 0.5-2.5 (3) MG/3ML neb solution     isosorbide mononitrate (IMDUR) 30 MG 24 hr tablet     magnesium oxide (MAG-OX) 400 MG tablet     metoprolol succinate ER (TOPROL-XL) 25 MG 24 hr tablet     metoprolol succinate ER (TOPROL-XL) 50 MG 24 hr tablet     Multiple Vitamins-Minerals (CENTRUM SILVER) per tablet     multivitamin w/minerals (MULTI-VITAMIN) tablet     nitroglycerin (NITROSTAT) 0.4 MG SL tablet     OLANZapine zydis (ZYPREXA) 5 MG ODT     omeprazole (PRILOSEC) 20 MG DR capsule     OMEPRAZOLE PO     order for DME     pioglitazone (ACTOS) 15 MG tablet     rOPINIRole (REQUIP) 1 MG tablet     SANTYL 250 UNIT/GM external ointment     senna-docusate (SENOKOT-S/PERICOLACE)  8.6-50 MG tablet     triamcinolone (KENALOG) 0.1 % external cream     Wound Dressings (FIBRACOL) MISC     No current facility-administered medications for this encounter.      VITALS: /55   Pulse 87   Temp 97.4  F (36.3  C) (Temporal)   Resp 18      PHYSICAL EXAM:  GENERAL: Patient is alert and oriented and in no acute distress  CV: absent pedal pulses  INTEGUMENTARY:   Wound (used by OP [facility] only) 11/04/20 1445 Left ulceration, arterial (Active)   Thickness/Stage full thickness 12/01/20 1402   Dressing Appearance moist drainage 12/01/20 1402   Base granulating 12/01/20 1402   Periwound intact 12/01/20 1402   Periwound Temperature warm 12/01/20 1402   Length (cm) 4 12/01/20 1402   Width (cm) 3 12/01/20 1402   Depth (cm) 0.1 12/01/20 1402   Wound (cm^2) 12 cm^2 12/01/20 1402   Wound Volume (cm^3) 1.2 cm^3 12/01/20 1402   Wound healing % -1400 12/01/20 1402   Drainage Characteristics/Odor serosanguineous 12/01/20 1402   Drainage Amount copious 12/01/20 1402   Care, Wound non-select wound debridement performed 12/01/20 1402         FOLLOW-UP: 8 weeks    MARCO ANTONIO GUZMAN PA-C

## 2021-01-26 ENCOUNTER — HOSPITAL ENCOUNTER (OUTPATIENT)
Dept: WOUND CARE | Facility: CLINIC | Age: 81
Discharge: HOME | End: 2021-01-26
Attending: PHYSICIAN ASSISTANT | Admitting: PHYSICIAN ASSISTANT
Payer: COMMERCIAL

## 2021-01-26 VITALS — SYSTOLIC BLOOD PRESSURE: 145 MMHG | DIASTOLIC BLOOD PRESSURE: 81 MMHG | HEART RATE: 75 BPM | TEMPERATURE: 96.8 F

## 2021-01-26 DIAGNOSIS — L97.922 SKIN ULCER OF LEFT LOWER LEG WITH FAT LAYER EXPOSED (H): ICD-10-CM

## 2021-01-26 PROCEDURE — G0463 HOSPITAL OUTPT CLINIC VISIT: HCPCS

## 2021-01-26 PROCEDURE — 99213 OFFICE O/P EST LOW 20 MIN: CPT | Performed by: PHYSICIAN ASSISTANT

## 2021-01-26 NOTE — DISCHARGE INSTRUCTIONS
[facility]  [address] [place] [id number] [facility] [address]  Appointment Phone [phone]     FAX: [phone]    Mel TAMIE Emanuel      1940    Your wounds are healed!! Dressings are no longer required but continue to reposition frequently and use your wheelchair cushion to keep pressure off of your buttock. Continue to wear EdemaWear on lower extremities.        Sarai Bowling PA-C. January 26, 2021    Call us at [phone] if you have any questions about your wounds, have redness or swelling around your wound, have a fever of 101 or greater or if you have any other problems or concerns. We answer the phone Monday through Friday 8 am to 4 pm, please leave a message as we check the voicemail frequently throughout the day.     Follow up 6 months

## 2021-01-26 NOTE — PROGRESS NOTES
[facility]    ASSESSMENT:   1. Healed, stage 3 pressure ulcer of sacrum  2. PAD with lymphedema    PLAN/DISCUSSION:   1. Pt and daughter are not well informed on pts medical care as a whole. With pts history of PAD she should be established with a vascular specialist for surveillance, or at the minimum, be monitored regularly by her primary care provider. I will be sending this note and orders to nursing facility today to make this recommendation. I will see patient again in 6 months just to check up on her skin health and edema control.   2. We expect these wounds to be recurrent due to her incontinence, arterial disease, and most significantly behaviors (unwilling to sleep in bed, often unwilling to wear low pressure support stockings, unwilling to elevate legs).   3. Should wear EdemaWear garment indefinitely on left lower leg.  4. Should continue to use cushion on wheelchair and be encouraged to reposition Q2 hours when awake.   5. No dressings currently needed for legs. PRN treatment for leg wounds if they develop: Xeroform, EdemaWear (8mmHg compression), roll gauze. If refuses to wear EdemaWear then she should wear a knee sock (regular OTC), or very low strength tubi/spandigrip (<20mmHg)  6. No dressings currently needed for buttocks. PRN treatment for buttocks wounds: Reposition Q2 hours, avoid sleeping in wheelchair, utilize barrier cream or hydrocolloid bandages when skin open    HISTORY OF PRESENT ILLNESS:   Mel Castellanos is an 80 year old female with dementia who returns for an arterial ulcer of her left foot and scattered ulcerations of her left lower leg. Initially seen by my colleague, Dr. Walls in June and July. Unfortunately she is not a candidate for revascularization nor an arterial boot.     6/2/20: Started on care plan of collagen dressing and EdemaWear.   7/13/20: Does not appear that EdemaWear is being utilized as patient presented in [facility]. Wound slightly  improved.  9/9/20: Foot wound essentially healed. Not wearing EdemaWear as she reports it hurts too much. Leg red and warm today. More scattered ulcerations and blistering on lateral leg. Started on antibiotics due to suspected cellulitis.   09/23/20: Wounds essentially resolved. Some scattered excoriations. Redness and edema improved. Is now complying with EdemaWear.   11/04/20: Leg wounds remain resolved. Has been refusing to wear EdemaWear on legs. Has pressure ulcer of sacrum. Sleeps in chair, will not sleep in bed. Has some incontinence. Daughter reports this has been off and on for years.   12/01/20: Unsurprisingly, leg wounds have recurred as patient continues to refuse to wear stockings, and sleeps in wheelchair with leg dependent. Fortunately sacral wounds have improved.   01/26/21: Returns today for follow-up. Doing great! She has been compliant with wearing her EdemaWear garment and this is working quite well at controlling her edema and helping keep her skin healthy. Her buttocks skin is intact. Daughter reports that pt is now using a cushion on her wheelchair. Pt and daughter do not believe that she is followed by a vascular surgeon.     PAST MEDICAL HISTORY:  has a past medical history of Arthritis, Emphysema of lung (H), High cholesterol, HTN (hypertension), Insomnia, MI (myocardial infarction) (H), Restless leg syndrome, TIA (transient ischaemic attack), Type 2 diabetes mellitus without complications (H), and UTI (urinary tract infection).    SOCIAL HISTORY: resides in nursing home  TOBACCO STATUS:  reports that she has been smoking. She has been smoking about 0.25 packs per day. She has never used smokeless tobacco.    VITALS: BP (!) 145/81   Pulse 75   Temp 96.8  F (36  C) (Temporal)      PHYSICAL EXAM:  GENERAL: Patient is alert and oriented and in no acute distress  CV: absent pedal pulses  INTEGUMENTARY: Skin intact on buttocks, some chronic erythema from sedentary lifestyle, bilateral lower  leg skin intact, edema well-controlled, no ulcerations, erythema, or other cause for concern        FOLLOW-UP: 6 months  MDM: 20 minutes was spent reviewing chart notes, laboratory studies, imaging studies, assessing the patient and discussing the treatment plan.     MARCO ANTONIO GUZMAN PA-C

## 2021-01-26 NOTE — PROGRESS NOTES
Patient arrived for wound care visit. Certified Wound Care Nurse time spent evaluating patient record, completed a full evaluation and documented wound(s) & michelle-wound skin; provided recommendation based on treatment plan. Applied dressing, reviewed discharge instructions, patient education, and discussed plan of care with appropriate medical team staff members and patient and/or family members.

## 2021-03-21 ENCOUNTER — RECORDS - HEALTHEAST (OUTPATIENT)
Dept: LAB | Facility: CLINIC | Age: 81
End: 2021-03-21

## 2021-03-21 LAB
SARS-COV-2 PCR COMMENT: NORMAL
SARS-COV-2 RNA SPEC QL NAA+PROBE: NEGATIVE
SARS-COV-2 VIRUS SPECIMEN SOURCE: NORMAL

## 2021-03-22 ENCOUNTER — RECORDS - HEALTHEAST (OUTPATIENT)
Dept: LAB | Facility: CLINIC | Age: 81
End: 2021-03-22

## 2021-03-24 LAB
ANION GAP SERPL CALCULATED.3IONS-SCNC: 8 MMOL/L (ref 5–18)
BUN SERPL-MCNC: 31 MG/DL (ref 8–28)
CALCIUM SERPL-MCNC: 9.2 MG/DL (ref 8.5–10.5)
CHLORIDE BLD-SCNC: 104 MMOL/L (ref 98–107)
CHOLEST SERPL-MCNC: 97 MG/DL
CO2 SERPL-SCNC: 28 MMOL/L (ref 22–31)
CREAT SERPL-MCNC: 0.97 MG/DL (ref 0.6–1.1)
FASTING STATUS PATIENT QL REPORTED: ABNORMAL
GFR SERPL CREATININE-BSD FRML MDRD: 55 ML/MIN/1.73M2
GLUCOSE BLD-MCNC: 132 MG/DL (ref 70–125)
HDLC SERPL-MCNC: 35 MG/DL
LDLC SERPL CALC-MCNC: 49 MG/DL
MAGNESIUM SERPL-MCNC: 1.9 MG/DL (ref 1.8–2.6)
POTASSIUM BLD-SCNC: 3.9 MMOL/L (ref 3.5–5)
SODIUM SERPL-SCNC: 140 MMOL/L (ref 136–145)
TRIGL SERPL-MCNC: 63 MG/DL

## 2021-06-21 ENCOUNTER — RECORDS - HEALTHEAST (OUTPATIENT)
Dept: LAB | Facility: CLINIC | Age: 81
End: 2021-06-21

## 2021-06-23 LAB — HBA1C MFR BLD: 6.4 %

## 2021-08-10 ENCOUNTER — LAB REQUISITION (OUTPATIENT)
Dept: LAB | Facility: CLINIC | Age: 81
End: 2021-08-10
Payer: COMMERCIAL

## 2021-08-10 DIAGNOSIS — I10 ESSENTIAL (PRIMARY) HYPERTENSION: ICD-10-CM

## 2021-08-10 DIAGNOSIS — R23.3 SPONTANEOUS ECCHYMOSES: ICD-10-CM

## 2021-08-11 LAB
ALBUMIN SERPL-MCNC: 3.5 G/DL (ref 3.5–5)
ALP SERPL-CCNC: 112 U/L (ref 45–120)
ALT SERPL W P-5'-P-CCNC: 31 U/L (ref 0–45)
ANION GAP SERPL CALCULATED.3IONS-SCNC: 9 MMOL/L (ref 5–18)
AST SERPL W P-5'-P-CCNC: 39 U/L (ref 0–40)
BILIRUB DIRECT SERPL-MCNC: 0.1 MG/DL
BILIRUB SERPL-MCNC: 0.3 MG/DL (ref 0–1)
BUN SERPL-MCNC: 21 MG/DL (ref 8–28)
CALCIUM SERPL-MCNC: 10.1 MG/DL (ref 8.5–10.5)
CHLORIDE BLD-SCNC: 102 MMOL/L (ref 98–107)
CO2 SERPL-SCNC: 29 MMOL/L (ref 22–31)
CREAT SERPL-MCNC: 0.87 MG/DL (ref 0.6–1.1)
GFR SERPL CREATININE-BSD FRML MDRD: 62 ML/MIN/1.73M2
GLUCOSE BLD-MCNC: 122 MG/DL (ref 70–125)
POTASSIUM BLD-SCNC: 3.8 MMOL/L (ref 3.5–5)
PROT SERPL-MCNC: 7.4 G/DL (ref 6–8)
SODIUM SERPL-SCNC: 140 MMOL/L (ref 136–145)

## 2021-08-11 PROCEDURE — 36415 COLL VENOUS BLD VENIPUNCTURE: CPT | Mod: ORL | Performed by: FAMILY MEDICINE

## 2021-08-11 PROCEDURE — P9603 ONE-WAY ALLOW PRORATED MILES: HCPCS | Performed by: FAMILY MEDICINE

## 2021-08-11 PROCEDURE — 82248 BILIRUBIN DIRECT: CPT | Mod: ORL | Performed by: FAMILY MEDICINE

## 2022-01-01 ENCOUNTER — HOSPITAL ENCOUNTER (INPATIENT)
Facility: CLINIC | Age: 82
LOS: 6 days | Discharge: HOSPICE | DRG: 871 | End: 2022-10-18
Attending: EMERGENCY MEDICINE | Admitting: INTERNAL MEDICINE
Payer: COMMERCIAL

## 2022-01-01 ENCOUNTER — APPOINTMENT (OUTPATIENT)
Dept: SPEECH THERAPY | Facility: CLINIC | Age: 82
DRG: 871 | End: 2022-01-01
Payer: COMMERCIAL

## 2022-01-01 ENCOUNTER — APPOINTMENT (OUTPATIENT)
Dept: GENERAL RADIOLOGY | Facility: CLINIC | Age: 82
DRG: 871 | End: 2022-01-01
Attending: EMERGENCY MEDICINE
Payer: COMMERCIAL

## 2022-01-01 ENCOUNTER — APPOINTMENT (OUTPATIENT)
Dept: PHYSICAL THERAPY | Facility: CLINIC | Age: 82
DRG: 871 | End: 2022-01-01
Attending: INTERNAL MEDICINE
Payer: COMMERCIAL

## 2022-01-01 ENCOUNTER — APPOINTMENT (OUTPATIENT)
Dept: SPEECH THERAPY | Facility: CLINIC | Age: 82
DRG: 871 | End: 2022-01-01
Attending: INTERNAL MEDICINE
Payer: COMMERCIAL

## 2022-01-01 ENCOUNTER — APPOINTMENT (OUTPATIENT)
Dept: CT IMAGING | Facility: CLINIC | Age: 82
DRG: 871 | End: 2022-01-01
Attending: EMERGENCY MEDICINE
Payer: COMMERCIAL

## 2022-01-01 ENCOUNTER — HOSPITAL ENCOUNTER (INPATIENT)
Facility: CLINIC | Age: 82
LOS: 3 days | Discharge: NURSING, REHAB OR LONG-TERM CARE | DRG: 872 | End: 2022-07-06
Attending: EMERGENCY MEDICINE | Admitting: HOSPITALIST
Payer: COMMERCIAL

## 2022-01-01 ENCOUNTER — APPOINTMENT (OUTPATIENT)
Dept: PHYSICAL THERAPY | Facility: CLINIC | Age: 82
DRG: 871 | End: 2022-01-01
Payer: COMMERCIAL

## 2022-01-01 ENCOUNTER — APPOINTMENT (OUTPATIENT)
Dept: CARDIOLOGY | Facility: CLINIC | Age: 82
DRG: 871 | End: 2022-01-01
Attending: INTERNAL MEDICINE
Payer: COMMERCIAL

## 2022-01-01 ENCOUNTER — PATIENT OUTREACH (OUTPATIENT)
Dept: CARE COORDINATION | Facility: CLINIC | Age: 82
End: 2022-01-01

## 2022-01-01 ENCOUNTER — APPOINTMENT (OUTPATIENT)
Dept: CT IMAGING | Facility: CLINIC | Age: 82
DRG: 871 | End: 2022-01-01
Attending: INTERNAL MEDICINE
Payer: COMMERCIAL

## 2022-01-01 ENCOUNTER — APPOINTMENT (OUTPATIENT)
Dept: GENERAL RADIOLOGY | Facility: CLINIC | Age: 82
DRG: 872 | End: 2022-01-01
Attending: PODIATRIST
Payer: COMMERCIAL

## 2022-01-01 ENCOUNTER — APPOINTMENT (OUTPATIENT)
Dept: GENERAL RADIOLOGY | Facility: CLINIC | Age: 82
DRG: 872 | End: 2022-01-01
Attending: EMERGENCY MEDICINE
Payer: COMMERCIAL

## 2022-01-01 VITALS
BODY MASS INDEX: 19.56 KG/M2 | SYSTOLIC BLOOD PRESSURE: 88 MMHG | RESPIRATION RATE: 18 BRPM | TEMPERATURE: 97.9 F | HEART RATE: 72 BPM | OXYGEN SATURATION: 86 % | DIASTOLIC BLOOD PRESSURE: 62 MMHG | WEIGHT: 121.2 LBS

## 2022-01-01 VITALS
DIASTOLIC BLOOD PRESSURE: 77 MMHG | TEMPERATURE: 98.5 F | WEIGHT: 143.3 LBS | SYSTOLIC BLOOD PRESSURE: 161 MMHG | HEIGHT: 66 IN | BODY MASS INDEX: 23.03 KG/M2 | RESPIRATION RATE: 24 BRPM | HEART RATE: 67 BPM | OXYGEN SATURATION: 94 %

## 2022-01-01 DIAGNOSIS — F03.C0 SEVERE DEMENTIA, UNSPECIFIED DEMENTIA TYPE, UNSPECIFIED WHETHER BEHAVIORAL, PSYCHOTIC, OR MOOD DISTURBANCE OR ANXIETY (H): Primary | ICD-10-CM

## 2022-01-01 DIAGNOSIS — R41.82 ALTERED MENTAL STATUS, UNSPECIFIED ALTERED MENTAL STATUS TYPE: ICD-10-CM

## 2022-01-01 DIAGNOSIS — I25.10 CORONARY ARTERY DISEASE DUE TO LIPID RICH PLAQUE: ICD-10-CM

## 2022-01-01 DIAGNOSIS — Z51.5 END OF LIFE CARE: ICD-10-CM

## 2022-01-01 DIAGNOSIS — A41.9 SEPSIS, DUE TO UNSPECIFIED ORGANISM, UNSPECIFIED WHETHER ACUTE ORGAN DYSFUNCTION PRESENT (H): ICD-10-CM

## 2022-01-01 DIAGNOSIS — L97.922 SKIN ULCER OF LEFT LOWER LEG WITH FAT LAYER EXPOSED (H): Primary | ICD-10-CM

## 2022-01-01 DIAGNOSIS — F03.918 DEMENTIA WITH BEHAVIORAL DISTURBANCE (H): ICD-10-CM

## 2022-01-01 DIAGNOSIS — I10 BENIGN ESSENTIAL HYPERTENSION: ICD-10-CM

## 2022-01-01 DIAGNOSIS — J18.9 PNEUMONIA OF LEFT LOWER LOBE DUE TO INFECTIOUS ORGANISM: ICD-10-CM

## 2022-01-01 DIAGNOSIS — N39.0 ACUTE UTI: ICD-10-CM

## 2022-01-01 DIAGNOSIS — I21.4 NSTEMI (NON-ST ELEVATED MYOCARDIAL INFARCTION) (H): ICD-10-CM

## 2022-01-01 DIAGNOSIS — Z71.89 OTHER SPECIFIED COUNSELING: ICD-10-CM

## 2022-01-01 DIAGNOSIS — A41.89 SEPSIS DUE TO OTHER ETIOLOGY (H): ICD-10-CM

## 2022-01-01 DIAGNOSIS — I25.83 CORONARY ARTERY DISEASE DUE TO LIPID RICH PLAQUE: ICD-10-CM

## 2022-01-01 LAB
ACINETOBACTER SPECIES: NOT DETECTED
ACINETOBACTER SPECIES: NOT DETECTED
ALBUMIN SERPL-MCNC: 2.7 G/DL (ref 3.4–5)
ALBUMIN UR-MCNC: 30 MG/DL
ALBUMIN UR-MCNC: 70 MG/DL
ALP SERPL-CCNC: 90 U/L (ref 40–150)
ALT SERPL W P-5'-P-CCNC: 50 U/L (ref 0–50)
ANION GAP SERPL CALCULATED.3IONS-SCNC: 2 MMOL/L (ref 3–14)
ANION GAP SERPL CALCULATED.3IONS-SCNC: 3 MMOL/L (ref 3–14)
ANION GAP SERPL CALCULATED.3IONS-SCNC: 3 MMOL/L (ref 3–14)
ANION GAP SERPL CALCULATED.3IONS-SCNC: 4 MMOL/L (ref 3–14)
ANION GAP SERPL CALCULATED.3IONS-SCNC: 5 MMOL/L (ref 3–14)
ANION GAP SERPL CALCULATED.3IONS-SCNC: 6 MMOL/L (ref 3–14)
APPEARANCE UR: ABNORMAL
APPEARANCE UR: ABNORMAL
AST SERPL W P-5'-P-CCNC: 70 U/L (ref 0–45)
ATRIAL RATE - MUSE: 58 BPM
ATRIAL RATE - MUSE: 84 BPM
BACTERIA #/AREA URNS HPF: ABNORMAL /HPF
BACTERIA BLD CULT: ABNORMAL
BACTERIA BLD CULT: NO GROWTH
BACTERIA UR CULT: ABNORMAL
BASOPHILS # BLD AUTO: 0 10E3/UL (ref 0–0.2)
BASOPHILS # BLD MANUAL: 0 10E3/UL (ref 0–0.2)
BASOPHILS NFR BLD AUTO: 0 %
BASOPHILS NFR BLD MANUAL: 0 %
BILIRUB SERPL-MCNC: 0.5 MG/DL (ref 0.2–1.3)
BILIRUB UR QL STRIP: NEGATIVE
BILIRUB UR QL STRIP: NEGATIVE
BUN SERPL-MCNC: 13 MG/DL (ref 7–30)
BUN SERPL-MCNC: 17 MG/DL (ref 7–30)
BUN SERPL-MCNC: 19 MG/DL (ref 7–30)
BUN SERPL-MCNC: 20 MG/DL (ref 7–30)
BUN SERPL-MCNC: 23 MG/DL (ref 7–30)
BUN SERPL-MCNC: 33 MG/DL (ref 7–30)
BUN SERPL-MCNC: 44 MG/DL (ref 7–30)
BUN SERPL-MCNC: 7 MG/DL (ref 7–30)
CALCIUM SERPL-MCNC: 10 MG/DL (ref 8.5–10.1)
CALCIUM SERPL-MCNC: 8.8 MG/DL (ref 8.5–10.1)
CALCIUM SERPL-MCNC: 9 MG/DL (ref 8.5–10.1)
CALCIUM SERPL-MCNC: 9.1 MG/DL (ref 8.5–10.1)
CALCIUM SERPL-MCNC: 9.2 MG/DL (ref 8.5–10.1)
CALCIUM SERPL-MCNC: 9.2 MG/DL (ref 8.5–10.1)
CALCIUM SERPL-MCNC: 9.3 MG/DL (ref 8.5–10.1)
CALCIUM SERPL-MCNC: 9.5 MG/DL (ref 8.5–10.1)
CHLORIDE BLD-SCNC: 101 MMOL/L (ref 94–109)
CHLORIDE BLD-SCNC: 105 MMOL/L (ref 94–109)
CHLORIDE BLD-SCNC: 106 MMOL/L (ref 94–109)
CHLORIDE BLD-SCNC: 107 MMOL/L (ref 94–109)
CHLORIDE BLD-SCNC: 111 MMOL/L (ref 94–109)
CHLORIDE BLD-SCNC: 111 MMOL/L (ref 94–109)
CHLORIDE BLD-SCNC: 115 MMOL/L (ref 94–109)
CHLORIDE BLD-SCNC: 117 MMOL/L (ref 94–109)
CITROBACTER SPECIES: NOT DETECTED
CITROBACTER SPECIES: NOT DETECTED
CK SERPL-CCNC: 70 U/L (ref 30–225)
CO2 SERPL-SCNC: 26 MMOL/L (ref 20–32)
CO2 SERPL-SCNC: 28 MMOL/L (ref 20–32)
CO2 SERPL-SCNC: 29 MMOL/L (ref 20–32)
CO2 SERPL-SCNC: 30 MMOL/L (ref 20–32)
CO2 SERPL-SCNC: 30 MMOL/L (ref 20–32)
CO2 SERPL-SCNC: 32 MMOL/L (ref 20–32)
COLOR UR AUTO: ABNORMAL
COLOR UR AUTO: ABNORMAL
CREAT SERPL-MCNC: 0.47 MG/DL (ref 0.52–1.04)
CREAT SERPL-MCNC: 0.49 MG/DL (ref 0.52–1.04)
CREAT SERPL-MCNC: 0.53 MG/DL (ref 0.52–1.04)
CREAT SERPL-MCNC: 0.56 MG/DL (ref 0.52–1.04)
CREAT SERPL-MCNC: 0.58 MG/DL (ref 0.52–1.04)
CREAT SERPL-MCNC: 0.62 MG/DL (ref 0.52–1.04)
CREAT SERPL-MCNC: 0.73 MG/DL (ref 0.52–1.04)
CREAT SERPL-MCNC: 0.95 MG/DL (ref 0.52–1.04)
CRP SERPL-MCNC: 62.2 MG/L (ref 0–8)
CTX-M: NOT DETECTED
CTX-M: NOT DETECTED
D DIMER PPP FEU-MCNC: 8.83 UG/ML FEU (ref 0–0.5)
DIASTOLIC BLOOD PRESSURE - MUSE: NORMAL MMHG
DIASTOLIC BLOOD PRESSURE - MUSE: NORMAL MMHG
ENTEROBACTER SPECIES: NOT DETECTED
ENTEROBACTER SPECIES: NOT DETECTED
EOSINOPHIL # BLD AUTO: 0 10E3/UL (ref 0–0.7)
EOSINOPHIL # BLD MANUAL: 0 10E3/UL (ref 0–0.7)
EOSINOPHIL NFR BLD AUTO: 0 %
EOSINOPHIL NFR BLD MANUAL: 0 %
ERYTHROCYTE [DISTWIDTH] IN BLOOD BY AUTOMATED COUNT: 12.1 % (ref 10–15)
ERYTHROCYTE [DISTWIDTH] IN BLOOD BY AUTOMATED COUNT: 12.2 % (ref 10–15)
ERYTHROCYTE [DISTWIDTH] IN BLOOD BY AUTOMATED COUNT: 12.2 % (ref 10–15)
ERYTHROCYTE [DISTWIDTH] IN BLOOD BY AUTOMATED COUNT: 12.6 % (ref 10–15)
ERYTHROCYTE [DISTWIDTH] IN BLOOD BY AUTOMATED COUNT: 13 % (ref 10–15)
ERYTHROCYTE [DISTWIDTH] IN BLOOD BY AUTOMATED COUNT: 13 % (ref 10–15)
ERYTHROCYTE [SEDIMENTATION RATE] IN BLOOD BY WESTERGREN METHOD: 47 MM/HR (ref 0–30)
ESCHERICHIA COLI: DETECTED
ESCHERICHIA COLI: DETECTED
FOLATE SERPL-MCNC: 28.1 NG/ML (ref 4.6–34.8)
GFR SERPL CREATININE-BSD FRML MDRD: 59 ML/MIN/1.73M2
GFR SERPL CREATININE-BSD FRML MDRD: 81 ML/MIN/1.73M2
GFR SERPL CREATININE-BSD FRML MDRD: 88 ML/MIN/1.73M2
GFR SERPL CREATININE-BSD FRML MDRD: 89 ML/MIN/1.73M2
GFR SERPL CREATININE-BSD FRML MDRD: 90 ML/MIN/1.73M2
GFR SERPL CREATININE-BSD FRML MDRD: >90 ML/MIN/1.73M2
GLUCOSE BLD-MCNC: 132 MG/DL (ref 70–99)
GLUCOSE BLD-MCNC: 134 MG/DL (ref 70–99)
GLUCOSE BLD-MCNC: 136 MG/DL (ref 70–99)
GLUCOSE BLD-MCNC: 147 MG/DL (ref 70–99)
GLUCOSE BLD-MCNC: 192 MG/DL (ref 70–99)
GLUCOSE BLD-MCNC: 218 MG/DL (ref 70–99)
GLUCOSE BLD-MCNC: 289 MG/DL (ref 70–99)
GLUCOSE BLD-MCNC: 312 MG/DL (ref 70–99)
GLUCOSE BLDC GLUCOMTR-MCNC: 105 MG/DL (ref 70–99)
GLUCOSE BLDC GLUCOMTR-MCNC: 128 MG/DL (ref 70–99)
GLUCOSE BLDC GLUCOMTR-MCNC: 134 MG/DL (ref 70–99)
GLUCOSE BLDC GLUCOMTR-MCNC: 134 MG/DL (ref 70–99)
GLUCOSE BLDC GLUCOMTR-MCNC: 136 MG/DL (ref 70–99)
GLUCOSE BLDC GLUCOMTR-MCNC: 138 MG/DL (ref 70–99)
GLUCOSE BLDC GLUCOMTR-MCNC: 138 MG/DL (ref 70–99)
GLUCOSE BLDC GLUCOMTR-MCNC: 141 MG/DL (ref 70–99)
GLUCOSE BLDC GLUCOMTR-MCNC: 159 MG/DL (ref 70–99)
GLUCOSE BLDC GLUCOMTR-MCNC: 165 MG/DL (ref 70–99)
GLUCOSE BLDC GLUCOMTR-MCNC: 166 MG/DL (ref 70–99)
GLUCOSE BLDC GLUCOMTR-MCNC: 171 MG/DL (ref 70–99)
GLUCOSE BLDC GLUCOMTR-MCNC: 174 MG/DL (ref 70–99)
GLUCOSE BLDC GLUCOMTR-MCNC: 175 MG/DL (ref 70–99)
GLUCOSE BLDC GLUCOMTR-MCNC: 185 MG/DL (ref 70–99)
GLUCOSE BLDC GLUCOMTR-MCNC: 187 MG/DL (ref 70–99)
GLUCOSE BLDC GLUCOMTR-MCNC: 188 MG/DL (ref 70–99)
GLUCOSE BLDC GLUCOMTR-MCNC: 190 MG/DL (ref 70–99)
GLUCOSE BLDC GLUCOMTR-MCNC: 191 MG/DL (ref 70–99)
GLUCOSE BLDC GLUCOMTR-MCNC: 195 MG/DL (ref 70–99)
GLUCOSE BLDC GLUCOMTR-MCNC: 195 MG/DL (ref 70–99)
GLUCOSE BLDC GLUCOMTR-MCNC: 198 MG/DL (ref 70–99)
GLUCOSE BLDC GLUCOMTR-MCNC: 206 MG/DL (ref 70–99)
GLUCOSE BLDC GLUCOMTR-MCNC: 207 MG/DL (ref 70–99)
GLUCOSE BLDC GLUCOMTR-MCNC: 208 MG/DL (ref 70–99)
GLUCOSE BLDC GLUCOMTR-MCNC: 209 MG/DL (ref 70–99)
GLUCOSE BLDC GLUCOMTR-MCNC: 212 MG/DL (ref 70–99)
GLUCOSE BLDC GLUCOMTR-MCNC: 222 MG/DL (ref 70–99)
GLUCOSE BLDC GLUCOMTR-MCNC: 225 MG/DL (ref 70–99)
GLUCOSE BLDC GLUCOMTR-MCNC: 229 MG/DL (ref 70–99)
GLUCOSE BLDC GLUCOMTR-MCNC: 234 MG/DL (ref 70–99)
GLUCOSE BLDC GLUCOMTR-MCNC: 235 MG/DL (ref 70–99)
GLUCOSE BLDC GLUCOMTR-MCNC: 245 MG/DL (ref 70–99)
GLUCOSE BLDC GLUCOMTR-MCNC: 249 MG/DL (ref 70–99)
GLUCOSE BLDC GLUCOMTR-MCNC: 250 MG/DL (ref 70–99)
GLUCOSE BLDC GLUCOMTR-MCNC: 254 MG/DL (ref 70–99)
GLUCOSE BLDC GLUCOMTR-MCNC: 257 MG/DL (ref 70–99)
GLUCOSE BLDC GLUCOMTR-MCNC: 266 MG/DL (ref 70–99)
GLUCOSE BLDC GLUCOMTR-MCNC: 268 MG/DL (ref 70–99)
GLUCOSE BLDC GLUCOMTR-MCNC: 280 MG/DL (ref 70–99)
GLUCOSE BLDC GLUCOMTR-MCNC: 283 MG/DL (ref 70–99)
GLUCOSE BLDC GLUCOMTR-MCNC: 297 MG/DL (ref 70–99)
GLUCOSE BLDC GLUCOMTR-MCNC: 352 MG/DL (ref 70–99)
GLUCOSE UR STRIP-MCNC: NEGATIVE MG/DL
GLUCOSE UR STRIP-MCNC: NEGATIVE MG/DL
HBA1C MFR BLD: 5.8 % (ref 0–5.6)
HBA1C MFR BLD: 6 % (ref 0–5.6)
HCO3 BLDV-SCNC: 28 MMOL/L (ref 21–28)
HCO3 BLDV-SCNC: 31 MMOL/L (ref 21–28)
HCO3 BLDV-SCNC: 38 MMOL/L (ref 21–28)
HCT VFR BLD AUTO: 31 % (ref 35–47)
HCT VFR BLD AUTO: 31.1 % (ref 35–47)
HCT VFR BLD AUTO: 31.8 % (ref 35–47)
HCT VFR BLD AUTO: 32.1 % (ref 35–47)
HCT VFR BLD AUTO: 41.4 % (ref 35–47)
HCT VFR BLD AUTO: 45.5 % (ref 35–47)
HGB BLD-MCNC: 10.3 G/DL (ref 11.7–15.7)
HGB BLD-MCNC: 10.4 G/DL (ref 11.7–15.7)
HGB BLD-MCNC: 12.9 G/DL (ref 11.7–15.7)
HGB BLD-MCNC: 13.8 G/DL (ref 11.7–15.7)
HGB BLD-MCNC: 9.8 G/DL (ref 11.7–15.7)
HGB BLD-MCNC: 9.9 G/DL (ref 11.7–15.7)
HGB UR QL STRIP: ABNORMAL
HGB UR QL STRIP: ABNORMAL
IMM GRANULOCYTES # BLD: 0.1 10E3/UL
IMM GRANULOCYTES NFR BLD: 1 %
IMP: NOT DETECTED
IMP: NOT DETECTED
INTERPRETATION ECG - MUSE: NORMAL
INTERPRETATION ECG - MUSE: NORMAL
KETONES UR STRIP-MCNC: ABNORMAL MG/DL
KETONES UR STRIP-MCNC: NEGATIVE MG/DL
KLEBSIELLA OXYTOCA: NOT DETECTED
KLEBSIELLA OXYTOCA: NOT DETECTED
KLEBSIELLA PNEUMONIAE: NOT DETECTED
KLEBSIELLA PNEUMONIAE: NOT DETECTED
KPC: NOT DETECTED
KPC: NOT DETECTED
LACTATE BLD-SCNC: 1.3 MMOL/L
LACTATE BLD-SCNC: 2.2 MMOL/L
LACTATE BLD-SCNC: 2.4 MMOL/L
LACTATE SERPL-SCNC: 1.4 MMOL/L (ref 0.7–2)
LACTATE SERPL-SCNC: 1.4 MMOL/L (ref 0.7–2)
LACTATE SERPL-SCNC: 2 MMOL/L (ref 0.7–2)
LEUKOCYTE ESTERASE UR QL STRIP: ABNORMAL
LEUKOCYTE ESTERASE UR QL STRIP: ABNORMAL
LVEF ECHO: NORMAL
LYMPHOCYTES # BLD AUTO: 1.4 10E3/UL (ref 0.8–5.3)
LYMPHOCYTES # BLD MANUAL: 1.3 10E3/UL (ref 0.8–5.3)
LYMPHOCYTES NFR BLD AUTO: 14 %
LYMPHOCYTES NFR BLD MANUAL: 13 %
MAGNESIUM SERPL-MCNC: 1.5 MG/DL (ref 1.6–2.3)
MAGNESIUM SERPL-MCNC: 1.6 MG/DL (ref 1.6–2.3)
MAGNESIUM SERPL-MCNC: 1.9 MG/DL (ref 1.6–2.3)
MAGNESIUM SERPL-MCNC: 2.1 MG/DL (ref 1.6–2.3)
MCH RBC QN AUTO: 33.9 PG (ref 26.5–33)
MCH RBC QN AUTO: 34 PG (ref 26.5–33)
MCH RBC QN AUTO: 34.4 PG (ref 26.5–33)
MCH RBC QN AUTO: 34.9 PG (ref 26.5–33)
MCH RBC QN AUTO: 35.1 PG (ref 26.5–33)
MCH RBC QN AUTO: 35.4 PG (ref 26.5–33)
MCHC RBC AUTO-ENTMCNC: 30.3 G/DL (ref 31.5–36.5)
MCHC RBC AUTO-ENTMCNC: 31.2 G/DL (ref 31.5–36.5)
MCHC RBC AUTO-ENTMCNC: 31.6 G/DL (ref 31.5–36.5)
MCHC RBC AUTO-ENTMCNC: 31.8 G/DL (ref 31.5–36.5)
MCHC RBC AUTO-ENTMCNC: 32.1 G/DL (ref 31.5–36.5)
MCHC RBC AUTO-ENTMCNC: 32.7 G/DL (ref 31.5–36.5)
MCV RBC AUTO: 107 FL (ref 78–100)
MCV RBC AUTO: 107 FL (ref 78–100)
MCV RBC AUTO: 109 FL (ref 78–100)
MCV RBC AUTO: 110 FL (ref 78–100)
MCV RBC AUTO: 111 FL (ref 78–100)
MCV RBC AUTO: 112 FL (ref 78–100)
MONOCYTES # BLD AUTO: 0.9 10E3/UL (ref 0–1.3)
MONOCYTES # BLD MANUAL: 0.2 10E3/UL (ref 0–1.3)
MONOCYTES NFR BLD AUTO: 9 %
MONOCYTES NFR BLD MANUAL: 2 %
MUCOUS THREADS #/AREA URNS LPF: PRESENT /LPF
NDM: NOT DETECTED
NDM: NOT DETECTED
NEUTROPHILS # BLD AUTO: 7.7 10E3/UL (ref 1.6–8.3)
NEUTROPHILS # BLD MANUAL: 8.8 10E3/UL (ref 1.6–8.3)
NEUTROPHILS NFR BLD AUTO: 76 %
NEUTROPHILS NFR BLD MANUAL: 85 %
NITRATE UR QL: NEGATIVE
NITRATE UR QL: POSITIVE
NRBC # BLD AUTO: 0 10E3/UL
NRBC BLD AUTO-RTO: 0 /100
NT-PROBNP SERPL-MCNC: 1837 PG/ML (ref 0–1800)
OXA (DETECTED/NOT DETECTED): NOT DETECTED
OXA (DETECTED/NOT DETECTED): NOT DETECTED
P AXIS - MUSE: 87 DEGREES
P AXIS - MUSE: 89 DEGREES
PCO2 BLDV: 47 MM HG (ref 40–50)
PCO2 BLDV: 53 MM HG (ref 40–50)
PCO2 BLDV: 57 MM HG (ref 40–50)
PH BLDV: 7.34 [PH] (ref 7.32–7.43)
PH BLDV: 7.42 [PH] (ref 7.32–7.43)
PH BLDV: 7.44 [PH] (ref 7.32–7.43)
PH UR STRIP: 5.5 [PH] (ref 5–7)
PH UR STRIP: 7.5 [PH] (ref 5–7)
PLAT MORPH BLD: ABNORMAL
PLATELET # BLD AUTO: 125 10E3/UL (ref 150–450)
PLATELET # BLD AUTO: 129 10E3/UL (ref 150–450)
PLATELET # BLD AUTO: 147 10E3/UL (ref 150–450)
PLATELET # BLD AUTO: 148 10E3/UL (ref 150–450)
PLATELET # BLD AUTO: 173 10E3/UL (ref 150–450)
PLATELET # BLD AUTO: 194 10E3/UL (ref 150–450)
PO2 BLDV: 22 MM HG (ref 25–47)
PO2 BLDV: 27 MM HG (ref 25–47)
PO2 BLDV: 36 MM HG (ref 25–47)
POTASSIUM BLD-SCNC: 3.3 MMOL/L (ref 3.4–5.3)
POTASSIUM BLD-SCNC: 3.4 MMOL/L (ref 3.4–5.3)
POTASSIUM BLD-SCNC: 3.7 MMOL/L (ref 3.4–5.3)
POTASSIUM BLD-SCNC: 4.1 MMOL/L (ref 3.4–5.3)
POTASSIUM BLD-SCNC: 4.1 MMOL/L (ref 3.4–5.3)
POTASSIUM BLD-SCNC: 4.3 MMOL/L (ref 3.4–5.3)
PR INTERVAL - MUSE: 118 MS
PR INTERVAL - MUSE: 176 MS
PROCALCITONIN SERPL-MCNC: 3.31 NG/ML
PROT SERPL-MCNC: 6.1 G/DL (ref 6.8–8.8)
PROTEUS SPECIES: NOT DETECTED
PROTEUS SPECIES: NOT DETECTED
PSEUDOMONAS AERUGINOSA: NOT DETECTED
PSEUDOMONAS AERUGINOSA: NOT DETECTED
QRS DURATION - MUSE: 74 MS
QRS DURATION - MUSE: 76 MS
QT - MUSE: 370 MS
QT - MUSE: 468 MS
QTC - MUSE: 437 MS
QTC - MUSE: 459 MS
R AXIS - MUSE: 39 DEGREES
R AXIS - MUSE: 65 DEGREES
RBC # BLD AUTO: 2.8 10E6/UL (ref 3.8–5.2)
RBC # BLD AUTO: 2.81 10E6/UL (ref 3.8–5.2)
RBC # BLD AUTO: 2.96 10E6/UL (ref 3.8–5.2)
RBC # BLD AUTO: 2.99 10E6/UL (ref 3.8–5.2)
RBC # BLD AUTO: 3.8 10E6/UL (ref 3.8–5.2)
RBC # BLD AUTO: 4.06 10E6/UL (ref 3.8–5.2)
RBC MORPH BLD: ABNORMAL
RBC URINE: 11 /HPF
RBC URINE: 7 /HPF
SAO2 % BLDV: 38 % (ref 94–100)
SAO2 % BLDV: 44 % (ref 94–100)
SAO2 % BLDV: 70 % (ref 94–100)
SARS-COV-2 RNA RESP QL NAA+PROBE: NEGATIVE
SARS-COV-2 RNA RESP QL NAA+PROBE: NEGATIVE
SODIUM SERPL-SCNC: 133 MMOL/L (ref 133–144)
SODIUM SERPL-SCNC: 137 MMOL/L (ref 133–144)
SODIUM SERPL-SCNC: 140 MMOL/L (ref 133–144)
SODIUM SERPL-SCNC: 142 MMOL/L (ref 133–144)
SODIUM SERPL-SCNC: 142 MMOL/L (ref 133–144)
SODIUM SERPL-SCNC: 143 MMOL/L (ref 133–144)
SODIUM SERPL-SCNC: 148 MMOL/L (ref 133–144)
SODIUM SERPL-SCNC: 149 MMOL/L (ref 133–144)
SODIUM SERPL-SCNC: 150 MMOL/L (ref 133–144)
SODIUM SERPL-SCNC: 152 MMOL/L (ref 133–144)
SODIUM SERPL-SCNC: 154 MMOL/L (ref 133–144)
SODIUM SERPL-SCNC: 155 MMOL/L (ref 133–144)
SP GR UR STRIP: 1.02 (ref 1–1.03)
SP GR UR STRIP: 1.02 (ref 1–1.03)
SQUAMOUS EPITHELIAL: 3 /HPF
SYSTOLIC BLOOD PRESSURE - MUSE: NORMAL MMHG
SYSTOLIC BLOOD PRESSURE - MUSE: NORMAL MMHG
T AXIS - MUSE: 135 DEGREES
T AXIS - MUSE: 64 DEGREES
TROPONIN I SERPL HS-MCNC: 13 NG/L
TROPONIN I SERPL HS-MCNC: 43 NG/L
TROPONIN I SERPL HS-MCNC: 49 NG/L
TROPONIN I SERPL HS-MCNC: 60 NG/L
TSH SERPL DL<=0.005 MIU/L-ACNC: 0.98 MU/L (ref 0.4–4)
UROBILINOGEN UR STRIP-MCNC: NORMAL MG/DL
UROBILINOGEN UR STRIP-MCNC: NORMAL MG/DL
VENTRICULAR RATE- MUSE: 58 BPM
VENTRICULAR RATE- MUSE: 84 BPM
VIM: NOT DETECTED
VIM: NOT DETECTED
VIT B12 SERPL-MCNC: 619 PG/ML (ref 232–1245)
WBC # BLD AUTO: 10.1 10E3/UL (ref 4–11)
WBC # BLD AUTO: 10.3 10E3/UL (ref 4–11)
WBC # BLD AUTO: 14.4 10E3/UL (ref 4–11)
WBC # BLD AUTO: 18.3 10E3/UL (ref 4–11)
WBC # BLD AUTO: 9 10E3/UL (ref 4–11)
WBC # BLD AUTO: 9.9 10E3/UL (ref 4–11)
WBC CLUMPS #/AREA URNS HPF: PRESENT /HPF
WBC URINE: 23 /HPF
WBC URINE: >182 /HPF

## 2022-01-01 PROCEDURE — 96361 HYDRATE IV INFUSION ADD-ON: CPT

## 2022-01-01 PROCEDURE — 85652 RBC SED RATE AUTOMATED: CPT | Performed by: EMERGENCY MEDICINE

## 2022-01-01 PROCEDURE — 250N000011 HC RX IP 250 OP 636: Performed by: EMERGENCY MEDICINE

## 2022-01-01 PROCEDURE — 83735 ASSAY OF MAGNESIUM: CPT | Performed by: HOSPITALIST

## 2022-01-01 PROCEDURE — 83036 HEMOGLOBIN GLYCOSYLATED A1C: CPT | Performed by: HOSPITALIST

## 2022-01-01 PROCEDURE — 210N000002 HC R&B HEART CARE

## 2022-01-01 PROCEDURE — 80048 BASIC METABOLIC PNL TOTAL CA: CPT | Performed by: HOSPITALIST

## 2022-01-01 PROCEDURE — 96375 TX/PRO/DX INJ NEW DRUG ADDON: CPT

## 2022-01-01 PROCEDURE — 81001 URINALYSIS AUTO W/SCOPE: CPT | Performed by: EMERGENCY MEDICINE

## 2022-01-01 PROCEDURE — 250N000013 HC RX MED GY IP 250 OP 250 PS 637: Performed by: INTERNAL MEDICINE

## 2022-01-01 PROCEDURE — 84484 ASSAY OF TROPONIN QUANT: CPT | Performed by: INTERNAL MEDICINE

## 2022-01-01 PROCEDURE — 36415 COLL VENOUS BLD VENIPUNCTURE: CPT | Performed by: HOSPITALIST

## 2022-01-01 PROCEDURE — 250N000013 HC RX MED GY IP 250 OP 250 PS 637: Performed by: HOSPITALIST

## 2022-01-01 PROCEDURE — 36415 COLL VENOUS BLD VENIPUNCTURE: CPT | Performed by: EMERGENCY MEDICINE

## 2022-01-01 PROCEDURE — 36415 COLL VENOUS BLD VENIPUNCTURE: CPT | Performed by: INTERNAL MEDICINE

## 2022-01-01 PROCEDURE — 258N000003 HC RX IP 258 OP 636: Performed by: HOSPITALIST

## 2022-01-01 PROCEDURE — 96368 THER/DIAG CONCURRENT INF: CPT

## 2022-01-01 PROCEDURE — 250N000013 HC RX MED GY IP 250 OP 250 PS 637: Performed by: EMERGENCY MEDICINE

## 2022-01-01 PROCEDURE — 83605 ASSAY OF LACTIC ACID: CPT

## 2022-01-01 PROCEDURE — 71275 CT ANGIOGRAPHY CHEST: CPT

## 2022-01-01 PROCEDURE — 83735 ASSAY OF MAGNESIUM: CPT | Performed by: EMERGENCY MEDICINE

## 2022-01-01 PROCEDURE — 83605 ASSAY OF LACTIC ACID: CPT | Performed by: HOSPITALIST

## 2022-01-01 PROCEDURE — 82803 BLOOD GASES ANY COMBINATION: CPT

## 2022-01-01 PROCEDURE — 120N000001 HC R&B MED SURG/OB

## 2022-01-01 PROCEDURE — 96366 THER/PROPH/DIAG IV INF ADDON: CPT

## 2022-01-01 PROCEDURE — 84484 ASSAY OF TROPONIN QUANT: CPT | Performed by: EMERGENCY MEDICINE

## 2022-01-01 PROCEDURE — 93306 TTE W/DOPPLER COMPLETE: CPT | Mod: 26 | Performed by: INTERNAL MEDICINE

## 2022-01-01 PROCEDURE — 87077 CULTURE AEROBIC IDENTIFY: CPT | Performed by: EMERGENCY MEDICINE

## 2022-01-01 PROCEDURE — 99223 1ST HOSP IP/OBS HIGH 75: CPT | Performed by: NURSE PRACTITIONER

## 2022-01-01 PROCEDURE — 85027 COMPLETE CBC AUTOMATED: CPT | Performed by: HOSPITALIST

## 2022-01-01 PROCEDURE — 99223 1ST HOSP IP/OBS HIGH 75: CPT | Mod: AI | Performed by: HOSPITALIST

## 2022-01-01 PROCEDURE — 250N000013 HC RX MED GY IP 250 OP 250 PS 637: Performed by: NURSE PRACTITIONER

## 2022-01-01 PROCEDURE — 71045 X-RAY EXAM CHEST 1 VIEW: CPT

## 2022-01-01 PROCEDURE — 99285 EMERGENCY DEPT VISIT HI MDM: CPT | Mod: 25

## 2022-01-01 PROCEDURE — 250N000011 HC RX IP 250 OP 636: Performed by: INTERNAL MEDICINE

## 2022-01-01 PROCEDURE — 97530 THERAPEUTIC ACTIVITIES: CPT | Mod: GP

## 2022-01-01 PROCEDURE — 258N000003 HC RX IP 258 OP 636: Performed by: INTERNAL MEDICINE

## 2022-01-01 PROCEDURE — 87086 URINE CULTURE/COLONY COUNT: CPT | Performed by: EMERGENCY MEDICINE

## 2022-01-01 PROCEDURE — 84295 ASSAY OF SERUM SODIUM: CPT | Performed by: INTERNAL MEDICINE

## 2022-01-01 PROCEDURE — 99223 1ST HOSP IP/OBS HIGH 75: CPT | Mod: AI | Performed by: INTERNAL MEDICINE

## 2022-01-01 PROCEDURE — 99239 HOSP IP/OBS DSCHRG MGMT >30: CPT | Performed by: INTERNAL MEDICINE

## 2022-01-01 PROCEDURE — 80048 BASIC METABOLIC PNL TOTAL CA: CPT | Performed by: EMERGENCY MEDICINE

## 2022-01-01 PROCEDURE — 999N000208 ECHOCARDIOGRAM COMPLETE

## 2022-01-01 PROCEDURE — 85379 FIBRIN DEGRADATION QUANT: CPT | Performed by: EMERGENCY MEDICINE

## 2022-01-01 PROCEDURE — 99232 SBSQ HOSP IP/OBS MODERATE 35: CPT | Mod: GC | Performed by: PODIATRIST

## 2022-01-01 PROCEDURE — 83605 ASSAY OF LACTIC ACID: CPT | Performed by: INTERNAL MEDICINE

## 2022-01-01 PROCEDURE — 70450 CT HEAD/BRAIN W/O DYE: CPT

## 2022-01-01 PROCEDURE — C9113 INJ PANTOPRAZOLE SODIUM, VIA: HCPCS | Performed by: EMERGENCY MEDICINE

## 2022-01-01 PROCEDURE — 80048 BASIC METABOLIC PNL TOTAL CA: CPT | Performed by: INTERNAL MEDICINE

## 2022-01-01 PROCEDURE — 83036 HEMOGLOBIN GLYCOSYLATED A1C: CPT | Performed by: INTERNAL MEDICINE

## 2022-01-01 PROCEDURE — 250N000009 HC RX 250: Performed by: EMERGENCY MEDICINE

## 2022-01-01 PROCEDURE — 84145 PROCALCITONIN (PCT): CPT | Performed by: HOSPITALIST

## 2022-01-01 PROCEDURE — 250N000013 HC RX MED GY IP 250 OP 250 PS 637: Performed by: PODIATRIST

## 2022-01-01 PROCEDURE — 250N000012 HC RX MED GY IP 250 OP 636 PS 637: Performed by: INTERNAL MEDICINE

## 2022-01-01 PROCEDURE — L3260 AMBULATORY SURGICAL BOOT EAC: HCPCS

## 2022-01-01 PROCEDURE — 255N000002 HC RX 255 OP 636: Performed by: INTERNAL MEDICINE

## 2022-01-01 PROCEDURE — 99232 SBSQ HOSP IP/OBS MODERATE 35: CPT | Performed by: INTERNAL MEDICINE

## 2022-01-01 PROCEDURE — 92526 ORAL FUNCTION THERAPY: CPT | Mod: GN

## 2022-01-01 PROCEDURE — C9803 HOPD COVID-19 SPEC COLLECT: HCPCS

## 2022-01-01 PROCEDURE — 99233 SBSQ HOSP IP/OBS HIGH 50: CPT | Performed by: HOSPITALIST

## 2022-01-01 PROCEDURE — 999N000111 HC STATISTIC OT IP EVAL DEFER

## 2022-01-01 PROCEDURE — 73660 X-RAY EXAM OF TOE(S): CPT | Mod: LT

## 2022-01-01 PROCEDURE — 82607 VITAMIN B-12: CPT | Performed by: HOSPITALIST

## 2022-01-01 PROCEDURE — 97162 PT EVAL MOD COMPLEX 30 MIN: CPT | Mod: GP

## 2022-01-01 PROCEDURE — 258N000003 HC RX IP 258 OP 636: Performed by: EMERGENCY MEDICINE

## 2022-01-01 PROCEDURE — 93005 ELECTROCARDIOGRAM TRACING: CPT

## 2022-01-01 PROCEDURE — 258N000002 HC RX IP 258 OP 250: Performed by: INTERNAL MEDICINE

## 2022-01-01 PROCEDURE — 97530 THERAPEUTIC ACTIVITIES: CPT | Mod: GP | Performed by: PHYSICAL THERAPIST

## 2022-01-01 PROCEDURE — 82746 ASSAY OF FOLIC ACID SERUM: CPT | Performed by: HOSPITALIST

## 2022-01-01 PROCEDURE — 80053 COMPREHEN METABOLIC PANEL: CPT | Performed by: EMERGENCY MEDICINE

## 2022-01-01 PROCEDURE — 99233 SBSQ HOSP IP/OBS HIGH 50: CPT | Performed by: INTERNAL MEDICINE

## 2022-01-01 PROCEDURE — U0005 INFEC AGEN DETEC AMPLI PROBE: HCPCS | Performed by: EMERGENCY MEDICINE

## 2022-01-01 PROCEDURE — 96365 THER/PROPH/DIAG IV INF INIT: CPT

## 2022-01-01 PROCEDURE — 83880 ASSAY OF NATRIURETIC PEPTIDE: CPT | Performed by: EMERGENCY MEDICINE

## 2022-01-01 PROCEDURE — 71046 X-RAY EXAM CHEST 2 VIEWS: CPT

## 2022-01-01 PROCEDURE — 82310 ASSAY OF CALCIUM: CPT | Performed by: HOSPITALIST

## 2022-01-01 PROCEDURE — 96367 TX/PROPH/DG ADDL SEQ IV INF: CPT

## 2022-01-01 PROCEDURE — 250N000011 HC RX IP 250 OP 636: Performed by: HOSPITALIST

## 2022-01-01 PROCEDURE — 87149 DNA/RNA DIRECT PROBE: CPT | Performed by: EMERGENCY MEDICINE

## 2022-01-01 PROCEDURE — G0463 HOSPITAL OUTPT CLINIC VISIT: HCPCS

## 2022-01-01 PROCEDURE — 99232 SBSQ HOSP IP/OBS MODERATE 35: CPT | Performed by: HOSPITALIST

## 2022-01-01 PROCEDURE — 96365 THER/PROPH/DIAG IV INF INIT: CPT | Mod: 59

## 2022-01-01 PROCEDURE — 250N000012 HC RX MED GY IP 250 OP 636 PS 637: Performed by: HOSPITALIST

## 2022-01-01 PROCEDURE — 92610 EVALUATE SWALLOWING FUNCTION: CPT | Mod: GN

## 2022-01-01 PROCEDURE — 85014 HEMATOCRIT: CPT | Performed by: INTERNAL MEDICINE

## 2022-01-01 PROCEDURE — U0003 INFECTIOUS AGENT DETECTION BY NUCLEIC ACID (DNA OR RNA); SEVERE ACUTE RESPIRATORY SYNDROME CORONAVIRUS 2 (SARS-COV-2) (CORONAVIRUS DISEASE [COVID-19]), AMPLIFIED PROBE TECHNIQUE, MAKING USE OF HIGH THROUGHPUT TECHNOLOGIES AS DESCRIBED BY CMS-2020-01-R: HCPCS | Performed by: EMERGENCY MEDICINE

## 2022-01-01 PROCEDURE — 87088 URINE BACTERIA CULTURE: CPT | Performed by: EMERGENCY MEDICINE

## 2022-01-01 PROCEDURE — 84443 ASSAY THYROID STIM HORMONE: CPT | Performed by: EMERGENCY MEDICINE

## 2022-01-01 PROCEDURE — 99239 HOSP IP/OBS DSCHRG MGMT >30: CPT | Performed by: HOSPITALIST

## 2022-01-01 PROCEDURE — 85027 COMPLETE CBC AUTOMATED: CPT | Performed by: EMERGENCY MEDICINE

## 2022-01-01 PROCEDURE — 86140 C-REACTIVE PROTEIN: CPT | Performed by: EMERGENCY MEDICINE

## 2022-01-01 PROCEDURE — 99223 1ST HOSP IP/OBS HIGH 75: CPT | Performed by: PODIATRIST

## 2022-01-01 PROCEDURE — 82550 ASSAY OF CK (CPK): CPT | Performed by: EMERGENCY MEDICINE

## 2022-01-01 PROCEDURE — 82310 ASSAY OF CALCIUM: CPT | Performed by: INTERNAL MEDICINE

## 2022-01-01 PROCEDURE — 85025 COMPLETE CBC W/AUTO DIFF WBC: CPT | Performed by: EMERGENCY MEDICINE

## 2022-01-01 PROCEDURE — 85007 BL SMEAR W/DIFF WBC COUNT: CPT | Performed by: EMERGENCY MEDICINE

## 2022-01-01 RX ORDER — IPRATROPIUM BROMIDE AND ALBUTEROL SULFATE 2.5; .5 MG/3ML; MG/3ML
1 SOLUTION RESPIRATORY (INHALATION) EVERY 6 HOURS PRN
Status: DISCONTINUED | OUTPATIENT
Start: 2022-01-01 | End: 2022-01-01 | Stop reason: HOSPADM

## 2022-01-01 RX ORDER — ONDANSETRON 2 MG/ML
4 INJECTION INTRAMUSCULAR; INTRAVENOUS EVERY 6 HOURS PRN
Status: DISCONTINUED | OUTPATIENT
Start: 2022-01-01 | End: 2022-01-01 | Stop reason: HOSPADM

## 2022-01-01 RX ORDER — ACETAMINOPHEN 650 MG/1
650 SUPPOSITORY RECTAL EVERY 6 HOURS PRN
Status: DISCONTINUED | OUTPATIENT
Start: 2022-01-01 | End: 2022-01-01

## 2022-01-01 RX ORDER — NITROGLYCERIN 0.4 MG/1
0.4 TABLET SUBLINGUAL EVERY 5 MIN PRN
Status: DISCONTINUED | OUTPATIENT
Start: 2022-01-01 | End: 2022-01-01 | Stop reason: HOSPADM

## 2022-01-01 RX ORDER — FLUTICASONE PROPIONATE 50 MCG
2 SPRAY, SUSPENSION (ML) NASAL DAILY
COMMUNITY

## 2022-01-01 RX ORDER — PROCHLORPERAZINE MALEATE 5 MG
5 TABLET ORAL EVERY 6 HOURS PRN
Status: DISCONTINUED | OUTPATIENT
Start: 2022-01-01 | End: 2022-01-01 | Stop reason: HOSPADM

## 2022-01-01 RX ORDER — NICOTINE POLACRILEX 4 MG
15-30 LOZENGE BUCCAL
Status: DISCONTINUED | OUTPATIENT
Start: 2022-01-01 | End: 2022-01-01

## 2022-01-01 RX ORDER — DULOXETIN HYDROCHLORIDE 20 MG/1
40 CAPSULE, DELAYED RELEASE ORAL DAILY
Status: DISCONTINUED | OUTPATIENT
Start: 2022-01-01 | End: 2022-01-01 | Stop reason: HOSPADM

## 2022-01-01 RX ORDER — GUAIFENESIN/DEXTROMETHORPHAN 100-10MG/5
10 SYRUP ORAL 3 TIMES DAILY PRN
COMMUNITY

## 2022-01-01 RX ORDER — DEXTROSE MONOHYDRATE 25 G/50ML
25-50 INJECTION, SOLUTION INTRAVENOUS
Status: DISCONTINUED | OUTPATIENT
Start: 2022-01-01 | End: 2022-01-01 | Stop reason: HOSPADM

## 2022-01-01 RX ORDER — LEVOFLOXACIN 250 MG/1
250 TABLET, FILM COATED ORAL DAILY
Status: DISCONTINUED | OUTPATIENT
Start: 2022-01-01 | End: 2022-01-01 | Stop reason: HOSPADM

## 2022-01-01 RX ORDER — SODIUM CHLORIDE 9 MG/ML
INJECTION, SOLUTION INTRAVENOUS CONTINUOUS
Status: DISCONTINUED | OUTPATIENT
Start: 2022-01-01 | End: 2022-01-01

## 2022-01-01 RX ORDER — ASPIRIN 81 MG/1
81 TABLET ORAL DAILY
Status: DISCONTINUED | OUTPATIENT
Start: 2022-01-01 | End: 2022-01-01 | Stop reason: HOSPADM

## 2022-01-01 RX ORDER — NICOTINE POLACRILEX 4 MG
15-30 LOZENGE BUCCAL
Status: DISCONTINUED | OUTPATIENT
Start: 2022-01-01 | End: 2022-01-01 | Stop reason: HOSPADM

## 2022-01-01 RX ORDER — DEXTROSE MONOHYDRATE, SODIUM CHLORIDE, AND POTASSIUM CHLORIDE 50; 1.49; 4.5 G/1000ML; G/1000ML; G/1000ML
INJECTION, SOLUTION INTRAVENOUS CONTINUOUS
Status: DISCONTINUED | OUTPATIENT
Start: 2022-01-01 | End: 2022-01-01

## 2022-01-01 RX ORDER — ACETAMINOPHEN 325 MG/1
975 TABLET ORAL 3 TIMES DAILY
Status: DISCONTINUED | OUTPATIENT
Start: 2022-01-01 | End: 2022-01-01 | Stop reason: HOSPADM

## 2022-01-01 RX ORDER — ALBUTEROL SULFATE 90 UG/1
2 AEROSOL, METERED RESPIRATORY (INHALATION) EVERY 6 HOURS PRN
Status: DISCONTINUED | OUTPATIENT
Start: 2022-01-01 | End: 2022-01-01 | Stop reason: HOSPADM

## 2022-01-01 RX ORDER — AMPICILLIN AND SULBACTAM 2; 1 G/1; G/1
3 INJECTION, POWDER, FOR SOLUTION INTRAMUSCULAR; INTRAVENOUS EVERY 6 HOURS
Status: DISCONTINUED | OUTPATIENT
Start: 2022-01-01 | End: 2022-01-01 | Stop reason: HOSPADM

## 2022-01-01 RX ORDER — MUSCLE RUB CREAM 100; 150 MG/G; MG/G
CREAM TOPICAL 3 TIMES DAILY PRN
COMMUNITY

## 2022-01-01 RX ORDER — PROCHLORPERAZINE 25 MG
12.5 SUPPOSITORY, RECTAL RECTAL EVERY 12 HOURS PRN
Status: DISCONTINUED | OUTPATIENT
Start: 2022-01-01 | End: 2022-01-01 | Stop reason: HOSPADM

## 2022-01-01 RX ORDER — AMMONIUM LACTATE 12 G/100G
LOTION TOPICAL 2 TIMES DAILY
COMMUNITY
End: 2022-01-01

## 2022-01-01 RX ORDER — CARBOXYMETHYLCELLULOSE SODIUM 5 MG/ML
1-2 SOLUTION/ DROPS OPHTHALMIC
Qty: 30 EACH | Refills: 0 | Status: SHIPPED | OUTPATIENT
Start: 2022-01-01

## 2022-01-01 RX ORDER — ISOSORBIDE DINITRATE 10 MG/1
10 TABLET ORAL
Status: DISCONTINUED | OUTPATIENT
Start: 2022-01-01 | End: 2022-01-01 | Stop reason: HOSPADM

## 2022-01-01 RX ORDER — CEFTRIAXONE 1 G/1
1 INJECTION, POWDER, FOR SOLUTION INTRAMUSCULAR; INTRAVENOUS ONCE
Status: COMPLETED | OUTPATIENT
Start: 2022-01-01 | End: 2022-01-01

## 2022-01-01 RX ORDER — ACETAMINOPHEN 650 MG/1
650 SUPPOSITORY RECTAL EVERY 6 HOURS PRN
Status: DISCONTINUED | OUTPATIENT
Start: 2022-01-01 | End: 2022-01-01 | Stop reason: HOSPADM

## 2022-01-01 RX ORDER — AMOXICILLIN 250 MG
1 CAPSULE ORAL 2 TIMES DAILY PRN
Status: DISCONTINUED | OUTPATIENT
Start: 2022-01-01 | End: 2022-01-01 | Stop reason: HOSPADM

## 2022-01-01 RX ORDER — LIDOCAINE 40 MG/G
CREAM TOPICAL
Status: DISCONTINUED | OUTPATIENT
Start: 2022-01-01 | End: 2022-01-01 | Stop reason: HOSPADM

## 2022-01-01 RX ORDER — ONDANSETRON 4 MG/1
4 TABLET, ORALLY DISINTEGRATING ORAL EVERY 6 HOURS PRN
Status: DISCONTINUED | OUTPATIENT
Start: 2022-01-01 | End: 2022-01-01 | Stop reason: HOSPADM

## 2022-01-01 RX ORDER — DEXTROSE MONOHYDRATE 50 MG/ML
INJECTION, SOLUTION INTRAVENOUS CONTINUOUS
Status: DISCONTINUED | OUTPATIENT
Start: 2022-01-01 | End: 2022-01-01

## 2022-01-01 RX ORDER — NALOXONE HYDROCHLORIDE 0.4 MG/ML
0.1 INJECTION, SOLUTION INTRAMUSCULAR; INTRAVENOUS; SUBCUTANEOUS
Status: DISCONTINUED | OUTPATIENT
Start: 2022-01-01 | End: 2022-01-01 | Stop reason: HOSPADM

## 2022-01-01 RX ORDER — ATROPINE SULFATE 10 MG/ML
2 SOLUTION/ DROPS OPHTHALMIC EVERY 4 HOURS PRN
Status: DISCONTINUED | OUTPATIENT
Start: 2022-01-01 | End: 2022-01-01 | Stop reason: HOSPADM

## 2022-01-01 RX ORDER — OLANZAPINE 5 MG/1
5 TABLET ORAL 2 TIMES DAILY
COMMUNITY

## 2022-01-01 RX ORDER — ACETAMINOPHEN 500 MG
1000 TABLET ORAL 3 TIMES DAILY
COMMUNITY

## 2022-01-01 RX ORDER — MAGNESIUM OXIDE 400 MG/1
400 TABLET ORAL 2 TIMES DAILY
Status: DISCONTINUED | OUTPATIENT
Start: 2022-01-01 | End: 2022-01-01

## 2022-01-01 RX ORDER — MAGNESIUM OXIDE 400 MG/1
400 TABLET ORAL 2 TIMES DAILY
Status: DISCONTINUED | OUTPATIENT
Start: 2022-01-01 | End: 2022-01-01 | Stop reason: HOSPADM

## 2022-01-01 RX ORDER — ASPIRIN 81 MG/1
81 TABLET, CHEWABLE ORAL DAILY
COMMUNITY
Start: 2022-01-01 | End: 2022-01-01

## 2022-01-01 RX ORDER — ATORVASTATIN CALCIUM 40 MG/1
40 TABLET, FILM COATED ORAL DAILY
Status: DISCONTINUED | OUTPATIENT
Start: 2022-01-01 | End: 2022-01-01 | Stop reason: HOSPADM

## 2022-01-01 RX ORDER — LOPERAMIDE HCL 2 MG
2 CAPSULE ORAL 4 TIMES DAILY PRN
COMMUNITY

## 2022-01-01 RX ORDER — FLUTICASONE PROPIONATE 50 MCG
2 SPRAY, SUSPENSION (ML) NASAL DAILY
Status: DISCONTINUED | OUTPATIENT
Start: 2022-01-01 | End: 2022-01-01 | Stop reason: HOSPADM

## 2022-01-01 RX ORDER — GABAPENTIN 100 MG/1
100 CAPSULE ORAL 2 TIMES DAILY
COMMUNITY

## 2022-01-01 RX ORDER — ONDANSETRON 4 MG/1
4 TABLET, ORALLY DISINTEGRATING ORAL EVERY 6 HOURS PRN
Qty: 10 TABLET | Refills: 0 | Status: SHIPPED | OUTPATIENT
Start: 2022-01-01

## 2022-01-01 RX ORDER — ISOSORBIDE MONONITRATE 30 MG/1
15 TABLET, EXTENDED RELEASE ORAL DAILY
Qty: 30 TABLET | Refills: 0 | Status: SHIPPED | OUTPATIENT
Start: 2022-01-01

## 2022-01-01 RX ORDER — ACETAMINOPHEN 650 MG/1
650 SUPPOSITORY RECTAL ONCE
Status: COMPLETED | OUTPATIENT
Start: 2022-01-01 | End: 2022-01-01

## 2022-01-01 RX ORDER — ROPINIROLE 1 MG/1
1 TABLET, FILM COATED ORAL AT BEDTIME
Status: DISCONTINUED | OUTPATIENT
Start: 2022-01-01 | End: 2022-01-01 | Stop reason: HOSPADM

## 2022-01-01 RX ORDER — AMOXICILLIN 250 MG
2 CAPSULE ORAL 2 TIMES DAILY PRN
Status: DISCONTINUED | OUTPATIENT
Start: 2022-01-01 | End: 2022-01-01 | Stop reason: HOSPADM

## 2022-01-01 RX ORDER — LANOLIN ALCOHOL/MO/W.PET/CERES
3 CREAM (GRAM) TOPICAL AT BEDTIME
COMMUNITY

## 2022-01-01 RX ORDER — HYDROMORPHONE HYDROCHLORIDE 1 MG/ML
1-2 SOLUTION ORAL
Status: DISCONTINUED | OUTPATIENT
Start: 2022-01-01 | End: 2022-01-01 | Stop reason: HOSPADM

## 2022-01-01 RX ORDER — LORAZEPAM 2 MG/ML
0.5 CONCENTRATE ORAL EVERY 8 HOURS PRN
Qty: 10 ML | Refills: 0 | Status: SHIPPED | OUTPATIENT
Start: 2022-01-01

## 2022-01-01 RX ORDER — HYDROMORPHONE HYDROCHLORIDE 1 MG/ML
1-2 SOLUTION ORAL
Qty: 10 ML | Refills: 0 | Status: SHIPPED | OUTPATIENT
Start: 2022-01-01

## 2022-01-01 RX ORDER — AMOXICILLIN 250 MG
1 CAPSULE ORAL DAILY PRN
COMMUNITY

## 2022-01-01 RX ORDER — ASPIRIN 300 MG/1
300 SUPPOSITORY RECTAL ONCE
Status: COMPLETED | OUTPATIENT
Start: 2022-01-01 | End: 2022-01-01

## 2022-01-01 RX ORDER — AMOXICILLIN 250 MG
1 CAPSULE ORAL DAILY PRN
Status: DISCONTINUED | OUTPATIENT
Start: 2022-01-01 | End: 2022-01-01 | Stop reason: HOSPADM

## 2022-01-01 RX ORDER — IOPAMIDOL 755 MG/ML
58 INJECTION, SOLUTION INTRAVASCULAR ONCE
Status: COMPLETED | OUTPATIENT
Start: 2022-01-01 | End: 2022-01-01

## 2022-01-01 RX ORDER — SODIUM CHLORIDE 450 MG/100ML
INJECTION, SOLUTION INTRAVENOUS CONTINUOUS
Status: DISCONTINUED | OUTPATIENT
Start: 2022-01-01 | End: 2022-01-01

## 2022-01-01 RX ORDER — DONEPEZIL HYDROCHLORIDE 10 MG/1
10 TABLET, FILM COATED ORAL AT BEDTIME
Status: DISCONTINUED | OUTPATIENT
Start: 2022-01-01 | End: 2022-01-01 | Stop reason: HOSPADM

## 2022-01-01 RX ORDER — ATROPINE SULFATE 10 MG/ML
2 SOLUTION/ DROPS OPHTHALMIC EVERY 4 HOURS PRN
Qty: 15 ML | Refills: 0 | Status: SHIPPED | OUTPATIENT
Start: 2022-01-01

## 2022-01-01 RX ORDER — OLANZAPINE 2.5 MG/1
2.5 TABLET, FILM COATED ORAL
Start: 2022-01-01 | End: 2022-01-01

## 2022-01-01 RX ORDER — OLANZAPINE 5 MG/1
5 TABLET, ORALLY DISINTEGRATING ORAL AT BEDTIME
Status: DISCONTINUED | OUTPATIENT
Start: 2022-01-01 | End: 2022-01-01

## 2022-01-01 RX ORDER — DEXTROSE MONOHYDRATE 25 G/50ML
25-50 INJECTION, SOLUTION INTRAVENOUS
Status: DISCONTINUED | OUTPATIENT
Start: 2022-01-01 | End: 2022-01-01

## 2022-01-01 RX ORDER — FLUOXETINE 10 MG/1
10 CAPSULE ORAL DAILY
COMMUNITY
End: 2022-01-01

## 2022-01-01 RX ORDER — DULOXETIN HYDROCHLORIDE 20 MG/1
40 CAPSULE, DELAYED RELEASE ORAL DAILY
COMMUNITY

## 2022-01-01 RX ORDER — NALOXONE HYDROCHLORIDE 0.4 MG/ML
0.2 INJECTION, SOLUTION INTRAMUSCULAR; INTRAVENOUS; SUBCUTANEOUS
Status: DISCONTINUED | OUTPATIENT
Start: 2022-01-01 | End: 2022-01-01 | Stop reason: HOSPADM

## 2022-01-01 RX ORDER — FLUOXETINE 10 MG/1
10 CAPSULE ORAL DAILY
Status: DISCONTINUED | OUTPATIENT
Start: 2022-01-01 | End: 2022-01-01 | Stop reason: HOSPADM

## 2022-01-01 RX ORDER — AMOXICILLIN 250 MG
1 CAPSULE ORAL DAILY PRN
Status: DISCONTINUED | OUTPATIENT
Start: 2022-01-01 | End: 2022-01-01 | Stop reason: ALTCHOICE

## 2022-01-01 RX ORDER — OLANZAPINE 2.5 MG/1
5 TABLET, FILM COATED ORAL 2 TIMES DAILY
Status: DISCONTINUED | OUTPATIENT
Start: 2022-01-01 | End: 2022-01-01 | Stop reason: HOSPADM

## 2022-01-01 RX ORDER — ASPIRIN 81 MG/1
81 TABLET, CHEWABLE ORAL DAILY
Status: DISCONTINUED | OUTPATIENT
Start: 2022-01-01 | End: 2022-01-01 | Stop reason: HOSPADM

## 2022-01-01 RX ORDER — LEVOFLOXACIN 250 MG/1
250 TABLET, FILM COATED ORAL DAILY
Qty: 7 TABLET | Refills: 0 | Status: SHIPPED | OUTPATIENT
Start: 2022-01-01 | End: 2022-01-01

## 2022-01-01 RX ORDER — PANTOPRAZOLE SODIUM 40 MG/1
40 TABLET, DELAYED RELEASE ORAL
Status: DISCONTINUED | OUTPATIENT
Start: 2022-01-01 | End: 2022-01-01 | Stop reason: HOSPADM

## 2022-01-01 RX ORDER — MULTIPLE VITAMINS W/ MINERALS TAB 9MG-400MCG
TAB ORAL DAILY
Status: DISCONTINUED | OUTPATIENT
Start: 2022-01-01 | End: 2022-01-01 | Stop reason: HOSPADM

## 2022-01-01 RX ORDER — OLANZAPINE 2.5 MG/1
2.5 TABLET, FILM COATED ORAL
COMMUNITY

## 2022-01-01 RX ORDER — METOPROLOL SUCCINATE 25 MG/1
12.5 TABLET, EXTENDED RELEASE ORAL 2 TIMES DAILY
Qty: 60 TABLET | Refills: 0 | Status: SHIPPED | OUTPATIENT
Start: 2022-01-01

## 2022-01-01 RX ORDER — CEFTRIAXONE 2 G/1
2 INJECTION, POWDER, FOR SOLUTION INTRAMUSCULAR; INTRAVENOUS EVERY 24 HOURS
Status: DISCONTINUED | OUTPATIENT
Start: 2022-01-01 | End: 2022-01-01

## 2022-01-01 RX ORDER — CARBOXYMETHYLCELLULOSE SODIUM 5 MG/ML
1-2 SOLUTION/ DROPS OPHTHALMIC
Status: DISCONTINUED | OUTPATIENT
Start: 2022-01-01 | End: 2022-01-01 | Stop reason: HOSPADM

## 2022-01-01 RX ORDER — AZITHROMYCIN 500 MG/1
500 INJECTION, POWDER, LYOPHILIZED, FOR SOLUTION INTRAVENOUS ONCE
Status: COMPLETED | OUTPATIENT
Start: 2022-01-01 | End: 2022-01-01

## 2022-01-01 RX ORDER — AZITHROMYCIN 250 MG/1
250 TABLET, FILM COATED ORAL DAILY
Status: DISCONTINUED | OUTPATIENT
Start: 2022-01-01 | End: 2022-01-01

## 2022-01-01 RX ORDER — PIPERACILLIN SODIUM, TAZOBACTAM SODIUM 4; .5 G/20ML; G/20ML
4.5 INJECTION, POWDER, LYOPHILIZED, FOR SOLUTION INTRAVENOUS ONCE
Status: COMPLETED | OUTPATIENT
Start: 2022-01-01 | End: 2022-01-01

## 2022-01-01 RX ORDER — ACETAMINOPHEN 325 MG/1
650 TABLET ORAL EVERY 6 HOURS PRN
Status: DISCONTINUED | OUTPATIENT
Start: 2022-01-01 | End: 2022-01-01

## 2022-01-01 RX ORDER — CHOLECALCIFEROL (VITAMIN D3) 50 MCG
50 TABLET ORAL DAILY
Status: DISCONTINUED | OUTPATIENT
Start: 2022-01-01 | End: 2022-01-01 | Stop reason: HOSPADM

## 2022-01-01 RX ORDER — ACETAMINOPHEN 325 MG/1
650 TABLET ORAL EVERY 6 HOURS PRN
Status: DISCONTINUED | OUTPATIENT
Start: 2022-01-01 | End: 2022-01-01 | Stop reason: HOSPADM

## 2022-01-01 RX ORDER — PIPERACILLIN SODIUM, TAZOBACTAM SODIUM 3; .375 G/15ML; G/15ML
3.38 INJECTION, POWDER, LYOPHILIZED, FOR SOLUTION INTRAVENOUS EVERY 6 HOURS
Status: DISCONTINUED | OUTPATIENT
Start: 2022-01-01 | End: 2022-01-01

## 2022-01-01 RX ORDER — OLANZAPINE 2.5 MG/1
2.5 TABLET, FILM COATED ORAL
Status: DISCONTINUED | OUTPATIENT
Start: 2022-01-01 | End: 2022-01-01 | Stop reason: HOSPADM

## 2022-01-01 RX ORDER — METHYLPREDNISOLONE SODIUM SUCCINATE 125 MG/2ML
125 INJECTION, POWDER, LYOPHILIZED, FOR SOLUTION INTRAMUSCULAR; INTRAVENOUS ONCE
Status: COMPLETED | OUTPATIENT
Start: 2022-01-01 | End: 2022-01-01

## 2022-01-01 RX ORDER — OLANZAPINE 5 MG/1
5 TABLET ORAL 2 TIMES DAILY
Status: DISCONTINUED | OUTPATIENT
Start: 2022-01-01 | End: 2022-01-01 | Stop reason: HOSPADM

## 2022-01-01 RX ADMIN — Medication 50 MCG: at 08:51

## 2022-01-01 RX ADMIN — ISOSORBIDE MONONITRATE 15 MG: 30 TABLET, EXTENDED RELEASE ORAL at 11:49

## 2022-01-01 RX ADMIN — MULTIPLE VITAMINS W/ MINERALS TAB 1 TABLET: TAB at 09:08

## 2022-01-01 RX ADMIN — OLANZAPINE 5 MG: 5 TABLET, FILM COATED ORAL at 20:26

## 2022-01-01 RX ADMIN — ACETAMINOPHEN 650 MG: 325 TABLET ORAL at 21:41

## 2022-01-01 RX ADMIN — SODIUM CHLORIDE: 9 INJECTION, SOLUTION INTRAVENOUS at 14:48

## 2022-01-01 RX ADMIN — Medication: at 09:09

## 2022-01-01 RX ADMIN — INSULIN ASPART 2 UNITS: 100 INJECTION, SOLUTION INTRAVENOUS; SUBCUTANEOUS at 14:39

## 2022-01-01 RX ADMIN — DONEPEZIL HYDROCHLORIDE 10 MG: 10 TABLET ORAL at 21:13

## 2022-01-01 RX ADMIN — METOPROLOL SUCCINATE 12.5 MG: 25 TABLET, EXTENDED RELEASE ORAL at 21:07

## 2022-01-01 RX ADMIN — PIPERACILLIN AND TAZOBACTAM 3.38 G: 3; .375 INJECTION, POWDER, FOR SOLUTION INTRAVENOUS at 22:06

## 2022-01-01 RX ADMIN — INSULIN ASPART 3 UNITS: 100 INJECTION, SOLUTION INTRAVENOUS; SUBCUTANEOUS at 10:06

## 2022-01-01 RX ADMIN — INSULIN ASPART 2 UNITS: 100 INJECTION, SOLUTION INTRAVENOUS; SUBCUTANEOUS at 18:16

## 2022-01-01 RX ADMIN — Medication 50 MCG: at 15:01

## 2022-01-01 RX ADMIN — OLANZAPINE 5 MG: 5 TABLET, FILM COATED ORAL at 10:29

## 2022-01-01 RX ADMIN — MAGNESIUM OXIDE TAB 400 MG (241.3 MG ELEMENTAL MG) 400 MG: 400 (241.3 MG) TAB at 21:19

## 2022-01-01 RX ADMIN — ISOSORBIDE MONONITRATE 15 MG: 30 TABLET, EXTENDED RELEASE ORAL at 09:08

## 2022-01-01 RX ADMIN — Medication 400 MG: at 20:19

## 2022-01-01 RX ADMIN — HUMAN ALBUMIN MICROSPHERES AND PERFLUTREN 9 ML: 10; .22 INJECTION, SOLUTION INTRAVENOUS at 14:27

## 2022-01-01 RX ADMIN — FLUTICASONE PROPIONATE 2 SPRAY: 50 SPRAY, METERED NASAL at 09:09

## 2022-01-01 RX ADMIN — ROPINIROLE HYDROCHLORIDE 1 MG: 1 TABLET, FILM COATED ORAL at 22:06

## 2022-01-01 RX ADMIN — METOPROLOL SUCCINATE 12.5 MG: 25 TABLET, EXTENDED RELEASE ORAL at 21:19

## 2022-01-01 RX ADMIN — MULTIPLE VITAMINS W/ MINERALS TAB 1 TABLET: TAB at 10:00

## 2022-01-01 RX ADMIN — MAGNESIUM OXIDE TAB 400 MG (241.3 MG ELEMENTAL MG) 400 MG: 400 (241.3 MG) TAB at 21:57

## 2022-01-01 RX ADMIN — Medication 125 MCG: at 10:00

## 2022-01-01 RX ADMIN — INSULIN ASPART 4 UNITS: 100 INJECTION, SOLUTION INTRAVENOUS; SUBCUTANEOUS at 12:58

## 2022-01-01 RX ADMIN — FLUOXETINE 10 MG: 10 CAPSULE ORAL at 11:49

## 2022-01-01 RX ADMIN — METOPROLOL SUCCINATE 12.5 MG: 25 TABLET, EXTENDED RELEASE ORAL at 08:06

## 2022-01-01 RX ADMIN — ISOSORBIDE DINITRATE 10 MG: 10 TABLET ORAL at 17:36

## 2022-01-01 RX ADMIN — PIPERACILLIN AND TAZOBACTAM 3.38 G: 3; .375 INJECTION, POWDER, FOR SOLUTION INTRAVENOUS at 16:18

## 2022-01-01 RX ADMIN — AZITHROMYCIN DIHYDRATE 500 MG: 500 INJECTION, POWDER, LYOPHILIZED, FOR SOLUTION INTRAVENOUS at 13:59

## 2022-01-01 RX ADMIN — PANTOPRAZOLE SODIUM 40 MG: 40 TABLET, DELAYED RELEASE ORAL at 10:00

## 2022-01-01 RX ADMIN — POTASSIUM CHLORIDE, DEXTROSE MONOHYDRATE AND SODIUM CHLORIDE: 150; 5; 450 INJECTION, SOLUTION INTRAVENOUS at 03:52

## 2022-01-01 RX ADMIN — INSULIN ASPART 1 UNITS: 100 INJECTION, SOLUTION INTRAVENOUS; SUBCUTANEOUS at 13:14

## 2022-01-01 RX ADMIN — ACETAMINOPHEN 975 MG: 325 TABLET, FILM COATED ORAL at 21:13

## 2022-01-01 RX ADMIN — CEFTRIAXONE SODIUM 2 G: 2 INJECTION, POWDER, FOR SOLUTION INTRAMUSCULAR; INTRAVENOUS at 17:13

## 2022-01-01 RX ADMIN — INSULIN ASPART 3 UNITS: 100 INJECTION, SOLUTION INTRAVENOUS; SUBCUTANEOUS at 11:26

## 2022-01-01 RX ADMIN — OLANZAPINE 5 MG: 5 TABLET, FILM COATED ORAL at 21:19

## 2022-01-01 RX ADMIN — DULOXETINE 40 MG: 20 CAPSULE, DELAYED RELEASE ORAL at 14:09

## 2022-01-01 RX ADMIN — METOPROLOL SUCCINATE 12.5 MG: 25 TABLET, EXTENDED RELEASE ORAL at 10:27

## 2022-01-01 RX ADMIN — Medication 125 MCG: at 08:06

## 2022-01-01 RX ADMIN — PANTOPRAZOLE SODIUM 40 MG: 40 TABLET, DELAYED RELEASE ORAL at 05:59

## 2022-01-01 RX ADMIN — DONEPEZIL HYDROCHLORIDE 10 MG: 10 TABLET ORAL at 21:06

## 2022-01-01 RX ADMIN — INSULIN ASPART 2 UNITS: 100 INJECTION, SOLUTION INTRAVENOUS; SUBCUTANEOUS at 16:31

## 2022-01-01 RX ADMIN — FLUTICASONE PROPIONATE 2 SPRAY: 50 SPRAY, METERED NASAL at 10:29

## 2022-01-01 RX ADMIN — DEXTROSE MONOHYDRATE: 50 INJECTION, SOLUTION INTRAVENOUS at 04:10

## 2022-01-01 RX ADMIN — METOPROLOL TARTRATE 12.5 MG: 25 TABLET, FILM COATED ORAL at 08:51

## 2022-01-01 RX ADMIN — INSULIN ASPART 1 UNITS: 100 INJECTION, SOLUTION INTRAVENOUS; SUBCUTANEOUS at 12:34

## 2022-01-01 RX ADMIN — ISOSORBIDE MONONITRATE 15 MG: 30 TABLET, EXTENDED RELEASE ORAL at 10:22

## 2022-01-01 RX ADMIN — AMPICILLIN SODIUM AND SULBACTAM SODIUM 3 G: 2; 1 INJECTION, POWDER, FOR SOLUTION INTRAMUSCULAR; INTRAVENOUS at 16:02

## 2022-01-01 RX ADMIN — INSULIN ASPART 2 UNITS: 100 INJECTION, SOLUTION INTRAVENOUS; SUBCUTANEOUS at 08:52

## 2022-01-01 RX ADMIN — AMPICILLIN SODIUM AND SULBACTAM SODIUM 3 G: 2; 1 INJECTION, POWDER, FOR SOLUTION INTRAMUSCULAR; INTRAVENOUS at 09:08

## 2022-01-01 RX ADMIN — DEXTROSE MONOHYDRATE: 50 INJECTION, SOLUTION INTRAVENOUS at 09:57

## 2022-01-01 RX ADMIN — INSULIN ASPART 3 UNITS: 100 INJECTION, SOLUTION INTRAVENOUS; SUBCUTANEOUS at 19:06

## 2022-01-01 RX ADMIN — DICLOFENAC SODIUM 2 G: 10 GEL TOPICAL at 09:57

## 2022-01-01 RX ADMIN — ASPIRIN 81 MG CHEWABLE TABLET 81 MG: 81 TABLET CHEWABLE at 09:08

## 2022-01-01 RX ADMIN — ISOSORBIDE MONONITRATE 15 MG: 30 TABLET, EXTENDED RELEASE ORAL at 08:06

## 2022-01-01 RX ADMIN — OLANZAPINE 5 MG: 5 TABLET, FILM COATED ORAL at 21:35

## 2022-01-01 RX ADMIN — DONEPEZIL HYDROCHLORIDE 10 MG: 10 TABLET ORAL at 22:29

## 2022-01-01 RX ADMIN — DULOXETINE 40 MG: 20 CAPSULE, DELAYED RELEASE ORAL at 10:28

## 2022-01-01 RX ADMIN — DULOXETINE 40 MG: 20 CAPSULE, DELAYED RELEASE ORAL at 08:51

## 2022-01-01 RX ADMIN — METOPROLOL TARTRATE 12.5 MG: 25 TABLET, FILM COATED ORAL at 21:38

## 2022-01-01 RX ADMIN — METOPROLOL SUCCINATE 12.5 MG: 25 TABLET, EXTENDED RELEASE ORAL at 20:19

## 2022-01-01 RX ADMIN — DONEPEZIL HYDROCHLORIDE 10 MG: 10 TABLET ORAL at 21:38

## 2022-01-01 RX ADMIN — MAGNESIUM OXIDE TAB 400 MG (241.3 MG ELEMENTAL MG) 400 MG: 400 (241.3 MG) TAB at 21:37

## 2022-01-01 RX ADMIN — PIPERACILLIN AND TAZOBACTAM 3.38 G: 3; .375 INJECTION, POWDER, FOR SOLUTION INTRAVENOUS at 04:35

## 2022-01-01 RX ADMIN — Medication 50 MCG: at 10:07

## 2022-01-01 RX ADMIN — METOPROLOL SUCCINATE 12.5 MG: 25 TABLET, EXTENDED RELEASE ORAL at 10:00

## 2022-01-01 RX ADMIN — METOPROLOL SUCCINATE 12.5 MG: 25 TABLET, EXTENDED RELEASE ORAL at 10:06

## 2022-01-01 RX ADMIN — PIPERACILLIN AND TAZOBACTAM 3.38 G: 3; .375 INJECTION, POWDER, FOR SOLUTION INTRAVENOUS at 03:56

## 2022-01-01 RX ADMIN — AZITHROMYCIN DIHYDRATE 500 MG: 500 INJECTION, POWDER, LYOPHILIZED, FOR SOLUTION INTRAVENOUS at 14:07

## 2022-01-01 RX ADMIN — INSULIN ASPART 1 UNITS: 100 INJECTION, SOLUTION INTRAVENOUS; SUBCUTANEOUS at 12:15

## 2022-01-01 RX ADMIN — DONEPEZIL HYDROCHLORIDE 10 MG: 10 TABLET ORAL at 21:41

## 2022-01-01 RX ADMIN — POTASSIUM CHLORIDE, DEXTROSE MONOHYDRATE AND SODIUM CHLORIDE: 150; 5; 450 INJECTION, SOLUTION INTRAVENOUS at 08:26

## 2022-01-01 RX ADMIN — OLANZAPINE 5 MG: 5 TABLET, FILM COATED ORAL at 09:08

## 2022-01-01 RX ADMIN — PIPERACILLIN AND TAZOBACTAM 3.38 G: 3; .375 INJECTION, POWDER, FOR SOLUTION INTRAVENOUS at 10:51

## 2022-01-01 RX ADMIN — ASPIRIN 81 MG: 81 TABLET, COATED ORAL at 08:06

## 2022-01-01 RX ADMIN — INSULIN ASPART 2 UNITS: 100 INJECTION, SOLUTION INTRAVENOUS; SUBCUTANEOUS at 13:05

## 2022-01-01 RX ADMIN — Medication 400 MG: at 09:08

## 2022-01-01 RX ADMIN — DONEPEZIL HYDROCHLORIDE 10 MG: 10 TABLET ORAL at 21:19

## 2022-01-01 RX ADMIN — ISOSORBIDE DINITRATE 10 MG: 10 TABLET ORAL at 09:08

## 2022-01-01 RX ADMIN — DICLOFENAC SODIUM 2 G: 10 GEL TOPICAL at 18:42

## 2022-01-01 RX ADMIN — Medication: at 20:28

## 2022-01-01 RX ADMIN — CEFTRIAXONE SODIUM 1 G: 1 INJECTION, POWDER, FOR SOLUTION INTRAMUSCULAR; INTRAVENOUS at 13:57

## 2022-01-01 RX ADMIN — FLUOXETINE 10 MG: 10 CAPSULE ORAL at 08:07

## 2022-01-01 RX ADMIN — AMPICILLIN SODIUM AND SULBACTAM SODIUM 3 G: 2; 1 INJECTION, POWDER, FOR SOLUTION INTRAMUSCULAR; INTRAVENOUS at 21:06

## 2022-01-01 RX ADMIN — METOPROLOL TARTRATE 12.5 MG: 25 TABLET, FILM COATED ORAL at 09:08

## 2022-01-01 RX ADMIN — PANTOPRAZOLE SODIUM 40 MG: 40 TABLET, DELAYED RELEASE ORAL at 06:52

## 2022-01-01 RX ADMIN — Medication 400 MG: at 10:00

## 2022-01-01 RX ADMIN — OLANZAPINE 5 MG: 5 TABLET, FILM COATED ORAL at 21:56

## 2022-01-01 RX ADMIN — ATORVASTATIN CALCIUM 40 MG: 40 TABLET, FILM COATED ORAL at 08:07

## 2022-01-01 RX ADMIN — DULOXETINE 40 MG: 20 CAPSULE, DELAYED RELEASE ORAL at 09:08

## 2022-01-01 RX ADMIN — METOPROLOL SUCCINATE 12.5 MG: 25 TABLET, EXTENDED RELEASE ORAL at 21:56

## 2022-01-01 RX ADMIN — Medication 50 MCG: at 10:29

## 2022-01-01 RX ADMIN — PIPERACILLIN AND TAZOBACTAM 3.38 G: 3; .375 INJECTION, POWDER, FOR SOLUTION INTRAVENOUS at 21:42

## 2022-01-01 RX ADMIN — OLANZAPINE 5 MG: 5 TABLET, ORALLY DISINTEGRATING ORAL at 22:06

## 2022-01-01 RX ADMIN — METOPROLOL SUCCINATE 12.5 MG: 25 TABLET, EXTENDED RELEASE ORAL at 09:09

## 2022-01-01 RX ADMIN — ASPIRIN 81 MG: 81 TABLET, COATED ORAL at 09:08

## 2022-01-01 RX ADMIN — ATORVASTATIN CALCIUM 40 MG: 40 TABLET, FILM COATED ORAL at 10:00

## 2022-01-01 RX ADMIN — INSULIN ASPART 2 UNITS: 100 INJECTION, SOLUTION INTRAVENOUS; SUBCUTANEOUS at 09:05

## 2022-01-01 RX ADMIN — ROPINIROLE HYDROCHLORIDE 1 MG: 1 TABLET, FILM COATED ORAL at 21:41

## 2022-01-01 RX ADMIN — SODIUM CHLORIDE: 9 INJECTION, SOLUTION INTRAVENOUS at 01:39

## 2022-01-01 RX ADMIN — SODIUM CHLORIDE 1000 ML: 9 INJECTION, SOLUTION INTRAVENOUS at 12:29

## 2022-01-01 RX ADMIN — DONEPEZIL HYDROCHLORIDE 10 MG: 10 TABLET ORAL at 21:35

## 2022-01-01 RX ADMIN — INSULIN ASPART 2 UNITS: 100 INJECTION, SOLUTION INTRAVENOUS; SUBCUTANEOUS at 12:33

## 2022-01-01 RX ADMIN — ACETAMINOPHEN 650 MG: 650 SUPPOSITORY RECTAL at 12:39

## 2022-01-01 RX ADMIN — OLANZAPINE 5 MG: 5 TABLET, FILM COATED ORAL at 21:37

## 2022-01-01 RX ADMIN — CEFTRIAXONE SODIUM 2 G: 2 INJECTION, POWDER, FOR SOLUTION INTRAMUSCULAR; INTRAVENOUS at 15:46

## 2022-01-01 RX ADMIN — MAGNESIUM OXIDE TAB 400 MG (241.3 MG ELEMENTAL MG) 400 MG: 400 (241.3 MG) TAB at 09:09

## 2022-01-01 RX ADMIN — DONEPEZIL HYDROCHLORIDE 10 MG: 10 TABLET ORAL at 21:57

## 2022-01-01 RX ADMIN — IOPAMIDOL 58 ML: 755 INJECTION, SOLUTION INTRAVENOUS at 13:18

## 2022-01-01 RX ADMIN — ASPIRIN 300 MG: 300 SUPPOSITORY RECTAL at 21:19

## 2022-01-01 RX ADMIN — OLANZAPINE 5 MG: 5 TABLET, FILM COATED ORAL at 10:06

## 2022-01-01 RX ADMIN — SODIUM CHLORIDE 85 ML: 900 INJECTION INTRAVENOUS at 13:18

## 2022-01-01 RX ADMIN — MAGNESIUM OXIDE TAB 400 MG (241.3 MG ELEMENTAL MG) 400 MG: 400 (241.3 MG) TAB at 08:51

## 2022-01-01 RX ADMIN — ISOSORBIDE DINITRATE 10 MG: 10 TABLET ORAL at 12:33

## 2022-01-01 RX ADMIN — DEXTROSE MONOHYDRATE: 50 INJECTION, SOLUTION INTRAVENOUS at 16:49

## 2022-01-01 RX ADMIN — INSULIN ASPART 2 UNITS: 100 INJECTION, SOLUTION INTRAVENOUS; SUBCUTANEOUS at 17:56

## 2022-01-01 RX ADMIN — ATORVASTATIN CALCIUM 40 MG: 40 TABLET, FILM COATED ORAL at 09:08

## 2022-01-01 RX ADMIN — METOPROLOL TARTRATE 12.5 MG: 25 TABLET, FILM COATED ORAL at 21:13

## 2022-01-01 RX ADMIN — AMPICILLIN SODIUM AND SULBACTAM SODIUM 3 G: 2; 1 INJECTION, POWDER, FOR SOLUTION INTRAMUSCULAR; INTRAVENOUS at 14:37

## 2022-01-01 RX ADMIN — FLUOXETINE 10 MG: 10 CAPSULE ORAL at 09:08

## 2022-01-01 RX ADMIN — CEFTRIAXONE SODIUM 2 G: 2 INJECTION, POWDER, FOR SOLUTION INTRAMUSCULAR; INTRAVENOUS at 14:38

## 2022-01-01 RX ADMIN — PANTOPRAZOLE SODIUM 80 MG: 40 INJECTION, POWDER, FOR SOLUTION INTRAVENOUS at 16:50

## 2022-01-01 RX ADMIN — DONEPEZIL HYDROCHLORIDE 10 MG: 10 TABLET ORAL at 22:07

## 2022-01-01 RX ADMIN — OLANZAPINE 5 MG: 5 TABLET, FILM COATED ORAL at 21:13

## 2022-01-01 RX ADMIN — PIPERACILLIN AND TAZOBACTAM 4.5 G: 4; .5 INJECTION, POWDER, FOR SOLUTION INTRAVENOUS at 16:42

## 2022-01-01 RX ADMIN — Medication 400 MG: at 21:06

## 2022-01-01 RX ADMIN — DICLOFENAC SODIUM 2 G: 10 GEL TOPICAL at 21:58

## 2022-01-01 RX ADMIN — ASPIRIN 81 MG CHEWABLE TABLET 81 MG: 81 TABLET CHEWABLE at 08:51

## 2022-01-01 RX ADMIN — MAGNESIUM OXIDE TAB 400 MG (241.3 MG ELEMENTAL MG) 400 MG: 400 (241.3 MG) TAB at 10:07

## 2022-01-01 RX ADMIN — SODIUM CHLORIDE 1000 ML: 9 INJECTION, SOLUTION INTRAVENOUS at 12:48

## 2022-01-01 RX ADMIN — METOPROLOL SUCCINATE 12.5 MG: 25 TABLET, EXTENDED RELEASE ORAL at 20:35

## 2022-01-01 RX ADMIN — INSULIN ASPART 2 UNITS: 100 INJECTION, SOLUTION INTRAVENOUS; SUBCUTANEOUS at 09:09

## 2022-01-01 RX ADMIN — POTASSIUM CHLORIDE, DEXTROSE MONOHYDRATE AND SODIUM CHLORIDE: 150; 5; 450 INJECTION, SOLUTION INTRAVENOUS at 17:49

## 2022-01-01 RX ADMIN — ACETAMINOPHEN 975 MG: 325 TABLET, FILM COATED ORAL at 07:01

## 2022-01-01 RX ADMIN — MAGNESIUM OXIDE TAB 400 MG (241.3 MG ELEMENTAL MG) 400 MG: 400 (241.3 MG) TAB at 20:27

## 2022-01-01 RX ADMIN — CEFTRIAXONE SODIUM 1 G: 1 INJECTION, POWDER, FOR SOLUTION INTRAMUSCULAR; INTRAVENOUS at 13:40

## 2022-01-01 RX ADMIN — INSULIN ASPART 3 UNITS: 100 INJECTION, SOLUTION INTRAVENOUS; SUBCUTANEOUS at 17:48

## 2022-01-01 RX ADMIN — MULTIPLE VITAMINS W/ MINERALS TAB 1 TABLET: TAB at 08:06

## 2022-01-01 RX ADMIN — CEFTRIAXONE SODIUM 2 G: 2 INJECTION, POWDER, FOR SOLUTION INTRAMUSCULAR; INTRAVENOUS at 15:27

## 2022-01-01 RX ADMIN — OLANZAPINE 5 MG: 5 TABLET, FILM COATED ORAL at 21:07

## 2022-01-01 RX ADMIN — OLANZAPINE 5 MG: 5 TABLET, FILM COATED ORAL at 08:51

## 2022-01-01 RX ADMIN — SODIUM CHLORIDE: 9 INJECTION, SOLUTION INTRAVENOUS at 16:34

## 2022-01-01 RX ADMIN — INSULIN ASPART 1 UNITS: 100 INJECTION, SOLUTION INTRAVENOUS; SUBCUTANEOUS at 09:57

## 2022-01-01 RX ADMIN — ROPINIROLE HYDROCHLORIDE 1 MG: 1 TABLET, FILM COATED ORAL at 21:06

## 2022-01-01 RX ADMIN — ISOSORBIDE MONONITRATE 15 MG: 30 TABLET, EXTENDED RELEASE ORAL at 14:10

## 2022-01-01 RX ADMIN — ISOSORBIDE MONONITRATE 15 MG: 30 TABLET, EXTENDED RELEASE ORAL at 10:06

## 2022-01-01 RX ADMIN — INSULIN ASPART 3 UNITS: 100 INJECTION, SOLUTION INTRAVENOUS; SUBCUTANEOUS at 10:14

## 2022-01-01 RX ADMIN — PIPERACILLIN AND TAZOBACTAM 3.38 G: 3; .375 INJECTION, POWDER, FOR SOLUTION INTRAVENOUS at 10:00

## 2022-01-01 RX ADMIN — SODIUM CHLORIDE: 4.5 INJECTION, SOLUTION INTRAVENOUS at 15:49

## 2022-01-01 RX ADMIN — Medication 400 MG: at 08:07

## 2022-01-01 RX ADMIN — ISOSORBIDE DINITRATE 10 MG: 10 TABLET ORAL at 17:52

## 2022-01-01 RX ADMIN — INSULIN ASPART 1 UNITS: 100 INJECTION, SOLUTION INTRAVENOUS; SUBCUTANEOUS at 17:23

## 2022-01-01 RX ADMIN — METOPROLOL TARTRATE 12.5 MG: 25 TABLET, FILM COATED ORAL at 21:36

## 2022-01-01 RX ADMIN — Medication 125 MCG: at 09:08

## 2022-01-01 RX ADMIN — Medication 50 MCG: at 09:08

## 2022-01-01 RX ADMIN — MAGNESIUM OXIDE TAB 400 MG (241.3 MG ELEMENTAL MG) 400 MG: 400 (241.3 MG) TAB at 21:35

## 2022-01-01 RX ADMIN — INSULIN ASPART 1 UNITS: 100 INJECTION, SOLUTION INTRAVENOUS; SUBCUTANEOUS at 17:19

## 2022-01-01 RX ADMIN — SODIUM CHLORIDE 1000 ML: 9 INJECTION, SOLUTION INTRAVENOUS at 15:15

## 2022-01-01 RX ADMIN — AMPICILLIN SODIUM AND SULBACTAM SODIUM 3 G: 2; 1 INJECTION, POWDER, FOR SOLUTION INTRAMUSCULAR; INTRAVENOUS at 03:22

## 2022-01-01 RX ADMIN — MAGNESIUM OXIDE TAB 400 MG (241.3 MG ELEMENTAL MG) 400 MG: 400 (241.3 MG) TAB at 10:29

## 2022-01-01 RX ADMIN — FLUTICASONE PROPIONATE 2 SPRAY: 50 SPRAY, METERED NASAL at 08:14

## 2022-01-01 RX ADMIN — OLANZAPINE 5 MG: 5 TABLET, ORALLY DISINTEGRATING ORAL at 21:41

## 2022-01-01 RX ADMIN — INSULIN ASPART 2 UNITS: 100 INJECTION, SOLUTION INTRAVENOUS; SUBCUTANEOUS at 18:23

## 2022-01-01 RX ADMIN — ASPIRIN 81 MG: 81 TABLET, COATED ORAL at 10:00

## 2022-01-01 RX ADMIN — METHYLPREDNISOLONE SODIUM SUCCINATE 125 MG: 125 INJECTION, POWDER, FOR SOLUTION INTRAMUSCULAR; INTRAVENOUS at 12:36

## 2022-01-01 RX ADMIN — FLUTICASONE PROPIONATE 2 SPRAY: 50 SPRAY, METERED NASAL at 09:08

## 2022-01-01 ASSESSMENT — ACTIVITIES OF DAILY LIVING (ADL)
ADLS_ACUITY_SCORE: 57
ADLS_ACUITY_SCORE: 52
ADLS_ACUITY_SCORE: 49
ADLS_ACUITY_SCORE: 51
ADLS_ACUITY_SCORE: 59
ADLS_ACUITY_SCORE: 55
ADLS_ACUITY_SCORE: 47
FALL_HISTORY_WITHIN_LAST_SIX_MONTHS: NO
ADLS_ACUITY_SCORE: 59
ADLS_ACUITY_SCORE: 55
ADLS_ACUITY_SCORE: 53
ADLS_ACUITY_SCORE: 53
ADLS_ACUITY_SCORE: 59
ADLS_ACUITY_SCORE: 47
ADLS_ACUITY_SCORE: 53
ADLS_ACUITY_SCORE: 51
ADLS_ACUITY_SCORE: 59
ADLS_ACUITY_SCORE: 51
DEPENDENT_IADLS:: CLEANING;COOKING;LAUNDRY;SHOPPING;MEAL PREPARATION;MEDICATION MANAGEMENT;TRANSPORTATION;INCONTINENCE;MONEY MANAGEMENT
ADLS_ACUITY_SCORE: 49
ADLS_ACUITY_SCORE: 47
ADLS_ACUITY_SCORE: 53
ADLS_ACUITY_SCORE: 47
ADLS_ACUITY_SCORE: 55
ADLS_ACUITY_SCORE: 47
ADLS_ACUITY_SCORE: 51
ADLS_ACUITY_SCORE: 55
DOING_ERRANDS_INDEPENDENTLY_DIFFICULTY: YES
ADLS_ACUITY_SCORE: 49
ADLS_ACUITY_SCORE: 57
ADLS_ACUITY_SCORE: 55
ADLS_ACUITY_SCORE: 51
ADLS_ACUITY_SCORE: 45
ADLS_ACUITY_SCORE: 49
ADLS_ACUITY_SCORE: 51
ADLS_ACUITY_SCORE: 49
ADLS_ACUITY_SCORE: 53
ADLS_ACUITY_SCORE: 55
ADLS_ACUITY_SCORE: 51
ADLS_ACUITY_SCORE: 53
EQUIPMENT_CURRENTLY_USED_AT_HOME: WHEELCHAIR, MANUAL
ADLS_ACUITY_SCORE: 47
ADLS_ACUITY_SCORE: 53
ADLS_ACUITY_SCORE: 59
ADLS_ACUITY_SCORE: 59
TOILETING_ASSISTANCE: TOILETING DIFFICULTY, REQUIRES EQUIPMENT;TOILETING DIFFICULTY, ASSISTANCE 1 PERSON
ADLS_ACUITY_SCORE: 49
ADLS_ACUITY_SCORE: 45
ADLS_ACUITY_SCORE: 37
DIFFICULTY_EATING/SWALLOWING: YES
ADLS_ACUITY_SCORE: 55
TOILETING_ISSUES: YES
ADLS_ACUITY_SCORE: 37
ADLS_ACUITY_SCORE: 37
CONCENTRATING,_REMEMBERING_OR_MAKING_DECISIONS_DIFFICULTY: YES
EATING/SWALLOWING: SWALLOWING LIQUIDS;SWALLOWING SOLID FOOD
ADLS_ACUITY_SCORE: 55
ADLS_ACUITY_SCORE: 59
ADLS_ACUITY_SCORE: 37
ADLS_ACUITY_SCORE: 51
ADLS_ACUITY_SCORE: 37
ADLS_ACUITY_SCORE: 49
ADLS_ACUITY_SCORE: 45
ADLS_ACUITY_SCORE: 37
ADLS_ACUITY_SCORE: 49
ADLS_ACUITY_SCORE: 51
ADLS_ACUITY_SCORE: 53
ADLS_ACUITY_SCORE: 59
CHANGE_IN_FUNCTIONAL_STATUS_SINCE_ONSET_OF_CURRENT_ILLNESS/INJURY: NO
ADLS_ACUITY_SCORE: 49
ADLS_ACUITY_SCORE: 49
ADLS_ACUITY_SCORE: 55
ADLS_ACUITY_SCORE: 49
ADLS_ACUITY_SCORE: 51
ADLS_ACUITY_SCORE: 37
ADLS_ACUITY_SCORE: 53
ADLS_ACUITY_SCORE: 37
WEAR_GLASSES_OR_BLIND: NO
ADLS_ACUITY_SCORE: 49
DRESSING/BATHING: BATHING DIFFICULTY, ASSISTANCE 1 PERSON
ADLS_ACUITY_SCORE: 49
ADLS_ACUITY_SCORE: 35
ADLS_ACUITY_SCORE: 57
ADLS_ACUITY_SCORE: 51
DRESSING/BATHING_MANAGEMENT: STAFF
ADLS_ACUITY_SCORE: 55
ADLS_ACUITY_SCORE: 59
ADLS_ACUITY_SCORE: 55
ADLS_ACUITY_SCORE: 47
ADLS_ACUITY_SCORE: 55
ADLS_ACUITY_SCORE: 49
ADLS_ACUITY_SCORE: 52
ADLS_ACUITY_SCORE: 56
ADLS_ACUITY_SCORE: 53
ADLS_ACUITY_SCORE: 59
DRESSING/BATHING_DIFFICULTY: YES
ADLS_ACUITY_SCORE: 59
ADLS_ACUITY_SCORE: 53
ADLS_ACUITY_SCORE: 56
ADLS_ACUITY_SCORE: 49
WALKING_OR_CLIMBING_STAIRS: AMBULATION DIFFICULTY, REQUIRES EQUIPMENT
ADLS_ACUITY_SCORE: 37
ADLS_ACUITY_SCORE: 49
ADLS_ACUITY_SCORE: 53
WALKING_OR_CLIMBING_STAIRS_DIFFICULTY: YES
ADLS_ACUITY_SCORE: 53
ADLS_ACUITY_SCORE: 53
ADLS_ACUITY_SCORE: 49
ADLS_ACUITY_SCORE: 37
ADLS_ACUITY_SCORE: 59
ADLS_ACUITY_SCORE: 49
ADLS_ACUITY_SCORE: 55
ADLS_ACUITY_SCORE: 37
ADLS_ACUITY_SCORE: 55
ADLS_ACUITY_SCORE: 53
ADLS_ACUITY_SCORE: 59
ADLS_ACUITY_SCORE: 53
TOILETING_MANAGEMENT: STAFF ASSISTANCE
ADLS_ACUITY_SCORE: 53

## 2022-07-03 PROBLEM — A41.9 SEPSIS, DUE TO UNSPECIFIED ORGANISM, UNSPECIFIED WHETHER ACUTE ORGAN DYSFUNCTION PRESENT (H): Status: ACTIVE | Noted: 2022-01-01

## 2022-07-03 PROBLEM — N39.0 ACUTE UTI: Status: ACTIVE | Noted: 2020-07-13

## 2022-07-03 NOTE — ED PROVIDER NOTES
History   Chief Complaint:  Hypotension       The history is provided by the EMS personnel. History limited by: poor historian due to dementia.      Mel Castellanos is an 82 year old female with a history of dementia, diabetes, hypertension, hyperlipidemia who presents via EMS after a fall. Per EMS, the patient fell out of her wheelchair at her nursing home, and staff called EMS. When EMS arrived, she was hypotensive at 63/44. EMS gave her 500mL saline, and her BP increased to 86/62. Blood glucose was 160 en route. No blood thinners. Patient has a history of dementia, and staff reports that she is at her baseline. EMS reports no obvious trauma to her head.  Patient denies having any pain, but she is not a good historian.    Review of Systems   Unable to perform ROS: Dementia     Allergies:  Sulfa Drugs    Medications:  albuterol (PROAIR HFA/PROVENTIL HFA/VENTOLIN HFA) 108 (90 Base) MCG/ACT inhaler  ASPIRIN EC PO  atorvastatin (LIPITOR) 40 MG tablet  cholecalciferol (VITAMIN D3) 5000 units (125 mcg) capsule  donepezil (ARICEPT) 5 MG tablet  doxycycline hyclate (VIBRAMYCIN) 100 MG capsule  ipratropium - albuterol 0.5 mg/2.5 mg/3 mL (DUONEB) 0.5-2.5 (3) MG/3ML neb solution  isosorbide mononitrate (IMDUR) 30 MG 24 hr tablet  magnesium oxide (MAG-OX) 400 MG tablet  metoprolol succinate ER (TOPROL-XL) 25 MG 24 hr tablet  metoprolol succinate ER (TOPROL-XL) 50 MG 24 hr tablet  nitroglycerin (NITROSTAT) 0.4 MG SL tablet  OLANZapine zydis (ZYPREXA) 5 MG ODT  omeprazole (PRILOSEC) 20 MG DR capsule  OMEPRAZOLE PO  pioglitazone (ACTOS) 15 MG tablet  rOPINIRole (REQUIP) 1 MG tablet  SANTYL 250 UNIT/GM external ointment  senna-docusate (SENOKOT-S/PERICOLACE) 8.6-50 MG tablet    Past Medical History:     Arthritis  Acute exacerbation of chronic bronchitis   Emphysema  Hypertension  Hyperlipidemia  Myocardial infarction  CAD  RLS  TIA  Type 2 diabetes mellitus  UTI  COPD with exacerbation  DONTA  Unstable angina  Diabetes  Nicotine  dependence  GERD  Skin ulcer of left lower leg with fat layer exposed     Past Surgical History:    Appendectomy  Cholecystectomy  IR lower extremity angiogram   Knee replacement  Colonoscopy    Family History:    Father: CAD    Social History:  Presents alone via EMS.     Physical Exam     Patient Vitals for the past 24 hrs:   BP Temp Pulse Resp SpO2   07/03/22 1800 135/71 -- 68 11 --   07/03/22 1730 (!) 143/63 -- 61 21 96 %   07/03/22 1700 (!) 140/63 -- 56 18 97 %   07/03/22 1631 129/70 -- 58 (!) 33 96 %   07/03/22 1603 -- 97.1  F (36.2  C) -- -- --   07/03/22 1550 -- -- -- -- 97 %   07/03/22 1540 (!) 85/56 -- 60 -- 96 %   07/03/22 1530 90/55 -- 61 -- 96 %   07/03/22 1520 91/67 -- 64 -- --   07/03/22 1510 (!) 81/58 -- 66 -- 94 %       Physical Exam  Nursing note and vitals reviewed.  Constitutional:  Awake and alert, but demented. Cooperative.   HENT:   Nose:    Nose normal.   Mouth/Throat:   Mucous membranes are normal.   Eyes:    Conjunctivae normal and EOM are normal.      Pupils are equal, round, and reactive to light.   Neck:    Trachea normal.   Cardiovascular:  Normal rate, regular rhythm, normal heart sounds and normal pulses. No murmur heard.  Pulmonary/Chest:  Effort normal and breath sounds normal.   Abdominal:   Soft. Normal appearance and bowel sounds are normal.      There is no tenderness.      There is no rebound and no CVA tenderness.   Rectal:   Trace guaiac positive but normal colored stool.  Musculoskeletal:  Extremities atraumatic x 4.   Lymphadenopathy:  No cervical adenopathy.   Neurological:   Awake and alert, but demented. Normal strength.      No cranial nerve deficit or sensory deficit. GCS eye subscore is 4.      GCS verbal subscore is 5. GCS motor subscore is 6.   Skin:    Skin is intact. No rash noted.   Psychiatric:   Normal mood and affect.      Emergency Department Course   ECG  ECG results from 07/03/22   EKG 12-lead, tracing only     Value    Systolic Blood Pressure     Diastolic  Blood Pressure     Ventricular Rate 58    Atrial Rate 58    WI Interval 176    QRS Duration 76        QTc 459    P Axis 89    R AXIS 65    T Axis 64    Interpretation ECG      Sinus bradycardia with Premature atrial complexes  Otherwise normal ECG  When compared with ECG of 17-MAR-2020 06:03,  Premature atrial complexes are now Present  Vent. rate has decreased BY  38 BPM  ST no longer depressed in Inferior leads  T wave inversion no longer evident in Inferior leads  Nonspecific T wave abnormality no longer evident in Lateral leads  Read by me at 1605       Imaging:  XR Chest Port 1 View   Preliminary Result   IMPRESSION: Mild bilateral vascular and interstitial prominence. Correlate with mild interstitial edema. Stable normal cardiac silhouette. No focal airspace disease.           Report per radiology    Laboratory:  Labs Ordered and Resulted from Time of ED Arrival to Time of ED Departure   COMPREHENSIVE METABOLIC PANEL - Abnormal       Result Value    Sodium 137      Potassium 4.1      Chloride 106      Carbon Dioxide (CO2) 29      Anion Gap 2 (*)     Urea Nitrogen 23      Creatinine 0.73      Calcium 8.8      Glucose 136 (*)     Alkaline Phosphatase 90      AST 70 (*)     ALT 50      Protein Total 6.1 (*)     Albumin 2.7 (*)     Bilirubin Total 0.5      GFR Estimate 81     CRP INFLAMMATION - Abnormal    CRP Inflammation 62.2 (*)    ERYTHROCYTE SEDIMENTATION RATE AUTO - Abnormal    Erythrocyte Sedimentation Rate 47 (*)    ROUTINE UA WITH MICROSCOPIC REFLEX TO CULTURE - Abnormal    Color Urine Orange (*)     Appearance Urine Cloudy (*)     Glucose Urine Negative      Bilirubin Urine Negative      Ketones Urine Trace (*)     Specific Gravity Urine 1.025      Blood Urine Small (*)     pH Urine 7.5 (*)     Protein Albumin Urine 70  (*)     Urobilinogen Urine Normal      Nitrite Urine Negative      Leukocyte Esterase Urine Large (*)     WBC Clumps Urine Present (*)     Mucus Urine Present (*)     RBC Urine 7  (*)     WBC Urine >182 (*)     Squamous Epithelials Urine 3 (*)    ISTAT GASES LACTATE VENOUS POCT - Abnormal    Lactic Acid POCT 2.2 (*)     Bicarbonate Venous POCT 31 (*)     O2 Sat, Venous POCT 70 (*)     pCO2V Venous POCT 47      pH Venous POCT 7.42      pO2 Venous POCT 36     CBC WITH PLATELETS AND DIFFERENTIAL - Abnormal    WBC Count 10.1      RBC Count 2.81 (*)     Hemoglobin 9.8 (*)     Hematocrit 31.0 (*)      (*)     MCH 34.9 (*)     MCHC 31.6      RDW 12.2      Platelet Count 148 (*)     % Neutrophils 76      % Lymphocytes 14      % Monocytes 9      % Eosinophils 0      % Basophils 0      % Immature Granulocytes 1      NRBCs per 100 WBC 0      Absolute Neutrophils 7.7      Absolute Lymphocytes 1.4      Absolute Monocytes 0.9      Absolute Eosinophils 0.0      Absolute Basophils 0.0      Absolute Immature Granulocytes 0.1      Absolute NRBCs 0.0     NT PROBNP INPATIENT - Abnormal    N terminal Pro BNP Inpatient 1,837 (*)    PROCALCITONIN - Abnormal    Procalcitonin 3.31 (*)    MAGNESIUM - Normal    Magnesium 2.1     TROPONIN I - Normal    Troponin I High Sensitivity 13     TSH WITH FREE T4 REFLEX - Normal    TSH 0.98     COVID-19 VIRUS (CORONAVIRUS) BY PCR - Normal    SARS CoV2 PCR Negative     CK TOTAL - Normal    CK 70     VITAMIN B12   BLOOD CULTURE   BLOOD CULTURE   URINE CULTURE        Emergency Department Course:    Reviewed:  I reviewed nursing notes, vitals, past medical history, Care Everywhere and [facility]    Assessments:  1502 Patient directly arrived into [facility] via EMS. I obtained history and examined the patient as noted above.    I rechecked the patient and explained findings.     Interventions:  1515 Bolus 1L IV  1634 Saline infusion IV  1642 Zosyn 4.5 g IV  1650 Protonix 80 mg IV    Disposition:  The patient was admitted to the hospital under the care of Dr. Reis.     Impression & Plan     CMS Diagnoses: Sepsis. The Lactic acid level is elevated due to sepsis, at this time there is no  sign of severe sepsis or septic shock.      Medical Decision Making:  This is an 82-year-old female brought in by EMS after she had an apparent fall and was hypotensive.  She was hypotensive on arrival here as well, however her blood pressure responded to IV fluids appropriately.  I was concerned that she might have an infection as a cause of her hypotension as well as sepsis or possibly anemia.  Her hemoglobin has dropped a little bit, and her stool is trace guaiac positive.  Therefore it is possible some of this is secondary to anemia.  However she does have a history of anemia as well.  Her urine does show signs of an infection, and I suspect that is the source of her sepsis and hypotension, as her lactic acid was slightly elevated as well.  We are providing her IV Zosyn.  I subsequently spoke with Dr. Reis, who will be admitting her.    Diagnosis:    ICD-10-CM    1. Acute UTI  N39.0    2. Sepsis, due to unspecified organism, unspecified whether acute organ dysfunction present (H)  A41.9          Scribe Disclosure:  I, Romel Leong, am serving as a scribe at 3:08 PM on 7/3/2022 to document services personally performed by Jd Ruggiero MD based on my observations and the provider's statements to me.        Jd Ruggiero MD  07/03/22 1950

## 2022-07-03 NOTE — PROGRESS NOTES
"[facility]  ED Nurse Handoff Report    ED Chief complaint: Hypotension      ED Diagnosis:   Final diagnoses:   Acute UTI   Sepsis, due to unspecified organism, unspecified whether acute organ dysfunction present (H)       Code Status: Full Code    Allergies:   Allergies   Allergen Reactions     Sulfa Drugs Rash       Patient Story: Patient has a hx of dementia and lives in a care center. Per EMS patient was not acting like \"herself\" and 911 was contacted. Patient was hypotensive prior to transport. Patient arrived at [facility] ED in [facility].  Focused Assessment: Hypotension and infection.    Treatments and/or interventions provided: fluids and antibiotics  Patient's response to treatments and/or interventions: b/p is stable    To be done/followed up on inpatient unit: continued fluids and antibiotics    Does this patient have any cognitive concerns?: Baseline dementia    Activity level - Baseline/Home:  Stand with assist x2  Activity Level - Current:   Stand with assist x2    Patient's Preferred language: English   Needed?: No    Isolation: None  Infection: Not Applicable  Patient tested for COVID 19 prior to admission: YES  Bariatric?: No    Vital Signs:   Vitals:    07/03/22 1550 07/03/22 1603 07/03/22 1631 07/03/22 1700   BP:   129/70 (!) 140/63   Pulse:   58 56   Resp:   (!) 33 18   Temp:  97.1  F (36.2  C)     SpO2: 97%  96% 97%       Cardiac Rhythm:     Was the PSS-3 completed:   Yes  What interventions are required if any?               Family Comments: Daughter was at bedside and active in her care  OBS brochure/video discussed/provided to patient/family: N/A              Name of person given brochure if not patient:               Relationship to patient:     For the majority of the shift this patient's behavior was Green.   Behavioral interventions performed were none.    ED NURSE PHONE NUMBER:*[id number]       "

## 2022-07-03 NOTE — H&P
[facility]    History and Physical - Hospitalist Service       Date of Admission:  7/3/2022    Assessment & Plan    Mel Castellanos is a 82 year old female with advanced dementia, coronary artery disease, COPD, diabetes and restless leg syndrome.  She resides at a long-term care facility and fell out of her wheelchair today.  911 was called and first responders found her to be hypotensive with a systolic blood pressure in the 60s.  They gave her 500 mL normal saline after which her blood pressure was in the 80s and she was transported to the emergency room at [facility] where her systolic blood pressure was also in the 80s.  She responded to 1 L of normal saline and her blood pressure is now in the 120s and she is receiving a second liter of normal saline.  Labs, UA and chest x-ray were performed.  Chest x-ray is consistent with mild heart failure and she does have an elevated BNP.  She had a normal white blood cell count but has anemia with an elevated MCV and mild thrombocytopenia.  UA is consistent with an infection.  Lactic acid was 2.2 before her fluid bolus.  CRP 60 and procalcitonin is pending.  She received IV Zosyn and cultures were obtained.    Severe sepsis likely due to a urinary tract infection   Resolved hypotension   Lactic acidosis   Other than a urinary tract infection there is no other obvious source.  She has received IV Zosyn.  Blood and urine cultures were obtained in the emergency room.  Her blood pressure is now normal.    Complete second liter of IV fluid bolus then maintenance fluid overnight    Repeat lactic acid later tonight    Continue IV Zosyn    Follow-up blood and urine cultures    Hold her prior to admission antihypertensive medication    Anemia with elevated MCV  New thrombocytopenia   Hemoglobin is 9.8 g and platelet count 148,000.    Check B12 and folate levels    Repeat a CBC tomorrow    Coronary artery disease   Probable mild congestive  heart failure  She had not been complaining of any chest pain or shortness of breath.  EKG does not show any ischemic abnormalities.  Troponin is negative.  X-ray is consistent with mild heart failure and she has an elevated BNP.  She is not currently symptomatic.    Hold isosorbide and metoprolol for now given the recent hypotension    Continue IV fluids overnight and probably stop tomorrow    Transthoracic echocardiogram to evaluate LV function    Continue statin and aspirin    Chronic obstructive lung disease   Currently she is asymptomatic.    Continue prior to admission duo nebs as needed    Type 2 diabetes mellitus     For now we will hold pioglitazone and use an aspart insulin correction scale    Restless legs     Continue ropinirole    Advanced dementia     Continue donepezil and olanzapine    The patient's daughter confirmed full CODE STATUS with the ER physician today     Diet:  diabetic   DVT Prophylaxis: Pneumatic Compression Devices  Tavera Catheter: Not present  Central Lines: None  Cardiac Monitoring: None  Code Status:      Clinically Significant Risk Factors Present on Admission             # Hypoalbuminemia: Albumin = 2.7 g/dL (Ref range: 3.4 - 5.0 g/dL) on admission, will monitor as appropriate              Disposition Plan         The patient's care was discussed with the Bedside Nurse.    Velasquez Reis MD  Hospitalist Service  [facility]  Securely message with the Vocera Web Console (learn more here)  Text page via [employer] Paging/Directory   _________________________________________________________    Chief Complaint   Low blood pressure     History is obtained from the electronic health record and emergency department physician    History of Present Illness   Mel Castellanos is a 82 year old female who has advanced dementia and resides at a long term care facility.  She has a history of coronary artery disease, COPD, diabetes and restless leg syndrome.  Today the  patient fell out of her wheelchair and 911 was called.  First responders obtained a blood pressure of 63/44.  They gave her 500 mL of normal saline after which her blood pressure was 86/62.  Glucose was 160.  Her initial blood pressure in the emergency room was 81/58 temperature 97.1  F heart rate 66 ox saturation 94% and subsequently 96 to 97%.  She was described as awake alert cooperative, equal pupils, normal heart rate and heart sounds, normal breathing effort and breath sounds, unremarkable abdominal exam.  Normal colored stool.  Motor exam was nonfocal.  EKG showed sinus rhythm at 58 bpm.  Chest x-ray showed mild bilateral vascular and interstitial prominence.  Labs notable for sodium 137 anion gap 2 BUN 23 creatinine 73 AST 70 ALT 50 bilirubin 0.5 albumin 2.7 CRP 62 ESR 47 lactic acid 2.2 white blood cell count 10,000 hemoglobin 9.8 g  platelet count 148,000 BNP 1837 magnesium 2.1 troponin 13 TSH 0.98 CK 70 COVID testing negative.  UA was cloudy and had a large amount of leukocyte esterases, no nitrite, greater than 182 white cells 7 red cells.  Blood and urine cultures were obtained.  The patient was given 1 L of normal saline and is currently receiving a second liter.  She was given IV Zosyn.  Her systolic blood pressure is now in the 120s with heart rate around 60.  The patient is unable to give any history whatsoever due to her dementia.    Review of Systems    Review of systems not obtained due to patient factors - dementia    Past Medical History    I have reviewed this patient's medical history and updated it with pertinent information if needed.   Past Medical History:   Diagnosis Date     Arthritis      Emphysema of lung (H)      High cholesterol      HTN (hypertension)      Insomnia      MI (myocardial infarction) (H)      Restless leg syndrome      TIA (transient ischaemic attack)      Type 2 diabetes mellitus without complications (H)      UTI (urinary tract infection)      Past Surgical  History   I have reviewed this patient's surgical history and updated it with pertinent information if needed.  Past Surgical History:   Procedure Laterality Date     APPENDECTOMY       CHOLECYSTECTOMY       IR LOWER EXTREMITY ANGIOGRAM LEFT  1/30/2020       Social History   I have reviewed this patient's social history and updated it with pertinent information if needed.  Social History     Tobacco Use     Smoking status: Current Every Day Smoker     Packs/day: 0.25     Smokeless tobacco: Never Used     Tobacco comment: 1/4-1/2 pack per day    Substance Use Topics     Alcohol use: No     Drug use: No       Family History   I have reviewed this patient's family history and updated it with pertinent information if needed.  Family History   Problem Relation Age of Onset     Coronary Artery Disease Father        Prior to Admission Medications   Prior to Admission Medications   Prescriptions Last Dose Informant Patient Reported? Taking?   ASPIRIN EC PO  Daughter Yes No   Sig: Take 81 mg by mouth daily    Multiple Vitamins-Minerals (CENTRUM SILVER) per tablet  Daughter Yes No   Sig: Take 1 tablet by mouth daily   OLANZapine zydis (ZYPREXA) 5 MG ODT   No No   Sig: Take 1 tablet (5 mg) by mouth At Bedtime   OMEPRAZOLE PO  Daughter Yes No   Sig: Take 20 mg by mouth every morning    SANTYL 250 UNIT/GM external ointment   Yes No   Wound Dressings (FIBRACOL) MISC   Yes No   Sig: Externally apply topically daily Foot wood   albuterol (PROAIR HFA/PROVENTIL HFA/VENTOLIN HFA) 108 (90 Base) MCG/ACT inhaler   Yes No   atorvastatin (LIPITOR) 40 MG tablet  Daughter Yes No   Sig: Take 40 mg by mouth daily   cholecalciferol (VITAMIN D3) 5000 units (125 mcg) capsule   Yes No   Sig: Take 5,000 Units by mouth daily   donepezil (ARICEPT) 5 MG tablet   Yes No   Sig: Take 10 mg by mouth At Bedtime    doxycycline hyclate (VIBRAMYCIN) 100 MG capsule   Yes No   ipratropium - albuterol 0.5 mg/2.5 mg/3 mL (DUONEB) 0.5-2.5 (3) MG/3ML neb solution    Yes No   Sig: Take 1 vial by nebulization every 6 hours as needed for shortness of breath / dyspnea or wheezing   isosorbide mononitrate (IMDUR) 30 MG 24 hr tablet   No No   Sig: Take 0.5 tablets (15 mg) by mouth daily   magnesium oxide (MAG-OX) 400 MG tablet  Daughter Yes No   Sig: Take 400 mg by mouth 2 times daily   metoprolol succinate ER (TOPROL-XL) 25 MG 24 hr tablet   No No   Sig: Take 0.5 tablets (12.5 mg) by mouth 2 times daily   metoprolol succinate ER (TOPROL-XL) 50 MG 24 hr tablet   Yes No   Sig: Take 50 mg by mouth 2 times daily   multivitamin w/minerals (MULTI-VITAMIN) tablet   Yes No   Sig: Take 1 tablet by mouth   nitroglycerin (NITROSTAT) 0.4 MG SL tablet  Daughter No No   Sig: Place 1 tablet (0.4 mg) under the tongue every 5 minutes as needed X 3 doses total if needed for chest pain.   omeprazole (PRILOSEC) 20 MG DR capsule   Yes No   order for DME   No No   Sig: [facility] Phone [phone] Fax [phone]  [place] Size small Qty 2 sleeves  Primary Dressing fibracol or sweta   Qty 6 sheets  Secondary Dressing 4x4 gauze  Qty 60   Secondary Dressing Vashe Qty 1 bottle  Length of Need: 1 month  Frequency of dressing change: daily   pioglitazone (ACTOS) 15 MG tablet  Daughter Yes No   Sig: Take 15 mg by mouth daily   rOPINIRole (REQUIP) 1 MG tablet   Yes No   Sig: Take 1 mg by mouth At Bedtime   senna-docusate (SENOKOT-S/PERICOLACE) 8.6-50 MG tablet   No No   Sig: Take 1 tablet by mouth 2 times daily as needed for constipation   triamcinolone (KENALOG) 0.1 % external cream   No No   Sig: Apply topically daily      Facility-Administered Medications: None     Allergies   Allergies   Allergen Reactions     Sulfa Drugs Rash       Physical Exam   Vital Signs: Temp: 97.1  F (36.2  C)   BP: (!) 140/63 Pulse: 56   Resp: 18 SpO2: 97 %      Weight: 0 lbs 0 oz    Constitutional: awake, alert, no apparent distress  Eyes: Lids and lashes normal, pupils equal, round, sclera clear, conjunctiva  normal  ENT: Normocephalic, without obvious abnormality, atraumatic, oral pharynx with moist mucous membranes, tonsils without erythema or exudates  Respiratory: No increased work of breathing, good air exchange, clear to auscultation bilaterally, no crackles or wheezing  Cardiovascular: regular rate and rhythm, normal S1 and S2, no S3 or S4, and no murmur noted  GI: normal bowel sounds, soft, non-distended, non-tender, no masses palpated  Skin: no rashes and no jaundice  Musculoskeletal: There is no redness, warmth, or swelling of the joints.  Full range of motion noted.  Motor strength is 5 out of 5 all extremities bilaterally.  Tone is normal.  Neurologic: Awake, non verbal, not answering questions but occasionally follows a simple command.  Cranial nerves II-XII are grossly intact.  Motor is 5 out of 5 bilaterally.  Neuropsychiatric: General: normal, calm and normal eye contact    Data   Data reviewed today: I reviewed all medications, new labs and imaging results over the last 24 hours. I personally reviewed the chest x-ray image(s) showing no I/E. EKG shows sinus bradycardia.    Recent Labs   Lab 07/03/22  1534   WBC 10.1   HGB 9.8*   *   *      POTASSIUM 4.1   CHLORIDE 106   CO2 29   BUN 23   CR 0.73   ANIONGAP 2*   LAUREN 8.8   *   ALBUMIN 2.7*   PROTTOTAL 6.1*   BILITOTAL 0.5   ALKPHOS 90   ALT 50   AST 70*     10.1    \    9.8 (L)    /    148 (L)   N 76    L N/A    137    106    23 /   ------------------------------------ 136 (H)   ALT 50   AST 70 (H)   AP 90   ALB 2.7 (L)   Ca 8.8  4.1    29    0.73 \    % RETIC N/A    LDH N/A  Troponin N/A    BNP N/A    CK 70  INR N/A   PTT N/A    D-dimer N/A    Fibrinogen N/A    Antithrombin N/A  Ferritin N/A  CRP 62.2 (H)    IL-6 N/A  Recent Results (from the past 24 hour(s))   XR Chest Port 1 View    Narrative    EXAM: XR CHEST PORTABLE 1 VIEW  LOCATION: [facility]  DATE/TIME: 07/03/2022, 3:10 PM    INDICATION:  Syncope.  COMPARISON: Chest x-ray 01/23/2020.      Impression    IMPRESSION: Mild bilateral vascular and interstitial prominence. Correlate with mild interstitial edema. Stable normal cardiac silhouette. No focal airspace disease.

## 2022-07-03 NOTE — PHARMACY-ADMISSION MEDICATION HISTORY
Pharmacy Medication History  Admission medication history interview status for the 7/3/2022  admission is complete. See EPIC admission navigator for prior to admission medications     Location of Interview: Outside patient room but on unit  Medication history sources: Surescripts, MAR (med list from Pamela Schreiber) and on call CAMERON Soto    Significant changes made to the medication list:  None    In the past week, patient estimated taking medication this percent of the time: greater than 90%    Additional medication history information:   None    Medication reconciliation completed by provider prior to medication history? No    Time spent in this activity: 15 min    Prior to Admission medications    Medication Sig Last Dose Taking? Auth Provider Long Term End Date   acetaminophen (TYLENOL) 500 MG tablet Take 1,000 mg by mouth 3 times daily 7/3/2022 at x2 Yes Unknown, Entered By History     albuterol (PROAIR HFA/PROVENTIL HFA/VENTOLIN HFA) 108 (90 Base) MCG/ACT inhaler Inhale 2 puffs into the lungs every 6 hours as needed  at prn Yes Reported, Patient     ammonium lactate (LAC-HYDRIN) 12 % external lotion Apply topically 2 times daily 7/3/2022 at am Yes Unknown, Entered By History     aspirin 81 MG EC tablet Take 81 mg by mouth daily  7/3/2022 at am Yes Unknown, Entered By History     atorvastatin (LIPITOR) 40 MG tablet Take 40 mg by mouth daily 7/3/2022 at Unknown time Yes Unknown, Entered By History Yes    cholecalciferol (VITAMIN D3) 5000 units (125 mcg) capsule Take 5,000 Units by mouth daily 7/3/2022 at Unknown time Yes Unknown, Entered By History     donepezil (ARICEPT) 10 MG tablet Take 10 mg by mouth At Bedtime  7/2/2022 at Unknown time Yes Unknown, Entered By History     FLUoxetine (PROZAC) 10 MG capsule Take 10 mg by mouth daily 7/3/2022 at Unknown time Yes Unknown, Entered By History Yes    fluticasone (FLONASE) 50 MCG/ACT nasal spray Spray 2 sprays into both nostrils daily 7/3/2022 at Unknown time  Yes Unknown, Entered By History     ipratropium - albuterol 0.5 mg/2.5 mg/3 mL (DUONEB) 0.5-2.5 (3) MG/3ML neb solution Take 1 vial by nebulization every 6 hours as needed for shortness of breath / dyspnea or wheezing  at prn Yes Unknown, Entered By History No    isosorbide mononitrate (IMDUR) 30 MG 24 hr tablet Take 0.5 tablets (15 mg) by mouth daily 7/3/2022 at Unknown time Yes Mohan Odonnell MD Yes    magnesium oxide (MAG-OX) 400 MG tablet Take 400 mg by mouth 3 times daily 7/3/2022 at x2 Yes Unknown, Entered By History     melatonin 3 MG tablet Take 3 mg by mouth At Bedtime 7/2/2022 at Unknown time Yes Unknown, Entered By History     metoprolol succinate ER (TOPROL-XL) 25 MG 24 hr tablet Take 0.5 tablets (12.5 mg) by mouth 2 times daily 7/3/2022 at x1 Yes Mohan Odonnell MD Yes    Multiple Vitamins-Minerals (CENTRUM SILVER) per tablet Take 1 tablet by mouth daily 7/3/2022 at am Yes Reported, Patient     muscle rub (ARTHRITIS HOT) 10-15 % CREA Apply topically every 6 hours as needed for moderate pain  at prn Yes Unknown, Entered By History     nitroglycerin (NITROSTAT) 0.4 MG SL tablet Place 1 tablet (0.4 mg) under the tongue every 5 minutes as needed X 3 doses total if needed for chest pain.  at prn Yes Briseida Nicolas,  Yes    OLANZapine (ZYPREXA) 5 MG tablet Take 5 mg by mouth 2 times daily 7/3/2022 at x1 Yes Unknown, Entered By History No    order for [facility] Phone [phone] Fax [phone]  [place] Size small Qty 2 sleeves  Primary Dressing fibracol or sweta   Qty 6 sheets  Secondary Dressing 4x4 gauze  Qty 60   Secondary Dressing Vashe Qty 1 bottle  Length of Need: 1 month  Frequency of dressing change: daily  at dme Yes Ronaldo Walls MD     rOPINIRole (REQUIP) 0.5 MG tablet Take 0.5 mg by mouth At Bedtime 7/2/2022 at Unknown time Yes Reported, Patient Yes    senna-docusate (SENOKOT-S/PERICOLACE) 8.6-50 MG tablet Take 1 tablet by mouth daily as needed  for constipation  at prn Yes Unknown, Entered By History         The information provided in this note is only as accurate as the sources available at the time of update(s)

## 2022-07-03 NOTE — ED NOTES
Bed: ST02  Expected date:   Expected time:   Means of arrival:   Comments:  426  81 F hypotensive  63/44  HR 99%  1505

## 2022-07-04 NOTE — PLAN OF CARE
Patient is very confused (Advance dementia-baseline), VSS, tele- NSR will continue to monitor, RA. unable to follow command, on mechanical soft diet, ordered meal for patient and feed. Take crush meds. With pudding/aple sauce.   Reposition q2, mepilex in place on the sacrum- redness and blanchable.back of the neck also has redness.Scattered scab on lower extremity and wound on the left great toe.     Family came to visit and given an update personally with the plans.     1130: talked to the nurse from memory care and gave an update regarding patient status. I was able to asked about patients baseline.   Patient is on mechanical soft diet and no denture, assisted by 1-2 person for transfer using wheelchair with Gbelt. No walker/cane involved. NO [place], No eye glass, AX1-2 with grooming/bath.   1200:  Family was notified/updtaed  via phone and coming to visit her.

## 2022-07-04 NOTE — PLAN OF CARE
OT/PT: Orders received. Chart reviewed and discussed with care team.  OT not indicated due to pt is wheelchair or bed bound at baseline; lives in memory care facility; briefly stopped into patients room, family present and report she gets assist for all ADLs, 1-2 for transfers to the wheelchair, she will 'walk' with the wheelchair to scoot herself around while seated; may benefit from an additional alarm system for attempting to stand from wheelchair as this is when pt falls, otherwise expect once pt is medically stable pt will return to memory care AFL and resumption of all care.  Defer discharge recommendations to medical team.  Will complete orders.

## 2022-07-04 NOTE — PROGRESS NOTES
Paged for gram negative fish bacteremia, 2/2 sets from 7/3. Zosyn continued as we continue to monitor her very closely overnight.

## 2022-07-04 NOTE — PROGRESS NOTES
Pt disoriented, hard to assess due to mumbling speech, soft BP's, loose BM's, purewick in place, BS checks, on tele, IV Abx & IVF running at 100 mL/hr. Pt slept all of the shift & was repositioned every 3 hours.

## 2022-07-04 NOTE — PROVIDER NOTIFICATION
Got another call from microbiology about another culture back Gram - Bacilli, Sayra was just paged about the same result and said to continue to monitor with zosyn

## 2022-07-04 NOTE — PROVIDER NOTIFICATION
MD Notification    Notified Person: MD    Notified Person Name: Sayra    Notification Date/Time: 0605 7/4/22    Notification Interaction: Page    Purpose of Notification: FYI positive blood culture- gram negative bacilli.     Orders Received:    Comments:

## 2022-07-04 NOTE — PROGRESS NOTES
RECEIVING UNIT ED HANDOFF REVIEW    ED Nurse Handoff Report was reviewed by: Basilio Guerrero RN on July 3, 2022 at 7:33 PM

## 2022-07-04 NOTE — PROVIDER NOTIFICATION
MD Notification    Notified Person: MD    Notified Person Name: Chato, MD    Notification Date/Time: 7/3/22 2049    Notification Interaction: paged    Purpose of Notification: [address]  Paging per order: New fever of 101.5, will give tylenol  Thank you  Basilio BARNES *[id number]     Orders Received:    Comments:

## 2022-07-04 NOTE — CONSULTS
Foot & Ankle Surgery  July 4, 2022    CC: left great toe wound/redness    I was asked to see Mel Castellanos regarding the chief complaint by:  Dr. TREE Ramos    HPI:  Pt is a 82 year old female who presents with above complaint.  Admission yesterday after being found after having fallen out of her wheelchair.  She was found to be hypotensive and was given fluid and admitted.  UA indicates systemic symptoms of infection secondary to UTI.  Consultation for left great toe redness and swelling.  She has a superficial wound at the dorsal medial left hallux IPJ and the toe is mildly erythematous and edematous.  There is some discomfort at the distal > proximal phalanx, but no obvious deformity.  X-rays were ordered when I received the consultation, but have not been done yet.  Patient has a past medical history positive for severe dementia, COPD and diabetes mellitus type 2    ROS:   Pos for CC.  The patient denies current nausea, vomiting, chills, fevers, belly pain, calf pain, chest pain or SOB.  Complete remainder of ROS is otherwise neg.    VITALS:    Vitals:    07/04/22 0935 07/04/22 0959 07/04/22 1136 07/04/22 1536   BP: (P) 92/59 (!) 164/69 (!) 149/60 (!) 161/70   BP Location: (P) Right arm Right arm Right arm Right arm   Patient Position:   Semi-Gan's    Cuff Size:       Pulse: 60 67  63   Resp: 16  14 20   Temp: 97.6  F (36.4  C) 97.8  F (36.6  C) 97.7  F (36.5  C) 98  F (36.7  C)   TempSrc: Axillary  Axillary Axillary   SpO2: 93% 93% 93% 94%   Weight:           PMH:    Past Medical History:   Diagnosis Date     Arthritis      Emphysema of lung (H)      High cholesterol      HTN (hypertension)      Insomnia      MI (myocardial infarction) (H)      Restless leg syndrome      TIA (transient ischaemic attack)      Type 2 diabetes mellitus without complications (H)      UTI (urinary tract infection)        SXHX:    Past Surgical History:   Procedure Laterality Date     APPENDECTOMY       CHOLECYSTECTOMY       IR  LOWER EXTREMITY ANGIOGRAM LEFT  1/30/2020        MEDS:    Current Facility-Administered Medications   Medication     acetaminophen (TYLENOL) tablet 650 mg    Or     acetaminophen (TYLENOL) Suppository 650 mg     aspirin EC tablet 81 mg     atorvastatin (LIPITOR) tablet 40 mg     glucose gel 15-30 g    Or     dextrose 50 % injection 25-50 mL    Or     glucagon injection 1 mg     donepezil (ARICEPT) tablet 10 mg     FLUoxetine (PROzac) capsule 10 mg     fluticasone (FLONASE) 50 MCG/ACT spray 2 spray     insulin aspart (NovoLOG) injection (RAPID ACTING)     insulin aspart (NovoLOG) injection (RAPID ACTING)     ipratropium - albuterol 0.5 mg/2.5 mg/3 mL (DUONEB) neb solution 3 mL     isosorbide mononitrate (IMDUR) 24 hr half-tab 15 mg     lidocaine (LMX4) cream     lidocaine 1 % 0.1-1 mL     magnesium oxide (MAG-OX) tablet 400 mg     melatonin tablet 1 mg     metoprolol succinate ER (TOPROL-XL) 24 hr half-tab 12.5 mg     multivitamin w/minerals (THERA-VIT-M) tablet     nitroGLYcerin (NITROSTAT) sublingual tablet 0.4 mg     OLANZapine zydis (zyPREXA) ODT tab 5 mg     ondansetron (ZOFRAN ODT) ODT tab 4 mg    Or     ondansetron (ZOFRAN) injection 4 mg     pantoprazole (PROTONIX) EC tablet 40 mg     piperacillin-tazobactam (ZOSYN) 3.375 g vial to attach to  mL bag     prochlorperazine (COMPAZINE) injection 5 mg    Or     prochlorperazine (COMPAZINE) tablet 5 mg    Or     prochlorperazine (COMPAZINE) suppository 12.5 mg     rOPINIRole (REQUIP) tablet 1 mg     senna-docusate (SENOKOT-S/PERICOLACE) 8.6-50 MG per tablet 1 tablet    Or     senna-docusate (SENOKOT-S/PERICOLACE) 8.6-50 MG per tablet 2 tablet     sodium chloride (PF) 0.9% PF flush 3 mL     sodium chloride (PF) 0.9% PF flush 3 mL     Vitamin D3 (CHOLECALCIFEROL) tablet 125 mcg       ALL:     Allergies   Allergen Reactions     Sulfa Drugs Rash       FMH:    Family History   Problem Relation Age of Onset     Coronary Artery Disease Father        SocHx:    Social  History     Socioeconomic History     Marital status:      Spouse name: Not on file     Number of children: Not on file     Years of education: Not on file     Highest education level: Not on file   Occupational History     Not on file   Tobacco Use     Smoking status: Current Every Day Smoker     Packs/day: 0.25     Smokeless tobacco: Never Used     Tobacco comment: 1/4-1/2 pack per day    Substance and Sexual Activity     Alcohol use: No     Drug use: No     Sexual activity: Not on file   Other Topics Concern     Parent/sibling w/ CABG, MI or angioplasty before 65F 55M? No   Social History Narrative     Not on file     Social Determinants of Health     Financial Resource Strain: Not on file   Food Insecurity: Not on file   Transportation Needs: Not on file   Physical Activity: Not on file   Stress: Not on file   Social Connections: Not on file   Intimate Partner Violence: Not on file   Housing Stability: Not on file           EXAMINATION:  Gen:   No apparent distress  Neuro:   A&Ox3, no deficits  Psych:    Answering questions appropriately for age and situation with normal affect  Head:    NCAT  Eye:    Visual scanning without deficit  Ear:    Response to auditory stimuli wnl  Lung:    Non-labored breathing on RA noted  Abd:    NTND per patient report  Lymph:    Mild redness and swelling left great toe  Vasc:    Pulses weakly palpable, CFT minimally delayed  Neuro:    Light touch sensation intact to all sensory nerve distributions without apparent paresthesias  Derm:    Superficial partial-thickness wound at the dorsal medial left hallux IPJ.  Mild surrounding redness and swelling.  No deep probing, necrosis, purulence or ascending infection  MSK:    Left great toe presents with mild proximal phalanx pain and mildly more pronounced pain of the distal phalanx but no obvious deformity  Calf:    Neg for redness, swelling or tenderness      Imaging: X-rays left foot have been ordered, awaiting results    Labs:     Component      Latest Ref Rng & Units 7/3/2022   CRP Inflammation      0.0 - 8.0 mg/L 62.2 (H)   Sed Rate      0 - 30 mm/hr 47 (H)   Hemoglobin A1C      0.0 - 5.6 % 5.8 (H)     Cultures:  UA -    Culture in progress       >100,000 CFU/mL Escherichia coli Abnormal              Assessment:  82 year old female with superficial wound to left great toe without exposed fat with mild surrounding erythema after recent fall out of wheelchair secondary to UTI      Plan:  Discussed etiologies, anatomy and options  1.  Superficial wound left great toe without exposed fat with mild surrounding erythema after recent fall out of wheelchair secondary to UTI  -I personally reviewed and interpreted the patient's lower extremity history pertinent to today's visit, including imaging/labs, in preparation for initiating a treatment program.  -Iodosorb island bandage to left great toe, every other day  -Surgical shoe, weightbearing as tolerated  -X-rays have been ordered to assess for underlying fractures after her fall out of the wheelchair  -Dr. Garcia to follow up on imaging results tomorrow        Patient's medical history was reviewed today      Brian Murphy DPM FACFAS FACFAOM  Podiatric Foot & Ankle Surgeon  [facility]  [phone]    Disclaimer: This note consists of symbols derived from keyboarding, dictation and/or voice recognition software. As a result, there may be errors in the script that have gone undetected. Please consider this when interpreting information found in this chart.

## 2022-07-04 NOTE — ED NOTES
Patient pulled out her IV. Was found to have large incontinent stool. Patient cleaned up. Repositioned. Warm blankets applied.

## 2022-07-04 NOTE — PROGRESS NOTES
[facility]    Medicine Progress Note - Hospitalist Service    Date of Admission:  7/3/2022    Assessment & Plan          This is a 82-year-old female with medical history which includes dementia, hypertension, hyperlipidemia, diabetes mellitus who presented to the ED after a fall as she fell off from wheelchair at nursing home and EMS was called and patient was found to be hypotensive with blood pressure of 63/44 and she was given 500 mL of saline with improvement of blood pressure to 86/62 and blood sugar was 160.    On presentation to the ED patient had comprehensive metabolic panel done and her albumin is low at 6.7, mildly elevated AST at 60, CRP was 62.6, lactic acid was 2.  She also had elevated procalcitonin of 3.31 with proBNP of 1837.  Patient had troponin done on admission which was normal at 13.  Her TSH was normal at 0.98 with normal B12 and folate levels.    She had CBC done on admission which showed that her hemoglobin was 9.8 with platelet count of 148 and her MCV is elevated at 110.  She had UA done on admission which was consistent with infection.  Patient had blood cultures done.  COVID-19 was negative on admission.  She also had chest x-ray done on admission which showed mild bilateral vascular and interstitial prominence and correlate with mild interstitial edema with no evidence of any pneumonia.  EKG done on admission showed sinus bradycardia with PAC.  She also had venous gas done on admission which showed pH of 7.42    1) severe sepsis due to E. coli bacteremia due to urinary tract infection with hypotension( resolved)   -On admission patient presented with confusion and a fall and had normal WBC count on admission and lactic was 2 with elevated procalcitonin and UA was consistent with infection  -Patient was given 2 L IV fluid bolus and then was started on IV fluids and IV Zosyn  -Her blood cultures on admission both bottles are growing gram-negative bacilli and  very gene panel is consistent with E. Coli  -She does have an elevated white count this morning at 18.3 but is hemodynamically stable and we will continue the patient with the IV Zosyn and ID has been consulted  -Also patient had x-ray which was consistent with mildly interstitial edema I will decrease the rate of IV fluids to 50 mL/h and she is on room air we will DC later in the evening    2) chronic anemia with elevated MCV  New thrombocytopenia due to likely sepsis  -She does have hemoglobin of 9.8 on admission and her MCV is elevated even in the past and was 110 on admission and her B12 and folate levels have been normal  -She also has mild thrombocytopenia and platelet count is 125 this morning and can be due to underlying sepsis  -We will continue to monitor her platelet levels and hemoglobin closely    3) diabetes mellitus  -We will continue the patient with sliding scale insulin and continue to hold her oral hypoglycemic agent    4) restless leg syndrome  -We will continue the patient with any acute    5) dementia  Anxiety/depression  -We will continue with her home medications including Aricept and she is also on olanzapine at bedtime and Prozac  -I spoke with patient's daughter who was visiting the patient and she mentions that at baseline patient has severe dementia and is not able to participate in meaningful conversation and babbles and did mention that patient is at baseline while she is in the hospital    6) elevated proBNP on admission  -She has mild elevation of proBNP on admission and chest x-ray was concerning for mild interstitial edema and I did review her echo in 2017 and her EF at that time was 65 to 70% and had mildly enlarged left atrium      7) coronary artery disease status stent to RCA in 2011  -We will continue the patient with aspirin and statin reordered her metoprolol, Imdur and as needed nitro    8) GERD  -We will continue the patient with Protonix    9) history of COPD  -On exam  she does not have any wheezing and will continue with as needed JosiahoNejaydon    10) Fall   - we will consult PT/OT     11) left great toe wound  -We will consult wound care and podiatry and I took pic in emr       Diet: Combination Diet Regular Diet Adult; Moderate Consistent Carb (60 g CHO per Meal) Diet; Mechanical/Dental Soft Diet    DVT Prophylaxis: Pneumatic Compression Devices  Tavera Catheter: Not present  Central Lines: None  Cardiac Monitoring: ACTIVE order. Indication: Syncope- high cardiac risk (48 hours)  Code Status: Full Code      Disposition Plan      Expected Discharge Date: 07/06/2022                The patient's care was discussed with the Bedside Nurse, Patient and Patient's Family.  I did discuss the plan of care with the patient's nurse who was visiting the patient over the phone and she was updated on bacteremia.  According to patient's daughter patient is at baseline mental status    William Ramos MD  Hospitalist Service  [facility]  Securely message with the Vocera Web Console (learn more here)  Text page via [employer] Paging/Directory         Clinically Significant Risk Factors Present on Admission             # Hypoalbuminemia: Albumin = 2.7 g/dL (Ref range: 3.4 - 5.0 g/dL) on admission, will monitor as appropriate    # Thrombocytopenia: Plts = 125 10e3/uL (Ref range: 150 - 450 10e3/uL) on admission, will monitor for bleeding         Time spent in care of patient is 37 minutes and more than 50% of the time was spent in coordination of care for management of sepsis, bacteremia, UTI, thrombocytopenia and I reviewed all her labs and medications and EKG  ______________________________________________________________________    Interval History     I saw the patient this morning and that she was sitting in the chair and patient babbles and was not able to participate in meaningful conversation as per the family.    Data reviewed today: I reviewed all medications, new labs and  imaging results over the last 24 hours. I personally reviewed the EKG tracing showing sinus bradycardia .    Physical Exam   Vital Signs: Temp: 97.5  F (36.4  C) Temp src: Axillary BP: (!) 160/65 Pulse: 61   Resp: 16 SpO2: 91 % O2 Device: None (Room air)    Weight: 143 lbs 15.37 oz        General: Patient appears comfortable and at baseline she has severe dementia and by both  HEENT: Head is atraumatic, normocephalic.    Neck: Neck is supple  Respiratory: Lungs are clear to auscultation bilaterally with no wheeze or crackles   Cardiovascular: Regular rate , S1 and S2 normal with no murmer or rubs or gallops  Abdomen:   soft , non tender , non distended and bowel sound present   Skin: No skin rashes or lesions to inspection or palpation.  Neurologic: . No facial droop  Musculoskeletal: Limited because of her clinical condition but did see her moving upper extremity  Psychiatric: She was sitting in the bed but looked calm    Data   Recent Labs   Lab 07/04/22  0740 07/04/22  0636 07/04/22  0224 07/03/22  2144 07/03/22  2039 07/03/22  1534   WBC 18.3*  --   --   --   --  10.1   HGB 9.9*  --   --   --   --  9.8*   *  --   --   --   --  110*   *  --   --   --   --  148*   NA  --  142  --   --   --  137   POTASSIUM  --  4.3  --   --   --  4.1   CHLORIDE  --  111*  --   --   --  106   CO2  --  26  --   --   --  29   BUN  --  19  --   --   --  23   CR  --  0.62  --   --   --  0.73   ANIONGAP  --  5  --   --   --  2*   LAUREN  --  9.0  --   --   --  8.8   GLC  --  147* 165* 128*   < > 136*   ALBUMIN  --   --   --   --   --  2.7*   PROTTOTAL  --   --   --   --   --  6.1*   BILITOTAL  --   --   --   --   --  0.5   ALKPHOS  --   --   --   --   --  90   ALT  --   --   --   --   --  50   AST  --   --   --   --   --  70*    < > = values in this interval not displayed.     Recent Results (from the past 24 hour(s))   XR Chest Port 1 View    Narrative    EXAM: XR CHEST PORTABLE 1 VIEW  LOCATION: [facility]  HOSPITAL  DATE/TIME: 07/03/2022, 3:10 PM    INDICATION: Syncope.  COMPARISON: Chest x-ray 01/23/2020.      Impression    IMPRESSION: Mild bilateral vascular and interstitial prominence. Correlate with mild interstitial edema. Stable normal cardiac silhouette. No focal airspace disease.       Medications     sodium chloride 100 mL/hr at 07/04/22 0139       aspirin  81 mg Oral Daily     atorvastatin  40 mg Oral Daily     donepezil  10 mg Oral At Bedtime     insulin aspart  1-7 Units Subcutaneous TID AC     insulin aspart  1-5 Units Subcutaneous At Bedtime     magnesium oxide  400 mg Oral BID     multivitamin w/minerals   Oral Daily     OLANZapine zydis  5 mg Oral At Bedtime     pantoprazole  40 mg Oral QAM AC     piperacillin-tazobactam  3.375 g Intravenous Q6H     rOPINIRole  1 mg Oral At Bedtime     sodium chloride (PF)  3 mL Intracatheter Q8H     Vitamin D3  125 mcg Oral Daily

## 2022-07-04 NOTE — CONSULTS
[facility]    Infectious Disease Consultation     Date of Admission:  7/3/2022  Date of Consult (When I saw the patient): 07/04/22    Assessment & Plan   Mel Castellanos is a 82 year old female who was admitted on 7/3/2022.     Impression:    1. 82 y.o female with multiple co morbidities   2. Advanced dementia   3. COPD  4. CAD  5. Admitted with sepsis   6. E coli in the blood   7. On zosyn     Recommendations:   1. Continue on zosyn   2. Follow up on the full cultures information   3. The left great toe is warm, swollen and red, consider podiatry consult.     Lidia Grady MD    Reason for Consult   Reason for consult: I was asked to evaluate this patient for urosepsis.    Primary Care Physician   [facility]    Chief Complaint   Fall     History is obtained from the patient and medical records    History of Present Illness   Mel Castellanos is a 82 year old female with advanced dementia, coronary artery disease, COPD, diabetes and restless leg syndrome.  She resides at a long-term care facility and fell out of her wheelchair today.  911 was called and first responders found her to be hypotensive with a systolic blood pressure in the 60s    Past Medical History   I have reviewed this patient's medical history and updated it with pertinent information if needed.   Past Medical History:   Diagnosis Date     Arthritis      Emphysema of lung (H)      High cholesterol      HTN (hypertension)      Insomnia      MI (myocardial infarction) (H)      Restless leg syndrome      TIA (transient ischaemic attack)      Type 2 diabetes mellitus without complications (H)      UTI (urinary tract infection)        Past Surgical History   I have reviewed this patient's surgical history and updated it with pertinent information if needed.  Past Surgical History:   Procedure Laterality Date     APPENDECTOMY       CHOLECYSTECTOMY       IR LOWER EXTREMITY ANGIOGRAM LEFT  1/30/2020       Prior to  Admission Medications   Prior to Admission Medications   Prescriptions Last Dose Informant Patient Reported? Taking?   FLUoxetine (PROZAC) 10 MG capsule 7/3/2022 at Unknown time Nursing Home Yes Yes   Sig: Take 10 mg by mouth daily   Multiple Vitamins-Minerals (CENTRUM SILVER) per tablet 7/3/2022 at am Nursing Home Yes Yes   Sig: Take 1 tablet by mouth daily   OLANZapine (ZYPREXA) 5 MG tablet 7/3/2022 at x1  Yes Yes   Sig: Take 5 mg by mouth 2 times daily   acetaminophen (TYLENOL) 500 MG tablet 7/3/2022 at x2 Nursing Home Yes Yes   Sig: Take 1,000 mg by mouth 3 times daily   albuterol (PROAIR HFA/PROVENTIL HFA/VENTOLIN HFA) 108 (90 Base) MCG/ACT inhaler  at prn Nursing Home Yes Yes   Sig: Inhale 2 puffs into the lungs every 6 hours as needed   ammonium lactate (LAC-HYDRIN) 12 % external lotion 7/3/2022 at am Nursing Home Yes Yes   Sig: Apply topically 2 times daily   aspirin 81 MG EC tablet 7/3/2022 at am Nursing Home Yes Yes   Sig: Take 81 mg by mouth daily    atorvastatin (LIPITOR) 40 MG tablet 7/3/2022 at Unknown time Nursing Home Yes Yes   Sig: Take 40 mg by mouth daily   cholecalciferol (VITAMIN D3) 5000 units (125 mcg) capsule 7/3/2022 at Unknown time Nursing Home Yes Yes   Sig: Take 5,000 Units by mouth daily   donepezil (ARICEPT) 10 MG tablet 7/2/2022 at Unknown time Nursing Home Yes Yes   Sig: Take 10 mg by mouth At Bedtime    fluticasone (FLONASE) 50 MCG/ACT nasal spray 7/3/2022 at Unknown time Nursing Home Yes Yes   Sig: Spray 2 sprays into both nostrils daily   ipratropium - albuterol 0.5 mg/2.5 mg/3 mL (DUONEB) 0.5-2.5 (3) MG/3ML neb solution  at prn Nursing Home Yes Yes   Sig: Take 1 vial by nebulization every 6 hours as needed for shortness of breath / dyspnea or wheezing   isosorbide mononitrate (IMDUR) 30 MG 24 hr tablet 7/3/2022 at Unknown time alf No Yes   Sig: Take 0.5 tablets (15 mg) by mouth daily   magnesium oxide (MAG-OX) 400 MG tablet 7/3/2022 at x2 Nursing Home Yes Yes   Sig: Take  400 mg by mouth 3 times daily   melatonin 3 MG tablet 7/2/2022 at Unknown time Nursing Home Yes Yes   Sig: Take 3 mg by mouth At Bedtime   metoprolol succinate ER (TOPROL-XL) 25 MG 24 hr tablet 7/3/2022 at x1 Nursing Home No Yes   Sig: Take 0.5 tablets (12.5 mg) by mouth 2 times daily   muscle rub (ARTHRITIS HOT) 10-15 % CREA  at prn  Yes Yes   Sig: Apply topically every 6 hours as needed for moderate pain   nitroglycerin (NITROSTAT) 0.4 MG SL tablet  at prn Nursing Home No Yes   Sig: Place 1 tablet (0.4 mg) under the tongue every 5 minutes as needed X 3 doses total if needed for chest pain.   order for DME  at [facility] No Yes   Sig: [facility] Phone [phone] Fax [phone]  [place] Size small Qty 2 sleeves  Primary Dressing fibracol or sweta   Qty 6 sheets  Secondary Dressing 4x4 gauze  Qty 60   Secondary Dressing Vashe Qty 1 bottle  Length of Need: 1 month  Frequency of dressing change: daily   rOPINIRole (REQUIP) 0.5 MG tablet 7/2/2022 at Unknown time Nursing Home Yes Yes   Sig: Take 0.5 mg by mouth At Bedtime   senna-docusate (SENOKOT-S/PERICOLACE) 8.6-50 MG tablet  at prn  Yes Yes   Sig: Take 1 tablet by mouth daily as needed for constipation      Facility-Administered Medications: None     Allergies   Allergies   Allergen Reactions     Sulfa Drugs Rash       Immunization History     There is no immunization history on file for this patient.    Social History   I have reviewed this patient's social history and updated it with pertinent information if needed. Mel WILLETT Emanuel  reports that she has been smoking. She has been smoking about 0.25 packs per day. She has never used smokeless tobacco. She reports that she does not drink alcohol and does not use drugs.    Family History   I have reviewed this patient's family history and updated it with pertinent information if needed.   Family History   Problem Relation Age of Onset     Coronary Artery Disease Father        Review of Systems    The 10 point Review of Systems is negative other than noted in the HPI or here.     Physical Exam   Temp: 97.6  F (36.4  C) Temp src: Axillary BP: (!) 164/69 Pulse: 67   Resp: 16 SpO2: 93 % O2 Device: None (Room air)    Vital Signs with Ranges  Temp:  [97.1  F (36.2  C)-101.5  F (38.6  C)] 97.6  F (36.4  C)  Pulse:  [56-85] 67  Resp:  [11-33] 16  BP: ()/() 164/69  SpO2:  [90 %-97 %] 93 %  143 lbs 15.37 oz  Body mass index is 23.96 kg/m .    GENERAL APPEARANCE:  awake  EYES: Eyes grossly normal to inspection  NECK: no adenopathy  RESP: lungs clear   CV: regular rates and rhythm  LYMPHATICS: normal ant/post cervical and supraclavicular nodes  ABDOMEN: soft, nontender  MS: left great toe is red swollen   SKIN: no suspicious lesions or rashes        Data   Lab Results   Component Value Date    WBC 18.3 (H) 07/04/2022    HGB 9.9 (L) 07/04/2022    HCT 31.1 (L) 07/04/2022     (L) 07/04/2022     07/04/2022    POTASSIUM 4.3 07/04/2022    CHLORIDE 111 (H) 07/04/2022    CO2 26 07/04/2022    BUN 19 07/04/2022    CR 0.62 07/04/2022     (H) 07/04/2022    SED 47 (H) 07/03/2022    DD 2.4 (H) 03/17/2020    NTBNPI 1,837 (H) 07/03/2022    TROPONIN <0.04 08/11/2007    TROPI <0.015 03/17/2020    AST 70 (H) 07/03/2022    ALT 50 07/03/2022    ALKPHOS 90 07/03/2022    BILITOTAL 0.5 07/03/2022    BILIDIRECT 0.1 11/20/2013    INR 1.11 01/30/2020     No results for input(s): CULT in the last 168 hours.  Recent Labs   Lab Test 03/17/20  1331 03/17/20  1324 01/23/20  1155 01/23/20  1138 12/20/19  2114   CULT No growth No growth No growth No growth  No growth <10,000 colonies/mL  Escherichia coli  *

## 2022-07-04 NOTE — PROVIDER NOTIFICATION
Left great toe wound, redness on the buttocks and scattered scabs. Asked for WOC consult order.   MD notified via page.

## 2022-07-05 NOTE — PROGRESS NOTES
[facility]    Infectious Disease Progress Note    Date of Service (when I saw the patient): 07/05/2022     Assessment & Plan   Mel Castellanos is a 82 year old female who was admitted on 7/3/2022.     1. 82 y.o female with multiple co morbidities   2. Advanced dementia   3. COPD  4. CAD  5. Admitted with sepsis   6. E coli in the blood   7. On zosyn      Recommendations:   1. from zosyn to unasyn, continue on IV while admitted   2.The left great toe is warm, swollen and red, seen by podiatry no concern for deep infection.   Plan for when ready for d/ c  Oral augmentin to finish a 14 days course of therapy   Will sign off please call with ques.          Lidia Grady MD    Interval History   Tolerating antibiotics ok   No new rashes or issues with antibiotics   Labs reviewed   Afebrile     Physical Exam   Temp: 98.3  F (36.8  C) Temp src: Oral BP: 121/89 Pulse: 69   Resp: 18 SpO2: 96 % O2 Device: None (Room air)    Vitals:    07/04/22 0300 07/05/22 0632   Weight: 65.3 kg (143 lb 15.4 oz) 64.9 kg (143 lb 1.3 oz)     Vital Signs with Ranges  Temp:  [97.5  F (36.4  C)-98.3  F (36.8  C)] 98.3  F (36.8  C)  Pulse:  [63-69] 69  Resp:  [14-20] 18  BP: (121-165)/(60-89) 121/89  SpO2:  [93 %-96 %] 96 %    Constitutional: Awake, alert, cooperative, no apparent distress  Lungs: Clear to auscultation bilaterally, no crackles or wheezing  Cardiovascular: Regular rate and rhythm, normal S1 and S2, and no murmur noted  Abdomen: Normal bowel sounds, soft, non-distended, non-tender  Skin: No rashes, no cyanosis, no edema  Other:    Medications       ampicillin-sulbactam (UNASYN) IV  3 g Intravenous Q6H     aspirin  81 mg Oral Daily     atorvastatin  40 mg Oral Daily     Cadexomer Iodine (topical) 0.9%   Topical Every Other Day     donepezil  10 mg Oral At Bedtime     FLUoxetine  10 mg Oral Daily     fluticasone  2 spray Both Nostrils Daily     insulin aspart  1-7 Units Subcutaneous TID AC     insulin aspart   1-5 Units Subcutaneous At Bedtime     isosorbide mononitrate  15 mg Oral Daily     magnesium oxide  400 mg Oral BID     metoprolol succinate ER  12.5 mg Oral BID     multivitamin w/minerals   Oral Daily     OLANZapine zydis  5 mg Oral At Bedtime     pantoprazole  40 mg Oral QAM AC     rOPINIRole  1 mg Oral At Bedtime     sodium chloride (PF)  3 mL Intracatheter Q8H     Vitamin D3  125 mcg Oral Daily       Data   All microbiology laboratory data reviewed.  Recent Labs   Lab Test 07/05/22  0807 07/04/22  0740 07/03/22  1534   WBC 14.4* 18.3* 10.1   HGB 10.3* 9.9* 9.8*   HCT 32.1* 31.1* 31.0*   * 111* 110*   * 125* 148*     Recent Labs   Lab Test 07/05/22  0807 07/04/22  0636 07/03/22  1534   CR 0.56 0.62 0.73     Recent Labs   Lab Test 07/03/22  1534   SED 47*     Recent Labs   Lab Test 03/17/20  1331 03/17/20  1324 01/23/20  1155 01/23/20  1138 12/20/19  2114   CULT No growth No growth No growth No growth  No growth <10,000 colonies/mL  Escherichia coli  *

## 2022-07-05 NOTE — PROGRESS NOTES
Foot & Ankle Surgery  July 5, 2022    CC: left great toe wound/redness    I was asked to see Mel Castellanos regarding the chief complaint by:  Dr. TREE Ramos    HPI:  Pt is a 82 year old female seen in follow up for left foot. She's sleeping on arrival. Difficult to arouse. No fevers over last 24 hours.     ROS:   Pos for CC.  The patient denies current nausea, vomiting, chills, fevers, belly pain, calf pain, chest pain or SOB.  Complete remainder of ROS is otherwise neg.    VITALS:    Vitals:    07/05/22 0611 07/05/22 0632 07/05/22 0748 07/05/22 0806   BP:   121/86 121/89   BP Location:   Left arm    Patient Position:       Cuff Size:   Adult Regular    Pulse:   69 69   Resp: 18  18    Temp:   98.3  F (36.8  C)    TempSrc:   Oral    SpO2:       Weight:  64.9 kg (143 lb 1.3 oz)         PMH:    Past Medical History:   Diagnosis Date     Arthritis      Emphysema of lung (H)      High cholesterol      HTN (hypertension)      Insomnia      MI (myocardial infarction) (H)      Restless leg syndrome      TIA (transient ischaemic attack)      Type 2 diabetes mellitus without complications (H)      UTI (urinary tract infection)        SXHX:    Past Surgical History:   Procedure Laterality Date     APPENDECTOMY       CHOLECYSTECTOMY       IR LOWER EXTREMITY ANGIOGRAM LEFT  1/30/2020        MEDS:    Current Facility-Administered Medications   Medication     acetaminophen (TYLENOL) tablet 650 mg    Or     acetaminophen (TYLENOL) Suppository 650 mg     ampicillin-sulbactam (UNASYN) 3 g vial to attach to  mL bag     aspirin EC tablet 81 mg     atorvastatin (LIPITOR) tablet 40 mg     Cadexomer Iodine (topical) 0.9% (IODOSORB) 0.9 % gel     glucose gel 15-30 g    Or     dextrose 50 % injection 25-50 mL    Or     glucagon injection 1 mg     donepezil (ARICEPT) tablet 10 mg     FLUoxetine (PROzac) capsule 10 mg     fluticasone (FLONASE) 50 MCG/ACT spray 2 spray     insulin aspart (NovoLOG) injection (RAPID ACTING)     insulin  aspart (NovoLOG) injection (RAPID ACTING)     ipratropium - albuterol 0.5 mg/2.5 mg/3 mL (DUONEB) neb solution 3 mL     isosorbide mononitrate (IMDUR) 24 hr half-tab 15 mg     lidocaine (LMX4) cream     lidocaine 1 % 0.1-1 mL     magnesium oxide (MAG-OX) tablet 400 mg     melatonin tablet 1 mg     metoprolol succinate ER (TOPROL-XL) 24 hr half-tab 12.5 mg     multivitamin w/minerals (THERA-VIT-M) tablet     nitroGLYcerin (NITROSTAT) sublingual tablet 0.4 mg     OLANZapine zydis (zyPREXA) ODT tab 5 mg     ondansetron (ZOFRAN ODT) ODT tab 4 mg    Or     ondansetron (ZOFRAN) injection 4 mg     pantoprazole (PROTONIX) EC tablet 40 mg     prochlorperazine (COMPAZINE) injection 5 mg    Or     prochlorperazine (COMPAZINE) tablet 5 mg    Or     prochlorperazine (COMPAZINE) suppository 12.5 mg     rOPINIRole (REQUIP) tablet 1 mg     senna-docusate (SENOKOT-S/PERICOLACE) 8.6-50 MG per tablet 1 tablet    Or     senna-docusate (SENOKOT-S/PERICOLACE) 8.6-50 MG per tablet 2 tablet     sodium chloride (PF) 0.9% PF flush 3 mL     sodium chloride (PF) 0.9% PF flush 3 mL     Vitamin D3 (CHOLECALCIFEROL) tablet 125 mcg       ALL:     Allergies   Allergen Reactions     Sulfa Drugs Rash       FMH:    Family History   Problem Relation Age of Onset     Coronary Artery Disease Father        SocHx:    Social History     Socioeconomic History     Marital status:      Spouse name: Not on file     Number of children: Not on file     Years of education: Not on file     Highest education level: Not on file   Occupational History     Not on file   Tobacco Use     Smoking status: Current Every Day Smoker     Packs/day: 0.25     Smokeless tobacco: Never Used     Tobacco comment: 1/4-1/2 pack per day    Substance and Sexual Activity     Alcohol use: No     Drug use: No     Sexual activity: Not on file   Other Topics Concern     Parent/sibling w/ CABG, MI or angioplasty before 65F 55M? No   Social History Narrative     Not on file     Social  Determinants of Health     Financial Resource Strain: Not on file   Food Insecurity: Not on file   Transportation Needs: Not on file   Physical Activity: Not on file   Stress: Not on file   Social Connections: Not on file   Intimate Partner Violence: Not on file   Housing Stability: Not on file           EXAMINATION:  Gen:   No apparent distress  Neuro:   A&Ox3, no deficits  Psych:    Answering questions appropriately for age and situation with normal affect  Head:    NCAT  Eye:    Visual scanning without deficit  Ear:    Response to auditory stimuli wnl  Lung:    Non-labored breathing on RA noted  Abd:    NTND per patient report  Lymph:    Mild redness and swelling left great toe  Vasc:    Pulses weakly palpable, CFT minimally delayed  Neuro:    Light touch sensation intact to all sensory nerve distributions without apparent paresthesias  Derm:    Superficial partial-thickness wound at the dorsal medial left hallux IPJ.  Mild surrounding redness and swelling.  No deep probing, necrosis, purulence or ascending infection  MSK:    Left great toe presents with mild proximal phalanx pain and mildly more pronounced pain of the distal phalanx but no obvious deformity  Calf:    Neg for redness, swelling or tenderness      Imaging:    I personally reviewed the images and agree with the reports as stated. No signs of fracture. No osteomyelitis     IMPRESSION: Diffuse bone demineralization. No erosions or periostitis  or other radiographic findings of osteomyelitis.    Labs:    Component      Latest Ref Rng & Units 7/3/2022   CRP Inflammation      0.0 - 8.0 mg/L 62.2 (H)   Sed Rate      0 - 30 mm/hr 47 (H)   Hemoglobin A1C      0.0 - 5.6 % 5.8 (H)     Cultures:  UA -    Culture in progress       >100,000 CFU/mL Escherichia coli Abnormal              Assessment:    1. 82 year old female left hallux ulcer --> depth to subcutaneous tissue  2. Bacteremia with E. Coli   3. Sepsis --> improving   4. UTI      Plan:    -Discussed all  findings with patient, chart reviewed.  -Ulcer to hallux without signs of infection, depth relatively superficial. Erythema present to hallux is improving. Erythema may be related to underlying arterial disease, that could be evaluated more outpatient.   -Xrays reviewed, without signs of fracture or osteomyelitis   -Cont dressing changes with iodosorb. Will order dh2 shoe to be worn when ambulating   -Cont abx per ID for sepsis, bacteremia and UTI   -No further podiatry plans. Will sign off. Will provide outpatient follow up information. Can follow up in the next 2-3 weeks.     Lori Garcia DPM   Podiatric Foot & Ankle Surgeon  [facility]  [phone]

## 2022-07-05 NOTE — PROGRESS NOTES
[facility]    Medicine Progress Note - Hospitalist Service    Date of Admission:  7/3/2022    Assessment & Plan          This is a 82-year-old female with medical history which includes dementia, hypertension, hyperlipidemia, diabetes mellitus who presented to the ED after a fall as she fell off from wheelchair at nursing home and EMS was called and patient was found to be hypotensive with blood pressure of 63/44 and she was given 500 mL of saline with improvement of blood pressure to 86/62 and blood sugar was 160.    On presentation to the ED patient had comprehensive metabolic panel done and her albumin is low at 6.7, mildly elevated AST at 60, CRP was 62.6, lactic acid was 2.  She also had elevated procalcitonin of 3.31 with proBNP of 1837.  Patient had troponin done on admission which was normal at 13.  Her TSH was normal at 0.98 with normal B12 and folate levels.    She had CBC done on admission which showed that her hemoglobin was 9.8 with platelet count of 148 and her MCV is elevated at 110.  She had UA done on admission which was consistent with infection.  Patient had blood cultures done.  COVID-19 was negative on admission.  She also had chest x-ray done on admission which showed mild bilateral vascular and interstitial prominence and correlate with mild interstitial edema with no evidence of any pneumonia.  EKG done on admission showed sinus bradycardia with PAC.  She also had venous gas done on admission which showed pH of 7.42    1) severe sepsis due to E. coli bacteremia due to urinary tract infection with hypotension( resolved)   -On admission patient presented with confusion and a fall and had normal WBC count on admission and lactic was 2 with elevated procalcitonin and UA was consistent with infection  -Patient was given 2 L IV fluid bolus and then was started on IV fluids and IV Zosyn  -Blood cultures done on admission showed E. coli which has been pansensitive and that  she has been afebrile since yesterday and her WBC count is 14.4 and initially she was started on Zosyn and was seen by ID again today and was transitioned to Unasyn  -We will see how the patient's WBC count trending and if it goes down then she can be switched to oral Augmentin to complete 14 days therapy and can be discharged tomorrow    2) chronic anemia with elevated MCV  New thrombocytopenia due to likely sepsis  -She does have hemoglobin of 9.8 on admission and her MCV is elevated even in the past and was 110 on admission and her B12 and folate levels have been normal  -I did review her hemoglobin which is 10.3 and platelet count is 129 and we will continue to monitor    3) diabetes mellitus  -We will continue the patient with sliding scale insulin and continue to hold her oral hypoglycemic agent    4) restless leg syndrome  -We will continue the patient with any acute    5) dementia  Anxiety/depression  -We will continue with her home medications including Aricept and  olanzapine twice daily  and Prozac  -7/4 I spoke with patient's daughter who was visiting the patient and she mentions that at baseline patient has severe dementia and is not able to participate in meaningful conversation and babbles and did mention that patient is at baseline while she is in the hospital    6) elevated proBNP on admission  -She has mild elevation of proBNP on admission and chest x-ray was concerning for mild interstitial edema and I did review her echo in 2017 and her EF at that time was 65 to 70% and had mildly enlarged left atrium  -On exam she is on room air    7) coronary artery disease status stent to RCA in 2011  -We will continue the patient with aspirin and statin and  metoprolol, Imdur and as needed nitro    8) GERD  -We will continue the patient with Protonix    9) history of COPD  -On exam she does not have any wheezing and will continue with as needed DuoNebs    10) Fall   -I did consult physical therapy and  Occupational Therapy and patient is basically wheelchair-bound    11) left great toe wound  -Patient has been seen by wound care team and also seen by podiatry and x-rays have been reviewed and there is no concern for local infection by podiatry team and they have been follow the patient as outpatient       Diet: Combination Diet Regular Diet Adult; Moderate Consistent Carb (60 g CHO per Meal) Diet; Mechanical/Dental Soft Diet    DVT Prophylaxis: Pneumatic Compression Devices  Tavera Catheter: Not present  Central Lines: None  Cardiac Monitoring: ACTIVE order. Indication: Syncope- high cardiac risk (48 hours)  Code Status: Full Code      Disposition Plan      Expected Discharge Date: 07/06/2022                The patient's care was discussed with the Bedside Nurse and Patient.      William Ramos MD  Hospitalist Service  [facility]  Securely message with the Vocera Web Console (learn more here)  Text page via [employer] Paging/Directory         Clinically Significant Risk Factors Present on Admission                    Time spent in care of patient is 37 minutes and more than 50% of the time was spent in coordination of care for management of sepsis, bacteremia, UTI, thrombocytopenia and I reviewed all her labs and medications and EKG  ______________________________________________________________________    Interval History     I saw the patient today and that she came back from the x-ray of the foot and she seems to be at her baseline mentation and does not have any fever or chills.  I did discuss the plan of care with patient's nurse  Data reviewed today: I reviewed all medications, new labs and imaging results over the last 24 hours. I personally reviewed the x ray left great tow image(s) showing Diffuse bone demineralization. No erosions or periostitis.    Physical Exam   Vital Signs: Temp: 98.3  F (36.8  C) Temp src: Oral BP: 121/89 Pulse: 69   Resp: 18 SpO2: 96 % O2 Device: None (Room air)     Weight: 143 lbs 1.26 oz        General: Patient appears comfortable and at baseline she has severe dementia and by both  HEENT: Head is atraumatic, normocephalic.    Neck: Neck is supple  Respiratory: Lungs are clear to auscultation bilaterally with no wheeze or crackles   Cardiovascular: Regular rate , S1 and S2 normal with no murmer or rubs or gallops  Abdomen:   soft , non tender , non distended and bowel sound present   Skin: Left great toe has mild redness  Neurologic: . No facial droop  Musculoskeletal: Limited because of her clinical condition but did see her moving upper extremity  Psychiatric: Patient was sitting in the bed and at baseline mentation    Data   Recent Labs   Lab 07/05/22  1224 07/05/22  0807 07/05/22  0759 07/04/22  1028 07/04/22  0740 07/04/22  0636 07/03/22 2039 07/03/22  1534   WBC  --  14.4*  --   --  18.3*  --   --  10.1   HGB  --  10.3*  --   --  9.9*  --   --  9.8*   MCV  --  107*  --   --  111*  --   --  110*   PLT  --  129*  --   --  125*  --   --  148*   NA  --  133  --   --   --  142  --  137   POTASSIUM  --  3.4  --   --   --  4.3  --  4.1   CHLORIDE  --  101  --   --   --  111*  --  106   CO2  --  28  --   --   --  26  --  29   BUN  --  13  --   --   --  19  --  23   CR  --  0.56  --   --   --  0.62  --  0.73   ANIONGAP  --  4  --   --   --  5  --  2*   LAUREN  --  9.3  --   --   --  9.0  --  8.8   * 132* 134*   < >  --  147*   < > 136*   ALBUMIN  --   --   --   --   --   --   --  2.7*   PROTTOTAL  --   --   --   --   --   --   --  6.1*   BILITOTAL  --   --   --   --   --   --   --  0.5   ALKPHOS  --   --   --   --   --   --   --  90   ALT  --   --   --   --   --   --   --  50   AST  --   --   --   --   --   --   --  70*    < > = values in this interval not displayed.     Recent Results (from the past 24 hour(s))   XR Toe Left G/E 2 Views    Narrative    TOE LEFT TWO OR MORE VIEWS   7/5/2022 10:40 AM     HISTORY:  Left great toe wound with redness/swelling.    COMPARISON:  None.      Impression    IMPRESSION: Diffuse bone demineralization. No erosions or periostitis  or other radiographic findings of osteomyelitis.    HAILEE LUJAN MD         SYSTEM ID:  [id number]     Medications       ampicillin-sulbactam (UNASYN) IV  3 g Intravenous Q6H     aspirin  81 mg Oral Daily     atorvastatin  40 mg Oral Daily     Cadexomer Iodine (topical) 0.9%   Topical Every Other Day     donepezil  10 mg Oral At Bedtime     FLUoxetine  10 mg Oral Daily     fluticasone  2 spray Both Nostrils Daily     insulin aspart  1-7 Units Subcutaneous TID AC     insulin aspart  1-5 Units Subcutaneous At Bedtime     isosorbide mononitrate  15 mg Oral Daily     magnesium oxide  400 mg Oral BID     metoprolol succinate ER  12.5 mg Oral BID     multivitamin w/minerals   Oral Daily     OLANZapine  5 mg Oral BID     pantoprazole  40 mg Oral QAM AC     rOPINIRole  1 mg Oral At Bedtime     sodium chloride (PF)  3 mL Intracatheter Q8H     Vitamin D3  125 mcg Oral Daily

## 2022-07-05 NOTE — PROGRESS NOTES
S: We received an order for a DH2 shoe room 2421-01.  DX:  Hallux ulcer  O:  I met with the patient in her room and she is not alert.  I donned a size medium DH2 shoe on the L foot and removed the offloading pegs from under the ulcer.  A:  The shoe fits adequate.  The shoe will offload the ulcer and promote healing.    P:  The patient will wear the shoe when up out of bed.  G:  Fit DH2 shoe to offload ulcer on L 1st hallux  Jacobo Kelley CO GURINDER

## 2022-07-05 NOTE — PROGRESS NOTES
Discussed with X-ray late last night, they apologized for not getting to patient sooner as they were busy, but ultimately writer decided in the AM would be more appropriate for patient as she was resting falling asleep at that time.

## 2022-07-05 NOTE — CONSULTS
[facility]  WOC Nurse Inpatient Assessment     Consulted for: Left great toe wound and rednbess on buttock and scattered scabs      Areas Assessed:      Areas visualized during today's visit: Focused:, Sacrum/coccyx and left toe    Wound location: left great toe         Last photo: 7/5  Wound due to: Neuropathic Ulcer  Wound history/plan of care: unknown prior cares, patient confused unable to report   Wound base:   dermis and fibrin     Palpation of the wound bed: mild firm      Drainage: scant     Description of drainage: serosanguinous     Measurements (length x width x depth, in cm): 1  x 1.3  x  0.2 cm      Tunneling: N/A     Undermining: N/A  Periwound skin: Intact      Color: normal and consistent with surrounding tissue      Temperature: normal   Odor: none  Pain: nonverbal absent at rest, grimace with bandage removal, calm after assessment   Pain interventions prior to dressing change: patient tolerated well  Treatment goal: Heal   STATUS: initial assessment  Supplies ordered: at bedside      7/5 buttock intact pink      Treatment Plan:     Dressing  PER UNIT ROUTINE        Comments: Cont daily dressing changes to left hallux with iodosorb to digit. Cover with mepelix.         Orders: Reviewed, written by podiatry     RECOMMEND PRIMARY TEAM ORDER: None, at this time  Education provided: plan of care  Discussed plan of care with: Patient and Nurse  WOC nurse follow-up plan: weekly  Notify WOC if wound(s) deteriorate.  Nursing to notify the Provider(s) and re-consult the WOC Nurse if new skin concern.    DATA:     Current support surface: Standard  Atmos Air mattress  Containment of urine/stool: Incontinence Protocol, Incontinent pad in bed and Purewick external catheter   BMI: Body mass index is 23.09 kg/m .   Active diet order: Orders Placed This Encounter      Combination Diet Regular Diet Adult; Moderate Consistent Carb (60 g CHO per Meal) Diet; Mechanical/Dental Soft Diet      Output: I/O last 3 completed shifts:  In: 1020 [P.O.:1020]  Out: 2650 [Urine:2650]     Labs: Recent Labs   Lab 07/05/22  0807 07/04/22  0740 07/03/22  1534   ALBUMIN  --   --  2.7*   HGB 10.3*   < > 9.8*   WBC 14.4*   < > 10.1   A1C  --   --  5.8*   CRP  --   --  62.2*    < > = values in this interval not displayed.     Pressure injury risk assessment:   Sensory Perception: 3-->slightly limited  Moisture: 3-->occasionally moist  Activity: 1-->bedfast  Mobility: 2-->very limited  Nutrition: 2-->probably inadequate  Friction and Shear: 2-->potential problem  Bruno Score: 13    Kimberly HERNANDEZBENNY   Dept. Pager: [phone]  Dept. Office Number: [phone]

## 2022-07-05 NOTE — PLAN OF CARE
Goal Outcome Evaluation:    Patient A&O to her first name only, can follow command but have babbles talks, respond to yes or no questions and the rest is illogical. MOD carb diet, need help ordering meals/feeder.  Incontinence of B&B pure wick in placed with adequate output. Podiatrist saw the patient on this shift, left foot orth-shoe to be place when OOB. Left toe X ray done on this shift. WOC  saw the patient on this shift. Wound on the left toe change, mepilex placed on the coccyx area for redness and moist. Turn and repo Q2hrs. Not OOB on this shift. Slept on and off on this shift. Will continue to monitor.

## 2022-07-05 NOTE — PROGRESS NOTES
Pt AO to name only.  Babbles while talking, able to answer some yes or no questions the rest is illogical.  Pleasant throughout shift.  Turn and Repo Q2h.  Incontinent, purewick in.  VSS on RA.  Intermittent antibiotics otherwise SL.  Tele NSR.  BS checks.  Mod CHO diet, gave pills crushed in apple sauce. Restless for a lot of the shift talking to herself, sleeping on and off.  Due to have an X-ray this AM.  Dressed tow wound according to orders.

## 2022-07-06 NOTE — PLAN OF CARE
Goal Outcome Evaluation:    Disoriented x4. VSS ex BP, on RA. T & repo q 2. Mepilex to coccyx. Dressing on great toe in place. On tele NSR. PIV SL. Purewick in place w/ adequate output. BG checked w/ corrective insulin. Slept comfortably during night. Will continue to monitor.

## 2022-07-06 NOTE — PROGRESS NOTES
Care Management Discharge Note    Discharge Date: 07/06/2022       Discharge Disposition:      Discharge Services:      Discharge DME:      Discharge Transportation:      Private pay costs discussed: Not applicable    PAS Confirmation Code:    Patient/family educated on Medicare website which has current facility and service quality ratings:      Education Provided on the Discharge Plan:    Persons Notified of Discharge Plans: [place] [facility]   Patient/Family in Agreement with the Plan:      Handoff Referral Completed: No    Additional Information:  Writer spoke with RN at [facility] and informed them patient is ready to discharge back to them.  They request orders be faxed to [phone].  Ride via stretcher and [facility] at 1730 today.  Orders faxed and msg left with the stretcher ride time.    MOLLY Patel RN

## 2022-07-06 NOTE — PLAN OF CARE
Patient confused-baseline, VSS, RA, denies pain. Wound care performed. Sat on the chair today using left. Assisted with feeding. Incontinent BB, pur wick in place.   Report given to  RN at [facility], RN requested that prescription can be faxed as soon as possible at [phone]    Discharged at 1730 via [facility] with [facility] transport   1705: daughter was given an update about patient discharge per daughter request      1748: transport does not arrive yet. No call. Will call [facility] transport  1820 patient discharge

## 2022-07-06 NOTE — PROGRESS NOTES
Care Management Follow Up    Length of Stay (days): 3    Expected Discharge Date: 07/06/2022     Concerns to be Addressed:       Patient plan of care discussed at interdisciplinary rounds: Yes    Anticipated Discharge Disposition:       Anticipated Discharge Services:    Anticipated Discharge DME:      Patient/family educated on Medicare website which has current facility and service quality ratings:    Education Provided on the Discharge Plan:    Patient/Family in Agreement with the Plan:      Referrals Placed by CM/SW:    Private pay costs discussed: Not applicable    Additional Information:  [facility] called [employer] and scheduled stretcher transport for 1730. SW completed PCS and added to chart. [facility] will continue to follow.      ANNIE Gurrola    [facility]

## 2022-07-06 NOTE — DISCHARGE SUMMARY
[facility]  Hospitalist Discharge Summary      Date of Admission:  7/3/2022  Date of Discharge:  7/6/2022  Discharging Provider: William Ramos MD  Discharge Service: Hospitalist Service    Discharge Diagnoses      severe sepsis due to E. coli bacteremia due to urinary tract infection with hypotension( resolved)     Follow-ups Needed After Discharge   Follow-up Appointments     Follow Up and recommended labs and tests      [facility] with [facility], located at [address] [place] [address] [address], [place] [address]. Phone number is [phone].   Follow up in 2-3 weeks after discharge.     The office will also be provided your information and will call you. In   the event that you do not get a call, please call the number above.         Follow-up and recommended labs and tests       Follow up with primary care provider, [facility], within   7 days for hospital follow- up.  No follow up labs or test are needed.         {Additional follow-up instructions/to-do's for PCP     Unresulted Labs Ordered in the Past 30 Days of this Admission     No orders found from 6/3/2022 to 7/4/2022.      These results will be followed up by    Discharge Disposition   Discharged to home- Memory care  Condition at discharge: Stable        Hospital Course     This is a 82-year-old female with medical history which includes dementia, hypertension, hyperlipidemia, diabetes mellitus who presented to the ED after a fall as she fell off from wheelchair at nursing home and EMS was called and patient was found to be hypotensive with blood pressure of 63/44 and she was given 500 mL of saline with improvement of blood pressure to 86/62 and blood sugar was 160.     On presentation to the ED patient had comprehensive metabolic panel done and her albumin is low at 6.7, mildly elevated AST at 60, CRP was 62.6, lactic acid was 2.  She also had elevated procalcitonin of 3.31 with proBNP of 1837.   Patient had troponin done on admission which was normal at 13.  Her TSH was normal at 0.98 with normal B12 and folate levels.     She had CBC done on admission which showed that her hemoglobin was 9.8 with platelet count of 148 and her MCV is elevated at 110.  She had UA done on admission which was consistent with infection.  Patient had blood cultures done.  COVID-19 was negative on admission.      She was started on IV Zosyn and her urine culture and blood cultures came back positive for E. coli and infectious disease was consulted.  Her WBC count was elevated on admission and it started trending down and she was afebrile and blood pressure was stable and she was switched to Unasyn and will complete 14-day course of antibiotics with Augmentin as outpatient.    Of note patient had concerns for infection over the left great toe and was seen by podiatry and x-ray was done which was not concerning for any infection and she was seen by wound care and podiatry team and wound care instructions have been given and she will follow with podiatry in 2 to 3 weeks as outpatient.    On day of discharge patient's hemoglobin was stable at 9, hemoglobin was 10.4 and platelets have also improved magnesium was 1.5 and she is on chronic magnesium    Patient daughter was contacted on day of discharge and was in agreement with going back to the [facility] facility and social work was also involved    Of note written prescription given to charge RN       Consultations This Hospital Stay   INFECTIOUS DISEASES IP CONSULT  PHYSICAL THERAPY ADULT IP CONSULT  OCCUPATIONAL THERAPY ADULT IP CONSULT  WOUND OSTOMY CONTINENCE NURSE  IP CONSULT  PODIATRY IP CONSULT  ORTHOSIS EXTREMITY LOWER REFERRAL IP CONSULT    Code Status   Full Code    Time Spent on this Encounter   IWilliam MD, personally saw the patient today and spent greater than 30 minutes discharging this patient.       William Ramos MD  [facility]  SPINE  [address] [place] [place] [address]  Phone: [phone]  Fax: [phone]  ______________________________________________________________________    Physical Exam   Vital Signs: Temp: 97  F (36.1  C) Temp src: Oral BP: 123/57 Pulse: 66   Resp: 18 SpO2: 91 % O2 Device: None (Room air)    Weight: 143 lbs 4.78 oz      General: Patient appears comfortable and at baseline she has severe dementia and by both  HEENT: Head is atraumatic, normocephalic.    Neck: Neck is supple  Respiratory: Lungs are clear to auscultation bilaterally with no wheeze or crackles   Cardiovascular: Regular rate , S1 and S2 normal with no murmer or rubs or gallops  Abdomen:   soft , non tender , non distended and bowel sound present   Skin: Left great toe has mild redness  Neurologic: . No facial droop  Musculoskeletal: Limited because of her clinical condition but did see her moving upper extremity  Psychiatric: Patient was sitting in the bed and at baseline mentation       Primary Care Physician   [facility]    Discharge Orders      Follow Up and recommended labs and tests    Follow up with podiatry at the [facility], located at [id number] [place] [address] [address], [place] [phone]. Phone number is [phone]. Follow up in 2-3 weeks after discharge.     The office will also be provided your information and will call you. In the event that you do not get a call, please call the number above.     Wound care (specify)    Wound Care Recommendations:    1)  Keep the wound covered by a bandage when bathing. Do not let foot get wet.     2)  Gently clean the wound with soap water, separate from bath/shower water.      3)  Each day, apply iodsorb.  Cover with large band-aid or gauze.      4)  Please seek immediate medical attention if any increasing redness, drainage, smell, or pain related to the wound.     5)  Please return to clinic in the period of time requested by Dr. Garcia.     Weight bearing status    WBAT to  LLE in surgical shoe. Elevate while at rest. Leave dressing to foot  at all times. Do not get wet in the shower.     Reason for your hospital stay    Uti BACTEREMIA     Follow-up and recommended labs and tests     Follow up with primary care provider, [facility], within 7 days for hospital follow- up.  No follow up labs or test are needed.     Activity    Your activity upon discharge: activity as tolerated     Discharge Instructions    dh2 shoe to be worn when ambulating     Diet    Follow this diet upon discharge: Orders Placed This Encounter      Combination Diet Regular Diet Adult; Moderate Consistent Carb (60 g CHO per Meal) Diet; Mechanical/Dental Soft Diet       Significant Results and Procedures   Most Recent 3 CBC's:Recent Labs   Lab Test 07/06/22  0804 07/05/22  0807 07/04/22  0740   WBC 9.0 14.4* 18.3*   HGB 10.4* 10.3* 9.9*   * 107* 111*   * 129* 125*     Most Recent 3 BMP's:Recent Labs   Lab Test 07/06/22  1109 07/06/22  0804 07/06/22  0745 07/05/22  1224 07/05/22  0807 07/04/22  1028 07/04/22  0636   NA  --  140  --   --  133  --  142   POTASSIUM  --  3.7  --   --  3.4  --  4.3   CHLORIDE  --  105  --   --  101  --  111*   CO2  --  30  --   --  28  --  26   BUN  --  7  --   --  13  --  19   CR  --  0.53  --   --  0.56  --  0.62   ANIONGAP  --  5  --   --  4  --  5   LAUREN  --  9.5  --   --  9.3  --  9.0   * 134* 138*   < > 132*   < > 147*    < > = values in this interval not displayed.   ,   Results for orders placed or performed during the hospital encounter of 07/03/22   XR Chest Port 1 View    Narrative    EXAM: XR CHEST PORTABLE 1 VIEW  LOCATION: [facility]  DATE/TIME: 07/03/2022, 3:10 PM    INDICATION: Syncope.  COMPARISON: Chest x-ray 01/23/2020.      Impression    IMPRESSION: Mild bilateral vascular and interstitial prominence. Correlate with mild interstitial edema. Stable normal cardiac silhouette. No focal airspace disease.     XR  Toe Left G/E 2 Views    Narrative    TOE LEFT TWO OR MORE VIEWS   7/5/2022 10:40 AM     HISTORY:  Left great toe wound with redness/swelling.    COMPARISON: None.      Impression    IMPRESSION: Diffuse bone demineralization. No erosions or periostitis  or other radiographic findings of osteomyelitis.    HAILEE LUJAN MD         SYSTEM ID:  [id number]       Discharge Medications   Current Discharge Medication List      START taking these medications    Details   amoxicillin-clavulanate (AUGMENTIN) 875-125 MG tablet Take 1 tablet by mouth 2 times daily for 12 days    Associated Diagnoses: Sepsis, due to unspecified organism, unspecified whether acute organ dysfunction present (H)      Cadexomer Iodine, topical, 0.9% (IODOSORB) 0.9 % GEL gel Apply topically every other day    Associated Diagnoses: Skin ulcer of left lower leg with fat layer exposed (H)         CONTINUE these medications which have NOT CHANGED    Details   acetaminophen (TYLENOL) 500 MG tablet Take 1,000 mg by mouth 3 times daily      albuterol (PROAIR HFA/PROVENTIL HFA/VENTOLIN HFA) 108 (90 Base) MCG/ACT inhaler Inhale 2 puffs into the lungs every 6 hours as needed    Comments: Pharmacy may dispense brand covered by insurance (Proair, or proventil or ventolin or generic albuterol inhaler)      ammonium lactate (LAC-HYDRIN) 12 % external lotion Apply topically 2 times daily      aspirin 81 MG EC tablet Take 81 mg by mouth daily       atorvastatin (LIPITOR) 40 MG tablet Take 40 mg by mouth daily      cholecalciferol (VITAMIN D3) 5000 units (125 mcg) capsule Take 5,000 Units by mouth daily      donepezil (ARICEPT) 10 MG tablet Take 10 mg by mouth At Bedtime       FLUoxetine (PROZAC) 10 MG capsule Take 10 mg by mouth daily      fluticasone (FLONASE) 50 MCG/ACT nasal spray Spray 2 sprays into both nostrils daily      ipratropium - albuterol 0.5 mg/2.5 mg/3 mL (DUONEB) 0.5-2.5 (3) MG/3ML neb solution Take 1 vial by nebulization every 6 hours as needed  for shortness of breath / dyspnea or wheezing      isosorbide mononitrate (IMDUR) 30 MG 24 hr tablet Take 0.5 tablets (15 mg) by mouth daily  Qty: 30 tablet, Refills: 0    Associated Diagnoses: Benign essential hypertension      magnesium oxide (MAG-OX) 400 MG tablet Take 400 mg by mouth 3 times daily      melatonin 3 MG tablet Take 3 mg by mouth At Bedtime      metoprolol succinate ER (TOPROL-XL) 25 MG 24 hr tablet Take 0.5 tablets (12.5 mg) by mouth 2 times daily  Qty: 60 tablet, Refills: 0    Associated Diagnoses: Benign essential hypertension      Multiple Vitamins-Minerals (CENTRUM SILVER) per tablet Take 1 tablet by mouth daily      muscle rub (ARTHRITIS HOT) 10-15 % CREA Apply topically every 6 hours as needed for moderate pain      nitroglycerin (NITROSTAT) 0.4 MG SL tablet Place 1 tablet (0.4 mg) under the tongue every 5 minutes as needed X 3 doses total if needed for chest pain.  Qty: 25 tablet, Refills: 3    Associated Diagnoses: Chest pain, unspecified type      OLANZapine (ZYPREXA) 5 MG tablet Take 5 mg by mouth 2 times daily      order for [facility] Phone [phone] Fax [phone]  [place] Size small Qty 2 sleeves  Primary Dressing fibracol or sweta   Qty 6 sheets  Secondary Dressing 4x4 gauze  Qty 60   Secondary Dressing Vashe Qty 1 bottle  Length of Need: 1 month  Frequency of dressing change: daily  Qty: 2 Device, Refills: 0    Associated Diagnoses: Skin ulcer of left ankle with fat layer exposed (H)      rOPINIRole (REQUIP) 0.5 MG tablet Take 0.5 mg by mouth At Bedtime      senna-docusate (SENOKOT-S/PERICOLACE) 8.6-50 MG tablet Take 1 tablet by mouth daily as needed for constipation         STOP taking these medications       omeprazole (PRILOSEC) 20 MG DR capsule Comments:   Reason for Stopping:             Allergies   Allergies   Allergen Reactions     Sulfa Drugs Rash

## 2022-07-07 NOTE — PROGRESS NOTES
[facility]    Background: Care Coordination referral placed from \[facility]\"" discharge report for reason of patient meeting criteria for a TCM outreach call by [facility] team.    Assessment: Upon chart review, [facility] Team member will cancel/close the referral for TCM outreach due to reason below:    Patient has discharged to a Group home, Memory Care or Nursing Home    Plan: Care Coordination referral for TCM outreach canceled.      Kinjal Armstrong MA  [facility], [facility]

## 2022-10-12 PROBLEM — R41.82 ALTERED MENTAL STATUS, UNSPECIFIED ALTERED MENTAL STATUS TYPE: Status: ACTIVE | Noted: 2022-01-01

## 2022-10-12 PROBLEM — J18.9 PNEUMONIA OF LEFT LOWER LOBE DUE TO INFECTIOUS ORGANISM: Status: ACTIVE | Noted: 2022-01-01

## 2022-10-12 PROBLEM — I21.4 NSTEMI (NON-ST ELEVATED MYOCARDIAL INFARCTION) (H): Status: ACTIVE | Noted: 2022-01-01

## 2022-10-12 NOTE — ED PROVIDER NOTES
History   Chief Complaint:  Shortness of Breath and Altered Mental Status     The history is provided by the EMS personnel, a caregiver and medical records. The history is limited by the condition of the patient.      Mel Castellanos is a 82 year old female with history of dementia, coronary artery disease, hypertension, COPD, and type 2 diabetes mellitus who presents via EMS from a group home with shortness of breath and altered mental status. According to EMS report, the patient has had worsening generalized weakness, altered mental status, and wheezing. She is nonverbal at baseline due to dementia. Staff noted the patient was quieter than normal and has been unable to stand with assistance. She had a fever of 102 today.  EMS noted wheezing, so she was given a nebulizer. She was initially in the high 80s prior to her neb, but is now in the high 90s. She has history of COPD and has baseline oxygen saturations in the low 90s, however she does not use oxygen supplementation chronically or at home. There have been no recent witnessed falls or injuries. She resides in the [facility] in [place]. History is limited due to condition of patient and dementia.     Review of Systems   Unable to perform ROS: Dementia     Allergies:  Sulfa Drugs    Medications:  Albuterol inhaler  Aspirin (81 mg)  Lipitor  Aricept   Prozac  Duoneb   Imdur   Toprol  Nitrostat   Zyprexa  Requip     Past Medical History:     Unstable angina  Hypertension  Insomnia  COPD with acute exacerbation  Dementia with behavioral disturbance   Absent radial pulse   Blood vessel spasm   Coronary artery disease   Diabetes mellitus, type 2  GERD  Hyperlipidemia  Obstructive sleep apnea   Restless legs syndrome   Subclavian artery stenosis, left   Acute exacerbation of chronic bronchitis    Transient ischemic attack     Past Surgical History:    Appendectomy  Cholecystectomy    Knee replacement   Colonoscopy     Family History:    Father:  Coronary Artery Disease     Social History:  The patient arrived to the emergency department via EMS.  Living Situation: Group Home  PCP: Monroe Cheng     Physical Exam     Patient Vitals for the past 24 hrs:   BP Temp Temp src Pulse Resp SpO2   10/12/22 1500 (!) 145/93 -- -- 67 25 96 %   10/12/22 1400 (!) 154/90 -- -- 68 22 100 %   10/12/22 1338 (!) 206/89 -- -- -- -- --   10/12/22 1230 112/80 -- -- 77 16 98 %   10/12/22 1226 -- (!) 101.4  F (38.6  C) Rectal -- -- --   10/12/22 1215 110/73 -- -- -- -- --   10/12/22 1130 (!) 145/80 -- -- 84 23 98 %   10/12/22 1115 (!) 142/75 -- -- 81 30 100 %   10/12/22 1103 (!) 142/73 98.6  F (37  C) Axillary 74 12 98 %     Physical Exam   Constitutional:  Significantly demented. Not interacting. Lying on her side with her eyes closed. Mumbles.   HENT:   Mouth/Throat:   Mucous membranes are dry.    Eyes:    Conjunctivae normal and EOM are normal. Pupils are equal, round, and reactive to light.   Neck:    Normal range of motion.   Cardiovascular: Normal rate, regular rhythm and normal heart sounds.  Exam reveals no gallop and no friction rub.  No murmur heard.  Pulmonary/Chest:  Effort normal and breath sounds normal. Patient has no wheezes. Patient has no rales.   Abdominal:   Soft. Bowel sounds are normal. Patient exhibits no mass. There is no tenderness. There is no rebound and no guarding.   Musculoskeletal:  Normal range of motion. Patient exhibits no edema.   Neurological:   Significant dementia. Does not follow instructions. Will open her eyes. Unable to assess for strength. Lying in a fetal position.   Skin:   Skin is warm and dry. No rash noted. No erythema.   Psychiatric:   Unable to assess the patient's mood.     Emergency Department Course   ECG  ECG taken at 1110, ECG read at 1118  Normal sinus rhythm  ST & T wave abnormality, consider anterolateral ischemia   Abnormal ECG  Rate 84 bpm. DC interval 118. QRS duration 74. QT/QTc 370/437. P-R-T axes 87 39  135.    Imaging:  CT Chest Pulmonary Embolism w Contrast   Final Result   IMPRESSION:   1.  No evidence for pulmonary embolism.   2.  Mild consolidation in the left lower lobe is suspicious for   pneumonia.   3.  Emphysema.       MALDONADO BAIRD MD            SYSTEM ID:  [id number]      XR Chest 2 Views   Final Result   IMPRESSION: The patient is rotated. The lungs are clear. No   significant change.      GAVINO HARP MD            SYSTEM ID:  [id number]      Report per radiology    Laboratory:  Labs Ordered and Resulted from Time of ED Arrival to Time of ED Departure   BASIC METABOLIC PANEL - Abnormal       Result Value    Sodium 155 (*)     Potassium 4.1      Chloride 117 (*)     Carbon Dioxide (CO2) 32      Anion Gap 6      Urea Nitrogen 44 (*)     Creatinine 0.95      Calcium 10.0      Glucose 192 (*)     GFR Estimate 59 (*)    ROUTINE UA WITH MICROSCOPIC REFLEX TO CULTURE - Abnormal    Color Urine Light Yellow      Appearance Urine Slightly Cloudy (*)     Glucose Urine Negative      Bilirubin Urine Negative      Ketones Urine Negative      Specific Gravity Urine 1.023      Blood Urine Trace (*)     pH Urine 5.5      Protein Albumin Urine 30 (*)     Urobilinogen Urine Normal      Nitrite Urine Positive (*)     Leukocyte Esterase Urine Moderate (*)     Bacteria Urine Many (*)     RBC Urine 11 (*)     WBC Urine 23 (*)    TROPONIN I - Abnormal    Troponin I High Sensitivity 60 (*)    D DIMER QUANTITATIVE - Abnormal    D-Dimer Quantitative 8.83 (*)    CBC WITH PLATELETS AND DIFFERENTIAL - Abnormal    WBC Count 10.3      RBC Count 4.06      Hemoglobin 13.8      Hematocrit 45.5       (*)     MCH 34.0 (*)     MCHC 30.3 (*)     RDW 13.0      Platelet Count 194     ISTAT GASES LACTATE VENOUS POCT - Abnormal    Lactic Acid POCT 2.4 (*)     Bicarbonate Venous POCT 38 (*)     O2 Sat, Venous POCT 38 (*)     pCO2V Venous POCT 57 (*)     pH Venous POCT 7.44 (*)     pO2 Venous POCT 22 (*)    DIFFERENTIAL - Abnormal    %  Neutrophils 85      % Lymphocytes 13      % Monocytes 2      % Eosinophils 0      % Basophils 0      Absolute Neutrophils 8.8 (*)     Absolute Lymphocytes 1.3      Absolute Monocytes 0.2      Absolute Eosinophils 0.0      Absolute Basophils 0.0      RBC Morphology Confirmed RBC Indices      Platelet Assessment        Value: Automated Count Confirmed. Platelet morphology is normal.   ISTAT GASES LACTATE VENOUS POCT - Abnormal    Lactic Acid POCT 1.3      Bicarbonate Venous POCT 28      O2 Sat, Venous POCT 44 (*)     pCO2V Venous POCT 53 (*)     pH Venous POCT 7.34      pO2 Venous POCT 27     HEMOGLOBIN A1C - Abnormal    Hemoglobin A1C 6.0 (*)    GLUCOSE BY METER - Abnormal    GLUCOSE BY METER POCT 187 (*)    COVID-19 VIRUS (CORONAVIRUS) BY PCR - Normal    SARS CoV2 PCR Negative     GLUCOSE MONITOR NURSING POCT   GLUCOSE MONITOR NURSING POCT   GLUCOSE MONITOR NURSING POCT   SODIUM   TROPONIN I   BLOOD CULTURE   BLOOD CULTURE   URINE CULTURE      Emergency Department Course:     Reviewed:  I reviewed nursing notes, vitals, past medical history and Care Everywhere    Assessments:  1048 I obtained history and examined the patient as noted above.   1226 Rectal temperature was 101.4.   1351 I rechecked the patient. Will call daughter to update on care plan.   1354 I spoke with the patient's daughter, Henrique, regarding patient's presentation, findings, and plan of care. I discussed code status. Changed status to DNR/DNI.   1409 Repeat lactic acid is 1.3.     Consults:  1433 I spoke to Dr. Quezada of the hospitalist service who accepts the patient for admission.     Interventions:  1229 0.9% sodium chloride BOLUS 1000 mL IV  1236 Solu-Medrol 125 mg IV  1239 Tylenol 650 mg rectal   1248 0.9% sodium chloride BOLUS 1000 mL IV  1340 Rocephin 1 g IV  1357 Rocephin 1 g IV  1359 Azithromycin 500 mg IV  1649 Dextrose 5% infusion IV  Rectal aspirin    Disposition:  The patient was admitted to the hospital under the care of Dr. Quezada.      Impression & Plan   CMS Diagnoses: The Lactic acid level is elevated due to sepsis, at this time there is no sign of severe sepsis or septic shock.    Medical Decision Making:  Mel Castellanos is an 82 year old female presenting to the emergency department from her group home with decreased altered mental status and increased weakness. She was unable to participate with the history due to altered mental status and severe dementia. Evaluation was performed. She has a UTI. She was reported to have a fever and hypoxia at her care facility. Pneumonia was certainly in the differential. Chest x-ray showed a faint infiltrate, however not severe enough to make her hypoxic. D-dimer was therefore run, and this is significantly elevated so a CT of her chest was performed. CT showed a left lower lobe pneumonia, however does not show any PE, dissection, pleural effusion, or pericardial effusion. Her lactic acid on arrival was elevated. This was likely secondary to sepsis as well as being hypoxic. She was on nasal cannula oxygen here and satting appropriately. She is not normally on oxygen.  EKG does show some ischemic changes and her troponin is mildly bumped.  This may be due to demand ischemia.  I spoke to hospitalist regarding this at this time we will focus on treating her dehydration sepsis.  I have written for rectal aspirin.  We will not heparinize at this point we will trend her troponins.  At this time I will admit her to the hospital. She has a significantly elevated sodium and chloride as well. She initially received IV fluids per sepsis protocol but then was switched over to a D5 infusion per Dr. Quezada. I did speak with her daughter and we changed her code status to DNR/DNI. Admit to the hospitalist.     Diagnosis:    ICD-10-CM    1. Pneumonia of left lower lobe due to infectious organism  J18.9       2. Altered mental status, unspecified altered mental status type  R41.82       3. NSTEMI (non-ST elevated  myocardial infarction) (H)  I21.4       4. Sepsis due to other etiology (H)  A41.89         Scribe Disclosure:  I, Larisa Ramos, am serving as a scribe at 10:57 AM on 10/12/2022 to document services personally performed by Fay Tsai MD based on my observations and the provider's statements to me.     Fay Tsai MD  10/12/22 1902

## 2022-10-12 NOTE — H&P
[facility]    History and Physical - Hospitalist Service       Date of Admission:  10/12/2022  Dictation #: [id number]  Brief Summary (see dictation for more details): 82 year old DNR/DNI, advanced dementia presents with AMS and hypernatremia Na of 155, LLL pneumonia, UTI and ECG changes with mild troponin elevation.  Tx for dehydration, with free water, abx for uti/pna, conservative mgmt for mild troponin elevation.    Clinically Significant Risk Factors Present on Admission         # Hypernatremia: Na = 155 mmol/L (Ref range: 133 - 144 mmol/L) on admission, will monitor as appropriate                    Gary Quezada MD  Hospitalist Service  [facility]  Securely message with the Vocera Web Console (learn more here)  Text page via [employer] Paging/Directory

## 2022-10-12 NOTE — H&P
Admitted: 10/12/2022    PRIMARY CARE PHYSICIAN:  [facility].    CHIEF COMPLAINT:  Altered mental status, increased weakness, fever and cough.    HISTORY OF PRESENT ILLNESS:  Mel Castellanos is an 82-year-old female with profound dementia who is nonverbal, in addition has coronary artery disease, hypertension, COPD, that is not oxygen-dependent, type 2 diabetes, who presents to [facility] Emergency Department with the above complaints.  The patient lives [facility] in [place].  The patient is unable to really give any history due to her nonverbal and advanced dementia.  According to the staff, the patient has over the last 24-48 hours has had increased weakness, altered mentation and wheezing.  She had a fever and she has been unable to stand with any sort of assistance.  The patient was seen by EMS, she was noted to have some wheezing, so she received DuoNebs.  She had a fever.  She was brought to [facility] Emergency Department for further assessment.    In the Emergency Room, the patient by Dr. Fay Tsai noted that the patient had a fever of 101.4.  She was borderline hypoxemic at her facility in the high 80s here in the Emergency Department.  She is in the low 90s, which is her baseline.  Blood pressures were stable.  Blood work revealed a sodium 155 with associated chloride of 117.  BUN is elevated at 44, creatinine is 0.95 with calculated GFR 59.  Lactic acid initially was 2.4 and the patient received 2 liters of normal saline and her lactic acid came down to 1.3.  Her troponin is mildly elevated over 60.  Her glucose is 192.  Venous blood gas showed initially pH of 7.44 with pCO2 of 57, pO2 of 22 and bicarbonate of 38.  White count is 10.0, hemoglobin 13.8, platelets 194,000 MCV elevated at 112.  Her D-dimer was elevated at 8.83.  She had an initial chest x-ray, which showed no acute finding.  She had a CT of the chest, which showed no evidence  of pulmonary embolism, but mild consolidation of the left lower lobe suspicious for pneumonia.  She has also underlying emphysema.  COVID test was negative.  Urinalysis was also abnormal with specific gravity at 1.023 with positive nitrites, trace blood, moderate leukocytes are 23 white cells, 11 red cells and many bacteria.  The patient received a total of 2 liters of normal saline.  She received 2 grams of ceftriaxone and Zithromax and Solu-Medrol.  She is DNR/DNI.  This was confirmed by the patient's daughter, patient will be admitted as inpatient for further treatment.    PAST MEDICAL HISTORY:    1.  ASCVD.  2.  Hypertension.  3.  Insomnia.  4.  COPD.  5.  Dementia, nonverbal.  6.  History of absent radial pulse.  7.  History of type 2 diabetes.  8.  GERD.  9.  Hyperlipidemia.  10.  Obstructive sleep apnea.  11.  Restless leg syndrome.  12.  Left subclavian artery stenosis.  13.  History of chronic bronchitis.  14.  History of TIA.    PAST SURGICAL HISTORY:    1.  Appendectomy.  2.  Cholecystectomy.  3.  Knee replacement.  4.  Colonoscopy.    FAMILY HISTORY:  Father with coronary disease.    SOCIAL HISTORY:  No tobacco or alcohol.  She is DNR/DNI.  Lives in a group home.  Next of kin is her daughter.    ALLERGIES:  SULFA DRUGS.    CURRENT MEDICATION LIST:    1.  Imdur  2.  Metoprolol.  3.  Nitroglycerin.  4.  Tylenol.  5.  Albuterol inhaler.  6.  Aricept.  7.  Cymbalta.  6.  Flonase.  7.  Gabapentin.  8.  Robitussin.  9.  DuoNebs.  10.  Loperamide.  11.  Magnesium oxide.  12.  Melatonin.  13.  Olanzapine.  14.  Senna.  15.  Vitamin D.    REVIEW OF SYSTEMS:  Unable to provide due to her advanced dementia.    PHYSICAL EXAMINATION:    VITAL SIGNS:  Temperature 101.4, heart rate 77, respirations 16, blood pressure 112/80, sats are 98% on 2 L.  GENERAL:  The patient is an 82-year-old  female.  She is in bed.  Her eyes are open.  She does not respond meaningfully.  HEENT:  Atraumatic.  Pupils are about 3 mm  and equal.  Sclerae are anicteric.  Oropharynx:  She is edentulous.  Her lips and tongue are profoundly dry.  NECK:  JVP is not elevated and flat.  PULMONARY:  Lungs are clear now.  CARDIOVASCULAR:  S1, S2, regular rhythm.  GASTROINTESTINAL:  Abdomen is scaphoid.  There is no focal tenderness, guarding or rebound.  Bowel sounds are normoactive.  MUSCULOSKELETAL:  Examination shows no edema.  She has got an abrasion over her left knee.  NEUROLOGIC:  She does move all 4 extremities.  Cranial nerves appear to be grossly intact, but limited due to her dementia.  SKIN:  Warm, dry, well perfused.  PSYCHIATRIC:  Unable to assess.    LABORATORY DATA:  As dictated in history of present illness.    ASSESSMENT:  Mel Castellanos is an 82-year-old DNR/DNI advanced dementia, nonverbal, who presents with altered mental status, shortness of breath and evidence of hypernatremia, severe dehydration, mild lactic acidosis a left lower lobe infiltrate consistent with pneumonia, abnormal urinalysis with suspected urinary tract infection, being admitted for further treatment.    PLAN:    1.  Hypernatremia.  According to the daughter, the patient is visited by her daily and has been apparently drinking and eating at her baseline.  This leads to speculation that she probably has been having gradual free water deficit that maybe has been going on for quite some time.  Currently, she appears to be severely dehydrated.  She unfortunately received 2 liters of fluid in the form of normal saline.  The patient will now be changed to hypotonic D5W at 100 mL an hour for the next 24 hours.  We will do sodium checks every 6 hours.  We will monitor I's and O's.  Her BUN to creatinine ratio is significantly elevated as well.  Given her hyperglycemia,  we will do Accu-Cheks and cover with sliding scale insulin.  2.  COPD with left lower lobe infiltrate.  It is unclear whether the patient has had an aspiration event.  She has a fever of 101.4.  We will  treat her as community-acquired pneumonia with ceftriaxone and Zithromax.  With respect to her COPD, we will continue the patient on all of her nebs.  She did receive Solu-Medrol in the Emergency Department.  3.  Abnormal urinalysis.  The patient has suspected UTI.  The patient has an abnormal urinalysis.  She is already on ceftriaxone.  Urine cultures have been obtained in addition to blood cultures.  4.  Advanced dementia.  We will continue the patient on her Aricept as well as a Zyprexa, but scheduled.  5.  ASCVD with mild troponin elevation.  The patient has abnormal ECG.  The patient is unable to really give any history of chest pain.  She has nonspecific mild ST segment depressions and T-wave inversions along throughout the anterior leads that is different from her previous ECGs.  The patient will be on telemetry.  We will do serial troponins.  We will continue the patient on her cardiac medications including metoprolol, Imdur.  We will repeat an ECG in the morning and at this junction given her advanced dementia, we will not aggressively anticoagulate her.    DEEP VENOUS THROMBOSIS PROPHYLAXIS:  The patient received compression boots.    CODE STATUS:  DNR/DNI.    Gary Quezada MD        D: 10/12/2022   T: 10/12/2022   MT: DAKOTA    Name:     REYMUNDO LUCERO  MRN:      [id number]        Account:     [id number]   :      1940           Admitted:    10/12/2022       Document: [id number]    cc:  [facility]

## 2022-10-12 NOTE — ED NOTES
[facility]  ED Nurse Handoff Report    ED Chief complaint: Generalized Weakness (Dementia, sent for increased weakness, dec alertness , fever 102 at her NH)      ED Diagnosis:   Final diagnoses:   Pneumonia of left lower lobe due to infectious organism   Altered mental status, unspecified altered mental status type   NSTEMI (non-ST elevated myocardial infarction) (H)       Code Status: not ordered but Dr Tsai states daughter said pt is a DNR DNI     Allergies:   Allergies   Allergen Reactions     Sulfa Drugs Rash       Patient Story: dementia, sent from NH for decreased alertness, fever 102, weakness   Focused Assessment:  As above, non responsive to cares , eyes open stares     Treatments and/or interventions provided: 2 liters NS, Rocephin, Azithromycin, pur wick, O@ 4 liters   Patient's response to treatments and/or interventions: no change     To be done/followed up on inpatient unit:  unknown     Does this patient have any cognitive concerns?: Baseline dementia    Activity level - Baseline/Home:  Unknown  Activity Level - Current:   Unknown    Patient's Preferred language: English   Needed?: No    Isolation: None  Infection: Not Applicable  Patient tested for COVID 19 prior to admission: YES  Bariatric?: No    Vital Signs:   Vitals:    10/12/22 1130 10/12/22 1215 10/12/22 1226 10/12/22 1230   BP: (!) 145/80 110/73  112/80   Pulse: 84   77   Resp: 23   16   Temp:   (!) 101.4  F (38.6  C)    TempSrc:   Rectal    SpO2: 98%   98%       Cardiac Rhythm:     Was the PSS-3 completed:   No -unable  What interventions are required if any?               Family Comments: daughters contact in demographics   OBS brochure/video discussed/provided to patient/family: No              Name of person given brochure if not patient:               Relationship to patient:     For the majority of the shift this patient's behavior was .   Behavioral interventions performed were .    ED NURSE PHONE NUMBER:  [phone]

## 2022-10-13 NOTE — PLAN OF CARE
RECEIVING UNIT ED HANDOFF REVIEW    ED Nurse Handoff Report was reviewed by: Damaris Edwards RN on October 13, 2022 at 3:02 AM

## 2022-10-13 NOTE — PROVIDER NOTIFICATION
MD Notification    Notified Person: MD    Notified Person Name: Dr. BENNY Agosto    Notification Date/Time: Today; 1025    Notification Interaction: Vocera Page    Purpose of Notification:    Pt remains somnolent, unable to take po, including meds. Is there an alternate IV med we can give to her? Vitally she is stable now, so Abx is my concern. Watching her BG. Updated daughter. Thanks.         Orders Received:    Comments:

## 2022-10-13 NOTE — PROGRESS NOTES
[facility]    Medicine Progress Note - Hospitalist Service        Date of Admission:  10/12/2022 11:03 AM    Assessment & Plan:   Mel Castellanos is an 82-year-old female with advanced dementia, reportedly nonverbal at baseline, who presents with altered mental status, shortness of breath and evidence of hypernatremia, severe dehydration, mild lactic acidosis a left lower lobe infiltrate consistent with pneumonia, abnormal urinalysis with suspected urinary tract infection.     E. coli bacteremia  Urinary tract infection  Possible pneumonia  Acute encephalopathy due to above  -Patient has advanced dementia and lives in a memory care group home.  -Reportedly per the staff there, patient over the last 24 to 48 hours has been having worsening weakness, fever and changes in mental state and was also wheezing.   -Initially admitted with concerns for UTI and pneumonia  -Blood culture positive for E. Coli  -Continue ceftriaxone 2 g IV daily  -Discontinue azithromycin  -Needs some more collateral information from family members, I called Henrique, daughter left a voicemail requesting call back  -Continue monitoring mental status closely    Acute hypernatremia  Dehydration  -Patient was found to be dehydrated at presentation with a sodium of 155  -Initially placed on hypotonic fluid, sodium now much improved at 148, switched to D5 half-normal saline this morning.  -continue to monitor sodium every 6 hours for today    Advanced dementia  -patient lives [facility] [place] in [place].  -apparently at baseline has advanced dementia and is verbal but most of it is incomprehensible  -Per her daughter, she is wheelchair-bound at baseline and requires help with ADLs  -At risk for delirium, monitor closely  -Maintain sleep-wake cycle, minimize interruptions although she is quite encephalopathic at the moment.  -Continue prior to admission Zyprexa if she clears and can take orally otherwise we will use  as needed IM Zyprexa    Mild troponin elevation most likely due to type II MI due to demand ischemia  History of coronary artery disease s/p PCI in 2011  -Initial troponin was 60 with a downtrend  -Suspect this is type II from demand ischemia from the initial septic process.  She did however have some EKG changes also.  -We will get an echocardiogram, if no new changes then would not pursue further cardiac work-up.    Diabetes mellitus type 2  -Last hemoglobin A1c was 6.0 1/12/2022  -Blood sugars have been quite elevated here probably due to 1 dose of methylprednisone that she received in the emergency  -It does not appear like she is on any medication normally as outpatient  -Continue medium sliding scale for now    COPD  -Scattered wheezes bilaterally but no evidence of overt acute exacerbation  -Continue prior to admission inhalers and nebulizers    Diet: Combination Diet Clear Liquid  Room Service     DVT Prophylaxis: Pneumatic Compression Devices   Tavera Catheter: Not present  Code Status: No CPR- Do NOT Intubate     Disposition Plan       Expected Discharge Date: 10/14/2022              Entered: Hu Cash MD 10/13/2022, 7:47 AM        Clinically Significant Risk Factors Present on Admission         # Hypernatremia: Na = 148 mmol/L (Ref range: 133 - 144 mmol/L); 148 mmol/L (Ref range: 133 - 144 mmol/L) on admission, will monitor as appropriate                      The patient's care was discussed with the Bedside Nurse, Patient and Daughter Henrique on the phone.    Hu Cash MD  Hospitalist Service  [facility]  Text Page 7AM-6PM  Securely message with the Vocera Web Console (learn more here)  Text page via [employer] Paging/Directory    ______________________________________________________________________    Interval History   Patient continues to be encephalopathic, opens eyes to painful stimuli.  Sodium improving.  Afebrile now.  Blood culture positive for E. coli.    Data  "reviewed today: I reviewed all medications, new labs and imaging results over the last 24 hours. I personally reviewed no images or EKG's today.    Physical Exam   Vital signs:  Temp: 98.5  F (36.9  C) Temp src: Axillary BP: (!) 141/84 Pulse: 61   Resp: 20 SpO2: 99 % O2 Device: Nasal cannula Oxygen Delivery: 1 LPM   Weight: (S) 55.6 kg (122 lb 9.2 oz)  Estimated body mass index is 19.78 kg/m  as calculated from the following:    Height as of 7/5/22: 1.676 m (5' 6\").    Weight as of this encounter: 55.6 kg (122 lb 9.2 oz).      Wt Readings from Last 2 Encounters:   10/13/22 (S) 55.6 kg (122 lb 9.2 oz)   07/06/22 65 kg (143 lb 4.8 oz)       Gen: Opens eyes to mild painful stimuli.  Deeply encephalopathic otherwise  HEENT: Supple neck, no pallor   resp: Coarse breath sound bilaterally with scattered 1-2 wheezes  CVS: RRR, no murmur  Abd/GI: Soft, non-tender. BS- normoactive.   Skin: Warm, dry no rashes  MSK:  no pedal edema  Neuro- CN- intact.  Appears globally weak but difficult to accurately assess motor strength.       Data   Recent Labs   Lab 10/13/22  0727 10/13/22  0628 10/13/22  0224 10/13/22  0021 10/12/22  2102 10/12/22  1647 10/12/22  1132   WBC  --  9.9  --   --   --   --  10.3   HGB  --  12.9  --   --   --   --  13.8   MCV  --  109*  --   --   --   --  112*   PLT  --  173  --   --   --   --  194   NA  --  148*  148*  --  152* 154*  --  155*   POTASSIUM  --  3.3*  --   --   --   --  4.1   CHLORIDE  --  115*  --   --   --   --  117*   CO2  --  30  --   --   --   --  32   BUN  --  33*  --   --   --   --  44*   CR  --  0.58  --   --   --   --  0.95   ANIONGAP  --  3  --   --   --   --  6   LAUREN  --  9.2  --   --   --   --  10.0   * 312* 266*  --   --    < > 192*    < > = values in this interval not displayed.       Recent Results (from the past 24 hour(s))   XR Chest 2 Views    Narrative    XR CHEST 2 VIEWS  10/12/2022 11:55 AM       INDICATION: sob  COMPARISON: 7/3/2022       Impression    IMPRESSION: " The patient is rotated. The lungs are clear. No  significant change.    GAVINO HARP MD         SYSTEM ID:  [id number]   CT Chest Pulmonary Embolism w Contrast    Narrative    CT CHEST PULMONARY EMBOLISM WITH CONTRAST October 12, 2022 1:29 PM    CLINICAL HISTORY: Fever. Cough. Elevated D-dimer.    TECHNIQUE: CT angiogram chest during arterial phase injection IV  contrast. 2D and 3D MIP reconstructions were performed by the CT  technologist. Dose reduction techniques were used.   CONTRAST: 58mL Isovue-370.    COMPARISON: CT of the chest performed 3/17/2020.    FINDINGS:  ANGIOGRAM CHEST: Pulmonary arteries are normal caliber and negative  for pulmonary emboli. The thoracic aorta is not well-opacified, there  is no evidence for aneurysm. There is extensive atherosclerotic  calcification of the thoracic aorta. No CT evidence of right heart  strain.    LUNGS AND PLEURA: No pleural effusions. Mild changes of centrilobular  emphysema in both upper lungs. Mild consolidation in the left lower  lobe is suspicious for pneumonia, and is new since the previous exam.  There is mild bronchial wall thickening noted in the left lower lobe.  No pneumothorax.    MEDIASTINUM/AXILLAE: No enlarged lymph nodes in the chest. No  pericardial effusion.    CORONARY ARTERY CALCIFICATION: Severe.    UPPER ABDOMEN: Limited views of the upper abdomen are unremarkable.    MUSCULOSKELETAL: Degenerative changes in the thoracic spine.      Impression    IMPRESSION:  1.  No evidence for pulmonary embolism.  2.  Mild consolidation in the left lower lobe is suspicious for  pneumonia.  3.  Emphysema.     MALDONADO BAIRD MD         SYSTEM ID:  [id number]     Medications     dextrose 5% and 0.45% NaCl + KCl 20 mEq/L         azithromycin  250 mg Oral Daily     cefTRIAXone  2 g Intravenous Q24H     donepezil  10 mg Oral At Bedtime     DULoxetine  40 mg Oral Daily     fluticasone  2 spray Both Nostrils Daily     insulin aspart  1-6 Units Subcutaneous Q4H      [Held by provider] insulin aspart  1-7 Units Subcutaneous TID AC     [Held by provider] insulin aspart  1-5 Units Subcutaneous At Bedtime     isosorbide mononitrate  15 mg Oral Daily     magnesium oxide  400 mg Oral BID     metoprolol succinate ER  12.5 mg Oral BID     OLANZapine  5 mg Oral BID     sodium chloride (PF)  3 mL Intracatheter Q8H     vitamin D3  50 mcg Oral Daily

## 2022-10-13 NOTE — CONSULTS
[facility]  WO Nurse Inpatient Assessment     Consulted for: right heel    Summary:  Small right heel DTPI (deep tissue pressure injury), community-acquired, epidermis remains intact.  Left great toe: small eschar to distal toe, stable and dry, no s/s infection.  Will order Prevalon boots for offloading.     Patient History (according to provider note(s):      Mel Castellanos is an 82-year-old female with advanced dementia, reportedly nonverbal at baseline, who presents with altered mental status, shortness of breath and evidence of hypernatremia, severe dehydration, mild lactic acidosis a left lower lobe infiltrate consistent with pneumonia, abnormal urinalysis with suspected urinary tract infection.    Areas Assessed:      Areas visualized during today's visit: BLE    Wound location: right medial heel    Last photo: 10-13-22      Wound due to: Pressure Injury DTPI  Wound history/plan of care: present on admission.  Per chart review, pt has advanced dementia.  Pt was somnolent during entire WOC visit 10/13.    Wound base: intact purple non-blanching tissue with peeling epidermis      Palpation of the wound bed: firm      Drainage: none     Description of drainage: none     Measurements (length x width x depth, in cm): 2 x 2 x 0cm      Tunneling: N/A     Undermining: N/A  Periwound skin: Erythema- blanchable      Color: pink      Temperature: normal   Odor: none  Pain: minimal reaction to palpation  Pain interventions prior to dressing change: N/A  Treatment goal: Heal  and Protection  STATUS: initial assessment  Supplies ordered: ordered prevalon boots       Wound location: left great toe    Last photo: 10-13-22      Wound due to: Unknown Etiology pressure vs trauma  Wound history/plan of care: present on admission; prior wounds to great toe noted on previous admissions  Wound base: brownish dry firm eschar vs scab     Palpation of the wound bed: firm      Drainage: none     Description of  drainage: none     Measurements (length x width x depth, in cm): approx 0.6 x 0.8 x 0cm      Tunneling: N/A     Undermining: N/A  Periwound skin: Intact, newly resurfaced tissue to dorsal toe      Color: pink      Temperature: normal   Odor: none  Pain: no reaction to palpation  Pain interventions prior to dressing change: N/A  Treatment goal: Protection  STATUS: initial assessment  Supplies ordered: at bedside       10-13-22 left knee, intact scabs      10-13-22 right dorsal foot, non-blanching intact erythema to bony prominence mid foot, 0.4 x 0.4 x 0cm         Treatment Plan:     Bilateral feet: Daily:  1.  Cleanse with mild skin cleanser, dry well.  2.  Apply Sween 24 cream, especially to heels.  Leave open to air.  3.  Offload heels at all times; Prevalon boots when in bed.     Pressure Injury Prevention (PIP) Plan:  -If patient is declining pressure injury prevention interventions: Explore reason why and address patient's concerns  -Mattress: Follow bed algorithm, reassess daily and order specialty mattress, if indicated.  -HOB: Maintain at or below 30 degrees, unless contraindicated  -Repositioning in bed: Every 1-2 hours , Left/right positioning; avoid supine and Raise foot of bed prior to raising head of bed, to reduce patient sliding down (shear)  -Heels: Keep elevated off mattress, Pillows under calves and Heel lift boots  -Protective Dressing: Sacral Mepilex for prevention (#[id number]),  especially for the agitated patient   -Chair positioning: Assist patient to reposition hourly   -Moisture Management: Perineal cleansing /protection: Follow Incontinence Protocol, Avoid brief in bed and Clean and dry skin folds with bathing   -Under Devices: Inspect skin under all medical devices during skin inspection , Ensure tubes are stabilized without tension and Ensure patient is not lying on medical devices or equipment when repositioned      Orders: Written    RECOMMEND PRIMARY TEAM ORDER: None, at this  time  Education provided: plan of care and Off-loading pressure  Discussed plan of care with: Nurse  WOC nurse follow-up plan: weekly  Notify WOC if wound(s) deteriorate.  Nursing to notify the Provider(s) and re-consult the WOC Nurse if new skin concern.    DATA:     Current support surface: Standard  Atmos Air mattress  Containment of urine/stool: Incontinence Protocol  BMI: Body mass index is 19.78 kg/m .   Active diet order: Orders Placed This Encounter      Combination Diet Clear Liquid     Output: I/O last 3 completed shifts:  In: 1401.67 [I.V.:1401.67]  Out: -      Labs: Recent Labs   Lab 10/13/22  0628 10/12/22  1132   HGB 12.9 13.8   WBC 9.9 10.3   A1C  --  6.0*     Pressure injury risk assessment:   Sensory Perception: 2-->very limited  Moisture: 2-->very moist  Activity: 2-->chairfast  Mobility: 2-->very limited  Nutrition: 2-->probably inadequate  Friction and Shear: 2-->potential problem  Bruno Score: 12    Sapna Watt RN   Dept. Pager: [phone]  Dept. Office Number: [phone]

## 2022-10-13 NOTE — PROVIDER NOTIFICATION
MD Notification    Notified Person: MD    Notified Person Name: Dr. Cash    Notification Date/Time: Today; 1235    Notification Interaction: Text page    Purpose of Notification:    Not sure if you received the Vocera text earlier?  Pt is still very lethargic, not safe to take PO.  Has not had her PO Abx or other scheduled meds this morning. If you want an IV alternative, pls order.  Thank you.     Orders Received:    Comments:

## 2022-10-13 NOTE — PROVIDER NOTIFICATION
MD Notification    Notified Person: MD    Notified Person Name:  Catarina    Notification Date/Time: 10/13/2022 4:21 AM.    Notification Interaction: web-page    Purpose of Notification: critical lab- Blood culture from 10/12 positive for Gram negative bacilli.   Also- suspected pressure injury to R heel, request WOC consult.    Orders Received: No changes to antibiotic plan. WOC consult ordered.     Comments:

## 2022-10-13 NOTE — SIGNIFICANT EVENT
Significant Event Note    Time of event: 4:24 AM October 13, 2022    Description of event:  Blood culture returned positive for gram-negative bacilli.  Patient is already on ceftriaxone and azithromycin.  Likely source of bacteremia is UTI.  Patient has had previous culture positive for pansensitive E. coli in July of this year.  We will continue with current antibiotics.    Marquez Elmore MD

## 2022-10-13 NOTE — CONSULTS
CLINICAL NUTRITION SERVICES  -  ASSESSMENT NOTE      RECOMMENDATIONS FOR MD/PROVIDER TO ORDER: none    Recommendations Ordered by Registered Dietitian (RD):  - ordered meal order assist (room service not appropriate)  - ordered 1 ensure clear daily with meal   Future/Additional Recommendations:  - determine/change supplements' types and frequencies based on pt's intake.   - monitor po intake and wt   Malnutrition:   % Weight Loss:  > 7.5% in 3 months (severe malnutrition)  % Intake:  Cannot be determined at this time - was not able to see pt due to pt sleeping and nonverbal at baseline due to dementia per physician's note  Subcutaneous Fat Loss:  Orbital region moderate depletion  Muscle Loss:  Temporal region severe depletion, Clavicle bone region moderate to severe depletion  Fluid Retention:  None noted    Malnutrition Diagnosis: Severe malnutrition  In Context of:  Acute illness or injury          REASON FOR ASSESSMENT  Mel Castellanos is a 82 year old female seen by Registered Dietitian for Provider Order - pt lost ~ 20 lbs from July, needs meal order assist, advanced dementia     Admitted with,  SOB, altered mental status  worsening generalized weakness, wheezing er EMS report.   Hx:  Dementia, ASCVD, hypertension, COPD, type 2 diabetes mellitus. Hyperlipidemia, obstructive sleep apnea, restless leg syndrome, left subclavian artery stenosis, history of chronic bronchitis, history of TIA, GERD, insomnia, history of absent radial pulse  Past surgical Hx:  Appendectomy, cholecystectomy, knee replacement, colonoscopy    NUTRITION HISTORY  - Information obtained from physician's note and nurse.  - Pt lives at [facility] in [place].  - Unable to obtain nutrition history - nonverbal at baseline due to dementia per physician.  - Pt remains somnolent, unbale to take p.o. per nurse's note on 10/13.  - Pt was consuming pudding and ice cream for taking meds PTA per nurse.  - WOCN consulted on 10/13 for  pressure injury.  - Pt's dental problems (loss of upper teeth) was informed by nurse and observed by writer.       CURRENT NUTRITION ORDERS  Diet Order:     Clear Liquid     Current Intake/Tolerance: Not applicable; on clear liquid since 10/12     NUTRITION FOCUSED PHYSICAL ASSESSMENT FOR DIAGNOSING MALNUTRITION)  Yes: visual assessment          Observed:    Muscle wasting (refer to documentation in Malnutrition section) and Subcutaneous fat loss (refer to documentation in Malnutrition section)    Obtained from Chart/Interdisciplinary Team:  Edema: none noted  10/13 WOCN consults for small right heel DTPI    ANTHROPOMETRICS  Height: 167.6 cm (5'6'')  Weight: 55.6 kg, 122 lbs 9.21 oz  Body mass index is 19.78 kg/m .  Weight Status:  Normal BMI  IBW: 59 kg  % IBW: 94%  Weight History:   Wt Readings from Last 10 Encounters:   10/13/22 (S) 55.6 kg (122 lb 9.2 oz)   07/06/22 65 kg (143 lb 4.8 oz)   06/02/20 63.5 kg (140 lb)   03/22/20 62.6 kg (138 lb 1.6 oz)   02/01/20 65.2 kg (143 lb 11.8 oz)   03/29/18 64 kg (141 lb)   02/16/18 67.9 kg (149 lb 11.2 oz)   10/05/16 67.6 kg (149 lb)   08/19/16 66.7 kg (147 lb 1.6 oz)   08/04/16 65.4 kg (144 lb 3.2 oz)   - ~ 20 lbs down from July (~ 14% wt loss for the past three months)    LABS  Sodium: 148 mmol/L (high, improved for the past 24 hrs)  Potassium: 3.3 mmol/L (low)  Urea nitrogen: 33 mg/dL (high)  BGM: 187 - 312 mg/dL (high for the past 24 hrs)    MEDICATIONS  D5 IVF @ 100 ml -> 408 kcal, MSSI.       ASSESSED NUTRITION NEEDS PER APPROVED PRACTICE GUIDELINES:    Dosing Weight: 55.6 kg  Estimated Energy Needs: 1390 - 1670 kcals (25-30 Kcal/Kg)  Justification: maintenance and  Repletion  Estimated Protein Needs: 67 - 83 grams protein (1.2-1.5 g pro/Kg)  Justification: Repletion and Wound healing   Estimated Fluid Needs: 1390 - 1670 mL (1 mL/Kcal)  Justification: maintenance    MALNUTRITION:  % Weight Loss:  > 7.5% in 3 months (severe malnutrition)  % Intake:  Cannot be  determined at this time - was not able to see pt due to pt sleeping and nonverbal at baseline due to dementia per physician's note  Subcutaneous Fat Loss:  Orbital region moderate depletion  Muscle Loss:  Temporal region severe depletion, Clavicle bone region moderate to severe depletion  Fluid Retention:  None noted      Malnutrition Diagnosis: Severe malnutrition  In Context of:  Acute illness or injury    NUTRITION DIAGNOSIS:  Malnutrition related to pt's altered mental stage as evidenced by >7.5% (~14%) wt loss in 3 months, moderate subcutaneous fat and muscle loss.       NUTRITION INTERVENTIONS  Recommendations / Nutrition Prescription  - Diet order per physician      Implementation  Nutrition education: Not appropriate at this time due to patient condition  Medical food supplement therapy   - Ordered meal order assist  - Ordered 1 ensure clear daily with meal, determine/change supplements frequencies based on pt's intake.     Nutrition Goals  - diet advancement > clear liquid in 24 hrs   - Pt consumes >/=75% of meals TID.   - Pt gain/maintain current wt.       MONITORING AND EVALUATION:  Progress towards goals will be monitored and evaluated per protocol and Practice Guidelines

## 2022-10-13 NOTE — CONSULTS
Care Management Initial Consult    General Information  Assessment completed with: Care Team Member, Brittany RN  Type of CM/SW Visit: Initial Assessment    Primary Care Provider verified and updated as needed: Yes   Readmission within the last 30 days: no previous admission in last 30 days      Reason for Consult: discharge planning  Advance Care Planning: Advance Care Planning Reviewed: concerns discussed          Communication Assessment  Patient's communication style: spoken language (English or Bilingual)             Cognitive  Cognitive/Neuro/Behavioral: .WDL except  Level of Consciousness: lethargic  Arousal Level: arouses to voice, arouses to touch/gentle shaking  Orientation: other (see comments) (RENETTA pt nonverbal dementia at baseline)     Best Language: 2 - Severe aphasia  Speech: garbled    Living Environment:   People in home: facility resident     Current living Arrangements: group home      Able to return to prior arrangements: yes       Family/Social Support:  Care provided by: self, other (see comments)  Provides care for: no one, unable/limited ability to care for self  Marital Status:   Children, Facility resident(s)/Staff          Description of Support System: Supportive, Involved    Support Assessment: Adequate family and caregiver support, Adequate social supports    Current Resources:   Patient receiving home care services: No     Community Resources: County Worker ([place]- EW Waiver [phone])  Equipment currently used at home: wheelchair, manual  Supplies currently used at home: Incontinence Supplies    Employment/Financial:  Employment Status: retired        Financial Concerns: No concerns identified   Referral to Financial Worker: No  Finance Comments: Judy Love Dual    Lifestyle & Psychosocial Needs:  Social Determinants of Health     Tobacco Use: Not on file   Alcohol Use: Not on file   Financial Resource Strain: Not on file   Food Insecurity: Not on file   Transportation Needs:  Not on file   Physical Activity: Not on file   Stress: Not on file   Social Connections: Not on file   Intimate Partner Violence: Not on file   Depression: Not on file   Housing Stability: Not on file       Functional Status:  Prior to admission patient needed assistance:   Dependent ADLs:: Bathing, Dressing, Eating, Grooming, Incontinence, Transfers, Wheelchair-with assist, Toileting  Dependent IADLs:: Cleaning, Cooking, Laundry, Shopping, Meal Preparation, Medication Management, Transportation, Incontinence, Money Management     Assist of 1-2 with transfers with gait belt. They do not use a lift.    Assist of 1 for daily cares. Assist of two for toilet task.     Mental Health Status:      No concerns present     Chemical Dependency Status:            No concerns present     Values/Beliefs:  Spiritual, Cultural Beliefs, Sabianist Practices, Values that affect care: no               Additional Information:  Per care management consult for discharge planning, chart was reviewed. Patient is a 82 year old female with a past medical history notable for profound dementia who is nonverbal in addition has coronary artery disease, hypertension, OCPD that is non-oxygen dependent with type 2 diabetes. Patient presents to [facility] on 10/12/22 for altered mental status, increased weakness, fever and cough with an anticipated discharge date of 10/15/22. Due to patient's nonverbal and dementia status, writer called [facility] to discuss patient's baseline. Writer spoke with Brittany Diop RN ([phone]). Brittany reports that patient has been a resident of their facility since June 2, 2022. Prior to residing at [facility], patient was living at the [facility]. Brittany confirmed that patient is on the EW waiver and care care coordinator is Alexus through [facility] and she can be reached at [phone]. Brittany reports that patient is typically an assist of 1-2 for transfers with  a transfer belt. They do not use a lift for patient. If patient is requiring a lift at discharge, facility request that this be ordered through her insurance prior to patient's arrival or else they would not be able to transfer patient. Brittany reports that patient is total assist requiring assist of 1 for grooming, dressing and assist of 2 for toilet tasks. Patient is incontinent at times and does wear briefs. Patient is wheelchair bound and not able to propel herself to meaningful spots- staff needs to assist. Patient is on a pureed diet with thin liquids. Brittany reports facility assist with oral cares after meals. Facility does medication, meals, laundry and cleaning. Patient is not on Oxygen but has albuterol inhalers and nebulizer that patient does not use. Brittany states patient's daughter Henrique is POA for Health decisions and daughter Lanny is POA for financial. Writer requested a copy of forms be sent to [facility]. Confirmed patient's PCP is Dr. Margarita Zamudio through [email or web address]. Brittany asked that when discharge plans are identified we call and update her at [phone]. Brittany asked that orders be faxed to [phone]. Patient will need transport set up on discharge.     KELLY Benítez, [facility]   Social Work   [facility]

## 2022-10-13 NOTE — PLAN OF CARE
Pt here with UTI, pneumonia. Nonverbal with advanced dementia at baseline, responds to voice and touch. Mild HTN on 1L O2. Tele SR. Clear liq diet, nutrition consult ordered for meal assist. Up with Lift assist. PIV infusing D5 at 100 ml/hr. Purewick in place. Redness to sacrum and heels, mepillex applied. WOC consult ordered. T/R q2h. No overt s/s of pain. Plan to continue IV abx, monitor electrolytes.

## 2022-10-14 NOTE — PROVIDER NOTIFICATION
MD Notification    Notified Person: MD    Notified Person Name:May Gaviria    Notification Date/Time: 10/13/22 2238     Notification Interaction: amcom    Purpose of Notification: , insulin is on hold    Orders Received: bedtime insulin - 4 units given    Comments:

## 2022-10-14 NOTE — PROGRESS NOTES
Care Management Follow Up    Length of Stay (days): 2    Expected Discharge Date: 10/17/2022     Concerns to be Addressed: discharge planning     Patient plan of care discussed at interdisciplinary rounds: Yes    Anticipated Discharge Disposition: Group Home  Disposition Comments: Discharge back to [facility]  Anticipated Discharge Services: None  Anticipated Discharge DME: Other (see comment) (Lift)    Patient/family educated on Medicare website which has current facility and service quality ratings: no  Education Provided on the Discharge Plan:    Patient/Family in Agreement with the Plan: yes    Referrals Placed by CM/SW:    Private pay costs discussed: Not applicable    Additional Information:  Therapies are recommending celina lift at discharge due to assist of 2 with all functional mobility.  Received order for celina lift at discharge.  Writer contacted [facility]  And spoke with Aby regarding order for lift.  Orders, facesheet, and chart notes faxed to [facility] at: [phone].  Per MD, soonest patient would likely discharge in 10/17.  Writer also contacted [facility] and had to leave voice message regarding discharge plan.    4:18 PM ~ Addendum    Writer was able to speak with Brittany RN at [facility] ([phone]) regarding tentative discharge date of 10/17.  Brittany also stated that patient is open to [facility] for RN/wound services.  PT will also need to be added at discharge.  Writer confirmed with Danita RN with [facility] that patient is open to RN services.  Orders to be faxed to [facility]: [phone] and contact Danita once discharge is determined: [phone].  Writer also spoke with Blanca at [facility] regarding celina and she requested some additional documentation be faxed and documentation was faxed.  Per Blanca, equipment should be delivered on Monday and they should be able to in-service staff at that time on operation of celina lift.     4:29 PM Addendum  Rec'd call  from Blanca with [facility] and all required documentation was received and delivery planned for Monday, Oct. 17th to patient's facility.    11:07 AM (10/15/202)  Addendum   Brittany RN at group home updated regarding lift delivery on Monday and plan for patient to discharge back to group home on Monday.    Nel Caban RN  Care Coordinator  [facility]  [phone] (text or call)

## 2022-10-14 NOTE — PLAN OF CARE
[id number]  Alert and oriented times zero - baseline dementia  RA  Tele: SR  Turn/repo q2  Assist 2 lift  purewick in place  D5 0.45 NaCL K 20 infusing at 100 ml/hr  Garbled speech - nonverbal   No signs of pain  Prevalon boots on  Clear liquid diet - feeder   Plan: continue to monitor, possible heart cath?

## 2022-10-14 NOTE — PROGRESS NOTES
[id number]  Patient A&Ox0 - baseline dementia. VSS on RA. Tele SR. Turn and repo q2h with lift and assist of 2. Purewick cath in place. 3 PIVs in L arm. D5 0.45 NaCL K 20 infusing @100ml/hr saline. Plan for possible cardiac cath.

## 2022-10-14 NOTE — PROGRESS NOTES
DME Documentation:   Describe the reason for need to support medical necessity: Ana lift required for transfer between bed and chair, wheelchair or commode and without the use of a lift and the patient will be bed confined.    I, the undersigned, certify that the above prescribed supplies are medically necessary for this patient and is both reasonable and necessary in reference to accepted standards of medical and necessary in reference to accepted standards of medical practice in the treatment of this patient's condition and is not prescribed as a convenience.

## 2022-10-14 NOTE — PLAN OF CARE
Goal Outcome Evaluation:    [id number]  Baseline Dementia.  Alert.  Pleasantly confused.  Disoriented to person, time, place and situation.  Incomprehensible and garbled speech.  Awake for part of shift.  Arouses to voice, touch.  No s/sx of pain noted.  T&R Q2hrs.  Incontinent of urine; has purewick.  NO BM noted.  Prevalon boots on pt.  Total feed.  Tolerating clear liquids with good appetite.  VSS.   Blood culture positive for E. Coli.  On daily ceftriaxone and IVF at 100 ml/hr.

## 2022-10-14 NOTE — PROGRESS NOTES
[facility]    Medicine Progress Note - Hospitalist Service        Date of Admission:  10/12/2022 11:03 AM    Assessment & Plan:   Mel Castellanos is an 82-year-old female with advanced dementia, reportedly nonverbal at baseline, who presents with altered mental status, shortness of breath and evidence of hypernatremia, severe dehydration, mild lactic acidosis a left lower lobe infiltrate consistent with pneumonia, abnormal urinalysis with suspected urinary tract infection.     E. coli bacteremia  Urinary tract infection  Possible pneumonia  Acute infectious encephalopathy due to above  -Patient has advanced dementia and lives in a memory care group home.  -Reportedly per the staff there, patient over the last 24 to 48 hours has been having worsening weakness, fever and changes in mental state and was also wheezing.   -Initially admitted with concerns for UTI and pneumonia  -Blood culture positive for pansensitive E. coli  -Continue ceftriaxone 2 g IV daily  -Still quite encephalopathic but appears slightly better compared to yesterday  -Continue monitoring mental status closely    Acute hypernatremia  Dehydration  -Patient was found to be dehydrated at presentation with a sodium of 155  -Sodium improving, down to 148, continue D5W at 50 mils per hour    Advanced dementia  -patient lives [facility] care group [place] in [place].  -apparently at baseline has advanced dementia and is verbal but most of it is incomprehensible  -Per her daughter, she is wheelchair-bound at baseline and requires help with ADLs  -Still encephalopathic but appears slightly more responsive compared to yesterday, continue to monitor.    Mild troponin elevation most likely due to type II MI due to demand ischemia  History of coronary artery disease s/p PCI in 2011  -Initial troponin was 60 with a downtrend  -Suspect this is type II from demand ischemia from the initial septic process.  She did however have some EKG  changes also.  -Transthoracic echo shows LV hypertrophy, EF is 55-60%, grade 2 advanced diastolic dysfunction.  Inferior wall either normal or borderline hypokinetic  -Given no overt abnormality, and advanced dementia and encephalopathic, we will not pursue further cardiac work-up at this time.    Diabetes mellitus type 2  -Last hemoglobin A1c was 6.0 1/12/2022  -High intensity sliding scale for now    COPD  -Scattered wheezes bilaterally but no evidence of overt acute exacerbation  -Continue prior to admission inhalers and nebulizers    Severe malnutrition in context of acute illness or injury  -Dietitian following    Right medial heel pressure injury  -Wound care following    Diet: Combination Diet Clear Liquid  Room Service  Snacks/Supplements Adult: Ensure Clear; With Meals     DVT Prophylaxis: Pneumatic Compression Devices   Tavera Catheter: Not present  Code Status: No CPR- Do NOT Intubate     Disposition Plan      Expected Discharge Date: 10/16/2022      Destination: group home        Entered: Hu Cash MD 10/14/2022, 9:05 AM        Clinically Significant Risk Factors Present on Admission                          The patient's care was discussed with the Bedside Nurse, Patient and Daughter Henrique on the phone.    Hu Cash MD  Hospitalist Service  [facility]  Text Page 7AM-6PM  Securely message with the Vocera Web Console (learn more here)  Text page via [employer] Paging/Directory    ______________________________________________________________________    Interval History   Patient continues to be encephalopathic however she is attempting to verbalize today.  Afebrile.  Sodium improving.    Data reviewed today: I reviewed all medications, new labs and imaging results over the last 24 hours. I personally reviewed no images or EKG's today.    Physical Exam   Vital signs:  Temp: 98.8  F (37.1  C) Temp src: Axillary BP: 131/75 Pulse: 67   Resp: 17 SpO2: 93 % O2 Device: None  "(Room air) Oxygen Delivery: 1 LPM   Weight: 54.2 kg (119 lb 7.8 oz)  Estimated body mass index is 19.29 kg/m  as calculated from the following:    Height as of 7/5/22: 1.676 m (5' 6\").    Weight as of this encounter: 54.2 kg (119 lb 7.8 oz).      Wt Readings from Last 2 Encounters:   10/14/22 54.2 kg (119 lb 7.8 oz)   07/06/22 65 kg (143 lb 4.8 oz)       Gen: Opens eyes to verbal stimuli and attempts to verbalize  HEENT: Supple neck, no pallor   resp: Coarse breath sound bilaterally with scattered 1-2 wheezes  CVS: RRR, no murmur  Abd/GI: Soft, non-tender. BS- normoactive.   Skin: Warm, dry no rashes  MSK:  no pedal edema  Neuro- CN- intact.  Difficult to assess focal deficit    Data   Recent Labs   Lab 10/14/22  0836 10/14/22  0613 10/14/22  0220 10/13/22  2133 10/13/22  1918 10/13/22  1420 10/13/22  1152 10/13/22  0727 10/13/22  0628 10/12/22  1647 10/12/22  1132   WBC  --   --   --   --   --   --   --   --  9.9  --  10.3   HGB  --   --   --   --   --   --   --   --  12.9  --  13.8   MCV  --   --   --   --   --   --   --   --  109*  --  112*   PLT  --   --   --   --   --   --   --   --  173  --  194   NA  --  148*  --   --  150*  --  149*  --  148*  148*   < > 155*   POTASSIUM  --  3.4  --   --   --   --   --   --  3.3*  --  4.1   CHLORIDE  --  111*  --   --   --   --   --   --  115*  --  117*   CO2  --  32  --   --   --   --   --   --  30  --  32   BUN  --  20  --   --   --   --   --   --  33*  --  44*   CR  --  0.49*  --   --   --   --   --   --  0.58  --  0.95   ANIONGAP  --  5  --   --   --   --   --   --  3  --  6   LAUREN  --  9.2  --   --   --   --   --   --  9.2  --  10.0   * 289* 254*   < >  --    < >  --    < > 312*   < > 192*    < > = values in this interval not displayed.       Recent Results (from the past 24 hour(s))   Echocardiogram Complete   Result Value    LVEF  55-60%    [facility]    [id number]  [id number]  [id number]  [id number]^LAURIE^JONATHAN     [facility]  Echocardiography " Laboratory  [address] [place] [address]     Name: REYMUNDO LUCERO  MRN: [id number]  : 1940  Study Date: 10/13/2022 01:52 PM  Age: 82 yrs  Gender: Female  Patient Location: [facility]  Reason For Study: MI  Ordering Physician: JONATHAN SULLIVAN  Performed By: Stefany Mason RDCS     BSA: 1.6 m2  Height: 66 in  Weight: 122 lb  HR: 69  BP: 122/76 mmHg  ______________________________________________________________________________  Procedure  Complete Portable Echo Adult. Optison (NDC #[id number]) given intravenously.  ______________________________________________________________________________  Interpretation Summary     The visual ejection fraction is 55-60%.  Normal left ventricular wall motion  Inferior wall either normal or borderline hypokinetic  There is mild right ventricular hypertrophy.  ______________________________________________________________________________  Left Ventricle  The left ventricle is normal in size. There is borderline concentric left  ventricular hypertrophy. The visual ejection fraction is 55-60%. Left  ventricular diastolic function is abnormal. Grade III or advanced diastolic  dysfunction. Normal left ventricular wall motion. Inferior wall either normal  or borderline hypokinetic.     Right Ventricle  There is mild right ventricular hypertrophy. The right ventricle is normal in  size and function.     Atria  The left atrium is moderately dilated. The right atrium is mildly dilated.     Mitral Valve  Thickened mitral valve anterior leaflet. There is trace mitral regurgitation.     Tricuspid Valve  There is trace tricuspid regurgitation. Right ventricle systolic pressure  estimate normal. The right ventricular systolic pressure is approximated at  12.8 mmHg plus the right atrial pressure. IVC diameter <2.1 cm collapsing >50%  with sniff suggests a normal RA pressure of 3 mmHg.     Aortic Valve  The aortic valve is trileaflet. No hemodynamically significant valvular  aortic  stenosis.     Pulmonic Valve  The pulmonic valve is not well seen, but is grossly normal.     Vessels  The aortic root is normal size. The inferior vena cava is normal.     Pericardium  The pericardium appears normal.     Rhythm  Sinus rhythm was noted.  ______________________________________________________________________________  MMode/2D Measurements & Calculations     IVSd: 1.1 cm  LVIDd: 4.3 cm  LVIDs: 2.8 cm  LVPWd: 1.1 cm  FS: 36.3 %  LV mass(C)d: 171.5 grams  LV mass(C)dI: 105.8 grams/m2  Ao root diam: 3.4 cm  LA dimension: 3.8 cm  asc Aorta Diam: 3.5 cm  LA/Ao: 1.1  LA Volume (BP): 92.2 ml  LA Volume Index (BP): 56.9 ml/m2  RWT: 0.53     Doppler Measurements & Calculations  MV E max husam: 102.0 cm/sec  MV A max husam: 48.4 cm/sec  MV E/A: 2.1  MV dec slope: 671.2 cm/sec2  PA acc time: 0.12 sec  TR max husam: 179.2 cm/sec  TR max P.8 mmHg  E/E' av.5  Lateral E/e': 14.9  Medial E/e': 18.0     ______________________________________________________________________________  Report approved by: Brielle Boykin 10/13/2022 04:57 PM           Medications     D5W         cefTRIAXone  2 g Intravenous Q24H     donepezil  10 mg Oral At Bedtime     DULoxetine  40 mg Oral Daily     fluticasone  2 spray Both Nostrils Daily     insulin aspart  1-5 Units Subcutaneous At Bedtime     insulin aspart  1-7 Units Subcutaneous TID AC     isosorbide mononitrate  15 mg Oral Daily     magnesium oxide  400 mg Oral BID     metoprolol succinate ER  12.5 mg Oral BID     OLANZapine  5 mg Oral BID     sodium chloride (PF)  3 mL Intracatheter Q8H     vitamin D3  50 mcg Oral Daily

## 2022-10-14 NOTE — PROGRESS NOTES
"   10/14/22 1038   Appointment Info   Signing Clinician's Name / Credentials (PT) Bucky Kulkarni DPT   Living Environment   People in Home alone   Current Living Arrangements assisted living  (memory care facility)   Home Accessibility no concerns   Transportation Anticipated agency   Living Environment Comments Per SW note review, pt live in memory care unit where she does not drive, does not need to navigate stairs, and does not need to navigate stairs.   Self-Care   Usual Activity Tolerance poor   Current Activity Tolerance poor   Regular Exercise No   Equipment Currently Used at Home wheelchair, manual   Activity/Exercise/Self-Care Comment Per SW note review, at baseline pt lives in memory care facility, is an assist of 1-2 with gait belt for transfers, does not walk, but uses a manual wheelchair that staff propel forward.   General Information   Onset of Illness/Injury or Date of Surgery 10/12/22   Referring Physician Gary Quezada MD   Patient/Family Therapy Goals Statement (PT) pt unable to verbalize goals   Pertinent History of Current Problem (include personal factors and/or comorbidities that impact the POC) Per chart review, \"Mel Castellanos is an 82-year-old female with advanced dementia, reportedly nonverbal at baseline, who presents with altered mental status, shortness of breath and evidence of hypernatremia, severe dehydration, mild lactic acidosis a left lower lobe infiltrate consistent with pneumonia, abnormal urinalysis with suspected urinary tract infection.\"   Existing Precautions/Restrictions fall   Cognition   Affect/Mental Status (Cognition) confused   Cognitive Status Comments pt has dementia at baseline and is mostly non-verbal. Able to provide 1-2 word responses at times, but does not follow mobility commands. Unable to provide name, location, or situation.   Integumentary/Edema   Integumentary/Edema Comments bilateral knee flexion and ankle PF contractures. Pressure injury noted on R " heel   Posture    Posture Forward head position;Protracted shoulders;Kyphosis   Range of Motion (ROM)   ROM Comment LE PROM and AROM limited secondary to weakness and contractures   Strength (Manual Muscle Testing)   Strength (Manual Muscle Testing) Deficits observed during functional mobility   Strength Comments pt unable to perform SLR or stand demonstrating severe LE weakness   Bed Mobility   Comment, (Bed Mobility) supine>sit w/ max A x2   Transfers   Comment, (Transfers) ceiling lift for transfers. unable to sit>stand   Gait/Stairs (Locomotion)   Comment, (Gait/Stairs) unable to ambulate at this time   Balance   Balance Comments impaired static sitting balance   Sensory Examination   Sensory Perception Comments unable to assess accurately due to cognitive impairment   Clinical Impression   Criteria for Skilled Therapeutic Intervention Yes, treatment indicated   PT Diagnosis (PT) impaired functional mobility   Influenced by the following impairments impaired activity tolerance, severe LE weakness, impaired static balance, impaired activity tolerance, cognitive impairment   Functional limitations due to impairments limited independence with functional mobility   Clinical Presentation (PT Evaluation Complexity) Evolving/Changing   Clinical Presentation Rationale clinical judgement   Clinical Decision Making (Complexity) moderate complexity   Planned Therapy Interventions (PT) balance training;bed mobility training;gait training;patient/family education;ROM (range of motion);strengthening;stretching;transfer training;progressive activity/exercise   Risk & Benefits of therapy have been explained evaluation/treatment results reviewed;care plan/treatment goals reviewed;risks/benefits reviewed;current/potential barriers reviewed;participants voiced agreement with care plan;participants included;patient   PT Total Evaluation Time   PT Eval, Moderate Complexity Minutes (12157) 5   Physical Therapy Goals   PT Frequency  3x/week   PT Predicted Duration/Target Date for Goal Attainment 10/24/22   PT Goals Bed Mobility;Transfers   PT: Bed Mobility Minimal assist;Supine to/from sit   PT: Transfers Moderate assist;Sit to/from stand;Bed to/from chair;Assistive device   Therapeutic Activity   Therapeutic Activities: dynamic activities to improve functional performance Minutes (44368) 28   Symptoms Noted During/After Treatment Fatigue  (confusion)   Treatment Detail/Skilled Intervention Greeted pt upon arrival to room. Pt agreeable to working with PT. PT evaluation completed and role of IP PT explained to pt. Pt pleasantly confused throughout session with baseline dementia and mostly unable to follow commands regarding mobility tasks. Time spent throughout session for room and furniture management. Rehab Zara swanson, present throughout session. Supine>sit w/ max A x2. Pt demonstrating min-mod A at times to maintain static sitting balance at edge of bed. Cueing to sit up tall with head, eyes, and chest upward. Minimal improvement in upright posture. Hand over and guidance for patients hands to edge of bed to encourage tripod sitting. Attempted sit>stand x2 w/ max A x2, pt able to complete 50% of full stand with each rep. Sit>supine with mod A x2. Dependent repositioning in bed for comfort as pt unable to assist with repositioning themselves. Pt left supine in bed with all needs met, call light within reach, bed alarm on, and RN updated.   PT Discharge Planning   PT Plan PT: progress bed mobility, attempt stand pivot transfers as able, stand w/ FWW as able   PT Discharge Recommendation (DC Rec) Long term care facility;Transitional Care Facility   PT Rationale for DC Rec Pt is below functional baseline mobility level. Pt currently A x2 for all functional mobility with minimal ability to follow commands related to mobility tasks at this time. At baseline, pt is A x1-2 for stand pivot transfer, but is dependent with ceiling lift for transfers at  this time. Pt previously was at memory care unit, recommend discharge back to facility if staff is able to manage this level of assist. If not, recommend discharge to alternate long term care facility that can manage this level of assistance for patient as well as baseline cognitive impairment.   PT Brief overview of current status max A x2 for bed mobility, ceiling lift for transfers   Total Session Time   Timed Code Treatment Minutes 28   Total Session Time (sum of timed and untimed services) 33

## 2022-10-14 NOTE — PROGRESS NOTES
Shift 5917-9476:    Pt somnolent/sleeping most of shift, in bed. No evidence of pain. Seen by WOC for bilateral heel issue. Prevalon boots ordered and placed on pt.  Did wake up sufficiently later in shift so NA was able to assist in feeding, and writer was able to give scheduled x1 (daily) meds.  BID scheduled AM meds not given however, due to the time of day meds were actually administered. Pt has BL dementia, minimal words, and mostly incomprehensible. VS stable today, on daily ceftriaxone and IVF at 110/hr.  Purewick in place, T&R. Daughter Maggy updated over phone and later came to see her mom.  Echo done at bedside today, EF 55-60%.

## 2022-10-14 NOTE — PLAN OF CARE
Goal Outcome Evaluation:      Plan of Care Reviewed With: patient        Patient alert at times, confused, keeps eyes closed a lot, talks to herself, rambles. baseline dementia. VSS on RA. Tele SR. Turn and repo q2h with lift and assist of 2. Purewick cath in place.  0.45 NS infusing @50 ml/hr saline. clear liquids, pt does swallow ok but pockets pills. Some pills unable to crush- used applesauce with great difficulty. Plan is to possibly discharge Monday back to facility.

## 2022-10-15 NOTE — PLAN OF CARE
Goal Outcome Evaluation:      Plan of Care Reviewed With: patient, child      Patient alert at times, confused, keeps eyes closed a lot, talks to herself, rambles. baseline dementia. VSS on RA. Tele SR. Turn and repo q2h with lift and assist of 2. Purewick cath in place. thickened liquids, pureed. pt does swallow ok but pockets pills. Some pills unable to crush (MD changed to non ER)- used applesauce with great difficulty. Plan is to possibly discharge Monday back to facility, Palliative consult.

## 2022-10-15 NOTE — PROVIDER NOTIFICATION
MD Notification    Notified Person: MD    Notified Person Name: Dr Cash     Notification Date/Time: 10/15/22 1500    Notification Interaction:paged    Purpose of Notification: daughter thinks left mouth droop is new. Daughter is here would like to talk     Orders Received:    Comments:

## 2022-10-15 NOTE — PLAN OF CARE
Goal Outcome Evaluation:   Disoriented x4-reoriented, VSS on RA, Tele SR. Turned/repositioned every two hours with 2A/lift. Adequate UOP via purewick. Takes pills crushed with apple, swallows thin liquids with no cough noted. No sign/sx non-verbal pain indicators noted. Plans for discharge to half-way this coming Monday-SW following.

## 2022-10-15 NOTE — PROGRESS NOTES
[facility]    Medicine Progress Note - Hospitalist Service        Date of Admission:  10/12/2022 11:03 AM    Assessment & Plan:   Mel Castellanos is an 82-year-old female with advanced dementia, reportedly nonverbal at baseline, who presents with altered mental status, shortness of breath and evidence of hypernatremia, severe dehydration, mild lactic acidosis a left lower lobe infiltrate consistent with pneumonia, abnormal urinalysis with suspected urinary tract infection.     E. coli bacteremia  Urinary tract infection  Possible pneumonia  Acute infectious encephalopathy due to above  -Patient has advanced dementia and lives in a memory care group home.  -Reportedly per the staff there, patient over the last 24 to 48 hours has been having worsening weakness, fever and changes in mental state and was also wheezing.   -Initially admitted with concerns for UTI and pneumonia  -Blood culture positive for pansensitive E. coli  -Continue ceftriaxone 2 g IV daily  -Improved since admission, probably close to baseline from an mental state standpoint  -PT, OT as able    Acute hypernatremia  Dehydration  -Patient was found to be dehydrated at presentation with a sodium of 155  -Hypernatremia resolved, encourage oral intake.  -Specifically focus on encouraging free water intake as outpatient.    Advanced dementia  -patient lives [facility] care [facility] in [place].  -apparently at baseline has advanced dementia and is verbal but most of it is incomprehensible  -Per her daughter, she is wheelchair-bound at baseline and requires help with ADLs  -Improving and probably at baseline now.    Called by nurse for daughter's concern for possible subtle facial droop.  To me it appears like testing raises been since admission.  She appears to lean on the left side and her exam is very unreliable and inconsistent.  For completeness we will get a CT head.  Discussed with the daughter and we both agreed that it  will be difficult to keep her still on MRI scanner for 30 minutes.    Mild troponin elevation most likely due to type II MI due to demand ischemia  History of coronary artery disease s/p PCI in 2011  -Initial troponin was 60 with a downtrend  -Suspect this is type II from demand ischemia from the initial septic process.  She did however have some EKG changes also.  -Transthoracic echo shows LV hypertrophy, EF is 55-60%, grade 2 advanced diastolic dysfunction.  Inferior wall either normal or borderline hypokinetic  -Given no overt abnormality, and advanced dementia and encephalopathic, we will not pursue further cardiac work-up at this time.    Diabetes mellitus type 2  -Last hemoglobin A1c was 6.0 1/12/2022  -Off D5W.  Changed to medium intensity sliding scale    Dysphagia  -Speech following  -Continue puréed solids(IDDSI 4 diet), mildly thick liquids with 1: 1 supervision.    COPD  -Scattered wheezes bilaterally but no evidence of overt acute exacerbation  -Continue prior to admission inhalers and nebulizers    Severe malnutrition in context of acute illness or injury  -Dietitian following    Right medial heel pressure injury  -Wound care following    Diet: Room Service  Snacks/Supplements Adult: Ensure Clear; With Meals  Combination Diet Pureed Diet (level 4); Mildly Thick (level 2)     DVT Prophylaxis: Pneumatic Compression Devices   Tavera Catheter: Not present  Code Status: No CPR- Do NOT Intubate     Disposition Plan      Expected Discharge Date: 10/17/2022, back to memory care group home      Destination: group home        Entered: Hu Cash MD 10/15/2022, 9:10 AM        Clinically Significant Risk Factors Present on Admission                    The patient's care was discussed with the bedside nurse and the patient.    Hu Cash MD  Hospitalist Service  [facility]  Text Page 7AM-6PM  Securely message with the Vocera Web Console (learn more here)  Text page via EPIS  "Paging/Directory    ______________________________________________________________________    Interval History   Slightly more interactive.  Still verbalizes incomprehensible words.  Afebrile.  Hypernatremia resolved.    Data reviewed today: I reviewed all medications, new labs and imaging results over the last 24 hours. I personally reviewed no images or EKG's today.    Physical Exam   Vital signs:  Temp: 98.4  F (36.9  C) Temp src: Oral BP: 119/79 Pulse: 76   Resp: 16 SpO2: 96 % O2 Device: None (Room air) Oxygen Delivery: 1 LPM   Weight: 54.2 kg (119 lb 7.8 oz)  Estimated body mass index is 19.29 kg/m  as calculated from the following:    Height as of 7/5/22: 1.676 m (5' 6\").    Weight as of this encounter: 54.2 kg (119 lb 7.8 oz).      Wt Readings from Last 2 Encounters:   10/14/22 54.2 kg (119 lb 7.8 oz)   07/06/22 65 kg (143 lb 4.8 oz)       Gen: Awake, opens eyes to verbal stimuli, verbalizes incomprehensible words or sentences, not following all the commands.  resp: Clear to auscultation bilaterally, normal effort of breathing  CVS: RRR, no murmur  Abd/GI: Soft, non-tender. BS- normoactive.   Skin: Warm, dry no rashes  MSK:  no pedal edema  Neuro- CN- intact.  Appears to be moving all 4 extremities.    Data   Recent Labs   Lab 10/15/22  0624 10/15/22  0241 10/14/22  2211 10/14/22  1732 10/14/22  1606 10/14/22  0836 10/14/22  0613 10/13/22  0727 10/13/22  0628 10/12/22  1647 10/12/22  1132   WBC  --   --   --   --   --   --   --   --  9.9  --  10.3   HGB  --   --   --   --   --   --   --   --  12.9  --  13.8   MCV  --   --   --   --   --   --   --   --  109*  --  112*   PLT  --   --   --   --   --   --   --   --  173  --  194     --   --   --  143  --  148*   < > 148*  148*   < > 155*   POTASSIUM 3.4  --   --   --   --   --  3.4  --  3.3*  --  4.1   CHLORIDE 107  --   --   --   --   --  111*  --  115*  --  117*   CO2 32  --   --   --   --   --  32  --  30  --  32   BUN 17  --   --   --   --   --  20  -- "  33*  --  44*   CR 0.47*  --   --   --   --   --  0.49*  --  0.58  --  0.95   ANIONGAP 3  --   --   --   --   --  5  --  3  --  6   LAUREN 9.1  --   --   --   --   --  9.2  --  9.2  --  10.0   * 195* 188*   < >  --    < > 289*   < > 312*   < > 192*    < > = values in this interval not displayed.       No results found for this or any previous visit (from the past 24 hour(s)).  Medications     NaCl Stopped (10/15/22 0849)       cefTRIAXone  2 g Intravenous Q24H     donepezil  10 mg Oral At Bedtime     DULoxetine  40 mg Oral Daily     fluticasone  2 spray Both Nostrils Daily     insulin aspart  1-5 Units Subcutaneous At Bedtime     insulin aspart  1-7 Units Subcutaneous TID AC     isosorbide mononitrate  15 mg Oral Daily     magnesium oxide  400 mg Oral BID     metoprolol succinate ER  12.5 mg Oral BID     OLANZapine  5 mg Oral BID     sodium chloride (PF)  3 mL Intracatheter Q8H     vitamin D3  50 mcg Oral Daily

## 2022-10-15 NOTE — PROGRESS NOTES
"Clinical Swallow Evaluation (CSE):     10/15/22 0853   Appointment Info   Signing Clinician's Name / Credentials (SLP) Pallavi Owen MS CCC-SLP   General Information   Onset of Illness/Injury or Date of Surgery 10/12/22   Referring Physician Dr. Cash   Patient/Family Therapy Goal Statement (SLP) Did not state   Pertinent History of Current Problem   Per hospitalist \"Mel Castellanos is an 82-year-old female with advanced dementia, reportedly nonverbal at baseline, who presents with altered mental status, shortness of breath and evidence of hypernatremia, severe dehydration, mild lactic acidosis a left lower lobe infiltrate consistent with pneumonia, abnormal urinalysis with suspected urinary tract infection.\"     SLP consulted for swallow evaluation. Per RN: coughing with thin liquids intermittently, pocketing medications     General Observations P  t alert (at times keeping eyes closed but participating) and upright in chair, majority of speech is mumbled/unintelligible     Type of Evaluation   Type of Evaluation Swallow Evaluation   Oral Motor   Oral Musculature unable to assess due to poor participation/comprehension   Mucosal Quality dry   Dentition (Oral Motor)   Dentition (Oral Motor) edentulous   Vocal Quality/Secretion Management (Oral Motor)   Vocal Quality (Oral Motor) WNL   Secretion Management (Oral Motor) WNL   General Swallowing Observations   Past History of Dysphagia No documented hx within EMR. Per baseline facility paperwork: puree/thin diet   Respiratory Support (General Swallowing Observations) none   Current Diet/Method of Nutritional Intake (General Swallowing Observations, NIS) clear liquid diet   Swallowing Evaluation Clinical swallow evaluation   Clinical Swallow Evaluation   Feeding Assistance dependent  (largely dependent, x1 where pt able to hold cup with hand over hand assist)   Clinical Swallow Evaluation Textures Trialed thin liquids;mildly thick liquids;pureed   Clinical Swallow Eval: " Thin Liquid Texture Trial   Mode of Presentation, Thin Liquids cup  (hand over hand)   Volume of Liquid or Food Presented x6 sips   Oral Phase of Swallow premature pharyngeal entry   Pharyngeal Phase of Swallow impaired;reduction in laryngeal movement;coughing/choking   Diagnostic Statement coughing on 2/6 trials   Clinical Swallow Eval: Mildly Thick Liquids   Mode of Presentation spoon;cup;fed by clinician   Volume Presented ~ 3 oz   Oral Phase WFL   Pharyngeal Phase reduction in laryngeal movement;wet vocal quality after swallow   Diagnostic Statement wet vocal quality x1 across 3 oz, pt unable to follow directions to throat clear or cough   Clinical Swallow Evaluation: Puree Solid Texture Trial   Mode of Presentation, Puree spoon;fed by clinician   Volume of Puree Presented ~ 2 oz, x1 medication attempted whole with RN present   Oral Phase, Puree delayed AP movement   Pharyngeal Phase, Puree reduction in laryngeal movement   Diagnostic Statement no overt clinical signs/sx aspiration noted. Pocketing of medication on x3 attempts requiring manual removal.   Esophageal Phase of Swallow   Patient reports or presents with symptoms of esophageal dysphagia No   Swallowing Recommendations   Diet Consistency Recommendations pureed (level 4);mildly thick liquids (level 2)   Supervision Level for Intake 1:1 supervision needed   Mode of Delivery Recommendations bolus size, small;slow rate of intake   Swallowing Maneuver Recommendations alternate food and liquid intake   Monitoring/Assistance Required (Eating/Swallowing) stop eating activities when fatigue is present;check mouth frequently for oral residue/pocketing;cue for finger/lingual sweep if oral pocketing present;monitor for cough or change in vocal quality with intake   Recommended Feeding/Eating Techniques (Swallow Eval) maintain upright sitting position for eating;maintain upright posture during/after eating for 30 minutes;minimize distractions during oral  intake;provide assist with feeding   Medication Administration Recommendations, Swallowing (SLP) crush with puree given pocketing despite strategies   General Therapy Interventions   Planned Therapy Interventions Dysphagia Treatment   Clinical Impression   Criteria for Skilled Therapeutic Interventions Met (SLP Eval) Yes, treatment indicated   SLP Diagnosis Mild oral, potential pharyngeal dysphagia   Risks & Benefits of therapy have been explained evaluation/treatment results reviewed;participants included;patient   Clinical Impression Comments   Clinical swallow evaluation completed- pt currently presents with Mild oral, potential pharyngeal dysphagia. Reduced direction following, largely unintelligible communication attempts 2/2 advanced dementia, needing assist with feeding. Pt orally accepting all boluses via tsp/cup, unable to suck from a straw despite cues. Adequate labial seal with no anterior spillage. Delayed oral transit but full oral clearance achieved. ? potential spillage + laryngeal penetration with thin liquids given overt immediate cough on 2/6 trials - improved oral control and clinical tolerance with mildly thick liquids where x1 questionable wet vocal quality noted across 3 oz. No overt signs/sx aspiration with puree. Pt unable to swallow whole medication in puree despite cues/strategies: pocketing each time requiring manual finger removal - will need crushed.     SLP Total Evaluation Time   Eval: oral/pharyngeal swallow function, clinical swallow Minutes (69054) 20   SLP Goals   Therapy Frequency (SLP Eval) 5 times/wk   SLP Predicted Duration/Target Date for Goal Attainment 10/22/22   SLP Goals Swallow   SLP: Safely tolerate diet without signs/symptoms of aspiration Pureed diet;Thin liquids;With use of swallow precautions;With assistance/supervision   SLP Discharge Planning   SLP Plan Po tolerance, re-trial thin   SLP Discharge Recommendation home;home with home care speech therapy   SLP  Rationale for DC Rec Pt slightly below baseline now requiring liquid modifications, may progress to meet goals p/t d/c though currently would recommend HH SLP f/u at baseline facility. Baseline diet = puree/thin   SLP Brief overview of current status  Recommendations: puree solids (IDDSI 4), mildly thick liquids (IDDSI 2) with 1:1 supervision, tsp or cup, liquid wash PRN, mediations crushed with puree given pocketing with whole attempts.   Total Session Time   Total Session Time (sum of timed and untimed services) 20

## 2022-10-16 NOTE — PLAN OF CARE
Goal Outcome Evaluation:      Plan of Care Reviewed With: patient, child    Overall Patient Progress: no changeOverall Patient Progress: no change     VSS, tele SR, appears comfortable, patient is non verbal baseline, but opens eyes to voice.  Repositioned frequently, heels elevated, foam dressing on coccyx an right heel per previous RN.  Non -blanchable redness noted.  External catheter with adequate output.  Assisted with feeding.  Takes pills crushed in pudding.  Ok to transfer per MD, cont to monitor.

## 2022-10-16 NOTE — PROGRESS NOTES
[facility]    Medicine Progress Note - Hospitalist Service        Date of Admission:  10/12/2022 11:03 AM    Assessment & Plan:   Mel Castellanos is an 82-year-old female with advanced dementia, reportedly nonverbal at baseline, who presents with altered mental status, shortness of breath and evidence of hypernatremia, severe dehydration, mild lactic acidosis a left lower lobe infiltrate consistent with pneumonia, abnormal urinalysis with suspected urinary tract infection.     E. coli bacteremia  Urinary tract infection  Possible pneumonia  Acute infectious encephalopathy due to above  -Patient has advanced dementia and lives in a memory care group home.  -Reportedly per the staff there, patient over the last 24 to 48 hours has been having worsening weakness, fever and changes in mental state and was also wheezing.   -Initially admitted with concerns for UTI and pneumonia  -Blood culture positive for pansensitive E. coli  -Continue ceftriaxone 2 g IV daily, day 5/14 today, switch to Levaquin at discharge.  -Encephalopathy improved since admission, probably close to baseline from an mental state standpoint.  Daughter had concerns about possible deviated mouth yesterday, CT head was negative for acute process.  No other concerns for CVA at this time.  We both agreed that she probably will not be able to tolerate sitting still on the MRI scanner and therefore further work-up deferred at this time.  Also daughter now interested in palliative care evaluation.  Please see discussion below  -PT, OT as able    Acute hypernatremia  Dehydration  -Patient was found to be dehydrated at presentation with a sodium of 155  -Hypernatremia resolved, encourage oral intake.  -Given her severe dementia, she will be at high risk for dehydration and hypernatremia in the future.    Advanced dementia  -patient lives [facility] care group [place] in [place].  -apparently at baseline has advanced dementia and is  verbal but most of it is incomprehensible  -Per her daughter, she is wheelchair-bound at baseline and requires help with ADLs  -Improving and probably at baseline now.  -Had a long discussion with daughter Henrique at the bedside yesterday.  Explained to her the prognosis given patient's advanced dementia, dysphagia and risk of recurrent dehydration.  She is interested in palliative care and maybe even hospice.  Palliative care consult placed for tomorrow.    Mild troponin elevation most likely due to type II MI due to demand ischemia  History of coronary artery disease s/p PCI in 2011  -Initial troponin was 60 with a downtrend  -Suspect this is type II from demand ischemia from the initial septic process.  She did however have some EKG changes also.  -Transthoracic echo shows LV hypertrophy, EF is 55-60%, grade 2 advanced diastolic dysfunction.  Inferior wall either normal or borderline hypokinetic  -Given no overt abnormality, and advanced dementia and encephalopathic, we will not pursue further cardiac work-up at this time.    Diabetes mellitus type 2  -Last hemoglobin A1c was 6.0 1/12/2022  -Medium sliding scale for now    Dysphagia  -Speech following  -Continue puréed solids(IDDSI 4 diet), mildly thick liquids with 1: 1 supervision.    COPD  -Scattered wheezes bilaterally but no evidence of overt acute exacerbation  -Continue prior to admission inhalers and nebulizers    Severe malnutrition in context of acute illness or injury  -Dietitian following    Right medial heel pressure injury  -Wound care following    Diet: Room Service  Snacks/Supplements Adult: Ensure Clear; With Meals  Combination Diet Pureed Diet (level 4); Mildly Thick (level 2)     DVT Prophylaxis: Pneumatic Compression Devices   Tavera Catheter: Not present  Code Status: No CPR- Do NOT Intubate     Disposition Plan      Expected Discharge Date: 10/18/2022, back to [facility] care group home pending evaluation by palliative care     Destination: group  "home        Entered: Hu Cash MD 10/16/2022, 8:42 AM        Clinically Significant Risk Factors Present on Admission                    The patient's care was discussed with the bedside nurse and the patient.    Hu Cash MD  Hospitalist Service  [facility]  Text Page 7AM-6PM  Securely message with the Vocera Web Console (learn more here)  Text page via [employer] Paging/Directory    ______________________________________________________________________    Interval History   No new clinical concerns.  Tolerating current diet okay.  Probably still at baseline.    Data reviewed today: I reviewed all medications, new labs and imaging results over the last 24 hours. I personally reviewed no images or EKG's today.    Physical Exam   Vital signs:  Temp: 98.1  F (36.7  C) Temp src: Oral BP: 127/61 Pulse: 72   Resp: 16 SpO2: 97 % O2 Device: Nasal cannula Oxygen Delivery: 1.5 LPM   Weight: 53.6 kg (118 lb 1.6 oz)  Estimated body mass index is 19.06 kg/m  as calculated from the following:    Height as of 7/5/22: 1.676 m (5' 6\").    Weight as of this encounter: 53.6 kg (118 lb 1.6 oz).      Wt Readings from Last 2 Encounters:   10/16/22 53.6 kg (118 lb 1.6 oz)   07/06/22 65 kg (143 lb 4.8 oz)       Gen: Awake, opens eyes to verbal stimuli, verbalizes incomprehensible words or sentences, not following all the commands.  resp: Clear to auscultation bilaterally, normal effort of breathing  CVS: RRR, no murmur  Abd/GI: Soft, non-tender. BS- normoactive.   Skin: Warm, dry no rashes  Neuro- CN- intact.  Appears to be moving all 4 extremities.    Data   Recent Labs   Lab 10/16/22  0805 10/16/22  0215 10/15/22  2118 10/15/22  1218 10/15/22  0624 10/14/22  1732 10/14/22  1606 10/14/22  0836 10/14/22  0613 10/13/22  0727 10/13/22  0628 10/12/22  1647 10/12/22  1132   WBC  --   --   --   --   --   --   --   --   --   --  9.9  --  10.3   HGB  --   --   --   --   --   --   --   --   --   --  12.9  --  " 13.8   MCV  --   --   --   --   --   --   --   --   --   --  109*  --  112*   PLT  --   --   --   --   --   --   --   --   --   --  173  --  194   NA  --   --   --   --  142  --  143  --  148*   < > 148*  148*   < > 155*   POTASSIUM  --   --   --   --  3.4  --   --   --  3.4  --  3.3*  --  4.1   CHLORIDE  --   --   --   --  107  --   --   --  111*  --  115*  --  117*   CO2  --   --   --   --  32  --   --   --  32  --  30  --  32   BUN  --   --   --   --  17  --   --   --  20  --  33*  --  44*   CR  --   --   --   --  0.47*  --   --   --  0.49*  --  0.58  --  0.95   ANIONGAP  --   --   --   --  3  --   --   --  5  --  3  --  6   LAUREN  --   --   --   --  9.1  --   --   --  9.2  --  9.2  --  10.0   * 207* 188*   < > 218*   < >  --    < > 289*   < > 312*   < > 192*    < > = values in this interval not displayed.       Recent Results (from the past 24 hour(s))   CT Head w/o Contrast    Narrative    EXAM: CT HEAD W/O CONTRAST  LOCATION: [facility]  DATE/TIME: 10/15/2022 4:02 PM    INDICATION: AMS; ? subtle left facial droop  COMPARISON: Head CT 01/23/2020  TECHNIQUE: Routine CT Head without IV contrast. Multiplanar reformats. Dose reduction techniques were used.    FINDINGS:  INTRACRANIAL CONTENTS: No intracranial hemorrhage. Mild diffuse cerebral parenchymal volume loss. No midline shift. The basilar cisterns are patent. Mild periventricular and scattered foci of deep white matter hypodensities involving both cerebral   hemispheres. No cerebellar tonsillar ectopia. No CT evidence for an acute infarct. Small to moderate-sized area of encephalomalacia involving the right occipital lobe, new since the prior head CT.    VISUALIZED ORBITS/SINUSES/MASTOIDS: No intraorbital abnormality. No paranasal sinus mucosal disease. No middle ear or mastoid effusion.    BONES/SOFT TISSUES: No acute abnormality.      Impression    IMPRESSION:  1.  No intracranial hemorrhage, mass lesions, hydrocephalus or  CT evidence for an acute infarct.  2.  Mild diffuse cerebral parenchymal volume loss. Presumed chronic hypertensive/microvascular ischemic white matter changes.  3.  Small to moderate-sized area of encephalomalacia involving the right occipital lobe, new since the prior head CT 01/23/2020.     Medications       aspirin  81 mg Oral Daily     cefTRIAXone  2 g Intravenous Q24H     donepezil  10 mg Oral At Bedtime     DULoxetine  40 mg Oral Daily     fluticasone  2 spray Both Nostrils Daily     insulin aspart  1-5 Units Subcutaneous At Bedtime     insulin aspart  1-7 Units Subcutaneous TID AC     isosorbide dinitrate  10 mg Oral TID     magnesium oxide  400 mg Oral BID     metoprolol tartrate  12.5 mg Oral BID     OLANZapine  5 mg Oral BID     sodium chloride (PF)  3 mL Intracatheter Q8H     vitamin D3  50 mcg Oral Daily

## 2022-10-16 NOTE — PROGRESS NOTES
Per MD, palliative to see patient on Monday. Will hold off on celina lift delivery til discharge plan decided. Care coordinator on Monday, Oct. 17th to call [facility] to place delivery on hold (writer called [facility] and no one in office to discuss delivery for Monday).  If patient goes hospice, celina can be ordered by hospice agency.  Message also left with [facility] RN, Brittany ([phone]) regarding the above.    Nel Caban RN  Care Coordinator  [facility]  [phone] (text or call)

## 2022-10-16 NOTE — PROGRESS NOTES
SPIRITUAL HEALTH SERVICES Significant Event  [employer]ING  [facility]    Referral Source: SH Consult    At request of Pt's family, I facilitated on-call 's visit for the sacrament of the sick. Pt Mel anointed by Father Phil Tariq per request of Pt's daughters.    SHS remains available to Pt and family as needed/requested.     Ashley Reyna MDiv  Chaplain Resident  Pager: [phone]

## 2022-10-16 NOTE — PLAN OF CARE
Goal Outcome Evaluation:  : Calm, confused, disoriented x4, baseline dementia & non verbal. VSS on RA. Tele SR. Diet Pureed w/ thick liquids. Takes pills crushed with pudding + thickened fluids. Tolerated well. No non-verbal pain indicators noted. Turned/repositioned q2hrs with 2 assist. LE wounds-prevalon boots in place. Pt scoring green on the Aggression stop Light Tool. Plans for safe discharge to [facility] this coming Monday-care coordinator following.

## 2022-10-17 NOTE — PROGRESS NOTES
[facility]  Hospitalist Progress Note    Admit Date:  10/12/2022  Date of Service (when I saw the patient): 10/17/2022   Provider:  Carolyn Lott, DO    Assessment & Plan   Mel Castellanos is a 82 year old female who was admitted on 10/12/2022. She has a PMH of advanced dementia, reportedly nonverbal at baseline, who presents with altered mental status, shortness of breath and evidence of hypernatremia, severe dehydration, mild lactic acidosis a left lower lobe infiltrate consistent with pneumonia, abnormal urinalysis with suspected urinary tract infection.      Problem List:    1.   E coli UTI  E. coli bacteremia  Possible pneumonia  Acute infectious encephalopathy due to above  -Patient has advanced dementia and lives in a memory care group home.  -Reportedly per the staff there, patient over the last 24 to 48 hours has been having worsening weakness, fever and changes in mental state and was also wheezing.   -Initially admitted with concerns for UTI and pneumonia  -Blood culture positive for pansensitive E. coli  -Continue ceftriaxone 2 g IV daily, day 6/14 today - switch to Levaquin at discharge   -Encephalopathy improved since admission, probably close to baseline from an mental state standpoint.    Daughter had concerns about possible deviated mouth 10/15, CT head was negative for acute process.  No other concerns for CVA at this time    Family interested in palliative care consultation for further goals of care and discharge planning.    Palliative care service is planning on seeing Ms. Castellanos later today.     2. Acute hypernatremia - resolved  Dehydration  -Patient was found to be dehydrated at presentation with a sodium of 155  -Hypernatremia resolved, encourage oral intake.  -Given her severe dementia, she will be at high risk for dehydration and hypernatremia in the future.     3. Advanced dementia  -patient lives [facility] care group [place] in  [place].  -apparently at baseline has advanced dementia and is verbal but most of it is incomprehensible  -Per her daughter, she is wheelchair-bound at baseline and requires help with ADLs  -Improving and probably at baseline now.  -Had a long discussion with daughter Henrique at the bedside yesterday.  Explained to her the prognosis given patient's advanced dementia, dysphagia and risk of recurrent dehydration.  She is interested in palliative care and maybe even hospice.  Palliative care consult pending, as above     4. Mild troponin elevation most likely due to type II MI due to demand ischemia  History of coronary artery disease s/p PCI in 2011  -Initial troponin was 60 with a downtrend  -Suspect this is type II from demand ischemia from the initial septic process.  She did however have some EKG changes also.  -Transthoracic echo shows LV hypertrophy, EF is 55-60%, grade 2 advanced diastolic dysfunction.  Inferior wall either normal or borderline hypokinetic  -Given no overt abnormality, and advanced dementia and encephalopathic, we will not pursue further cardiac work-up at this time.     5. Diabetes mellitus type 2  -Last hemoglobin A1c was 6.0 1/12/2022  -Medium sliding scale for now     6. Dysphagia  -Speech following  -Continue puréed solids(IDDSI 4 diet), mildly thick liquids with 1: 1 supervision.     7. COPD  -no wheezes to ausc today  -Continue prior to admission inhalers and nebulizers     8. Severe malnutrition in context of acute illness or injury  -Dietitian following     9. Right medial heel pressure injury  -Wound care following     Diet: Room Service  Snacks/Supplements Adult: Ensure Clear; With Meals  Combination Diet Pureed Diet (level 4); Mildly Thick (level 2)     DVT Prophylaxis: Pneumatic Compression Devices   Tavera Catheter: Not present  Code Status: No CPR- Do NOT Intubate        Disposition Plan      Expected Discharge Date: 10/18/2022, back to memory care group home pending evaluation  by palliative care     Destination: group home        Entered: Hu Cash MD 10/16/2022, 8:42 AM        Diet: Room Service  Snacks/Supplements Adult: Ensure Clear; With Meals  Combination Diet Pureed Diet (level 4); Mildly Thick (level 2)    DVT Prophylaxis: Pneumatic Compression Devices  Tavera Catheter: Not present  Code Status: No CPR- Do NOT Intubate      Disposition Plan   Entered: Carolyn Lott DO 10/17/2022, 6:50 AM       The patient's care was discussed with the palliative care  Consultant.    We are operating under sub optimal conditions in the setting of a world wide pandemic, hospitals are running at full capacity with limited bed availability. We are providing the best possible patient care with limited resources.    Interval History     Crying when I enter the room.  Cannot understand what she is verbalizing for me.  Up in chair this am.  RN overnight reported that she was calm but confused overnight.  No new concerning changes in vital signs.     -Data reviewed today: I reviewed all new labs and imaging results over the last 24 hours. I personally reviewed no images or EKG's today.    Physical Exam   Temp: 97.6  F (36.4  C) Temp src: Axillary BP: 112/57 Pulse: 78   Resp: 18 SpO2: 95 % O2 Device: None (Room air) Oxygen Delivery: 1 LPM  Vitals:    10/14/22 0600 10/16/22 0544 10/17/22 0614   Weight: 54.2 kg (119 lb 7.8 oz) 53.6 kg (118 lb 1.6 oz) 54.4 kg (120 lb)     Vital Signs with Ranges  Temp:  [97.6  F (36.4  C)-99.1  F (37.3  C)] 97.6  F (36.4  C)  Pulse:  [70-78] 78  Resp:  [16-20] 18  BP: (112-151)/(57-68) 112/57  SpO2:  [89 %-99 %] 95 %  I/O last 3 completed shifts:  In: 480 [P.O.:480]  Out: 1200 [Urine:1200]    GEN:  Awake, alert, crying sitting up in chair.  HEENT:  Normocephalic/atraumatic, no scleral icterus, no nasal discharge, mouth appears mostly moist to somewhat limited exam this am  NECK:  No clear thyromegaly or JVD  CV:  Somewhat distant but regular rate and rhythm,  no clear murmur to ausc. Over sounds of her crying.  S1 + S2 noted, no S3 or S4.  LUNGS:  Clear to auscultation ant/lat bilaterally.  No rales/rhonchi/wheezing auscultated bilaterally.  No costal retractions bilaterally.  Symmetric chest rise on inhalation noted.  ABD:  Active bowel sounds, soft, non-tender/non-distended.  No rebound/guarding/rigidity.  No masses palpated.  No obvious HSM to exam.  EXT:  No significant pretibial edema or cyanosis bilaterally. No joint synovitis noted.  No calf-tenderness or asymmetry noted.  SKIN:  Dry to touch, no rashes or jaundice noted.  PSYCH:  Crying as above and appears scared   NEURO:  No tremors at rest, speech is mumbled through her tears.      Data   Labs:  No results for input(s): CULT in the last 168 hours.  Recent Labs   Lab 10/17/22  1152 10/17/22  0905 10/17/22  0126 10/15/22  1218 10/15/22  0624 10/14/22  1732 10/14/22  1606 10/14/22  0836 10/14/22  0613 10/13/22  0727 10/13/22  0628   NA  --   --   --   --  142  --  143  --  148*   < > 148*  148*   POTASSIUM  --   --   --   --  3.4  --   --   --  3.4  --  3.3*   CHLORIDE  --   --   --   --  107  --   --   --  111*  --  115*   CO2  --   --   --   --  32  --   --   --  32  --  30   ANIONGAP  --   --   --   --  3  --   --   --  5  --  3   * 191* 190*   < > 218*   < >  --    < > 289*   < > 312*   BUN  --   --   --   --  17  --   --   --  20  --  33*   CR  --   --   --   --  0.47*  --   --   --  0.49*  --  0.58   GFRESTIMATED  --   --   --   --  >90  --   --   --  >90  --  89   LAUREN  --   --   --   --  9.1  --   --   --  9.2  --  9.2    < > = values in this interval not displayed.     Recent Labs   Lab 10/13/22  0628 10/12/22  1132   WBC 9.9 10.3   HGB 12.9 13.8   HCT 41.4 45.5   * 112*    194     Recent Labs   Lab 10/17/22  1152 10/17/22  0905 10/17/22  0126 10/15/22  1218 10/15/22  0624 10/14/22  1732 10/14/22  1606 10/14/22  0836 10/14/22  0613 10/13/22  0727 10/13/22  0628   NA  --   --   --    --  142  --  143  --  148*   < > 148*  148*   POTASSIUM  --   --   --   --  3.4  --   --   --  3.4  --  3.3*   CHLORIDE  --   --   --   --  107  --   --   --  111*  --  115*   CO2  --   --   --   --  32  --   --   --  32  --  30   ANIONGAP  --   --   --   --  3  --   --   --  5  --  3   * 191* 190*   < > 218*   < >  --    < > 289*   < > 312*   BUN  --   --   --   --  17  --   --   --  20  --  33*   CR  --   --   --   --  0.47*  --   --   --  0.49*  --  0.58   GFRESTIMATED  --   --   --   --  >90  --   --   --  >90  --  89   LAUREN  --   --   --   --  9.1  --   --   --  9.2  --  9.2    < > = values in this interval not displayed.      Recent Imaging:   No results found for this or any previous visit (from the past 24 hour(s)).    Medications       aspirin  81 mg Oral Daily     cefTRIAXone  2 g Intravenous Q24H     donepezil  10 mg Oral At Bedtime     DULoxetine  40 mg Oral Daily     fluticasone  2 spray Both Nostrils Daily     insulin aspart  1-5 Units Subcutaneous At Bedtime     insulin aspart  1-7 Units Subcutaneous TID AC     isosorbide dinitrate  10 mg Oral TID     magnesium oxide  400 mg Oral BID     metoprolol tartrate  12.5 mg Oral BID     OLANZapine  5 mg Oral BID     sodium chloride (PF)  3 mL Intracatheter Q8H     vitamin D3  50 mcg Oral Daily

## 2022-10-17 NOTE — PROGRESS NOTES
Care Management Follow Up    Length of Stay (days): 5    Expected Discharge Date: 10/18/2022     Concerns to be Addressed: discharge planning     Patient plan of care discussed at interdisciplinary rounds: Yes    Anticipated Discharge Disposition: Group Home  Disposition Comments: Discharge back to [facility]  Anticipated Discharge Services: None  Anticipated Discharge DME: Other (see comment) (Lift)    Patient/family educated on Medicare website which has current facility and service quality ratings: no  Education Provided on the Discharge Plan:    Patient/Family in Agreement with the Plan: yes    Referrals Placed by CM/SW:    Private pay costs discussed: Not applicable    Additional Information:  Call placed to [facility] [phone].  Spoke to [facility] and asked that Ana Lift be placed on Hold until palliative meeting today.   [facility] will place delivery on hold.    CC/SW to follow for discharge planning.     Addendum 1542: Ana lift cancelled as patient now going on hospice care and that agency will arrange. SW aware and following.     Kathi Posada RN

## 2022-10-17 NOTE — CONSULTS
[facility]  Palliative Care Consultation Note    Patient: Mel Castellanos  Date of Admission:  10/12/2022    Requesting Clinician / Team:Hu Cash MD  Reason for consult: Goals of care    Recommendations:    Goals of Care:  Comfort, transition to hospice.  Daughter Henrique open to meeting with Hospice either prior to discharge or after discharge at [facility].   Mel should qualify for hospice with stage 7 dementia, recent weight loss of 23lb weight loss (16%) over the past 3 months, recent decline in function and recurrent hospital admissions with infection and hypernatremia.      Chronic neck pain:  Per daughter Henrique after fall from wheelchair several years ago.    Restart scheduled tylenol 1000mg TID    Voltaren gel to neck/upper back TID    Restart Neurontin q HS for now, then return to BID if needed.          Thank you for the opportunity to participate in the care of this patient and family. Our team: does not plan on following further, however do not hesitate to call or re-consult if we can be of further assistance to the patient/family.     During regular M-F work hours -- if you are not sure who specifically to contact -- please contact us via [facility] paging.    After regular work hours and on weekends/holidays, you can call our answering service at [phone]. Also, who's on call for us is available in [employer].       Assessments:  Mel Castellanos is a 82 year old female with stage 7 Dementia admitted to the hospital with altered mental status, shortness of breath and evidence of hypernatremia, severe dehydration, mild lactic acidosis a left lower lobe infiltrate consistent with pneumonia, abnormal urinalysis found to have E. Coli bacteremia, UTI and possibly PNA.  This is her second admission tot [facility] this year with sepsis.  She has had a 23lb weight loss (16%) over the past 3 months.    Today, the patient was seen for:  Goals of care    Prognosis,  Goals, & Planning:      Functional Status just prior to hospitalization: 4 (Completely disabled and dependent on others for selfcare; bedbound)   In the past few months weeks, Mel has lost ability to feed herself.  She is now unable to recognize her daughter.  She is non-ambulatory and incontinent.  She only speech is unintelligible.        Prognosis, Goals, and/or Advance Care Planning were addressed today: Yes        Summary/Comments: Spoke with Daughter Henrique via the phone.  She understands patient is in the most advance stage of dementia.  She spoke with hospitalist about hospice yesterday and she is familiar with the benefit as her father dies at home on hospice in 2005.  She did not think patient would qualify for hospice as she knew hospice patients must have 6 months or less to live.  We spoke about difficulty making this prognosis but the clear evidence that Mel should qualify with advanced stage, recent decline, weight loss and recurrent illness.  She may indeed live shorter or longer than 6 months but 6 months or less is a reasonable prognosis at this time.  Hospice would support her though continued decline and if Mel regains some stability she may end up on hospice longer that 6 months if she still meets qualifying factors.   Henrique is open to hospice services for Mel.  We discussed possibility of continued antibiotics for future infections on hospice if patient would want but less invasive than returning to hospital for aggressive treatment of recurrent infection. We discussed the option to give no further abx and keep her comfortable through the end of her life.        Patient's decision making preferences: unable to assess          Patient has decision-making capacity today for complex decisions: No            I have concerns about the patient/family's health literacy today: No           Patient has a completed Health Care Directive: No.       Code status: No CPR / No Intubation Did not  "discuss today.    Coping, Meaning, & Spirituality:   Mood, coping, and/or meaning in the context of serious illness were addressed today: Yes  Summary/Comments: Henrique shared about caring for her father on hospice and even though Mel was  from him at the time, she was still there for him.  She is grateful for the  visit for \"last rights.\"  This would have been important to Mel as it was recommended by an previous exchange student.    Social:     Living situation: [place]    Marcum family / caregivers: Daughters Darin and Lanny    History of Present Illness:  History gathered today from: family/loved ones, medical chart, medical team members    82-year-old female with advanced dementia, reportedly nonverbal at baseline, who presents with altered mental status, shortness of breath and evidence of hypernatremia, severe dehydration, mild lactic acidosis a left lower lobe infiltrate consistent with pneumonia, abnormal urinalysis found to have E. Coli bacteremia, UTI and possibly PNA.      Lives in memory care, baseline incomprehensible speech and wheelchair bound.  23lb weight loss (16%) over the past 3 months.    This is Mel's second admission to the hospital this year, last in 7/2022 d/t severe sepsis due to E. coli bacteremia due to urinary tract infection with hypotension.     Mel has a hx of neck pain after a fall from her wheelchair at a previous NH. Henrique feels like it may not be well controlled.  Currently on scheduled Tylenol and Topical Arthritis Salve.         Key Palliative Symptom Data:  unable    ROS:  Comprehensive ROS is reviewed and is negative except as here & per HPI: unable     Past Medical History:  Past Medical History:   Diagnosis Date     Arthritis      Emphysema of lung (H)      High cholesterol      HTN (hypertension)      Insomnia      MI (myocardial infarction) (H)      Restless leg syndrome      TIA (transient ischaemic attack)      Type 2 diabetes mellitus " "without complications (H)      UTI (urinary tract infection)         Past Surgical History:  Past Surgical History:   Procedure Laterality Date     APPENDECTOMY       CHOLECYSTECTOMY       IR LOWER EXTREMITY ANGIOGRAM LEFT  1/30/2020         Family History:  Family History   Problem Relation Age of Onset     Coronary Artery Disease Father         Allergies:  Allergies   Allergen Reactions     Sulfa Drugs Rash        Medications:  I have reviewed this patient's medication profile and medications from this hospitalization.   Noted scheduled meds are:  Tylenol 1000mg TID PTA  Neurontin 100mg BID PTA, not restarted here.  \"Arthritis Hot\" TID PTA    Duloxetine 40mg  Daily  Zyprexa 5mg      Noted PRN meds are:  None    Physical Exam:  Vital Signs: Temp: 98  F (36.7  C) Temp src: Axillary BP: 127/66 Pulse: 75   Resp: 18 SpO2: 95 % O2 Device: None (Room air)    Weight: 120 lbs 0 oz  GEN: Does not follow-commands, responds to questioning with unintelligible speech, appears comfortable, NAD.   HEENT:  Normocephalic/atraumatic, no scleral icterus, no nasal discharge, mouth moist.  CV:  Regular rate and rhythm.  LUNGS:  Clear to auscultation bilaterally without rales/rhonchi/wheezing/retractions.  Symmetric chest rise on inhalation noted.  ABD:  Active bowel sounds, soft, non-tender/non-distended.   EXT:  No edema or cyanosis.    SKIN:  Dry to touch, no exanthems noted in the visualized areas.      Data reviewed:  Recent imaging reviewed, my comments on pertinents:   EXAM: CT HEAD W/O CONTRAST  LOCATION: [facility]  DATE/TIME: 10/15/2022 4:02 PM     INDICATION: AMS; ? subtle left facial droop  COMPARISON: Head CT 01/23/2020  TECHNIQUE: Routine CT Head without IV contrast. Multiplanar reformats. Dose reduction techniques were used.     FINDINGS:  INTRACRANIAL CONTENTS: No intracranial hemorrhage. Mild diffuse cerebral parenchymal volume loss. No midline shift. The basilar cisterns are patent. Mild " periventricular and scattered foci of deep white matter hypodensities involving both cerebral   hemispheres. No cerebellar tonsillar ectopia. No CT evidence for an acute infarct. Small to moderate-sized area of encephalomalacia involving the right occipital lobe, new since the prior head CT.     VISUALIZED ORBITS/SINUSES/MASTOIDS: No intraorbital abnormality. No paranasal sinus mucosal disease. No middle ear or mastoid effusion.     BONES/SOFT TISSUES: No acute abnormality.                                                                      IMPRESSION:  1.  No intracranial hemorrhage, mass lesions, hydrocephalus or CT evidence for an acute infarct.  2.  Mild diffuse cerebral parenchymal volume loss. Presumed chronic hypertensive/microvascular ischemic white matter changes.  3.  Small to moderate-sized area of encephalomalacia involving the right occipital lobe, new since the prior head CT 01/23/2020.    Recent lab data reviewed, my comments on pertinents:   Last Comprehensive Metabolic Panel:  Sodium   Date Value Ref Range Status   10/15/2022 142 133 - 144 mmol/L Final   06/16/2020 139 133 - 144 mmol/L Final     Potassium   Date Value Ref Range Status   10/15/2022 3.4 3.4 - 5.3 mmol/L Final   06/16/2020 3.5 3.4 - 5.3 mmol/L Final     Chloride   Date Value Ref Range Status   10/15/2022 107 94 - 109 mmol/L Final   06/16/2020 107 94 - 109 mmol/L Final     Carbon Dioxide   Date Value Ref Range Status   06/16/2020 30 20 - 32 mmol/L Final     Carbon Dioxide (CO2)   Date Value Ref Range Status   10/15/2022 32 20 - 32 mmol/L Final     Anion Gap   Date Value Ref Range Status   10/15/2022 3 3 - 14 mmol/L Final   06/16/2020 2 (L) 3 - 14 mmol/L Final     Glucose   Date Value Ref Range Status   10/15/2022 218 (H) 70 - 99 mg/dL Final   06/16/2020 122 (H) 70 - 99 mg/dL Final     GLUCOSE BY METER POCT   Date Value Ref Range Status   10/17/2022 195 (H) 70 - 99 mg/dL Final     Urea Nitrogen   Date Value Ref Range Status   10/15/2022  17 7 - 30 mg/dL Final   06/16/2020 16 7 - 30 mg/dL Final     Creatinine   Date Value Ref Range Status   10/15/2022 0.47 (L) 0.52 - 1.04 mg/dL Final   06/16/2020 0.59 0.52 - 1.04 mg/dL Final     GFR Estimate   Date Value Ref Range Status   10/15/2022 >90 >60 mL/min/1.73m2 Final     Comment:     Effective December 21, 2021 eGFRcr in adults is calculated using the 2021 CKD-EPI creatinine equation which includes age and gender (Bernice et al., NE, DOI: 10.1056/XHEUvh0695933)   06/16/2020 86 >60 mL/min/[1.73_m2] Final     Comment:     Non  GFR Calc  Starting 12/18/2018, serum creatinine based estimated GFR (eGFR) will be   calculated using the Chronic Kidney Disease Epidemiology Collaboration   (CKD-EPI) equation.       Calcium   Date Value Ref Range Status   10/15/2022 9.1 8.5 - 10.1 mg/dL Final   06/16/2020 9.1 8.5 - 10.1 mg/dL Final     Lab Results   Component Value Date    WBC 9.9 10/13/2022    WBC 5.0 06/16/2020     Lab Results   Component Value Date    RBC 3.80 10/13/2022    RBC 3.66 06/16/2020     Lab Results   Component Value Date    HGB 12.9 10/13/2022    HGB 11.6 06/16/2020     Lab Results   Component Value Date    HCT 41.4 10/13/2022    HCT 35.4 06/16/2020     Lab Results   Component Value Date     10/13/2022    MCV 97 06/16/2020     Lab Results   Component Value Date    MCH 33.9 10/13/2022    MCH 31.7 06/16/2020     Lab Results   Component Value Date    MCHC 31.2 10/13/2022    MCHC 32.8 06/16/2020     Lab Results   Component Value Date    RDW 13.0 10/13/2022    RDW 13.3 06/16/2020     Lab Results   Component Value Date     10/13/2022     06/16/2020     TTS: I have personally spent a total of 75 minutes today reviewing patient's medical record, consultation with the medical providers, assessing patient and symptoms and providing emotional support with more than 50% of this time spent in counseling, coordination of care  re: Goals of care and pain management.    Vesta Owusu  APRN, CNS, CNP  [facility], Palliative Care  [phone]

## 2022-10-17 NOTE — PLAN OF CARE
"Speech Language Therapy Discharge Summary    Reason for therapy discharge:    Change in medical status.  Transitioning to comfort cares with plans for hospice upon discharge.    Progress towards therapy goal(s). See goals on Care Plan in [facility] electronic health record for goal details.  Goals not met.  Barriers to achieving goals:   change in goals of care.    Therapy recommendation(s):    No further therapy is recommended.     From dysphagia tx session today \"Dtr in room assisting feeding mildly thick liquids via tsp -- observed x10 tsps which pt tolerated without signs/sx aspiration. Dtr reports plan is to go back to  tomorrow with hospice cares.  Educated on potential for liberalizing diet given hospice edvin though dtr reporting current diet is going really well and currently would like to pursue these modifications for puree/mildly thick - she is aware that that can change in future if desired. Verbal and written education re: thickened liquids provided.\"            "

## 2022-10-17 NOTE — PLAN OF CARE
Goal Outcome Evaluation:  Calm, confused, disoriented x4, baseline dementia & non verbal. VSS on RA. Tele SR. Diet Pureed w/thickened liquids. Takes pills crushed with pudding+thickened fluids. Tolerated well. No-non verbal pain indicators noted. Turned/repo q2hrs. Prevalon boots in place. Plans for safe discharge to memory care [facility] today/tomorrow.pending improvement.

## 2022-10-17 NOTE — PLAN OF CARE
Goal Outcome Evaluation:         Calm, confused, disoriented x4, baseline dementia & non verbal. VSS on RA. Tele SR. Diet Pureed w/thickened liquids. Takes pills crushed with pudding+thickened fluids. Tolerated well. No-non verbal pain indicators noted. Turned/repo q2hrs. Prevalon boots in place. Plans for safe discharge to memory care [facility]

## 2022-10-17 NOTE — CONSULTS
Care Management Follow Up    Length of Stay (days): 5    Expected Discharge Date: 10/18/2022     Concerns to be Addressed: discharge planning     Patient plan of care discussed at interdisciplinary rounds: Yes    Anticipated Discharge Disposition: Group Home  Disposition Comments: Discharge back to [facility] with hospice  Anticipated Discharge Services: hospice  Anticipated Discharge DME: Other (see comment) ([employer])    Patient/family educated on Medicare website which has current facility and service quality ratings: no  Education Provided on the Discharge Plan:  yes  Patient/Family in Agreement with the Plan: yes    Referrals Placed by CM/SW:  [facility], [facility], [facility]  Private pay costs discussed: Not applicable    Additional Information:  Received a call from Latricia from [facility], [phone].  She states that she spoke with patient's daughter Henrique and she would like for patient to transition to home with hospice tomorrow.  They will have a nurse and a  available between 1-2 to see patient at home.  They are asking for information to be faxed.   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  Faxed the facesheet and PCS to [employer].  Call placed to patient's daughter to update her as to the plan and she is in agreement.  Call placed and message left for Brittany, the nurse at [facility], [phone], to update her as to the plan.  Call placed and message left for Alexus, patient's [facility] care coordinator, [phone], to update her as to the plan.  Call placed to update Latricia from [facility] as to the transport time.  We will need to fill a 3 day supply of hospice meds.  The meds will need to be bubble packed, if appropriate.      Will continue to follow.      Francesca THOMSON  KELLY Smart, [facility]    [phone]  [facility]      SHABNAM Altamirano

## 2022-10-18 NOTE — PROGRESS NOTES
Care Management Discharge Note    Discharge Date: 10/18/2022       Discharge Disposition: Hospice, Home    Discharge Services: None    Discharge DME: Other (see comment) ([employer])    Discharge Transportation: [employer] stretcher transport at 11:30    Private pay costs discussed: Not applicable    PAS Confirmation Code:    Patient/family educated on Medicare website which has current facility and service quality ratings: no    Education Provided on the Discharge Plan:  yes  Persons Notified of Discharge Plans: Patient's daughter Latricia Duenas from [facility], message left for Brittany, the nurse from [facility]  Patient/Family in Agreement with the Plan: yes    Handoff Referral Completed: No    Additional Information:  Received discharge orders for patient.  Patient is planning on returning home to [facility] with [facility].  [employer] stretcher transport arranged for 11:30 today.  Call placed to update patient's daughter Henrique and she is in agreement to the plan.  Call placed to update [facility] and faxed the discharge orders.  Call placed and message left for Brittany, the nurse at [facility].  Unable to fax the discharge orders as they never returned the call with their fax number.        KELLY Anaya, [facility]    [phone]  [facility]

## 2022-10-18 NOTE — PLAN OF CARE
Tele: . BP 88/62. Unable to give AM medications, pt it very lethargic and comfort care was ordered by physician this AM. IV out and monitor off. Paperwork and medications given to transportation. Pt has all of her belongings.

## 2022-10-18 NOTE — PROGRESS NOTES
Chart reviewed for scheduled nutrition reassessment. Noted transition to comfort cares, ordered yesterday.   RD will sign off. Please re-consult should needs arise.     Alysha Benoit RD, LD  [facility], Ortho, [facility]  Pager: [phone]  Weekend Pager: [phone]

## 2022-10-18 NOTE — DISCHARGE SUMMARY
[facility]    Discharge Summary  Hospitalist    Date of Admission:  10/12/2022  Date of Discharge:  10/18/2022  Provider:  Carolyn Lott DO    Discharge Diagnoses   1.  E coli UTI  2.  E coli bacteremia  3.  LLL pneumonia  4.  Acute infectious encephalopathy on top of advancing dementia  5.  Hypernatremia d/t clinical dehydration - resolved  6.  Lactic acidosis, resolved  7.  Type II MI with demand ischemia and elevated troponin - resolved    Transition to comfort cares.  Will be enrolling in hospice care on return to memory care facility.     Other medical issues:  Past Medical History:   Diagnosis Date     Arthritis      Emphysema of lung (H)      High cholesterol      HTN (hypertension)      Insomnia      MI (myocardial infarction) (H)      Restless leg syndrome      TIA (transient ischaemic attack)      Type 2 diabetes mellitus without complications (H)      UTI (urinary tract infection)        History of Present Illness   Mel Castellanos is an 82 year old female who presented with altered mentation, hypernatremia and SOB.  Please see the admission history and physical for full details.    Hospital Course   Mel Castellanos was admitted on 10/12/2022.  The following problems were addressed during her hospitalization:    1.  E coli UTI with bacteremia       LLL pneumonia    Ms. Castellanos is a 83 yo female with advanced dementia who presents from her memory care with altered mentation.  She was noted to have E coli UTI and bacteremia, as well as, LLL infiltrate on CT chest imaging.  She was started on IV rocephin on admission with clinical improvement and was transitioned to po levaquin after sens reviewed from  and BC to complete a total 14 day antibiotic course.      2.  Hypernatremia       Clinical dehydration    She presented with clinical dehydration and hypernatremia.  She was provided with IVF with improvement of her sodium (142 on 10/15/22).  No labs were drawn on am of day  of discharge as she was transitioned to comfort cares/hospice.    3.  Acute infectious encephalopathy       Advancing dementia    Ms Castellanos has a h/o of advancing dementia.  She presents with increased confusion in the setting of electrolyte abnormalities and acute infectious processes. Head CT neg for acute findings on admission.    Palliative care consult was requested.  Family decided to transition Ms. Leal for comfort cares.  She will enroll with hospice on return to her memory care facility.      Significant Results and Procedures   none    Pending Results   Unresulted Labs Ordered in the Past 30 Days of this Admission     No orders found from 9/12/2022 to 10/13/2022.          Code Status   Comfort Care       Primary Care Physician   [facility]    Physical Exam   Temp: 97.9  F (36.6  C) Temp src: Oral BP: (!) 88/62 Pulse: 72   Resp: 18 SpO2: (!) 86 % O2 Device: None (Room air)    Vitals:    10/16/22 0544 10/17/22 0614 10/18/22 0428   Weight: 53.6 kg (118 lb 1.6 oz) 54.4 kg (120 lb) 55 kg (121 lb 3.2 oz)     Vital Signs with Ranges  Temp:  [96.6  F (35.9  C)-97.9  F (36.6  C)] 97.9  F (36.6  C)  Pulse:  [72-80] 72  Resp:  [16-20] 18  BP: ()/(60-65) 88/62  SpO2:  [86 %-96 %] 86 %  I/O last 3 completed shifts:  In: 750 [P.O.:750]  Out: 375 [Urine:375]    PT SEEN AND EXAMINED ON DAY OF D/C  GEN:  Sleeping but appears comfortable this am, min responsive (will say ouch to pain)  HEENT:  Normocephalic/atraumatic, PERRL, no scleral icterus, no nasal discharge, mouth and membranes appear fairly moist  CV:  Regular rate and rhythm, no loud murmur to ausc.  S1 + S2 noted, no S3 or S4.  LUNGS:  Clear to auscultation ant/lat bilaterally; post exam limited this am.  No rales/rhonchi/wheezing auscultated bilaterally.  No costal retractions bilaterally.  Symmetric chest rise on inhalation noted.  ABD:  Active bowel sounds, soft, no grimacing to light palpation throughout.  No rebound.  EXT:  1+  pretibial edema bilaterally, no cyanosis or mottling bSKIN:  Dry to touch, no rashes or jaundice noted.    Discharge Disposition   Admited to hospice care.   Agency: [facility]  Discharged to memory care    Consultations This Hospital Stay   PHYSICAL THERAPY ADULT IP CONSULT  WOUND OSTOMY CONTINENCE NURSE  IP CONSULT  NUTRITION SERVICES ADULT IP CONSULT  CARE MANAGEMENT / SOCIAL WORK IP CONSULT  SPEECH LANGUAGE PATH ADULT IP CONSULT  PALLIATIVE CARE ADULT IP CONSULT  SPIRITUAL HEALTH SERVICES IP CONSULT  SOCIAL WORK IP CONSULT  CARE MANAGEMENT / SOCIAL WORK IP CONSULT    Time Spent on this Encounter   ICarolyn DO, personally saw the patient today and spent greater than 30 minutes discharging this patient.    Discharge Orders      Follow Up and recommended labs and tests    Follow up with hospice on return to [facility]     Reason for your hospital stay    You were admitted for UTI and blood infection     Activity - Up with nursing assistance     No CPR- Do NOT Intubate     Miscellaneous DME Order    DME Documentation:   Describe the reason for need to support medical necessity: Ana lift required for transfer between bed and chair, wheelchair or commode and without the use of a lift and the patient will be bed confined.    I, the undersigned, certify that the above prescribed supplies are medically necessary for this patient and is both reasonable and necessary in reference to accepted standards of medical and necessary in reference to accepted standards of medical practice in the treatment of this patient's condition and is not prescribed as a convenience.     Diet    Follow this diet upon discharge:         Snacks/Supplements Adult: Ensure Clear; With Meals      Combination Diet Pureed Diet (level 4); Mildly Thick (level 2)     Discharge Medications   Discharge Medication List as of 10/18/2022 10:25 AM      START taking these medications    Details   atropine 1 % ophthalmic solution Place 2  drops under the tongue every 4 hours as needed for secretions, Disp-15 mL, R-0, E-Prescribe      carboxymethylcellulose PF (REFRESH PLUS) 0.5 % ophthalmic solution Place 1-2 drops into both eyes every hour as needed for dry eyes, Disp-30 each, R-0, E-Prescribe      HYDROmorphone, STANDARD CONC, (DILAUDID) 1 MG/ML oral solution Take 1-2 mLs (1-2 mg) by mouth every 2 hours as needed for moderate to severe pain (or dyspnea), Disp-10 mL, R-0, Local Print      levofloxacin (LEVAQUIN) 250 MG tablet Take 1 tablet (250 mg) by mouth daily for 7 days, Disp-7 tablet, R-0, E-Prescribe      LORazepam (ATIVAN) 2 MG/ML (HIGH CONC) oral solution Take 0.25 mLs (0.5 mg) by mouth every 8 hours as needed for anxiety, nausea or agitation, Disp-10 mL, R-0, Local Print      ondansetron (ZOFRAN ODT) 4 MG ODT tab Take 1 tablet (4 mg) by mouth every 6 hours as needed for nausea or vomiting, Disp-10 tablet, R-0, E-Prescribe         CONTINUE these medications which have CHANGED    Details   isosorbide mononitrate (IMDUR) 30 MG 24 hr tablet Take 0.5 tablets (15 mg) by mouth daily, Disp-30 tablet, R-0, E-PrescribeHOLD IF SBP <115      metoprolol succinate ER (TOPROL XL) 25 MG 24 hr tablet Take 0.5 tablets (12.5 mg) by mouth 2 times daily, Disp-60 tablet, R-0, E-PrescribeHOLD if SBP <110 or HR <60         CONTINUE these medications which have NOT CHANGED    Details   acetaminophen (TYLENOL) 500 MG tablet Take 1,000 mg by mouth 3 times daily, Historical      albuterol (PROAIR HFA/PROVENTIL HFA/VENTOLIN HFA) 108 (90 Base) MCG/ACT inhaler Inhale 2 puffs into the lungs every 6 hours as needed, HistoricalPharmacy may dispense brand covered by insurance (Proair, or proventil or ventolin or generic albuterol inhaler)      donepezil (ARICEPT) 10 MG tablet Take 10 mg by mouth At Bedtime , Historical      DULoxetine (CYMBALTA) 20 MG capsule Take 40 mg by mouth daily, Historical      fluticasone (FLONASE) 50 MCG/ACT nasal spray Spray 2 sprays into both  nostrils daily, Historical      gabapentin (NEURONTIN) 100 MG capsule Take 100 mg by mouth 2 times daily, Historical      guaiFENesin-dextromethorphan (ROBITUSSIN DM) 100-10 MG/5ML syrup Take 10 mLs by mouth 3 times daily as needed for cough, Historical      ipratropium - albuterol 0.5 mg/2.5 mg/3 mL (DUONEB) 0.5-2.5 (3) MG/3ML neb solution Take 1 vial by nebulization every 6 hours as needed for shortness of breath / dyspnea or wheezing, Historical      loperamide (IMODIUM) 2 MG capsule Take 2 mg by mouth 4 times daily as needed for diarrhea, Historical      melatonin 3 MG tablet Take 3 mg by mouth At Bedtime, Historical      muscle rub (ARTHRITIS HOT) 10-15 % CREA Apply topically 3 times daily as needed for moderate pain, Historical      nitroglycerin (NITROSTAT) 0.4 MG SL tablet Place 1 tablet (0.4 mg) under the tongue every 5 minutes as needed X 3 doses total if needed for chest pain., Disp-25 tablet, R-3, E-Prescribe      !! OLANZapine (ZYPREXA) 2.5 MG tablet Take 2.5 mg by mouth nightly as needed, Historical      !! OLANZapine (ZYPREXA) 5 MG tablet Take 5 mg by mouth 2 times daily, Historical      order for [facility] Medical Phone [phone] Fax [phone]  [place] Size small Qty 2 sleeves  Primary Dressing fibracol or sweta   Qty 6 sheets  Secondary Dressing 4x4 gauze  Qty 60   Secondary Dressing Vashe Qty 1 bottle  Length of Need: 1 month  Freq uency of dressing change: dailyDisp-2 Device, R-0, Local Print      senna-docusate (SENOKOT-S/PERICOLACE) 8.6-50 MG tablet Take 1 tablet by mouth daily as needed for constipation, Historical      vitamin D3 (CHOLECALCIFEROL) 50 mcg (2000 units) tablet Take 2,000 Units by mouth daily, Historical       !! - Potential duplicate medications found. Please discuss with provider.      STOP taking these medications       aspirin (ASA) 81 MG chewable tablet Comments:   Reason for Stopping:         magnesium oxide (MAG-OX) 400 MG tablet Comments:   Reason for  Stopping:             Allergies   Allergies   Allergen Reactions     Sulfa Drugs Rash     Data   Recent Labs   Lab 10/13/22  0628 10/12/22  1132   WBC 9.9 10.3   HGB 12.9 13.8   HCT 41.4 45.5   * 112*    194     Recent Labs   Lab 10/18/22  0854 10/18/22  0213 10/17/22  2146 10/15/22  1218 10/15/22  0624 10/14/22  1732 10/14/22  1606 10/14/22  0836 10/14/22  0613 10/13/22  0727 10/13/22  0628   NA  --   --   --   --  142  --  143  --  148*   < > 148*  148*   POTASSIUM  --   --   --   --  3.4  --   --   --  3.4  --  3.3*   CHLORIDE  --   --   --   --  107  --   --   --  111*  --  115*   CO2  --   --   --   --  32  --   --   --  32  --  30   ANIONGAP  --   --   --   --  3  --   --   --  5  --  3   * 206* 229*   < > 218*   < >  --    < > 289*   < > 312*   BUN  --   --   --   --  17  --   --   --  20  --  33*   CR  --   --   --   --  0.47*  --   --   --  0.49*  --  0.58   GFRESTIMATED  --   --   --   --  >90  --   --   --  >90  --  89   LAUREN  --   --   --   --  9.1  --   --   --  9.2  --  9.2    < > = values in this interval not displayed.     Recent Labs   Lab 10/18/22  0854 10/18/22  0213 10/17/22  2146 10/15/22  1218 10/15/22  0624 10/14/22  1732 10/14/22  1606 10/14/22  0836 10/14/22  0613 10/13/22  0727 10/13/22  0628   NA  --   --   --   --  142  --  143  --  148*   < > 148*  148*   POTASSIUM  --   --   --   --  3.4  --   --   --  3.4  --  3.3*   CHLORIDE  --   --   --   --  107  --   --   --  111*  --  115*   CO2  --   --   --   --  32  --   --   --  32  --  30   ANIONGAP  --   --   --   --  3  --   --   --  5  --  3   * 206* 229*   < > 218*   < >  --    < > 289*   < > 312*   BUN  --   --   --   --  17  --   --   --  20  --  33*   CR  --   --   --   --  0.47*  --   --   --  0.49*  --  0.58   GFRESTIMATED  --   --   --   --  >90  --   --   --  >90  --  89   LAUREN  --   --   --   --  9.1  --   --   --  9.2  --  9.2    < > = values in this interval not displayed.     Recent Labs   Lab  10/13/22  0021 10/12/22  2102 10/12/22  1132   TROPONINIS 43 49 60*     Recent Labs   Lab 10/12/22  1231   COLOR Light Yellow   APPEARANCE Slightly Cloudy*   URINEGLC Negative   URINEBILI Negative   URINEKETONE Negative   SG 1.023   UBLD Trace*   URINEPH 5.5   PROTEIN 30*   NITRITE Positive*   LEUKEST Moderate*   RBCU 11*   WBCU 23*     Positive on the 1st day of incubation Abnormal        Escherichia coli Panic     1 of 2 bottles        Resulting Agency: [facility]   10/12/22  Susceptibility     Escherichia coli     VINCE     Ampicillin 8 ug/mL Susceptible     Ampicillin/ Sulbactam 4 ug/mL Susceptible     Cefepime <=1 ug/mL Susceptible     Ceftazidime <=1 ug/mL Susceptible     Ceftriaxone <=1 ug/mL Susceptible     Ciprofloxacin <=0.25 ug/mL Susceptible     Gentamicin <=1 ug/mL Susceptible     Levofloxacin <=0.12 ug/mL Susceptible     Meropenem <=0.25 ug/mL Susceptible     Piperacillin/Tazobactam <=4 ug/mL Susceptible     Tobramycin <=1 ug/mL Susceptible     Trimethoprim/Sulfamethoxazole <=1/19 ug/mL Susceptible                       10/12/22  50,000-100,000 CFU/mL Escherichia coli Abnormal        10,000-50,000 CFU/mL Escherichia coli Abnormal             Resulting Agency: [facility]     Susceptibility     Escherichia coli (1) Escherichia coli (2)     VINCE VINCE     Ampicillin 8 ug/mL Susceptible 8 ug/mL Susceptible     Ampicillin/ Sulbactam 4 ug/mL Susceptible <=2 ug/mL Susceptible     Cefazolin <=4 ug/mL Susceptible 1 <=4 ug/mL Susceptible 1     Cefepime <=1 ug/mL Susceptible <=1 ug/mL Susceptible     Cefoxitin <=4 ug/mL Susceptible <=4 ug/mL Susceptible     Ceftazidime <=1 ug/mL Susceptible <=1 ug/mL Susceptible     Ceftriaxone <=1 ug/mL Susceptible <=1 ug/mL Susceptible     Ciprofloxacin <=0.25 ug/mL Susceptible <=0.25 ug/mL Susceptible     Gentamicin <=1 ug/mL Susceptible <=1 ug/mL Susceptible     Levofloxacin <=0.12 ug/mL Susceptible <=0.12 ug/mL Susceptible     Nitrofurantoin <=16 ug/mL Susceptible <=16 ug/mL Susceptible      Piperacillin/Tazobactam <=4 ug/mL Susceptible <=4 ug/mL Susceptible     Tobramycin <=1 ug/mL Susceptible <=1 ug/mL Susceptible     Trimethoprim/Sulfamethoxazole <=1/19 ug/mL Susceptible <=1/19 ug/mL Susceptible                           Results for orders placed or performed during the hospital encounter of 10/12/22   XR Chest 2 Views    Narrative    XR CHEST 2 VIEWS  10/12/2022 11:55 AM       INDICATION: sob  COMPARISON: 7/3/2022       Impression    IMPRESSION: The patient is rotated. The lungs are clear. No  significant change.    GAVINO HARP MD         SYSTEM ID:  [id number]   CT Chest Pulmonary Embolism w Contrast    Narrative    CT CHEST PULMONARY EMBOLISM WITH CONTRAST October 12, 2022 1:29 PM    CLINICAL HISTORY: Fever. Cough. Elevated D-dimer.    TECHNIQUE: CT angiogram chest during arterial phase injection IV  contrast. 2D and 3D MIP reconstructions were performed by the CT  technologist. Dose reduction techniques were used.   CONTRAST: 58mL [id number]-[id number].    COMPARISON: CT of the chest performed 3/17/2020.    FINDINGS:  ANGIOGRAM CHEST: Pulmonary arteries are normal caliber and negative  for pulmonary emboli. The thoracic aorta is not well-opacified, there  is no evidence for aneurysm. There is extensive atherosclerotic  calcification of the thoracic aorta. No CT evidence of right heart  strain.    LUNGS AND PLEURA: No pleural effusions. Mild changes of centrilobular  emphysema in both upper lungs. Mild consolidation in the left lower  lobe is suspicious for pneumonia, and is new since the previous exam.  There is mild bronchial wall thickening noted in the left lower lobe.  No pneumothorax.    MEDIASTINUM/AXILLAE: No enlarged lymph nodes in the chest. No  pericardial effusion.    CORONARY ARTERY CALCIFICATION: Severe.    UPPER ABDOMEN: Limited views of the upper abdomen are unremarkable.    MUSCULOSKELETAL: Degenerative changes in the thoracic spine.      Impression    IMPRESSION:  1.  No evidence for  pulmonary embolism.  2.  Mild consolidation in the left lower lobe is suspicious for  pneumonia.  3.  Emphysema.     MALDONADO BAIRD MD         SYSTEM ID:  [id number]   CT Head w/o Contrast    Narrative    EXAM: CT HEAD W/O CONTRAST  LOCATION: [facility]  DATE/TIME: 10/15/2022 4:02 PM    INDICATION: AMS; ? subtle left facial droop  COMPARISON: Head CT 2020  TECHNIQUE: Routine CT Head without IV contrast. Multiplanar reformats. Dose reduction techniques were used.    FINDINGS:  INTRACRANIAL CONTENTS: No intracranial hemorrhage. Mild diffuse cerebral parenchymal volume loss. No midline shift. The basilar cisterns are patent. Mild periventricular and scattered foci of deep white matter hypodensities involving both cerebral   hemispheres. No cerebellar tonsillar ectopia. No CT evidence for an acute infarct. Small to moderate-sized area of encephalomalacia involving the right occipital lobe, new since the prior head CT.    VISUALIZED ORBITS/SINUSES/MASTOIDS: No intraorbital abnormality. No paranasal sinus mucosal disease. No middle ear or mastoid effusion.    BONES/SOFT TISSUES: No acute abnormality.      Impression    IMPRESSION:  1.  No intracranial hemorrhage, mass lesions, hydrocephalus or CT evidence for an acute infarct.  2.  Mild diffuse cerebral parenchymal volume loss. Presumed chronic hypertensive/microvascular ischemic white matter changes.  3.  Small to moderate-sized area of encephalomalacia involving the right occipital lobe, new since the prior head CT 2020.   Echocardiogram Complete     Value    LVEF  55-60%    Narrative    [id number]  [id number]  [id number]  [id number]^LAURIE^[facility]  Echocardiography Laboratory  [address] [place] [address]     Name: REYMUNDO LUCERO  MRN: [id number]  : 1940  Study Date: 10/13/2022 01:52 PM  Age: 82 yrs  Gender: Female  Patient Location: [facility]  Reason For Study: MI  Ordering Physician:  JONATHAN SULLIVAN  Performed By: Stefany Mason RDCS     BSA: 1.6 m2  Height: 66 in  Weight: 122 lb  HR: 69  BP: 122/76 mmHg  ______________________________________________________________________________  Procedure  Complete Portable Echo Adult. Optison (NDC #[id number]) given intravenously.  ______________________________________________________________________________  Interpretation Summary     The visual ejection fraction is 55-60%.  Normal left ventricular wall motion  Inferior wall either normal or borderline hypokinetic  There is mild right ventricular hypertrophy.  ______________________________________________________________________________  Left Ventricle  The left ventricle is normal in size. There is borderline concentric left  ventricular hypertrophy. The visual ejection fraction is 55-60%. Left  ventricular diastolic function is abnormal. Grade III or advanced diastolic  dysfunction. Normal left ventricular wall motion. Inferior wall either normal  or borderline hypokinetic.     Right Ventricle  There is mild right ventricular hypertrophy. The right ventricle is normal in  size and function.     Atria  The left atrium is moderately dilated. The right atrium is mildly dilated.     Mitral Valve  Thickened mitral valve anterior leaflet. There is trace mitral regurgitation.     Tricuspid Valve  There is trace tricuspid regurgitation. Right ventricle systolic pressure  estimate normal. The right ventricular systolic pressure is approximated at  12.8 mmHg plus the right atrial pressure. IVC diameter <2.1 cm collapsing >50%  with sniff suggests a normal RA pressure of 3 mmHg.     Aortic Valve  The aortic valve is trileaflet. No hemodynamically significant valvular aortic  stenosis.     Pulmonic Valve  The pulmonic valve is not well seen, but is grossly normal.     Vessels  The aortic root is normal size. The inferior vena cava is normal.     Pericardium  The pericardium appears normal.     Rhythm  Sinus rhythm  was noted.  ______________________________________________________________________________  MMode/2D Measurements & Calculations     IVSd: 1.1 cm  LVIDd: 4.3 cm  LVIDs: 2.8 cm  LVPWd: 1.1 cm  FS: 36.3 %  LV mass(C)d: 171.5 grams  LV mass(C)dI: 105.8 grams/m2  Ao root diam: 3.4 cm  LA dimension: 3.8 cm  asc Aorta Diam: 3.5 cm  LA/Ao: 1.1  LA Volume (BP): 92.2 ml  LA Volume Index (BP): 56.9 ml/m2  RWT: 0.53     Doppler Measurements & Calculations  MV E max husam: 102.0 cm/sec  MV A max husam: 48.4 cm/sec  MV E/A: 2.1  MV dec slope: 671.2 cm/sec2  PA acc time: 0.12 sec  TR max husam: 179.2 cm/sec  TR max P.8 mmHg  E/E' av.5  Lateral E/e': 14.9  Medial E/e': 18.0     ______________________________________________________________________________  Report approved by: Brielle Boykin 10/13/2022 04:57 PM

## 2022-10-18 NOTE — PLAN OF CARE
Goal Outcome Evaluation:  Confused, calm, disoriented x4. On RA sat 90s. Tele SR. Diet Pureed w/thickened liquids. Takes pills crushed with pudding +thickened fluids -tolerated well. No-nonverbal pain indicators noted. Turned/repo q2hrs. Prevalon boots in place. Plans for  safe discharge  to memory care group home pending evaluation per palliative care.

## 2022-10-19 NOTE — PLAN OF CARE
Physical Therapy Discharge Summary    Reason for therapy discharge:    Discharged to long term care facility.    Progress towards therapy goal(s). See goals on Care Plan in Epic electronic health record for goal details.  Goals not met.  Barriers to achieving goals:   discharge from facility.    Therapy recommendation(s):    Patient returning home to [facility] with [facility].  No further physical therapy indicated.

## 2022-10-19 NOTE — PROGRESS NOTES
[facility]    Background: Transitional Care Management program auto-identified and prompting a chart review by [facility] team.    Assessment: Upon chart review, [facility] Team member will cancel/close this episode of Transitional Care Management program due to reason below:    Patient has been discharged with Hospice Care    Plan: Transitional Care Management episode closed per reason above.      Bailey Calvo  Community Health Worker  [facility] [place]: [phone]    *Connected Care Resource Team does NOT follow patient ongoing. Referrals are identified based on internal discharge reports and the outreach is to ensure patient has an understanding of their discharge instructions.

## 2024-01-18 NOTE — PHARMACY-ADMISSION MEDICATION HISTORY
Pharmacy Medication History  Admission medication history interview status for the 10/12/2022  admission is complete. See EPIC admission navigator for prior to admission medications     Location of Interview: Outside patient room but on unit  Medication history sources: [facility], [facility] (nursing home) and Care Everywhere    Significant changes made to the medication list:  Added: duloxetine, gabapentin, prn olanzapine  Changed: Vitamin D  Removed: Fluoxetine    In the past week, patient estimated taking medication this percent of the time: greater than 90%      Time spent in this activity: 30 minutes    Prior to Admission medications    Medication Sig Last Dose Taking? Auth Provider Long Term End Date   acetaminophen (TYLENOL) 500 MG tablet Take 1,000 mg by mouth 3 times daily 10/12/2022 at AM Yes Unknown, Entered By History     albuterol (PROAIR HFA/PROVENTIL HFA/VENTOLIN HFA) 108 (90 Base) MCG/ACT inhaler Inhale 2 puffs into the lungs every 6 hours as needed Unknown at prn Yes Reported, Patient     donepezil (ARICEPT) 10 MG tablet Take 10 mg by mouth At Bedtime  10/11/2022 at HS Yes Unknown, Entered By History     DULoxetine (CYMBALTA) 20 MG capsule Take 40 mg by mouth daily 10/12/2022 at AM Yes Unknown, Entered By History Yes    fluticasone (FLONASE) 50 MCG/ACT nasal spray Spray 2 sprays into both nostrils daily 10/12/2022 at AM Yes Unknown, Entered By History     gabapentin (NEURONTIN) 100 MG capsule Take 100 mg by mouth 2 times daily 10/12/2022 at AM Yes Unknown, Entered By History Yes    guaiFENesin-dextromethorphan (ROBITUSSIN DM) 100-10 MG/5ML syrup Take 10 mLs by mouth 3 times daily as needed for cough Unknown at prn Yes Unknown, Entered By History     ipratropium - albuterol 0.5 mg/2.5 mg/3 mL (DUONEB) 0.5-2.5 (3) MG/3ML neb solution Take 1 vial by nebulization every 6 hours as needed for shortness of breath / dyspnea or wheezing Unknown at prn Yes Unknown, Entered By History No    isosorbide mononitrate  The DP pulses are 2+ bilaterally. The PT pulses are 2+ bilaterally. (IMDUR) 30 MG 24 hr tablet Take 0.5 tablets (15 mg) by mouth daily 10/12/2022 at AM Yes Mohan Odonnell MD Yes    loperamide (IMODIUM) 2 MG capsule Take 2 mg by mouth 4 times daily as needed for diarrhea Unknown at prn Yes Unknown, Entered By History     magnesium oxide (MAG-OX) 400 MG tablet Take 400 mg by mouth 2 times daily 10/12/2022 at AM Yes Unknown, Entered By History     melatonin 3 MG tablet Take 3 mg by mouth At Bedtime 10/11/2022 at HS Yes Unknown, Entered By History     metoprolol succinate ER (TOPROL-XL) 25 MG 24 hr tablet Take 0.5 tablets (12.5 mg) by mouth 2 times daily 10/12/2022 at AM Yes Mohan Odonnell MD Yes    muscle rub (ARTHRITIS HOT) 10-15 % CREA Apply topically 3 times daily as needed for moderate pain Unknown at prn Yes Unknown, Entered By History     nitroglycerin (NITROSTAT) 0.4 MG SL tablet Place 1 tablet (0.4 mg) under the tongue every 5 minutes as needed X 3 doses total if needed for chest pain. Unknown at prn Yes Briseida Nicolas,  Yes    OLANZapine (ZYPREXA) 2.5 MG tablet Take 2.5 mg by mouth nightly as needed Unknown at prn Yes Unknown, Entered By History No    OLANZapine (ZYPREXA) 5 MG tablet Take 5 mg by mouth 2 times daily 10/12/2022 at AM Yes Unknown, Entered By History No    order for [facility] Phone [phone] Fax [phone]  [place] Size small Qty 2 sleeves  Primary Dressing fibracol or sweta   Qty 6 sheets  Secondary Dressing 4x4 gauze  Qty 60   Secondary Dressing Vashe Qty 1 bottle  Length of Need: 1 month  Frequency of dressing change: daily Unknown Yes Ronaldo Walls MD     senna-docusate (SENOKOT-S/PERICOLACE) 8.6-50 MG tablet Take 1 tablet by mouth daily as needed for constipation Unknown Yes Unknown, Entered By History     vitamin D3 (CHOLECALCIFEROL) 50 mcg (2000 units) tablet Take 2,000 Units by mouth daily 10/12/2022 at AM Yes Unknown, Entered By History         The information provided in this note is only as  accurate as the sources available at the time of update(s)